# Patient Record
Sex: FEMALE | Race: WHITE | Employment: OTHER | URBAN - METROPOLITAN AREA
[De-identification: names, ages, dates, MRNs, and addresses within clinical notes are randomized per-mention and may not be internally consistent; named-entity substitution may affect disease eponyms.]

---

## 2017-01-18 ENCOUNTER — OFFICE VISIT (OUTPATIENT)
Dept: FAMILY MEDICINE | Facility: CLINIC | Age: 67
End: 2017-01-18

## 2017-01-18 VITALS
RESPIRATION RATE: 18 BRPM | SYSTOLIC BLOOD PRESSURE: 106 MMHG | DIASTOLIC BLOOD PRESSURE: 71 MMHG | TEMPERATURE: 98 F | HEART RATE: 65 BPM

## 2017-01-18 DIAGNOSIS — R42 VERTIGO: ICD-10-CM

## 2017-01-18 DIAGNOSIS — K59.09 OTHER CONSTIPATION: ICD-10-CM

## 2017-01-18 DIAGNOSIS — K59.09 OTHER CONSTIPATION: Primary | ICD-10-CM

## 2017-01-18 LAB
ANION GAP SERPL CALCULATED.3IONS-SCNC: 7 MMOL/L (ref 3–14)
BASOPHILS # BLD AUTO: 0.1 10E9/L (ref 0–0.2)
BASOPHILS NFR BLD AUTO: 0.8 %
BUN SERPL-MCNC: 11 MG/DL (ref 7–30)
CALCIUM SERPL-MCNC: 8.7 MG/DL (ref 8.5–10.1)
CHLORIDE SERPL-SCNC: 107 MMOL/L (ref 94–109)
CO2 SERPL-SCNC: 27 MMOL/L (ref 20–32)
CREAT SERPL-MCNC: 0.73 MG/DL (ref 0.52–1.04)
DIFFERENTIAL METHOD BLD: NORMAL
EOSINOPHIL # BLD AUTO: 0.2 10E9/L (ref 0–0.7)
EOSINOPHIL NFR BLD AUTO: 2.1 %
ERYTHROCYTE [DISTWIDTH] IN BLOOD BY AUTOMATED COUNT: 13.8 % (ref 10–15)
GFR SERPL CREATININE-BSD FRML MDRD: 80 ML/MIN/1.7M2
GLUCOSE SERPL-MCNC: 85 MG/DL (ref 70–99)
HCT VFR BLD AUTO: 38.3 % (ref 35–47)
HGB BLD-MCNC: 12.8 G/DL (ref 11.7–15.7)
IMM GRANULOCYTES # BLD: 0 10E9/L (ref 0–0.4)
IMM GRANULOCYTES NFR BLD: 0.3 %
LYMPHOCYTES # BLD AUTO: 2 10E9/L (ref 0.8–5.3)
LYMPHOCYTES NFR BLD AUTO: 27.8 %
MCH RBC QN AUTO: 31 PG (ref 26.5–33)
MCHC RBC AUTO-ENTMCNC: 33.4 G/DL (ref 31.5–36.5)
MCV RBC AUTO: 93 FL (ref 78–100)
MONOCYTES # BLD AUTO: 0.5 10E9/L (ref 0–1.3)
MONOCYTES NFR BLD AUTO: 6.7 %
NEUTROPHILS # BLD AUTO: 4.5 10E9/L (ref 1.6–8.3)
NEUTROPHILS NFR BLD AUTO: 62.3 %
NRBC # BLD AUTO: 0 10*3/UL
NRBC BLD AUTO-RTO: 0 /100
PLATELET # BLD AUTO: 249 10E9/L (ref 150–450)
POTASSIUM SERPL-SCNC: 4.2 MMOL/L (ref 3.4–5.3)
RBC # BLD AUTO: 4.13 10E12/L (ref 3.8–5.2)
SODIUM SERPL-SCNC: 141 MMOL/L (ref 133–144)
TSH SERPL DL<=0.005 MIU/L-ACNC: 0.76 MU/L (ref 0.4–4)
WBC # BLD AUTO: 7.2 10E9/L (ref 4–11)

## 2017-01-18 RX ORDER — MECLIZINE HCL 12.5 MG 12.5 MG/1
12.5 TABLET ORAL 3 TIMES DAILY PRN
Qty: 30 TABLET | Refills: 1 | Status: SHIPPED | OUTPATIENT
Start: 2017-01-18 | End: 2018-05-09

## 2017-01-18 ASSESSMENT — PAIN SCALES - GENERAL: PAINLEVEL: MILD PAIN (3)

## 2017-01-18 NOTE — NURSING NOTE
Chief Complaint   Patient presents with     Dizziness     Patient is here to discuss ongoing dizzy spells     Rowena Ghotra CMA 3:24 PM on 1/18/2017.

## 2017-01-18 NOTE — PROGRESS NOTES
"SUBJECTIVE:    Pt is a 66 year old female with pmh of     Patient Active Problem List   Diagnosis     Transient cerebral ischemia     Carotid artery dissection (H)     IBS (irritable bowel syndrome)     Screen for colon cancer     Visit for screening mammogram     Encounter for routine gynecological examination     Hypothyroidism     CARDIOVASCULAR SCREENING; LDL GOAL LESS THAN 130     Rash and other nonspecific skin eruption     High cholesterol     Vaginal atrophy     Blepharitis       who is here for evaluation of had concerns including Dizziness.    Here for a new issue.  Dizzy, vertigo type.  Five days.  No head trauma, no recent start/stop meds or supplements.  H/o carotid dissection.  No visual or hearing changes with.   No falls.  No associated URI; some sinus congestion for a few days before started.  Worse if moves head.  No near syncope or syncope    Allergies   Allergen Reactions     Aspirin GI Disturbance     GI upset     Bee Venom Swelling     Birds [Feathers]      Cats      Dust Mites      Fluoride Other (See Comments)     headaches     Mold      No Clinical Screening - See Comments Other (See Comments)     Some antibiotics but not cipro     Novocain [Procaine] Other (See Comments)     headache     Penicillins Unknown     As a child     Seasonal Allergies      Ragweed, pollen     Simvastatin Other (See Comments)     \"hot flash\", muscle weakness, dizziness     Sulfa Drugs Itching     Some associated nausea.        Current Outpatient Prescriptions   Medication Sig Dispense Refill     meclizine (ANTIVERT) 12.5 MG tablet Take 1 tablet (12.5 mg) by mouth 3 times daily as needed for dizziness 30 tablet 1     fluocinonide (LIDEX) 0.05 % external solution Apply topically 2 times daily To scalp as needed. 60 mL 1     albuterol (PROAIR HFA, PROVENTIL HFA, VENTOLIN HFA) 108 (90 BASE) MCG/ACT inhaler Inhale 2 puffs into the lungs every 6 hours as needed for shortness of breath / dyspnea or wheezing 1 Inhaler 1 "     estradiol (ESTRACE) 0.1 MG/GM vaginal cream Place 0.5 g vaginally twice a week       peppermint oil liquid 1 mL daily as needed for other Taking gel tabs not oil, not in EMR.       Hypromellose (ARTIFICIAL TEARS OP) Apply 1 drop to eye daily as needed       EPINEPHrine (EPIPEN) 0.3 MG/0.3ML injection Inject 0.3 mLs (0.3 mg) into the muscle once as needed for anaphylaxis 1 each 3     thyroid (ARMOUR THYROID) 60 MG tablet Take 45 mg by mouth daily       PROGESTERONE 50MG CAPSULES COMPOUND 150 mg At Bedtime 1 capsules in the morning, in addition to bedtime dose.       DiphenhydrAMINE HCl (BENADRYL ALLERGY PO) Take  by mouth as needed. allergies       simethicone (GAS-X EXTRA STRENGTH) 125 MG CHEW Take 2 tablets by mouth 2 times daily.       Fiber POWD Take 1 teaspoonful by mouth daily. Mix with slippery elm.       MAGNESIUM OXIDE PO Take 6-10 tablets by mouth daily.       Lactobacillus (ACIDOPHILUS) CAPS Take 1 capsule by mouth daily.       Charcoal Activated (CHARCOAL PO) Take 6 capsules by mouth daily as needed. For indigestion       Digestive Enzymes TABS Use as directed       Omega-3 Fatty Acids (FISH OIL PO) Take 1 capsule by mouth daily. Plus Vit E       HYDROCHLORIC ACID Take as directed       Cholecalciferol (VITAMIN D) 2000 UNIT tablet Take 1 tablet by mouth daily.         Social History   Substance Use Topics     Smoking status: Never Smoker      Smokeless tobacco: Never Used     Alcohol Use: No         OBJECTIVE:  /71 mmHg  Pulse 65  Temp(Src) 98  F (36.7  C) (Oral)  Resp 18  Breastfeeding? No  GENERAL APPEARANCE: Alert, no acute distress  EYES: PERRL, EOM normal, conjunctiva and lids normal  HENT: Ears and TMs normal, oral mucosa and posterior oropharynx normal  NECK: No adenopathy,masses or thyromegaly  CV: normal rate, regular rhythm, no murmur or gallop  NEURO: Alert, oriented, speech and mentation normal--no nystagmus on lateral gaze, normal romberg and gait  PSYCHE: mentation appears  normal, affect and mood normal    ASSESSMENT/PLAN:    New vertigo h/o carotid dissection  Labs    Prn meclizine  ENT consult  Call if worse    CAL PITTMAN MD

## 2017-01-18 NOTE — MR AVS SNAPSHOT
After Visit Summary   1/18/2017    Jennifer Steiner    MRN: [id number]           Patient Information     Date Of Birth          1950        Visit Information        Provider Department      1/18/2017 3:05 PM Milan Alvarez MD [facility]        Today's Diagnoses     Other constipation    -  1     Vertigo           Care Instructions    Primary Care Center Medication Refill Request Information:  * Please contact your pharmacy regarding ANY request for medication refills.  ** [facility] Prescription Fax = [phone]  * Please allow 3 business days for routine medication refills.  * Please allow 5 business days for controlled substance medication refills.     Primary Care Center Test Result notification information:  *You will be notified with in 7-10 days of your appointment day regarding the results of your test.  If you are on MyChart you will be notified as soon as the provider has reviewed the results and signed off on them.  MRI Screening Tool    Does the patient have any metals in their body? no  Is the patient claustrophobic? no  Does the patient need sedation? no  Is the patient able to transport themself to a table? yes            Follow-ups after your visit        Your next 10 appointments already scheduled     Jan 18, 2017  4:15 PM   LAB with  LAB    [facility] ([facility])    [address] [place] [id number] [phone]           Patient must bring picture ID.  Patient should be prepared to give a urine specimen  Please do not eat 10-12 hours before your appointment if you are coming in fasting for labs on lipids, cholesterol, or glucose (sugar).  Pregnant women should follow their Care Team instructions. Water with medications is okay. Do not drink coffee or other fluids.   If you have concerns about taking  your medications, please ask at office or if scheduling via [employer], send a message by clicking on Secure  Messaging, Message Your Care Team.            Jan 20, 2017  3:00 PM   MR BRAIN FOR STROKE COMPLETE with [id number]   [facility] MRI ([facility])    [address] [facility] [address] [phone]           Take your medicines as usual, unless your doctor tells you not to. Bring a list of your current medicines to your exam (including vitamins, minerals and over-the-counter drugs). Also bring the results of similar scans you may have had.  Please remove any body piercings and hair extensions before you arrive.  Follow your doctor s orders. If you do not, we may have to postpone your exam.  You will not have contrast for this exam. You do not need to do anything special to prepare.  The MRI machine uses a strong magnet. Please wear clothes without metal (snaps, zippers). A sweatsuit works well, or we may give you a hospital gown.   **IMPORTANT** THE INSTRUCTIONS BELOW ARE ONLY FOR THOSE PATIENTS WHO HAVE BEEN TOLD THEY WILL RECEIVE SEDATION OR GENERAL ANESTHESIA DURING THEIR MRI PROCEDURE:  IF YOU WILL RECEIVE SEDATION (take medicine to help you relax during your exam):   You must get the medicine from your doctor before you arrive. Bring the medicine to the exam. Do not take it at home.   Arrive one hour early. Bring someone who can take you home after the test. Your medicine will make you sleepy. After the exam, you may not drive, take a bus or take a taxi by yourself.   No eating 8 hours before your exam. You may have clear liquids up until 4 hours before your exam. (Clear liquids include water, clear tea, black coffee and fruit juice without pulp.)  IF YOU WILL RECEIVE ANESTHESIA (be asleep for your exam):   Arrive 1 1/2 hours early. Bring someone who can take you home after the test. You may not drive, take a bus or take a taxi by yourself.   No eating 8 hours before your exam. You may have clear liquids up until 4 hours before your exam. (Clear  liquids include water, clear tea, black coffee and fruit juice without pulp.)   You will spend four to five hours in the recovery room.  Please call the Imaging Department at your exam site with any questions.            2017  4:20 PM   (Arrive by 4:05 PM)   Return Visit with Thelma Arvizu MD   [facility] ([facility])    [address] [place] [id number] [phone]              Future tests that were ordered for you today     Open Future Orders        Priority Expected Expires Ordered    CBC with platelets differential Routine  2018    TSH with free T4 reflex Routine  2018    Basic metabolic panel Routine  2018    MR Brain for Stroke Cmpl w/o & w Contras Routine  2018            Who to contact     Please call your clinic at [phone] to:    Ask questions about your health    Make or cancel appointments    Discuss your medicines    Learn about your test results    Speak to your doctor   If you have compliments or concerns about an experience at your clinic, or if you wish to file a complaint, please contact [facility] Patient Relations at [phone] or email us at [email or web address]         Additional Information About Your Visit        Notion Systemshart Information     [email or web address] is an electronic gateway that provides easy, online access to your medical records. With [email or web address], you can request a clinic appointment, read your test results, renew a prescription or communicate with your care team.     To sign up for [facility] visit the website at [email or web address]   You will be asked to enter the access code listed below, as well as some personal information. Please follow the directions to create your username and password.     Your access code is: [email or web address]  Expires: 2017  6:30 AM     Your access code will   in 90 days. If you need help or a new code, please contact your [facility] or call [phone] for assistance.        Care EveryWhere ID     This is your Care EveryWhere ID. This could be used by other organizations to access your [facility] medical records  [id number]        Your Vitals Were     Pulse Temperature Respirations Breastfeeding?          65 98  F (36.7  C) (Oral) 18 No         Blood Pressure from Last 3 Encounters:   01/18/17 106/71   06/01/16 112/68   02/17/16 118/72    Weight from Last 3 Encounters:   07/13/16 65.772 kg (145 lb)   06/01/16 68.04 kg (150 lb)   11/18/15 69.491 kg (153 lb 3.2 oz)                 Today's Medication Changes          These changes are accurate as of: 1/18/17  4:06 PM.  If you have any questions, ask your nurse or doctor.               Start taking these medicines.        Dose/Directions    meclizine 12.5 MG tablet   Commonly known as:  ANTIVERT   Used for:  Vertigo   Started by:  Milan Alvarez MD        Dose:  12.5 mg   Take 1 tablet (12.5 mg) by mouth 3 times daily as needed for dizziness   Quantity:  30 tablet   Refills:  1            Where to get your medicines      These medications were sent to [facility] [address] [place] [address] AT [address] [place] [id number]    Hours:  24-hours Phone:  [phone]    - meclizine 12.5 MG tablet             Primary Care Provider Office Phone # Fax #    Milan Alvarez [phone]       [facility] [address] [place] [id number]        Thank you!     Thank you for choosing [facility]  for your care. Our goal is always to provide you with excellent care. Hearing back from our patients is one way we can continue to improve our services. Please take a few minutes to complete the written survey that you may receive in the mail after your visit with us. Thank you!              Your Updated Medication List - Protect others around you: Learn how to safely use, store and throw away your medicines at www.disposemymeds.org.          This list is accurate as of: 1/18/17  4:06 PM.  Always use your most recent med list.                   Brand Name Dispense Instructions for use    acidophilus Caps      Take 1 capsule by mouth daily.       albuterol 108 (90 BASE) MCG/ACT Inhaler    PROAIR HFA/PROVENTIL HFA/VENTOLIN HFA    1 Inhaler    Inhale 2 puffs into the lungs every 6 hours as needed for shortness of breath / dyspnea or wheezing       ARMOUR THYROID 60 MG tablet   Generic drug:  thyroid      Take 45 mg by mouth daily       ARTIFICIAL TEARS OP      Apply 1 drop to eye daily as needed       BENADRYL ALLERGY PO      Take  by mouth as needed. allergies       CHARCOAL PO      Take 6 capsules by mouth daily as needed. For indigestion       Digestive Enzymes Tabs      Use as directed       EPINEPHrine 0.3 MG/0.3ML injection    EPIPEN    1 each    Inject 0.3 mLs (0.3 mg) into the muscle once as needed for anaphylaxis       estradiol 0.1 MG/GM cream    ESTRACE     Place 0.5 g vaginally twice a week       Fiber Powd      Take 1 teaspoonful by mouth daily. Mix with slippery elm.       FISH OIL PO      Take 1 capsule by mouth daily. Plus Vit E       fluocinonide 0.05 % solution    LIDEX    60 mL    Apply topically 2 times daily To scalp as needed.       GAS-X EXTRA STRENGTH 125 MG Chew chewable tablet   Generic drug:  simethicone      Take 2 tablets by mouth 2 times daily.       HYDROCHLORIC ACID      Take as directed       MAGNESIUM OXIDE PO      Take 6-10 tablets by mouth daily.       meclizine 12.5 MG tablet    ANTIVERT    30 tablet    Take 1 tablet (12.5 mg) by mouth 3 times daily as needed for dizziness       peppermint oil oil      1 mL daily as needed for other Taking gel tabs not oil, not in EMR.       PROGESTERONE 50MG CAPSULES COMPOUND      150 mg At Bedtime 1 capsules in the morning, in  addition to bedtime dose.       vitamin D 2000 UNITS tablet      Take 1 tablet by mouth daily.

## 2017-01-20 ENCOUNTER — TELEPHONE (OUTPATIENT)
Dept: NURSING | Facility: CLINIC | Age: 67
End: 2017-01-20

## 2017-01-20 ENCOUNTER — TELEPHONE (OUTPATIENT)
Dept: INTERNAL MEDICINE | Facility: CLINIC | Age: 67
End: 2017-01-20

## 2017-01-20 ENCOUNTER — HOSPITAL ENCOUNTER (OUTPATIENT)
Dept: MRI IMAGING | Facility: CLINIC | Age: 67
Discharge: HOME | End: 2017-01-20
Attending: FAMILY MEDICINE | Admitting: FAMILY MEDICINE
Payer: MEDICARE

## 2017-01-20 DIAGNOSIS — R42 VERTIGO: Primary | ICD-10-CM

## 2017-01-20 DIAGNOSIS — R42 VERTIGO: ICD-10-CM

## 2017-01-20 PROCEDURE — 25500064 ZZH RX 255 OP 636: Performed by: FAMILY MEDICINE

## 2017-01-20 PROCEDURE — 70553 MRI BRAIN STEM W/O & W/DYE: CPT

## 2017-01-20 PROCEDURE — 70549 MR ANGIOGRAPH NECK W/O&W/DYE: CPT

## 2017-01-20 PROCEDURE — A9585 GADOBUTROL INJECTION: HCPCS | Performed by: FAMILY MEDICINE

## 2017-01-20 RX ORDER — GADOBUTROL 604.72 MG/ML
7.5 INJECTION INTRAVENOUS ONCE
Status: COMPLETED | OUTPATIENT
Start: 2017-01-20 | End: 2017-01-20

## 2017-01-20 RX ADMIN — GADOBUTROL 7.5 ML: 604.72 INJECTION INTRAVENOUS at 15:50

## 2017-01-20 NOTE — TELEPHONE ENCOUNTER
"Jennifer called the nurse triage line 01/20/2017 @ 0300. She says that the ENT clinic does not have the referral sent by Dr. Alvarez. She thinks she could get a sooner appointment if she gets a referral. She seems to be somewhat confused. Sometimes saying that she can not make an ENT appointment, other times saying that they (ENT clinic)  just won't call her back. Jennifer says she \"slips through the cracks\" and rarely gets calls back. I advised her to call back to the ENT clinic today during office hours, but she insisted that she needs to speak with someone from Dr. Hollins office first.  She is requesting a call back from someone from Dr. Alvarez's office to discuss her ENT referral. Call her at [phone] to clarify. Thank you.     Reshma Medina RN FNA    Routed to: Dr. Alvarez and RN pool  "

## 2017-01-20 NOTE — TELEPHONE ENCOUNTER
Call Type: Triage Call    Presenting Problem: Jennifer says her [facility] MD was going to call her to  make a ENT referral for her. She says the ENT clinic can not  schedule without it. Message sent to [facility] MD, Dr. Alvarez.  Triage Note:  Guideline Title: Information Only Call; No Symptom Triage (Adult)  Recommended Disposition: Provide Information or Advice Only  Original Inclination: Wanted to speak with a nurse  Override Disposition:  Intended Action: Follow advice given  Physician Contacted: No  Follow-up call to recent contact; no triage required. Information provided from  past call documentation, approved references or experience. ?  YES  Requesting regular office appointment ? NO  Sign(s) or symptom(s) associated with a diagnosed condition or with a new illness  ? NO  Requesting information about provider, services or community resources ? NO  Call back to complete assessment/clarification of information from prior caller to  complete triage ? NO  Requesting information and provider is best resource; no triage required. ? NO  Caller not with patient and is unable to provide clinical information about  patient to facilitate triage. ? NO  Requesting provider information for recently scheduled test, procedure; no triage  required. Needed information not available per approved resources or clinical  experience. ? NO  Requesting information not available per approved reference or clinical  experience; no triage required. ? NO  Requesting information regarding scheduled exam, test or procedure; no triage  required. Information provided from approved resources or clinical experience. ?  NO  General information question; no triage required. Information provided from  approved references or knowledge of organization. ? NO  Health information question; person denies any symptoms, no triage required.  Information provided from approved references or clinical experience. ? NO  Physician Instructions:  Care Advice:

## 2017-02-06 ENCOUNTER — TELEPHONE (OUTPATIENT)
Dept: OTOLARYNGOLOGY | Facility: CLINIC | Age: 67
End: 2017-02-06

## 2017-02-06 NOTE — TELEPHONE ENCOUNTER
Per Call Center:Pt stated she had an audiogram done 1-2 years ago, I explained that she will still need one done here since we have never seen her and we need an updated audiogram. Pt wants to cancel audiogram but wanted to talk to a nurse first before doing so. She also wanted to know if she can be seen sooner than scheduled because she feels like her vertigo is going away. Pt can be reached at [phone], thanks.     Patient has been seen by Dr Washington for eval of potential slient sinus- in 2014- this was negative at that time.  Dr Nissen saw with audio in 2012 for dizziness. She also saw Neurology

## 2017-02-10 ENCOUNTER — TELEPHONE (OUTPATIENT)
Dept: DERMATOLOGY | Facility: CLINIC | Age: 67
End: 2017-02-10

## 2017-02-10 DIAGNOSIS — R42 VERTIGO: Primary | ICD-10-CM

## 2017-02-10 NOTE — TELEPHONE ENCOUNTER
----- Message from Eulaliajackie Burgos sent at 2/10/2017  8:36 AM CST -----  Regarding: PT HOPING TO BE SEEN ON 2/14 FOR CHANGING SPOT ON CHEST   Contact: [phone]  Pt has spot on chest that is changing in size quickly and would like to be seen on 2/14 if possible when she is here for some other appointments. Pt has vertigo and has a hard time going out and would like to be fit in that day if possible while she [place] [facility]. I checked schedules and wasn't able to pull up any availability. Pt asked if a message could be sent to see if she could be fit in.    Please give Pt a call back at [phone]. Okay to leave message.    Thank you,    Eulalia  Call Center    Please DO NOT send message and or reply back to sender. Call center Representatives DO NOT respond to Messages.

## 2017-02-10 NOTE — TELEPHONE ENCOUNTER
Called patient regarding getting an appointment for 2/14. Jennifer didn't answer but I did leave a message for her to call back. Dr. Bonilla has a SBA slot available on 2/14 @ 9:00

## 2017-02-13 NOTE — TELEPHONE ENCOUNTER
"Recommend repeat audio.Last seen in 2012 for dizziness, at that time, there was no otologic     Dr Nissen note/ plan \" I talked with the patient today for some time and went over the audiogram with her and usual symptoms and associated auditory symptoms we will see with dizziness from an otologic standpoint. She does not sound characteristic at all for dizziness emanating from the vestibular system. I think with her history of the carotid artery and some stroke symptoms in the past, I would encourage her to keep her neurology appointment later today. Again, I do not think this is related to otologic entity. \"    Recent MRI 1/20/2017:Impression:  1. No acute infarct.  2. Head MRA demonstrates no aneurysm or stenosis of the major  intracranial arteries.  3. Neck MRA demonstrates patent major cervical arteries. No evidence  of dissection.  4. Mild chronic small vessel ischemic disease.  "

## 2017-02-14 ENCOUNTER — OFFICE VISIT (OUTPATIENT)
Dept: AUDIOLOGY | Facility: CLINIC | Age: 67
End: 2017-02-14

## 2017-02-14 ENCOUNTER — OFFICE VISIT (OUTPATIENT)
Dept: OTOLARYNGOLOGY | Facility: CLINIC | Age: 67
End: 2017-02-14

## 2017-02-14 DIAGNOSIS — R42 DIZZINESS: Primary | ICD-10-CM

## 2017-02-14 DIAGNOSIS — H61.23 EXCESSIVE CERUMEN IN BOTH EAR CANALS: ICD-10-CM

## 2017-02-14 DIAGNOSIS — R42 DIZZINESS AND GIDDINESS: Primary | ICD-10-CM

## 2017-02-14 ASSESSMENT — PAIN SCALES - GENERAL: PAINLEVEL: NO PAIN (1)

## 2017-02-14 NOTE — LETTER
"2/14/2017       RE: Jennifer Steiner  [address]  [facility] [address]     Dear Colleague,    Thank you for referring your patient, Jennifer Steiner, to the [facility] EAR NOSE AND THROAT at [facility]. Please see a copy of my visit note below.    Dear Milan Paredes:    I had the pleasure of meeting Jennifer Steiner in consultation today at the [facility] at your request.    HISTORY OF PRESENT ILLNESS: The patient is a 66-year-old in today for assessment of dizziness.  On January 13th she had a severe episode of spinning for a couple hours.  There is no nausea or vomiting.  She was able to get to an acupuncture treatment  later that day.  She has had mild off balance that has slowly been getting better since then.  Seems to be in all positions but worse when she is up.  She also went to the chiropractor.  She is not really aware of any hearing change or fluctuation.  She does have bilateral tinnitus.  There is no pain or drainage in the ears.  She does have occasional pressure.  Off balance is still present but slowly getting better.           ALLERGIES:    Allergies   Allergen Reactions     Aspirin GI Disturbance     GI upset     Bee Venom Swelling     Birds [Feathers]      Cats      Dust Mites      Fluoride Other (See Comments)     headaches     Mold      No Clinical Screening - See Comments Other (See Comments)     Some antibiotics but not cipro     Novocain [Procaine] Other (See Comments)     headache     Penicillins Unknown     As a child     Seasonal Allergies      Ragweed, pollen     Simvastatin Other (See Comments)     \"hot flash\", muscle weakness, dizziness     Sulfa Drugs Itching       HABITS:   Alcohol use No    History   Smoking Status     Never Smoker   Smokeless Tobacco     Never Used         PAST MEDICAL HISTORY: Please see today's intake form (for the remainder of the PMH) which I reviewed and signed.  Past " Medical History   Diagnosis Date     Anemia      Asthma      since childhood     Back injury      Blepharitis      Constipation, chronic      Chronic enema use     CVA (cerebral infarction) 2009     carotid intimal repair, left     Hyperlipidemia age 58     identified age 58     Hypothyroidism      Immune disorder (H)      Irritable bowel disease      Lung disease      Neck injuries      Pelvic floor dysfunction      Postmenopausal hormone replacement therapy      From Dr. Link     Reduced vision      Scoliosis      Shortness of breath        FAMILY HISTORY/SOCIAL HISTORY:   Family History   Problem Relation Age of Onset     DIABETES Father      GASTROINTESTINAL DISEASE Father 82      after surgery for Bowel obstruction     Alcohol/Drug Brother      GASTROINTESTINAL DISEASE Brother      Hepatitis     HEART DISEASE Brother 62     hx of drug abuse,  age 62     DIABETES Brother      Skin Cancer Brother      Allergies Brother      Anesthesia Reaction No family hx of      Colon Cancer No family hx of      Crohn Disease No family hx of      Ulcerative Colitis No family hx of      Colon Polyps No family hx of      Glaucoma No family hx of      Macular Degeneration No family hx of      CANCER Sister 56     bone cancer     Melanoma Mother      Skin Cancer Mother      Anxiety Disorder Sister      Substance Abuse Sister      Substance Abuse Brother      Anesthesia Reaction Brother      Asthma Brother      CANCER Sister      DIABETES Brother      OSTEOPOROSIS Mother      Obesity Father      Obesity Brother      Alcoholism Sister      Alcoholism Brother      Bone Cancer Sister     Social History     Social History     Marital status: Single     Spouse name: N/A     Number of children: N/A     Years of education: N/A     Occupational History     disability           Social History Main Topics     Smoking status: Never Smoker     Smokeless tobacco: Never Used     Alcohol use No     Drug use: No      Sexual activity: No     Other Topics Concern     Not on file     Social History Narrative       REVIEW OF SYSTEMS: Please see today's intake form (for the remainder of the ROS) which I have reviewed and signed.    PHYSICIAL EXAMINATION:  Constitutional: The patient was well-groomed and in no acute distress.   Skin: Warm and pink.  Psychiatric: The patient's affect was calm, cooperative, and appropriate.   Respiratory: Breathing comfortably without stridor or exertion of accessory muscles.  Eyes: Pupils were equal and reactive. Extraocular movement intact.   Head: Normocephalic and atraumatic. No lesions or scars.  Ears: Both ears were examined under the microscope for microscopic assessment and cleaning.  The right side was cleaned of cerumen with curette.  Tympanic membrane and middle ear look normal after cleaning.  The opposite ear was cleaned and examined using the microscope, curettes and similar techniques.  Tympanic membrane and middle ear look normal after cleaning.     Nose: Sinuses were nontender. Anterior rhinoscopy revealed midline septum and absence of purulence or polyps.  Oral Cavity: Normal tongue, floor of moth, buccal mucosa, and palate. No lesions or masses on inspection or palpation. No abnormal lymph tissue in the oropharynx.   Neck: The parotid is soft without masses. Supple with normal laryngeal and tracheal landmarks.   Lymphatic: There is no palpable lymphadenopathy or other masses in the neck.   Neurologic: Alert and oriented x 3. Cranial nerves III-XI within normal limits. Voice quality normal.  Cerebellar Function Tests:  Grossly normal    Audiogram:  AUDIOGRAM:  Audiogram performed shows a bilateral high-frequency sensorineural hearing loss, slightly worse on the left above 3000 Hz.  Good discrimination at 100% in each ear.           IMPRESSION AND PLAN: I talked with the patient for some time and I suspect she may have had a labyrinthitis.  In any event, it is slowly improving.  Just  1-time episode of spinning.  I would just monitor for now and if things do not clear completely she will return for further testing.         Again, thank you for allowing me to participate in the care of your patient.      Sincerely,    Rick L. Nissen, MD

## 2017-02-14 NOTE — NURSING NOTE
Chief Complaint   Patient presents with     RECHECK     dizziness, vertigo. Seen in audiology. Declined height and weight.     Haresh Wilson LPN

## 2017-02-14 NOTE — PATIENT INSTRUCTIONS
Plan of Care:  1. Follow up with Dr. Nissen as needed.    Clinic Information:  1. To schedule an appointment please call [phone], option 1  2. To talk to a Triage RN please call [phone] select option 3    Marybeth Alex RN  [facility] ENT   Head & Neck Surgery

## 2017-02-14 NOTE — MR AVS SNAPSHOT
After Visit Summary   2/14/2017    Jennifer Steiner    MRN: [id number]           Patient Information     Date Of Birth          1950        Visit Information        Provider Department      2/14/2017 2:00 PM Nissen, Rick L, MD [facility] Ear Nose and Throat        Today's Diagnoses     Dizziness    -  1    Excessive cerumen in both ear canals          Care Instructions    Plan of Care:  1. Follow up with Dr. Nissen as needed.    Clinic Information:  1. To schedule an appointment please call [phone], option 1  2. To talk to a Triage RN please call [phone] select option 3    Marybeth Alex RN  [facility] ENT   Head & Neck Surgery             Follow-ups after your visit        Your next 10 appointments already scheduled     Feb 21, 2017  3:00 PM CST   RETURN GENERAL with Damir Cunningham MD   Eye Clinic ([facility])    Oral Michelle [facility]  [address] [address]  [address] [address]  [facility] [address] [phone]            Mar 28, 2017  3:30 PM CDT   (Arrive by 3:15 PM)   RETURN NEUROTOLOGY with Rick L Nissen, MD   [facility] Ear Nose and Throat ([facility])    [address] [place] [address] [phone]            Apr 05, 2017  4:20 PM CDT   (Arrive by 4:05 PM)   Return Visit with Thelma Arvizu MD   [facility] Gastroenterology and IBD ([facility])    [address] [place] [address] [phone]              Who to contact     Please call your clinic at [phone] to:    Ask questions about your health    Make or cancel appointments    Discuss your medicines    Learn about your test results    Speak to your doctor   If you have compliments or concerns about an experience at your clinic, or if you wish to file a complaint, please contact [facility] Patient Relations at [phone] or email us at  [email or web address]         Additional Information About Your Visit        LokalitehariSkoot Information     GapJumperst is an electronic gateway that provides easy, online access to your medical records. With [email or web address], you can request a clinic appointment, read your test results, renew a prescription or communicate with your care team.     To sign up for [email or web address] visit the website at [email or web address]   You will be asked to enter the access code listed below, as well as some personal information. Please follow the directions to create your username and password.     Your access code is: [email or web address]  Expires: 2017  6:30 AM     Your access code will  in 90 days. If you need help or a new code, please contact your [facility] or call [phone] for assistance.        Care EveryWhere ID     This is your Care EveryWhere ID. This could be used by other organizations to access your [place] medical records  [id number]         Blood Pressure from Last 3 Encounters:   17 106/71   16 112/68   16 118/72    Weight from Last 3 Encounters:   16 65.8 kg (145 lb)   16 68 kg (150 lb)   11/18/15 69.5 kg (153 lb 3.2 oz)              We Performed the Following     BINOCULAR MICROSCOPY        Primary Care Provider Office Phone # Fax #    Milan Alvarez [phone]       [facility] [address] [place] [address]        Thank you!     Thank you for choosing [facility] EAR NOSE AND THROAT  for your care. Our goal is always to provide you with excellent care. Hearing back from our patients is one way we can continue to improve our services. Please take a few minutes to complete the written survey that you may receive in the mail after your visit with us. Thank you!             Your Updated Medication List - Protect others around you: Learn how to safely use, store and throw away your medicines at  www.disposemymeds.org.          This list is accurate as of: 2/14/17 11:59 PM.  Always use your most recent med list.                   Brand Name Dispense Instructions for use    acidophilus Caps      Take 1 capsule by mouth daily.       albuterol 108 (90 BASE) MCG/ACT Inhaler    PROAIR HFA/PROVENTIL HFA/VENTOLIN HFA    1 Inhaler    Inhale 2 puffs into the lungs every 6 hours as needed for shortness of breath / dyspnea or wheezing       ARMOUR THYROID 60 MG tablet   Generic drug:  thyroid      Take 45 mg by mouth daily       ARTIFICIAL TEARS OP      Apply 1 drop to eye daily as needed       BENADRYL ALLERGY PO      Take  by mouth as needed. allergies       CHARCOAL PO      Take 6 capsules by mouth daily as needed. For indigestion       Digestive Enzymes Tabs      Use as directed       EPINEPHrine 0.3 MG/0.3ML injection    EPIPEN    1 each    Inject 0.3 mLs (0.3 mg) into the muscle once as needed for anaphylaxis       estradiol 0.1 MG/GM cream    ESTRACE     Place 0.5 g vaginally twice a week       Fiber Powd      Take 1 teaspoonful by mouth daily. Mix with slippery elm.       FISH OIL PO      Take 1 capsule by mouth daily. Plus Vit E       fluocinonide 0.05 % solution    LIDEX    60 mL    Apply topically 2 times daily To scalp as needed.       GAS-X EXTRA STRENGTH 125 MG Chew chewable tablet   Generic drug:  simethicone      Take 2 tablets by mouth 2 times daily.       HYDROCHLORIC ACID      Take as directed       MAGNESIUM OXIDE PO      Take 6-10 tablets by mouth daily.       meclizine 12.5 MG tablet    ANTIVERT    30 tablet    Take 1 tablet (12.5 mg) by mouth 3 times daily as needed for dizziness       peppermint oil oil      1 mL daily as needed for other Taking gel tabs not oil, not in EMR.       PROGESTERONE 50MG CAPSULES COMPOUND      150 mg At Bedtime 1 capsules in the morning, in addition to bedtime dose.       vitamin D 2000 UNITS tablet      Take 1 tablet by mouth daily.

## 2017-02-14 NOTE — MR AVS SNAPSHOT
After Visit Summary   2/14/2017    Jennifer Steiner    MRN: [id number]           Patient Information     Date Of Birth          1950        Visit Information        Provider Department      2/14/2017 12:00 PM Betty Ennis AuD [facility] Audiology        Today's Diagnoses     Dizziness and giddiness    -  1       Follow-ups after your visit        Your next 10 appointments already scheduled     Feb 21, 2017  3:00 PM CST   RETURN GENERAL with Damir Cunningham MD   Eye Clinic ([facility])    Oral Michelle [facility]  [address] [facility]  [address] Clin [address]  [facility] [id number] [phone]            Apr 05, 2017  4:20 PM CDT   (Arrive by 4:05 PM)   Return Visit with Thelma Arvizu MD   [facility] Gastroenterology and IBD ([facility])    [address]  [address] [facility] [id number] [phone]              Who to contact     Please call your clinic at [phone] to:    Ask questions about your health    Make or cancel appointments    Discuss your medicines    Learn about your test results    Speak to your doctor   If you have compliments or concerns about an experience at your clinic, or if you wish to file a complaint, please contact [facility] Patient Relations at [phone] or email us at [email or web address]         Additional Information About Your Visit        MyChart Information     Buzzstarter Inct is an electronic gateway that provides easy, online access to your medical records. With Goodpatch, you can request a clinic appointment, read your test results, renew a prescription or communicate with your care team.     To sign up for Buzzstarter Inct visit the website at [email or web address]   You will be asked to enter the access code listed below, as well as some personal information. Please follow the directions to create your username and password.     Your access code is: [email or web address]  Expires:  2017  6:30 AM     Your access code will  in 90 days. If you need help or a new code, please contact your [facility] or call [phone] for assistance.        Care EveryWhere ID     This is your Care EveryWhere ID. This could be used by other organizations to access your [place] medical records  [id number]         Blood Pressure from Last 3 Encounters:   17 106/71   16 112/68   16 118/72    Weight from Last 3 Encounters:   16 65.8 kg (145 lb)   16 68 kg (150 lb)   11/18/15 69.5 kg (153 lb 3.2 oz)              We Performed the Following     AUDIOGRAM/TYMPANOGRAM - INTERFACE     [facility] Eval [facility] Speech Recog ([address])     Horace   ([id number])     Tymps / Reflex   ([id number])        Primary Care Provider Office Phone # Fax #    Milan Alvarez [phone]       PRIMARY [facility]ARE CENTER [address] [place] [address]        Thank you!     Thank you for choosing [facility] AUDIOLOGY  for your care. Our goal is always to provide you with excellent care. Hearing back from our patients is one way we can continue to improve our services. Please take a few minutes to complete the written survey that you may receive in the mail after your visit with us. Thank you!             Your Updated Medication List - Protect others around you: Learn how to safely use, store and throw away your medicines at www.disposemymeds.org.          This list is accurate as of: 17  2:17 PM.  Always use your most recent med list.                   Brand Name Dispense Instructions for use    acidophilus Caps      Take 1 capsule by mouth daily.       albuterol 108 (90 BASE) MCG/ACT Inhaler    PROAIR HFA/PROVENTIL HFA/VENTOLIN HFA    1 Inhaler    Inhale 2 puffs into the lungs every 6 hours as needed for shortness of breath / dyspnea or wheezing       ARMOUR THYROID 60 MG tablet   Generic drug:  thyroid      Take 45 mg by mouth daily        ARTIFICIAL TEARS OP      Apply 1 drop to eye daily as needed       BENADRYL ALLERGY PO      Take  by mouth as needed. allergies       CHARCOAL PO      Take 6 capsules by mouth daily as needed. For indigestion       Digestive Enzymes Tabs      Use as directed       EPINEPHrine 0.3 MG/0.3ML injection    EPIPEN    1 each    Inject 0.3 mLs (0.3 mg) into the muscle once as needed for anaphylaxis       estradiol 0.1 MG/GM cream    ESTRACE     Place 0.5 g vaginally twice a week       Fiber Powd      Take 1 teaspoonful by mouth daily. Mix with slippery elm.       FISH OIL PO      Take 1 capsule by mouth daily. Plus Vit E       fluocinonide 0.05 % solution    LIDEX    60 mL    Apply topically 2 times daily To scalp as needed.       GAS-X EXTRA STRENGTH 125 MG Chew chewable tablet   Generic drug:  simethicone      Take 2 tablets by mouth 2 times daily.       HYDROCHLORIC ACID      Take as directed       MAGNESIUM OXIDE PO      Take 6-10 tablets by mouth daily.       meclizine 12.5 MG tablet    ANTIVERT    30 tablet    Take 1 tablet (12.5 mg) by mouth 3 times daily as needed for dizziness       peppermint oil oil      1 mL daily as needed for other Taking gel tabs not oil, not in EMR.       PROGESTERONE 50MG CAPSULES COMPOUND      150 mg At Bedtime 1 capsules in the morning, in addition to bedtime dose.       vitamin D 2000 UNITS tablet      Take 1 tablet by mouth daily.

## 2017-02-14 NOTE — PROGRESS NOTES
"Dear Milan Paredes:    I had the pleasure of meeting Jennifer Steiner in consultation today at the [facility] Otolaryngology [facility] at your request.    HISTORY OF PRESENT ILLNESS: The patient is a 66-year-old in today for assessment of dizziness.  On January 13th she had a severe episode of spinning for a couple hours.  There is no nausea or vomiting.  She was able to get to an acupuncture treatment  later that day.  She has had mild off balance that has slowly been getting better since then.  Seems to be in all positions but worse when she is up.  She also went to the chiropractor.  She is not really aware of any hearing change or fluctuation.  She does have bilateral tinnitus.  There is no pain or drainage in the ears.  She does have occasional pressure.  Off balance is still present but slowly getting better.           ALLERGIES:    Allergies   Allergen Reactions     Aspirin GI Disturbance     GI upset     Bee Venom Swelling     Birds [Feathers]      Cats      Dust Mites      Fluoride Other (See Comments)     headaches     Mold      No Clinical Screening - See Comments Other (See Comments)     Some antibiotics but not cipro     Novocain [Procaine] Other (See Comments)     headache     Penicillins Unknown     As a child     Seasonal Allergies      Ragweed, pollen     Simvastatin Other (See Comments)     \"hot flash\", muscle weakness, dizziness     Sulfa Drugs Itching       HABITS:   Alcohol use No    History   Smoking Status     Never Smoker   Smokeless Tobacco     Never Used         PAST MEDICAL HISTORY: Please see today's intake form (for the remainder of the PMH) which I reviewed and signed.  Past Medical History   Diagnosis Date     Anemia      Asthma      since childhood     Back injury      Blepharitis      Constipation, chronic      Chronic enema use     CVA (cerebral infarction) 2009     carotid intimal repair, left     Hyperlipidemia age 58     identified age 58     Hypothyroidism      " Immune disorder (H)      Irritable bowel disease      Lung disease      Neck injuries      Pelvic floor dysfunction      Postmenopausal hormone replacement therapy      From Dr. Link     Reduced vision      Scoliosis      Shortness of breath        FAMILY HISTORY/SOCIAL HISTORY:   Family History   Problem Relation Age of Onset     DIABETES Father      GASTROINTESTINAL DISEASE Father 82      after surgery for Bowel obstruction     Alcohol/Drug Brother      GASTROINTESTINAL DISEASE Brother      Hepatitis     HEART DISEASE Brother 62     hx of drug abuse,  age 62     DIABETES Brother      Skin Cancer Brother      Allergies Brother      Anesthesia Reaction No family hx of      Colon Cancer No family hx of      Crohn Disease No family hx of      Ulcerative Colitis No family hx of      Colon Polyps No family hx of      Glaucoma No family hx of      Macular Degeneration No family hx of      CANCER Sister 56     bone cancer     Melanoma Mother      Skin Cancer Mother      Anxiety Disorder Sister      Substance Abuse Sister      Substance Abuse Brother      Anesthesia Reaction Brother      Asthma Brother      CANCER Sister      DIABETES Brother      OSTEOPOROSIS Mother      Obesity Father      Obesity Brother      Alcoholism Sister      Alcoholism Brother      Bone Cancer Sister     Social History     Social History     Marital status: Single     Spouse name: N/A     Number of children: N/A     Years of education: N/A     Occupational History     disability           Social History Main Topics     Smoking status: Never Smoker     Smokeless tobacco: Never Used     Alcohol use No     Drug use: No     Sexual activity: No     Other Topics Concern     Not on file     Social History Narrative       REVIEW OF SYSTEMS: Please see today's intake form (for the remainder of the ROS) which I have reviewed and signed.    PHYSICIAL EXAMINATION:  Constitutional: The patient was well-groomed and in no acute  distress.   Skin: Warm and pink.  Psychiatric: The patient's affect was calm, cooperative, and appropriate.   Respiratory: Breathing comfortably without stridor or exertion of accessory muscles.  Eyes: Pupils were equal and reactive. Extraocular movement intact.   Head: Normocephalic and atraumatic. No lesions or scars.  Ears: Both ears were examined under the microscope for microscopic assessment and cleaning.  The right side was cleaned of cerumen with curette.  Tympanic membrane and middle ear look normal after cleaning.  The opposite ear was cleaned and examined using the microscope, curettes and similar techniques.  Tympanic membrane and middle ear look normal after cleaning.     Nose: Sinuses were nontender. Anterior rhinoscopy revealed midline septum and absence of purulence or polyps.  Oral Cavity: Normal tongue, floor of moth, buccal mucosa, and palate. No lesions or masses on inspection or palpation. No abnormal lymph tissue in the oropharynx.   Neck: The parotid is soft without masses. Supple with normal laryngeal and tracheal landmarks.   Lymphatic: There is no palpable lymphadenopathy or other masses in the neck.   Neurologic: Alert and oriented x 3. Cranial nerves III-XI within normal limits. Voice quality normal.  Cerebellar Function Tests:  Grossly normal    Audiogram:  AUDIOGRAM:  Audiogram performed shows a bilateral high-frequency sensorineural hearing loss, slightly worse on the left above 3000 Hz.  Good discrimination at 100% in each ear.           IMPRESSION AND PLAN: I talked with the patient for some time and I suspect she may have had a labyrinthitis.  In any event, it is slowly improving.  Just 1-time episode of spinning.  I would just monitor for now and if things do not clear completely she will return for further testing.           Thank you very much for the opportunity to participate in the care of your patient.    Rick L Nissen MD

## 2017-02-14 NOTE — PROGRESS NOTES
AUDIOLOGY REPORT    SUMMARY: Audiology visit completed. See audiogram for results.      RECOMMENDATIONS: Follow-up with ENT.    Valencia Capellan.  Licensed Audiologist  [place] #[id number]

## 2017-02-21 ENCOUNTER — OFFICE VISIT (OUTPATIENT)
Dept: OPHTHALMOLOGY | Facility: CLINIC | Age: 67
End: 2017-02-21
Attending: OPHTHALMOLOGY
Payer: MEDICARE

## 2017-02-21 DIAGNOSIS — R42 VERTIGO: ICD-10-CM

## 2017-02-21 DIAGNOSIS — H04.123 BILATERAL DRY EYES: ICD-10-CM

## 2017-02-21 DIAGNOSIS — H26.9 CATARACT: ICD-10-CM

## 2017-02-21 DIAGNOSIS — H05.411: ICD-10-CM

## 2017-02-21 DIAGNOSIS — H57.12 PAIN IN OR AROUND EYE, LEFT: Primary | ICD-10-CM

## 2017-02-21 PROCEDURE — 99212 OFFICE O/P EST SF 10 MIN: CPT | Mod: ZF

## 2017-02-21 ASSESSMENT — TONOMETRY
OD_IOP_MMHG: 19
OS_IOP_MMHG: 14
IOP_METHOD: ICARE

## 2017-02-21 ASSESSMENT — EXTERNAL EXAM - LEFT EYE: OS_EXAM: NORMAL

## 2017-02-21 ASSESSMENT — REFRACTION_WEARINGRX
SPECS_TYPE: SVL
OS_SPHERE: -5.25
OD_SPHERE: -5.75
OS_CYLINDER: +0.50
OD_AXIS: 078
OD_CYLINDER: +0.75
OS_AXIS: 100

## 2017-02-21 ASSESSMENT — CONF VISUAL FIELD
OS_NORMAL: 1
OD_NORMAL: 1

## 2017-02-21 ASSESSMENT — VISUAL ACUITY
OD_CC: 20/20
METHOD: SNELLEN - LINEAR
CORRECTION_TYPE: GLASSES
OS_CC: 20/20

## 2017-02-21 ASSESSMENT — CUP TO DISC RATIO
OD_RATIO: 0.3
OS_RATIO: 0.3

## 2017-02-21 ASSESSMENT — EXTERNAL EXAM - RIGHT EYE: OD_EXAM: ENOPHTHALMOS

## 2017-02-21 ASSESSMENT — SLIT LAMP EXAM - LIDS
COMMENTS: NORMAL
COMMENTS: NORMAL

## 2017-02-21 NOTE — MR AVS SNAPSHOT
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      Your next 10 appointments already scheduled     Mar 28, 2017  3:30 PM CDT   (Arrive by 3:15 PM)   RETURN NEUROTOLOGY with Rick L Nissen, MD   [facility] ([facility])    [address] [place] [id number] [phone]            Apr 05, 2017  4:20 PM CDT   (Arrive by 4:05 PM)   Return Visit with Thelma Arvizu MD   [facility] ([facility])    [address] [place] [id number] [phone]              Who to contact     Please call your clinic at [phone] to:    Ask questions about your health    Make or cancel appointments    Discuss your medicines    Learn about your test results    Speak to your doctor   If you have compliments or concerns about an experience at your clinic, or if you wish to file a complaint, please contact [facility] Patient Relations at [phone] or email us at [email or web address]         Additional Information About Your Visit        MyChart Information     ProspX is an electronic gateway that provides easy, online access to your medical records. With ProspX, you can request a clinic appointment, read your test results, renew a prescription or communicate with your care  team.     To sign up for [employer] visit the website at [email or web address]   You will be asked to enter the access code listed below, as well as some personal information. Please follow the directions to create your username and password.     Your access code is: [email or web address]  Expires: 2017  6:30 AM     Your access code will  in 90 days. If you need help or a new code, please contact your [facility] or call [phone] for assistance.        Care EveryWhere ID     This is your Care EveryWhere ID. This could be used by other organizations to access your [place] medical records  [id number]         Blood Pressure from Last 3 Encounters:   17 106/71   16 112/68   16 118/72    Weight from Last 3 Encounters:   16 65.8 kg (145 lb)   16 68 kg (150 lb)   11/18/15 69.5 kg (153 lb 3.2 oz)              Today, you had the following     No orders found for display       Primary Care Provider Office Phone # Fax #    Milan Alvarez [phone]       [facility] [address] [place] [id number]        Thank you!     Thank you for choosing EYE CLINIC  for your care. Our goal is always to provide you with excellent care. Hearing back from our patients is one way we can continue to improve our services. Please take a few minutes to complete the written survey that you may receive in the mail after your visit with us. Thank you!             Your Updated Medication List - Protect others around you: Learn how to safely use, store and throw away your medicines at www.disposemymeds.org.          This list is accurate as of: 17 11:59 PM.  Always use your most recent med list.                   Brand Name Dispense Instructions for use    acidophilus Caps      Take 1 capsule by mouth daily.       albuterol 108 (90 BASE) MCG/ACT Inhaler    PROAIR HFA/PROVENTIL HFA/VENTOLIN HFA    1 Inhaler    Inhale 2 puffs into the  lungs every 6 hours as needed for shortness of breath / dyspnea or wheezing       ARMOUR THYROID 60 MG tablet   Generic drug:  thyroid      Take 45 mg by mouth daily       ARTIFICIAL TEARS OP      Apply 1 drop to eye daily as needed       BENADRYL ALLERGY PO      Take  by mouth as needed. allergies       CHARCOAL PO      Take 6 capsules by mouth daily as needed. For indigestion       Digestive Enzymes Tabs      Use as directed       EPINEPHrine 0.3 MG/0.3ML injection    EPIPEN    1 each    Inject 0.3 mLs (0.3 mg) into the muscle once as needed for anaphylaxis       estradiol 0.1 MG/GM cream    ESTRACE     Place 0.5 g vaginally twice a week       Fiber Powd      Take 1 teaspoonful by mouth daily. Mix with slippery elm.       FISH OIL PO      Take 1 capsule by mouth daily. Plus Vit E       fluocinonide 0.05 % solution    LIDEX    60 mL    Apply topically 2 times daily To scalp as needed.       GAS-X EXTRA STRENGTH 125 MG Chew chewable tablet   Generic drug:  simethicone      Take 2 tablets by mouth 2 times daily.       HYDROCHLORIC ACID      Take as directed       MAGNESIUM OXIDE PO      Take 6-10 tablets by mouth daily.       meclizine 12.5 MG tablet    ANTIVERT    30 tablet    Take 1 tablet (12.5 mg) by mouth 3 times daily as needed for dizziness       peppermint oil oil      1 mL daily as needed for other Taking gel tabs not oil, not in EMR.       PROGESTERONE 50MG CAPSULES COMPOUND      150 mg At Bedtime 1 capsules in the morning, in addition to bedtime dose.       vitamin D 2000 UNITS tablet      Take 1 tablet by mouth daily.

## 2017-02-21 NOTE — NURSING NOTE
Chief Complaints and History of Present Illnesses   Patient presents with     Follow Up For     Pain/pressure behind LE     HPI    Affected eye(s):  Left   Symptoms:        Duration:  9 months   Frequency:  Constant       Do you have eye pain now?:  No      Comments:  Pt. States that she had an episode of vertigo in late January.   Vertigo has improved but still has issues.   Also noticed pressure behind LE when vertigo kicked in.  Pressure seems to worsen when bending over.  Roslyn Cormier COT 3:09 PM February 21, 2017

## 2017-04-05 ENCOUNTER — OFFICE VISIT (OUTPATIENT)
Dept: GASTROENTEROLOGY | Facility: CLINIC | Age: 67
End: 2017-04-05

## 2017-04-05 VITALS
SYSTOLIC BLOOD PRESSURE: 115 MMHG | OXYGEN SATURATION: 97 % | DIASTOLIC BLOOD PRESSURE: 72 MMHG | TEMPERATURE: 97.8 F | HEIGHT: 63 IN | HEART RATE: 80 BPM

## 2017-04-05 DIAGNOSIS — R19.8 ABNORMAL DEFECATION: ICD-10-CM

## 2017-04-05 DIAGNOSIS — K59.9 COLONIC DYSMOTILITY: Primary | ICD-10-CM

## 2017-04-05 ASSESSMENT — PAIN SCALES - GENERAL: PAINLEVEL: MILD PAIN (2)

## 2017-04-05 NOTE — PATIENT INSTRUCTIONS
1. Give us a call with the dose of your slippery elm, magnesium oxide, and fiber.  If you have any questions, please don't hesitate to contact our GI RN Clinic Coordinators at [phone] (select option #3 for nurse line).   2. Will consider dose reduction in Mg oxide given loose stools.  3. Ok to continue twice daily enemas.  4. Will refer to [facility] for repeat evaluation given worsening symptoms over the past 6-7 years (since last evaluated). Recommend biofeedback with [facility].   5. Referral placed for GI health psychologist, Doretha Vallejo.  6. Please review information on TCAs and SSRIs.   7. Follow-up in GI clinic after Pelvic Floor assessment (approx 3-4 months).

## 2017-04-05 NOTE — PROGRESS NOTES
"GI CLINIC VISIT    CC: follow-up      ASSESSMENT/PLAN:  (K59.9) Colonic dysmotility (primary encounter diagnosis)  (R19.8) Abnormal defecation  Comment:   Stooling regularly, though, not unexpectedly, difficulty evacuating her rectum without use of enema. We spent a good deal of time reviewing what efforts we could make to alter consistency of stools and, to some degree, her bowel transit. She continues to express interest in using a limited range of medications that she feels are \"natural.\" We also discussed her prior diagnoses including pelvic floor dysfunction and the progression this has seemingly had over the last several years. She is more open today than at her last visit in working on biofeedback (per her report she has yet to complete the training at any center where she's been treated, most recently due to her prior therapist leaving her position).  has some questions as to whether she is a surgical candidate, though she's clear she's not interested in an ostomy. We discussed a referral to the [facility] for another opinion on this as well as considering a re-evaluation of her degree of dysfunction.   Noted stools are frequently quite loose with pt ever increasing her dose of Mg (she is unsure if 100 mg caps or 250) and I think we can titrate this and her fiber dose to better manage her stooling pattern.    There has been a significant impact of these symptoms on her QOL as well as changes to her mood. Vague abdominal discomfort is reported (not always associated with constipation) and has been ongoing since childhood which may benefit from more directed therapy.  Plan:   1. Give us a call with the dose of your slippery elm, magnesium oxide, and fiber  2. Will consider dose reduction in Mg oxide given loose stools once we know your current medications  3. Ok to continue twice daily enemas.  4. Will refer to [facility] for repeat evaluation given worsening symptoms over the past " 6-7 years (since last evaluated). Recommend biofeedback with [facility].   5. Referral placed for GI health psychologist, Doretha Vallejo.  6. Please review provided information on TCAs and SSRIs.        RTC after Pelvic Floor assessment, ~ 3-4 months    It was a pleasure to participate in the care of this patient; please contact us with any further questions.  A total of 30 minutes was spent with this patient, >50% of which was counseling regarding the above delineated issues.    Thelma Arvizu MD   of Medicine  Division of Gastroenterology, Hepatology and Nutrition  [facility]    ---------------------------------------------------------------------------------------------------  HPI:  Ms. Steiner is a 67 year old female with hypothyroidism, asthma, HL, reported systemic candidiasis, and prior TIA who carries the prior diagnoses of dysynnergic defecation/pelvic floor dysfunction and constipation (?colonic dysmotility) who was initially seen in GI clinic in June 2016 to establish care. She had previously been followed by gastroenterology at the [facility] (Dr. Munoz) and [facility] and has been treated for GI symptoms at the [facility] in the past. She had been seen at our institution by GI once before with Dr. Esteban Saravia in Feb 2016. She has been seen by several colorectal surgeons in the past including Dr. Andrade, Dr. Sheriff and most recently Dr. Villafuerte here at [facility] (2013). She returns today for follow-up.      Taken from my prior documentation:  - years of laxative and enema use in order to move her bowels  - 2010 - pelvic floor testing reveals dysfunction, SBFT normal, GES normal, Sitz marker study with 75% of markers in the rectum --> starts biofeedback (at [facility]), surgery first discussed (at [facility])- concern that subtotal colectomy w/ Ileoanal anastomosis may not address her issues  - 2011 - biofeedback at [facility],  "work-up there with motility capsule reveals normal gastric and small bowel transit times, prolonged colon transit - but comments are made that colon pressures are good and patient had many small BMs during study, more suggestive of pelvic floor issues. She was felt to have some IBS features and was treated for SIBO as well as SI fungal overgrowth during this time. She did not tolerate the fluconazole given for fungal overgrowth.  - 2012 - sent to [facility] for colonic manometry, test not performed due to concern for anal stenosis (mentioned in a prior note from physical exam), biofeedback started at [facility]  - 2013 - seen by Dr. Villafuerte here and surgery discussed, again concern that ileoanal anastomosis would be insufficient, pt not interested in pursuing  - late 2013/2014 - appears colonic manometry completed (see scanned report under Procedures tab) - poor phasic and tonic response to meal ingestion, normal response to neostygmine --> response to neostigmine excludes issues with colonic inertia, though poor meal response may indicate a requirement of pharmacologic stimulus to induce stools      At her initial GI visit she expressed interest in another opinion on her options. She reported struggling with biofeedback and not desiring any surgery. She was managing her constipation with magnesium oxide (had been at 6, now at 10 daily), slippery elm  and tap water enemas stating she preferred \"natural\" products. She had not previously been on Amitiza or Linzess. With this regimen she produced a small BM in the morning (before an enema use) and then with an enema she will produce a more significant stool. A second enema would be used in the evening if she noted bloating, abdominal distention, and/or pain.   With some discussion, Ms. Steiner was open to continuing to work with PT in [facility] on pelivc floor exercises. Unfortunately her therapist left [facility] after only a couple sessions. Ms. Steiner states there is no other therapist " "available to meet her needs. She has minimally continued the exercises on her own at home.   Since that visit, we also performed a surveillance colonoscopy. This was normal, though with her history of polyps she is planned for a repeat exam in 5 years.     Currently, she is using 12 caps Mg oxide (dose unkown). She had been using slippery elm, but has stopped due to cost issues. With this each morning she will pass on [place] 7 movement. She may or may not have some smaller BMs thoughout the day. Ms. Steiner continues to use about one water enema daily. This is used when she needs to \"relieve pressure\" or experiences LLQ abd pain. After the enema she passes [place] 5-7 stools and gas. She notes a sense that her stool reaches is in her rectum but feels that it can only be emptied with an enema. She does not feel digital stimulation is as effective and the enema.    Ms. Steiner is currently using peppermint gels as needed for \"gas\"/pressure in abdomen. She is not currently on fiber and reports an \"allergy\" to flax and psyllium.    She shares the difficulty she's had with her GI issues since her childhood describing a \"hard\" life as one of seven children. Ms. Steiner states she's had chronic abdominal pain since she was a kid. She states she had a lot of \"food allergies\" and that laxatives were given to address this issue. She found this somewhat helpful but never led to full resolution of her pain which has continued to plague her.     ROS:    Remainder of comprehensive ROS is negative.     PROBLEM LIST  Patient Active Problem List    Diagnosis Date Noted     Blepharitis 10/07/2014     Problem list name updated by automated process. Provider to review       Vaginal atrophy 11/03/2012     High cholesterol 02/14/2012     Rash and other nonspecific skin eruption 07/22/2011     CARDIOVASCULAR SCREENING; LDL GOAL LESS THAN 130 06/11/2010     Transient cerebral ischemia 07/10/2009     Diagnosis updated by automated process. " "Provider to review and confirm.       Carotid artery dissection (H) 07/10/2009     IBS (irritable bowel syndrome) 07/10/2009     Screen for colon cancer 07/10/2009     Due.  Referral given.       Visit for screening mammogram 07/10/2009     Due.  Referral given.  (Problem list name updated by automated process. Provider to review and confirm.)       Encounter for routine gynecological examination 07/10/2009     Due.  Recommend return for physical and pap.  Problem list name updated by automated process. Provider to review       Hypothyroidism 07/10/2009       PERTINENT MEDICATIONS:  Current Outpatient Prescriptions   Medication     meclizine (ANTIVERT) 12.5 MG tablet     fluocinonide (LIDEX) 0.05 % external solution     albuterol (PROAIR HFA, PROVENTIL HFA, VENTOLIN HFA) 108 (90 BASE) MCG/ACT inhaler     estradiol (ESTRACE) 0.1 MG/GM vaginal cream     peppermint oil liquid     Hypromellose (ARTIFICIAL TEARS OP)     EPINEPHrine (EPIPEN) 0.3 MG/0.3ML injection     thyroid (ARMOUR THYROID) 60 MG tablet     PROGESTERONE 50MG CAPSULES COMPOUND     DiphenhydrAMINE HCl (BENADRYL ALLERGY PO)     simethicone (GAS-X EXTRA STRENGTH) 125 MG CHEW     Fiber POWD     MAGNESIUM OXIDE PO     Lactobacillus (ACIDOPHILUS) CAPS     Charcoal Activated (CHARCOAL PO)     Digestive Enzymes TABS     Omega-3 Fatty Acids (FISH OIL PO)     HYDROCHLORIC ACID     Cholecalciferol (VITAMIN D) 2000 UNIT tablet     No current facility-administered medications for this visit.          PHYSICAL EXAMINATION:  Vitals /72 (BP Location: Left arm, Patient Position: Chair, Cuff Size: Adult Small)  Pulse 80  Temp 97.8  F (36.6  C)  Ht 1.6 m (5' 3\")  SpO2 97%   Wt   Wt Readings from Last 2 Encounters:   07/13/16 65.8 kg (145 lb)   06/01/16 68 kg (150 lb)   Gen: Pt sitting up on exam table in NAD, interactive and cooperative on exam  Eyes: sclerae anicteric, no injection  ENT:  MMM  Cardiac: RRR, nl S1, S2  Resp: Clear on anterior exam  GI: " Normoactive BS, abd soft,nontender  Skin: Warm, dry, no jaundice  Neuro: alert, oriented, answers questions appropriately      PERTINENT STUDIES:    Orders Only on 01/18/2017   Component Date Value Ref Range Status     WBC 01/18/2017 7.2  4.0 - 11.0 10e9/L Final     RBC Count 01/18/2017 4.13  3.8 - 5.2 10e12/L Final     Hemoglobin 01/18/2017 12.8  11.7 - 15.7 g/dL Final     Hematocrit 01/18/2017 38.3  35.0 - 47.0 % Final     MCV 01/18/2017 93  78 - 100 fl Final     MCH 01/18/2017 31.0  26.5 - 33.0 pg Final     MCHC 01/18/2017 33.4  31.5 - 36.5 g/dL Final     RDW 01/18/2017 13.8  10.0 - 15.0 % Final     Platelet Count 01/18/2017 249  150 - 450 10e9/L Final     Diff Method 01/18/2017 Automated Method   Final     % Neutrophils 01/18/2017 62.3  % Final     % Lymphocytes 01/18/2017 27.8  % Final     % Monocytes 01/18/2017 6.7  % Final     % Eosinophils 01/18/2017 2.1  % Final     % Basophils 01/18/2017 0.8  % Final     % Immature Granulocytes 01/18/2017 0.3  % Final     Nucleated RBCs 01/18/2017 0  0 /100 Final     Absolute Neutrophil 01/18/2017 4.5  1.6 - 8.3 10e9/L Final     Absolute Lymphocytes 01/18/2017 2.0  0.8 - 5.3 10e9/L Final     Absolute Monocytes 01/18/2017 0.5  0.0 - 1.3 10e9/L Final     Absolute Eosinophils 01/18/2017 0.2  0.0 - 0.7 10e9/L Final     Absolute Basophils 01/18/2017 0.1  0.0 - 0.2 10e9/L Final     Abs Immature Granulocytes 01/18/2017 0.0  0 - 0.4 10e9/L Final     Absolute Nucleated RBC 01/18/2017 0.0   Final     TSH 01/18/2017 0.76  0.40 - 4.00 mU/L Final     Sodium 01/18/2017 141  133 - 144 mmol/L Final     Potassium 01/18/2017 4.2  3.4 - 5.3 mmol/L Final     Chloride 01/18/2017 107  94 - 109 mmol/L Final     Carbon Dioxide 01/18/2017 27  20 - 32 mmol/L Final     Anion Gap 01/18/2017 7  3 - 14 mmol/L Final     Glucose 01/18/2017 85  70 - 99 mg/dL Final     Urea Nitrogen 01/18/2017 11  7 - 30 mg/dL Final     Creatinine 01/18/2017 0.73  0.52 - 1.04 mg/dL Final     GFR Estimate 01/18/2017 80  >60  mL/min/1.7m2 Final     GFR Estimate If Black 01/18/2017   >60 mL/min/1.7m2 Final                    Value:>90   GFR Calc       Calcium 01/18/2017 8.7  8.5 - 10.1 mg/dL Final

## 2017-04-05 NOTE — MR AVS SNAPSHOT
After Visit Summary   4/5/2017    Jennifer Steiner    MRN: [id number]           Patient Information     Date Of Birth          1950        Visit Information        Provider Department      4/5/2017 4:20 PM Thelma Arvizu MD [facility]        Care Instructions    1. Give us a call with the dose of your slippery elm, magnesium oxide, and fiber.  If you have any questions, please don't hesitate to contact our GI RN Clinic Coordinators at [phone] (select option #3 for nurse line).   2. Will consider dose reduction in Mg oxide given loose stools.  3. Ok to continue twice daily enemas.  4. Will refer to [facility] for repeat evaluation given worsening symptoms over the past 6-7 years (since last evaluated). Recommend biofeedback with [facility].   5. Referral placed for GI health psychologist, Doretha Vallejo.  6. Please review information on TCAs and SSRIs.   7. Follow-up in GI clinic after Pelvic Floor assessment (approx 3-4 months).               Follow-ups after your visit        Your next 10 appointments already scheduled     May 15, 2017  3:00 PM CDT   (Arrive by 2:45 PM)   Return Visit with Clotilde Washington MD   [facility] ([facility])    [address] [place] [id number] [phone]              Who to contact     Please call your clinic at [phone] to:    Ask questions about your health    Make or cancel appointments    Discuss your medicines    Learn about your test results    Speak to your doctor   If you have compliments or concerns about an experience at your clinic, or if you wish to file a complaint, please contact [facility] Patient Relations at [phone] or email us at [email or web address]         Additional Information About Your Visit        MyChart Information     [employer] is an electronic gateway that provides  "easy, online access to your medical records. With [employer], you can request a clinic appointment, read your test results, renew a prescription or communicate with your care team.     To sign up for [employer] visit the website at [email or web address]   You will be asked to enter the access code listed below, as well as some personal information. Please follow the directions to create your username and password.     Your access code is: [email or web address]  Expires: 2017  7:30 AM     Your access code will  in 90 days. If you need help or a new code, please contact your [facility] or call [phone] for assistance.        Care EveryWhere ID     This is your Care EveryWhere ID. This could be used by other organizations to access your [place] medical records  [id number]        Your Vitals Were     Pulse Temperature Height Pulse Oximetry          80 97.8  F (36.6  C) 1.6 m (5' 3\") 97%         Blood Pressure from Last 3 Encounters:   17 115/72   17 106/71   16 112/68    Weight from Last 3 Encounters:   16 65.8 kg (145 lb)   16 68 kg (150 lb)   11/18/15 69.5 kg (153 lb 3.2 oz)              Today, you had the following     No orders found for display       Primary Care Provider Office Phone # Fax #    Milan Alvarez [phone]       [facility] [address] [place] [id number]        Thank you!     Thank you for choosing [facility]  for your care. Our goal is always to provide you with excellent care. Hearing back from our patients is one way we can continue to improve our services. Please take a few minutes to complete the written survey that you may receive in the mail after your visit with us. Thank you!             Your Updated Medication List - Protect others around you: Learn how to safely use, store and throw away your medicines at www.disposemymeds.org.          This list is " accurate as of: 4/5/17  5:29 PM.  Always use your most recent med list.                   Brand Name Dispense Instructions for use    acidophilus Caps      Take 1 capsule by mouth daily.       albuterol 108 (90 BASE) MCG/ACT Inhaler    PROAIR HFA/PROVENTIL HFA/VENTOLIN HFA    1 Inhaler    Inhale 2 puffs into the lungs every 6 hours as needed for shortness of breath / dyspnea or wheezing       ARMOUR THYROID 60 MG tablet   Generic drug:  thyroid      Take 45 mg by mouth daily       ARTIFICIAL TEARS OP      Apply 1 drop to eye daily as needed       BENADRYL ALLERGY PO      Take  by mouth as needed. allergies       CHARCOAL PO      Take 6 capsules by mouth daily as needed. For indigestion       Digestive Enzymes Tabs      Use as directed       EPINEPHrine 0.3 MG/0.3ML injection    EPIPEN    1 each    Inject 0.3 mLs (0.3 mg) into the muscle once as needed for anaphylaxis       estradiol 0.1 MG/GM cream    ESTRACE     Place 0.5 g vaginally twice a week       Fiber Powd      Take 1 teaspoonful by mouth daily. Mix with slippery elm.       FISH OIL PO      Take 1 capsule by mouth daily. Plus Vit E       fluocinonide 0.05 % solution    LIDEX    60 mL    Apply topically 2 times daily To scalp as needed.       GAS-X EXTRA STRENGTH 125 MG Chew chewable tablet   Generic drug:  simethicone      Take 2 tablets by mouth 2 times daily.       HYDROCHLORIC ACID      Take as directed       MAGNESIUM OXIDE PO      Take 6-10 tablets by mouth daily.       meclizine 12.5 MG tablet    ANTIVERT    30 tablet    Take 1 tablet (12.5 mg) by mouth 3 times daily as needed for dizziness       peppermint oil oil      1 mL daily as needed for other Taking gel tabs not oil, not in EMR.       PROGESTERONE 50MG CAPSULES COMPOUND      150 mg At Bedtime 1 capsules in the morning, in addition to bedtime dose.       vitamin D 2000 UNITS tablet      Take 1 tablet by mouth daily.

## 2017-04-05 NOTE — LETTER
"4/5/2017     RE: Jennifer Steiner  [address]  [place] [facility] [address]     Dear Colleague,    Thank you for referring your patient, Jennifer Steiner, to the [facility]STRO[facility]OLOGY AND IBD at [facility]. Please see a copy of my visit note below.    GI CLINIC VISIT    CC: follow-up      ASSESSMENT/PLAN:  (K59.9) Colonic dysmotility (primary encounter diagnosis)  (R19.8) Abnormal defecation  Comment:   Stooling regularly, though, not unexpectedly, difficulty evacuating her rectum without use of enema. We spent a good deal of time reviewing what efforts we could make to alter consistency of stools and, to some degree, her bowel transit. She continues to express interest in using a limited range of medications that she feels are \"natural.\" We also discussed her prior diagnoses including pelvic floor dysfunction and the progression this has seemingly had over the last several years. She is more open today than at her last visit in working on biofeedback (per her report she has yet to complete the training at any center where she's been treated, most recently due to her prior therapist leaving her position).  has some questions as to whether she is a surgical candidate, though she's clear she's not interested in an ostomy. We discussed a referral to the [facility] for another opinion on this as well as considering a re-evaluation of her degree of dysfunction.   Noted stools are frequently quite loose with pt ever increasing her dose of Mg (she is unsure if 100 mg caps or 250) and I think we can titrate this and her fiber dose to better manage her stooling pattern.    There has been a significant impact of these symptoms on her QOL as well as changes to her mood. Vague abdominal discomfort is reported (not always associated with constipation) and has been ongoing since childhood which may benefit from more directed therapy.  Plan:   1. Give us a call with " the dose of your slippery elm, magnesium oxide, and fiber  2. Will consider dose reduction in Mg oxide given loose stools once we know your current medications  3. Ok to continue twice daily enemas.  4. Will refer to [facility] for repeat evaluation given worsening symptoms over the past 6-7 years (since last evaluated). Recommend biofeedback with [facility].   5. Referral placed for GI health psychologist, Doretha Vallejo.  6. Please review provided information on TCAs and SSRIs.        RTC after Pelvic Floor assessment, ~ 3-4 months    It was a pleasure to participate in the care of this patient; please contact us with any further questions.  A total of 30 minutes was spent with this patient, >50% of which was counseling regarding the above delineated issues.    Thelma Arvizu MD   of Medicine  Division of Gastroenterology, Hepatology and Nutrition  [facility]    ---------------------------------------------------------------------------------------------------  HPI:  Ms. Steiner is a 67 year old female with hypothyroidism, asthma, HL, reported systemic candidiasis, and prior TIA who carries the prior diagnoses of dysynnergic defecation/pelvic floor dysfunction and constipation (?colonic dysmotility) who was initially seen in GI clinic in June 2016 to establish care. She had previously been followed by gastroenterology at the [facility] (Dr. Munoz) and [facility] and has been treated for GI symptoms at the [facility] in the past. She had been seen at our institution by GI once before with Dr. Esteban Saravia in Feb 2016. She has been seen by several colorectal surgeons in the past including Dr. Andrade, Dr. Sheriff and most recently Dr. Christo rivera at [facility] (2013). She returns today for follow-up.      Taken from my prior documentation:  - years of laxative and enema use in order to move her bowels  - 2010 - pelvic floor testing reveals  "dysfunction, SBFT normal, GES normal, Sitz marker study with 75% of markers in the rectum --> starts biofeedback (at [facility]), surgery first discussed (at [facility])- concern that subtotal colectomy w/ Ileoanal anastomosis may not address her issues  - 2011 - biofeedback at continues at [place], work-up there with motility capsule reveals normal gastric and small bowel transit times, prolonged colon transit - but comments are made that colon pressures are good and patient had many small BMs during study, more suggestive of pelvic floor issues. She was felt to have some IBS features and was treated for SIBO as well as SI fungal overgrowth during this time. She did not tolerate the fluconazole given for fungal overgrowth.  - 2012 - sent to [facility] for colonic manometry, test not performed due to concern for anal stenosis (mentioned in a prior note from physical exam), biofeedback started at [facility]  - 2013 - seen by Dr. Villafuerte here and surgery discussed, again concern that ileoanal anastomosis would be insufficient, pt not interested in pursuing  - late 2013/2014 - appears colonic manometry completed (see scanned report under Procedures tab) - poor phasic and tonic response to meal ingestion, normal response to neostygmine --> response to neostigmine excludes issues with colonic inertia, though poor meal response may indicate a requirement of pharmacologic stimulus to induce stools      At her initial GI visit she expressed interest in another opinion on her options. She reported struggling with biofeedback and not desiring any surgery. She was managing her constipation with magnesium oxide (had been at 6, now at 10 daily), slippery elm  and tap water enemas stating she preferred \"natural\" products. She had not previously been on Amitiza or Linzess. With this regimen she produced a small BM in the morning (before an enema use) and then with an enema she will produce a more significant stool. A second enema would be used in the " "evening if she noted bloating, abdominal distention, and/or pain.   With some discussion, Ms. Steiner was open to continuing to work with PT in [facility] on pelivc floor exercises. Unfortunately her therapist left [facility] after only a couple sessions. Ms. Steiner states there is no other therapist available to meet her needs. She has minimally continued the exercises on her own at home.   Since that visit, we also performed a surveillance colonoscopy. This was normal, though with her history of polyps she is planned for a repeat exam in 5 years.     Currently, she is using 12 caps Mg oxide (dose unkown). She had been using slippery elm, but has stopped due to cost issues. With this each morning she will pass on [place] 7 movement. She may or may not have some smaller BMs thoughout the day. Ms. Steiner continues to use about one water enema daily. This is used when she needs to \"relieve pressure\" or experiences LLQ abd pain. After the enema she passes [place] 5-7 stools and gas. She notes a sense that her stool reaches is in her rectum but feels that it can only be emptied with an enema. She does not feel digital stimulation is as effective and the enema.    Ms. Steiner is currently using peppermint gels as needed for \"gas\"/pressure in abdomen. She is not currently on fiber and reports an \"allergy\" to flax and psyllium.    She shares the difficulty she's had with her GI issues since her childhood describing a \"hard\" life as one of seven children. Ms. Steiner states she's had chronic abdominal pain since she was a kid. She states she had a lot of \"food allergies\" and that laxatives were given to address this issue. She found this somewhat helpful but never led to full resolution of her pain which has continued to plague her.     ROS:    Remainder of comprehensive ROS is negative.     PROBLEM LIST  Patient Active Problem List    Diagnosis Date Noted     Blepharitis 10/07/2014     Problem list name updated by automated " "process. Provider to review       Vaginal atrophy 11/03/2012     High cholesterol 02/14/2012     Rash and other nonspecific skin eruption 07/22/2011     CARDIOVASCULAR SCREENING; LDL GOAL LESS THAN 130 06/11/2010     Transient cerebral ischemia 07/10/2009     Diagnosis updated by automated process. Provider to review and confirm.       Carotid artery dissection (H) 07/10/2009     IBS (irritable bowel syndrome) 07/10/2009     Screen for colon cancer 07/10/2009     Due.  Referral given.       Visit for screening mammogram 07/10/2009     Due.  Referral given.  (Problem list name updated by automated process. Provider to review and confirm.)       Encounter for routine gynecological examination 07/10/2009     Due.  Recommend return for physical and pap.  Problem list name updated by automated process. Provider to review       Hypothyroidism 07/10/2009       PERTINENT MEDICATIONS:  Current Outpatient Prescriptions   Medication     meclizine (ANTIVERT) 12.5 MG tablet     fluocinonide (LIDEX) 0.05 % external solution     albuterol (PROAIR HFA, PROVENTIL HFA, VENTOLIN HFA) 108 (90 BASE) MCG/ACT inhaler     estradiol (ESTRACE) 0.1 MG/GM vaginal cream     peppermint oil liquid     Hypromellose (ARTIFICIAL TEARS OP)     EPINEPHrine (EPIPEN) 0.3 MG/0.3ML injection     thyroid (ARMOUR THYROID) 60 MG tablet     PROGESTERONE 50MG CAPSULES COMPOUND     DiphenhydrAMINE HCl (BENADRYL ALLERGY PO)     simethicone (GAS-X EXTRA STRENGTH) 125 MG CHEW     Fiber POWD     MAGNESIUM OXIDE PO     Lactobacillus (ACIDOPHILUS) CAPS     Charcoal Activated (CHARCOAL PO)     Digestive Enzymes TABS     Omega-3 Fatty Acids (FISH OIL PO)     HYDROCHLORIC ACID     Cholecalciferol (VITAMIN D) 2000 UNIT tablet     No current facility-administered medications for this visit.          PHYSICAL EXAMINATION:  Vitals /72 (BP Location: Left arm, Patient Position: Chair, Cuff Size: Adult Small)  Pulse 80  Temp 97.8  F (36.6  C)  Ht 1.6 m (5' 3\")  SpO2 " 97%   Wt   Wt Readings from Last 2 Encounters:   07/13/16 65.8 kg (145 lb)   06/01/16 68 kg (150 lb)   Gen: Pt sitting up on exam table in NAD, interactive and cooperative on exam  Eyes: sclerae anicteric, no injection  ENT:  MMM  Cardiac: RRR, nl S1, S2  Resp: Clear on anterior exam  GI: Normoactive BS, abd soft,nontender  Skin: Warm, dry, no jaundice  Neuro: alert, oriented, answers questions appropriately      PERTINENT STUDIES:    Orders Only on 01/18/2017   Component Date Value Ref Range Status     WBC 01/18/2017 7.2  4.0 - 11.0 10e9/L Final     RBC Count 01/18/2017 4.13  3.8 - 5.2 10e12/L Final     Hemoglobin 01/18/2017 12.8  11.7 - 15.7 g/dL Final     Hematocrit 01/18/2017 38.3  35.0 - 47.0 % Final     MCV 01/18/2017 93  78 - 100 fl Final     MCH 01/18/2017 31.0  26.5 - 33.0 pg Final     MCHC 01/18/2017 33.4  31.5 - 36.5 g/dL Final     RDW 01/18/2017 13.8  10.0 - 15.0 % Final     Platelet Count 01/18/2017 249  150 - 450 10e9/L Final     Diff Method 01/18/2017 Automated Method   Final     % Neutrophils 01/18/2017 62.3  % Final     % Lymphocytes 01/18/2017 27.8  % Final     % Monocytes 01/18/2017 6.7  % Final     % Eosinophils 01/18/2017 2.1  % Final     % Basophils 01/18/2017 0.8  % Final     % Immature Granulocytes 01/18/2017 0.3  % Final     Nucleated RBCs 01/18/2017 0  0 /100 Final     Absolute Neutrophil 01/18/2017 4.5  1.6 - 8.3 10e9/L Final     Absolute Lymphocytes 01/18/2017 2.0  0.8 - 5.3 10e9/L Final     Absolute Monocytes 01/18/2017 0.5  0.0 - 1.3 10e9/L Final     Absolute Eosinophils 01/18/2017 0.2  0.0 - 0.7 10e9/L Final     Absolute Basophils 01/18/2017 0.1  0.0 - 0.2 10e9/L Final     Abs Immature Granulocytes 01/18/2017 0.0  0 - 0.4 10e9/L Final     Absolute Nucleated RBC 01/18/2017 0.0   Final     TSH 01/18/2017 0.76  0.40 - 4.00 mU/L Final     Sodium 01/18/2017 141  133 - 144 mmol/L Final     Potassium 01/18/2017 4.2  3.4 - 5.3 mmol/L Final     Chloride 01/18/2017 107  94 - 109 mmol/L Final      Carbon Dioxide 01/18/2017 27  20 - 32 mmol/L Final     Anion Gap 01/18/2017 7  3 - 14 mmol/L Final     Glucose 01/18/2017 85  70 - 99 mg/dL Final     Urea Nitrogen 01/18/2017 11  7 - 30 mg/dL Final     Creatinine 01/18/2017 0.73  0.52 - 1.04 mg/dL Final     GFR Estimate 01/18/2017 80  >60 mL/min/1.7m2 Final     GFR Estimate If Black 01/18/2017   >60 mL/min/1.7m2 Final                    Value:>90   GFR Calc       Calcium 01/18/2017 8.7  8.5 - 10.1 mg/dL Final     Again, thank you for allowing me to participate in the care of your patient.      Sincerely,    Thelma Arvizu MD

## 2017-04-05 NOTE — NURSING NOTE
"Chief Complaint   Patient presents with     RECHECK     f/u       Vitals:    04/05/17 1619   BP: 115/72   BP Location: Left arm   Patient Position: Chair   Cuff Size: Adult Small   Pulse: 80   Temp: 97.8  F (36.6  C)   SpO2: 97%   Height: 5' 3\"       There is no height or weight on file to calculate BMI.      Sveta Lopez MA                          "

## 2017-04-06 ENCOUNTER — CARE COORDINATION (OUTPATIENT)
Dept: GASTROENTEROLOGY | Facility: CLINIC | Age: 67
End: 2017-04-06

## 2017-05-09 ENCOUNTER — CARE COORDINATION (OUTPATIENT)
Dept: GASTROENTEROLOGY | Facility: CLINIC | Age: 67
End: 2017-05-09

## 2017-05-09 NOTE — PROGRESS NOTES
Called patient to follow up on [facility] referral.     Notified patient that [facility] has refused patient referral for biofeedback and further testing. Per Dr. Arvizu if patient would like to discuss surgical options we can refer to Colorectal surgery.     Patient declined CR referral and hung up on writer.

## 2017-05-15 ENCOUNTER — OFFICE VISIT (OUTPATIENT)
Dept: OTOLARYNGOLOGY | Facility: CLINIC | Age: 67
End: 2017-05-15

## 2017-05-15 VITALS — HEIGHT: 63 IN | BODY MASS INDEX: 25.69 KG/M2 | WEIGHT: 145 LBS

## 2017-05-15 DIAGNOSIS — R42 VERTIGO: Primary | ICD-10-CM

## 2017-05-15 ASSESSMENT — PAIN SCALES - GENERAL: PAINLEVEL: NO PAIN (0)

## 2017-05-15 NOTE — LETTER
5/15/2017       RE: Jennifer Steiner  [address] [place] [address]     Dear Colleague,    Thank you for referring your patient, Jennifer Steiner, to the [facility] EAR [facility]SE [facility] THROAT at [facility]. Please see a copy of my visit note below.    HISTORY OF PRESENT ILLNESS:  Jennifer Steiner presents.  She was seen by me previously for incidental finding of a maxillary sinus mucous retention cyst.  She comes in now because in January she had a bout of acute vertigo.  She was evaluated here in the ENT Clinic several weeks after onset of symptoms.  An MRI was obtained which was read as normal.  She does have a history of carotid artery dissection.  This was not identified as the source of her symptoms back in January.  She was improving by the time she was seen in the ENT Clinic.  She states that she currently is not quite yet back to baseline and is wondering if anything else can be done.  She has no other otologic symptoms such as hearing loss or tinnitus currently and no other concerning ENT symptoms.      PHYSICAL EXAMINATION:  She is examined.  Ears demonstrate normal canals, auricles and tympanic membranes.  The nasal passages demonstrate clear rhinorrhea.  Oral cavity, oropharynx is normal.  Facial movement is symmetric.      ASSESSMENT AND PLAN:  This is a patient who experienced acute vertigo.  She is slowly recovering.  I do think that she may benefit from vestibular therapy.  We will put in a consult for this.  She seems comfortable with this plan.  I am certainly happy to see her back if she has any additional issues.       Again, thank you for allowing me to participate in the care of your patient.      Sincerely,    Clotilde Washington MD

## 2017-05-15 NOTE — NURSING NOTE
Chief Complaint   Patient presents with     RECHECK     f/u vertigo     Nazia Das Medical Assistant

## 2017-05-15 NOTE — MR AVS SNAPSHOT
After Visit Summary   5/15/2017    Jennifer Steiner    MRN: [id number]           Patient Information     Date Of Birth          1950        Visit Information        Provider Department      5/15/2017 3:00 PM Clotilde Washington MD [facility] Ear Nose and Throat        Care Instructions    Plan of care:  We will call you with contact information regarding vestibular rehab  Clinic contact information:  1. To schedule an appointment call [phone], option 1  2. To talk to the Triage RN call [phone], option 3  3. If you need to speak to Liliana or get a message to your doctor on a Friday, call the triage RN  4. LilianaRN: [phone]  5. Surgery scheduling:      Maude Miles: [phone]      Kenyetta Phillips: [phone]  6. Fax: [phone]  7. [phone]          Follow-ups after your visit        Your next 10 appointments already scheduled     2017  4:00 PM CDT   (Arrive by 3:45 PM)   Return Visit with Doretha Vallejo, PhD   [facility] Gastroenterology and IBD ([facility])    [address] [place] [id number] [phone]            Aug 16, 2017  3:40 PM CDT   (Arrive by 3:25 PM)   Return Visit with Thelma Arvizu MD   [facility] Gastroenterology and IBD ([facility])    [address] [place] [id number] [phone]              Who to contact     Please call your clinic at [phone] to:    Ask questions about your health    Make or cancel appointments    Discuss your medicines    Learn about your test results    Speak to your doctor   If you have compliments or concerns about an experience at your clinic, or if you wish to file a complaint, please contact [facility] Patient Relations at [phone] or email us at [email or web address]         Additional Information About Your Visit        MyChart Information     [employer] is an  "electronic gateway that provides easy, online access to your medical records. With [email or web address], you can request a clinic appointment, read your test results, renew a prescription or communicate with your care team.     To sign up for [email or web address] visit the website at [email or web address]   You will be asked to enter the access code listed below, as well as some personal information. Please follow the directions to create your username and password.     Your access code is: [email or web address]  Expires: 2017  6:31 AM     Your access code will  in 90 days. If you need help or a new code, please contact your [facility] or call [phone] for assistance.        Care EveryWhere ID     This is your Care EveryWhere ID. This could be used by other organizations to access your [facility] medical records  [id number]        Your Vitals Were     Height BMI (Body Mass Index)                1.6 m (5' 3\") 25.69 kg/m2           Blood Pressure from Last 3 Encounters:   17 115/72   17 106/71   16 112/68    Weight from Last 3 Encounters:   05/15/17 65.8 kg (145 lb)   16 65.8 kg (145 lb)   16 68 kg (150 lb)              Today, you had the following     No orders found for display       Primary Care Provider Office Phone # Fax #    Milan Alvarez [phone]       [facility] [address] [place] [address]        Thank you!     Thank you for choosing [facility] EAR NOSE AND THROAT  for your care. Our goal is always to provide you with excellent care. Hearing back from our patients is one way we can continue to improve our services. Please take a few minutes to complete the written survey that you may receive in the mail after your visit with us. Thank you!             Your Updated Medication List - Protect others around you: Learn how to safely use, store and throw away your medicines at www.disposemymeds.org.          This list is " accurate as of: 5/15/17  4:23 PM.  Always use your most recent med list.                   Brand Name Dispense Instructions for use    acidophilus Caps      Take 1 capsule by mouth daily.       albuterol 108 (90 BASE) MCG/ACT Inhaler    PROAIR HFA/PROVENTIL HFA/VENTOLIN HFA    1 Inhaler    Inhale 2 puffs into the lungs every 6 hours as needed for shortness of breath / dyspnea or wheezing       ARMOUR THYROID 60 MG tablet   Generic drug:  thyroid      Take 45 mg by mouth daily       ARTIFICIAL TEARS OP      Apply 1 drop to eye daily as needed       BENADRYL ALLERGY PO      Take  by mouth as needed. allergies       CHARCOAL PO      Take 6 capsules by mouth daily as needed. For indigestion       Digestive Enzymes Tabs      Use as directed       EPINEPHrine 0.3 MG/0.3ML injection    EPIPEN    1 each    Inject 0.3 mLs (0.3 mg) into the muscle once as needed for anaphylaxis       estradiol 0.1 MG/GM cream    ESTRACE     Place 0.5 g vaginally twice a week       Fiber Powd      Take 1 teaspoonful by mouth daily. Mix with slippery elm.       FISH OIL PO      Take 1 capsule by mouth daily. Plus Vit E       fluocinonide 0.05 % solution    LIDEX    60 mL    Apply topically 2 times daily To scalp as needed.       GAS-X EXTRA STRENGTH 125 MG Chew chewable tablet   Generic drug:  simethicone      Take 2 tablets by mouth 2 times daily.       HYDROCHLORIC ACID      Take as directed       MAGNESIUM OXIDE PO      Take 6-10 tablets by mouth daily.       meclizine 12.5 MG tablet    ANTIVERT    30 tablet    Take 1 tablet (12.5 mg) by mouth 3 times daily as needed for dizziness       peppermint oil oil      1 mL daily as needed for other Taking gel tabs not oil, not in EMR.       PROGESTERONE 50MG CAPSULES COMPOUND      150 mg At Bedtime 1 capsules in the morning, in addition to bedtime dose.       vitamin D 2000 UNITS tablet      Take 1 tablet by mouth daily.

## 2017-05-15 NOTE — PATIENT INSTRUCTIONS
Plan of care:  We will call you with contact information regarding vestibular rehab  Clinic contact information:  1. To schedule an appointment call [phone], option 1  2. To talk to the Triage RN call [phone], option 3  3. If you need to speak to Liliana or get a message to your doctor on a Friday, call the triage RN  4. LilianaRN: [phone]  5. Surgery scheduling:      Maude Miles: [phone]      Kenyetta Phillips: [phone]  6. Fax: [phone]  7. [phone]

## 2017-06-10 ENCOUNTER — HEALTH MAINTENANCE LETTER (OUTPATIENT)
Age: 67
End: 2017-06-10

## 2017-06-19 ENCOUNTER — OFFICE VISIT (OUTPATIENT)
Dept: GASTROENTEROLOGY | Facility: CLINIC | Age: 67
End: 2017-06-19

## 2017-06-19 DIAGNOSIS — F33.1 MAJOR DEPRESSIVE DISORDER, RECURRENT EPISODE, MODERATE (H): ICD-10-CM

## 2017-06-19 DIAGNOSIS — F45.42 PAIN DISORDER ASSOCIATED WITH PSYCHOLOGICAL FACTORS AND MEDICAL CONDITION: Primary | ICD-10-CM
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
## 2017-06-19 NOTE — MR AVS SNAPSHOT
After Visit Summary   6/19/2017    Jennifer Steiner    MRN: [id number]           Patient Information     Date Of Birth          1950        Visit Information        Provider Department      6/19/2017 4:00 PM Doretha Vallejo, PhD M [facility] Gastroenterology and IBD         Follow-ups after your visit        Your next 10 appointments already scheduled     Jun 26, 2017  2:30 PM CDT   (Arrive by 2:15 PM)   Balance Testing with Gabriel Luong [facility] ([facility] [place])    [address] [place] [address] [phone]           You are scheduled for the following tests: VNG (Videonystagmography). You will wear goggles with small cameras. These record small eye movements as you change position. This test takes 1 to 1 1/2 hours. Rotational Chair. You will sit a chair that has a motor. The room will be dark. You will wear goggles with a camera while the chair rocks slowly from side to side. This test takes 20 to 30 minutes. CDP (Computerized Dynamic Posturography). You will stand on a platform with your eyes open or closed. It will be unsteady at times. This will tell us how your balance systems work together and how well you sense the ground under your feet. This test takes 30 minutes.  Why am I having these tests? These are tests for your inner ear. They may help us find out why you feel dizzy or off balance.  How do I prepare? Below is a list of things that can affect test results. Do NOT take these for 48 hours before your tests or we will need to reschedule. We want to make sure your test results are correct. Ask your doctor if you have questions about stopping any medicine.  48 hours before the tests: Stop drinking alcohol (beer, wine, liquor). Do NOT take Amitriptyline. Do NOT take medicines for: Allergy or colds. Examples: Benadryl (diphenhydramine), Allegra (fexofenadine), Zyrtec (cetirizine) and any over-the-counter medicine that  may cause drowsiness. Anti-anxiety, unless you have been on them every day for the past 8 weeks or more. Examples: Xanax (alprazolam), Klonopin (clonazepam), Valium (diazepam), Ativan (lorazepam). Dizziness and nausea. Examples: Antivert/Bonine/Dramamine Less-Drowsy Formula (meclizine), Dramamine (dimenhydrinate), Trans-derm Scop (Scopolamine), Compazine (prochlorperazine), Phenergan (promethazine). Sleeping. This includes sleeping pills, sedatives, tranquilizers, muscle relaxants and narcotics. It is okay to take medicines for diabetes, heart, blood pressure, seizures, thyroid or an ongoing condition.  The day of the tests:   Please have a  with you.   Do not have caffeine (coffee, soda, chocolate, energy drinks).   Do not smoke or have nicotine for at least 4 hours before the tests. This includes tobacco, e-cigarettes and nicotine gum.   Do not eat 2 to 3 hours before the tests, unless you have diabetes. Then, you should follow your usual diet.   Do not wear any make-up or lotions around your eyes or eyelids.   If you wear contact lenses, be prepared to take them out for testing; or wear your glasses.   If you take the anti-nausea medicine Zofran (ondansetron), you may bring it with you to take after your tests.  Who should I call if I have questions? If you have any questions, please call the [facility] Center at [facility]: [phone]            Jul 17, 2017  4:00 PM CDT   (Arrive by 3:45 PM)   Return Visit with Doretha Vallejo, PhD    [facility] ([facility])    [address] [place] [id number]   [phone]            Jul 31, 2017  4:00 PM CDT   (Arrive by 3:45 PM)   Return Visit with Doretha Vallejo, PhD   [facility] ([facility])    [address] [place] [id number]   [phone]            Aug 16, 2017  3:40 PM CDT   (Arrive by 3:25 PM)    Return Visit with Thelma Arvizu MD   [facility] Gastroenterology and IBD ([facility] [place])    [address] [facility]  [address] [facility] [address] [phone]              Who to contact     Please call your clinic at [phone] to:    Ask questions about your health    Make or cancel appointments    Discuss your medicines    Learn about your test results    Speak to your doctor   If you have compliments or concerns about an experience at your clinic, or if you wish to file a complaint, please contact [facility] Patient Relations at [phone] or email us at [email or web address]         Additional Information About Your Visit        MyChart Information     [employer] is an electronic gateway that provides easy, online access to your medical records. With [email or web address], you can request a clinic appointment, read your test results, renew a prescription or communicate with your care team.     To sign up for [email or web address] visit the website at [email or web address]   You will be asked to enter the access code listed below, as well as some personal information. Please follow the directions to create your username and password.     Your access code is: [email or web address]  Expires: 2017  6:31 AM     Your access code will  in 90 days. If you need help or a new code, please contact your [facility] or call [phone] for assistance.        Care EveryWhere ID     This is your Care EveryWhere ID. This could be used by other organizations to access your [facility] medical records  [id number]         Blood Pressure from Last 3 Encounters:   17 115/72   17 106/71   16 112/68    Weight from Last 3 Encounters:   05/15/17 65.8 kg (145 lb)   16 65.8 kg (145 lb)   16 68 kg (150 lb)              Today, you had the following     No orders found for display       Primary Care Provider Office Phone  # Fax #    Milan Alvarez [phone]       [facility] [address] [place] [id number]        Thank you!     Thank you for choosing [facility] GASTROENTEROLOGY AND IBD  for your care. Our goal is always to provide you with excellent care. Hearing back from our patients is one way we can continue to improve our services. Please take a few minutes to complete the written survey that you may receive in the mail after your visit with us. Thank you!             Your Updated Medication List - Protect others around you: Learn how to safely use, store and throw away your medicines at www.disposemymeds.org.          This list is accurate as of: 6/19/17  5:16 PM.  Always use your most recent med list.                   Brand Name Dispense Instructions for use    acidophilus Caps      Take 1 capsule by mouth daily.       albuterol 108 (90 BASE) MCG/ACT Inhaler    PROAIR HFA/PROVENTIL HFA/VENTOLIN HFA    1 Inhaler    Inhale 2 puffs into the lungs every 6 hours as needed for shortness of breath / dyspnea or wheezing       ARMOUR THYROID 60 MG tablet   Generic drug:  thyroid      Take 45 mg by mouth daily       ARTIFICIAL TEARS OP      Apply 1 drop to eye daily as needed       BENADRYL ALLERGY PO      Take  by mouth as needed. allergies       CHARCOAL PO      Take 6 capsules by mouth daily as needed. For indigestion       Digestive Enzymes Tabs      Use as directed       EPINEPHrine 0.3 MG/0.3ML injection    EPIPEN    1 each    Inject 0.3 mLs (0.3 mg) into the muscle once as needed for anaphylaxis       estradiol 0.1 MG/GM cream    ESTRACE     Place 0.5 g vaginally twice a week       Fiber Powd      Take 1 teaspoonful by mouth daily. Mix with slippery elm.       FISH OIL PO      Take 1 capsule by mouth daily. Plus Vit E       fluocinonide 0.05 % solution    LIDEX    60 mL    Apply topically 2 times daily To scalp as needed.       GAS-X EXTRA STRENGTH 125 MG Chew chewable tablet   Generic  drug:  simethicone      Take 2 tablets by mouth 2 times daily.       HYDROCHLORIC ACID      Take as directed       MAGNESIUM OXIDE PO      Take 6-10 tablets by mouth daily.       meclizine 12.5 MG tablet    ANTIVERT    30 tablet    Take 1 tablet (12.5 mg) by mouth 3 times daily as needed for dizziness       peppermint oil oil      1 mL daily as needed for other Taking gel tabs not oil, not in EMR.       PROGESTERONE 50MG CAPSULES COMPOUND      150 mg At Bedtime 1 capsules in the morning, in addition to bedtime dose.       vitamin D 2000 UNITS tablet      Take 1 tablet by mouth daily.

## 2017-06-26 ENCOUNTER — DOCUMENTATION ONLY (OUTPATIENT)
Dept: FAMILY MEDICINE | Facility: CLINIC | Age: 67
End: 2017-06-26

## 2017-06-26 ENCOUNTER — OFFICE VISIT (OUTPATIENT)
Dept: AUDIOLOGY | Facility: CLINIC | Age: 67
End: 2017-06-26

## 2017-06-26 DIAGNOSIS — H90.3 SENSORINEURAL HEARING LOSS (SNHL) OF BOTH EARS: ICD-10-CM

## 2017-06-26 DIAGNOSIS — R42 DIZZINESS AND GIDDINESS: ICD-10-CM

## 2017-06-26 NOTE — MR AVS SNAPSHOT
After Visit Summary   6/26/2017    Jennifer Steiner    MRN: [id number]           Patient Information     Date Of Birth          1950        Visit Information        Provider Department      6/26/2017 2:30 PM Cecille Aaron AuD [facility] Audiology        Today's Diagnoses     Dizziness and giddiness        Sensorineural hearing loss (SNHL) of both ears           Follow-ups after your visit        Your next 10 appointments already scheduled     Jul 17, 2017  4:00 PM CDT   (Arrive by 3:45 PM)   Return Visit with Doretha Vallejo, PhD    [facility] IBD ([facility])    [address] [place] [address]   [phone]            Jul 31, 2017  4:00 PM CDT   (Arrive by 3:45 PM)   Return Visit with Doretha Vallejo, PhD    [facility] ([facility])    [address] [place] [address]   [phone]            Sep 11, 2017  3:00 PM CDT   (Arrive by 2:45 PM)   Return Visit with Doretha Vallejo, PhD    [facility] ([facility])    [address] [place] [address]   [phone]            Sep 25, 2017  3:00 PM CDT   (Arrive by 2:45 PM)   Return Visit with Doretha Vallejo, PhD    [facility] ([facility])    [address] [place] [address]   [phone]            Oct 09, 2017  3:00 PM CDT   (Arrive by 2:45 PM)   Return Visit with Doretha Vallejo, PhD    [facility] ([facility])    [address] [place] [address]   [phone]            Oct 23, 2017  3:00 PM CDT   (Arrive by 2:45 PM)   Return Visit with Doretha Vallejo, PhD    [facility] ([facility])    [address] [place] [address]    [phone]            2017  3:00 PM CST   (Arrive by 2:45 PM)   Return Visit with Doretha Vallejo PhD   [facility] ([facility])    [address] [place] [address]   [phone]            2017  3:00 PM CST   (Arrive by 2:45 PM)   Return Visit with PhD ANA MARIA [facility] Gastroenterology and IBD ([facility])    [address] [place] [address]   [phone]            Dec 06, 2017  3:00 PM CST   (Arrive by 2:45 PM)   Return Visit with Thelma Arvizu MD   [facility] Gastroenterology and IBD ([facility])    [address] [place] [id number] [phone]              Who to contact     Please call your clinic at [phone] to:    Ask questions about your health    Make or cancel appointments    Discuss your medicines    Learn about your test results    Speak to your doctor   If you have compliments or concerns about an experience at your clinic, or if you wish to file a complaint, please contact [facility] Patient Relations at [phone] or email us at [email or web address]         Additional Information About Your Visit        Triporatihart Information     Magentot is an electronic gateway that provides easy, online access to your medical records. With [employer], you can request a clinic appointment, read your test results, renew a prescription or communicate with your care team.     To sign up for Magentot visit the website at [email or web address]   You will be asked to enter the access code listed below, as well as some personal information. Please follow the directions to create your username and password.     Your access code is: [email or web address]  Expires: 2017  6:31 AM     Your access code will  in 90 days. If you need help or a new code, please contact  your [facility] or call [phone] for assistance.        Care EveryWhere ID     This is your Care EveryWhere ID. This could be used by other organizations to access your [facility] medical records  [id number]         Blood Pressure from Last 3 Encounters:   04/05/17 115/72   01/18/17 106/71   06/01/16 112/68    Weight from Last 3 Encounters:   05/15/17 65.8 kg (145 lb)   07/13/16 65.8 kg (145 lb)   06/01/16 68 kg (150 lb)              We Performed the Following     Basic Vestibular Evaluation (67761)     Caloric Vestibular Test, W./ Rec, Bilateral, Monothermal, 2 Irrigations (81337)     Sinusoidal Vertical Axis Rotation Test (08342)     Tympanometry (impedance - testing) (54104)        Primary Care Provider Office Phone # Fax #    Milan Alvarez [phone]       [facility] [address] [place] [address]        Equal Access to Services     FRANCY BROUSSARD AH: Hadii aad ku hadasho Soomaali, waaxda luqadaha, qaybta kaalmada adeegyada, waxay idiin hayaan priyanka smith . So [facility] [phone].    ATENCIÓN: Si habla español, tiene a mcallister disposición servicios gratuitos de asistencia lingüística. Llame al [phone].    We comply with applicable federal civil rights laws and [place] laws. We do not discriminate on the basis of race, color, national origin, age, disability sex, sexual orientation or gender identity.            Thank you!     Thank you for choosing [facility] AUDIOLOGY  for your care. Our goal is always to provide you with excellent care. Hearing back from our patients is one way we can continue to improve our services. Please take a few minutes to complete the written survey that you may receive in the mail after your visit with us. Thank you!             Your Updated Medication List - Protect others around you: Learn how to safely use, store and throw away your medicines at www.disposemymeds.org.          This list is accurate  as of: 6/26/17 11:59 PM.  Always use your most recent med list.                   Brand Name Dispense Instructions for use Diagnosis    acidophilus Caps      Take 1 capsule by mouth daily.        albuterol 108 (90 BASE) MCG/ACT Inhaler    PROAIR HFA/PROVENTIL HFA/VENTOLIN HFA    1 Inhaler    Inhale 2 puffs into the lungs every 6 hours as needed for shortness of breath / dyspnea or wheezing    Uncomplicated asthma, unspecified asthma severity, Osteoporosis       ARMOUR THYROID 60 MG tablet   Generic drug:  thyroid      Take 45 mg by mouth daily    Osteoporosis, unspecified       ARTIFICIAL TEARS OP      Apply 1 drop to eye daily as needed        BENADRYL ALLERGY PO      Take  by mouth as needed. allergies        CHARCOAL PO      Take 6 capsules by mouth daily as needed. For indigestion        Digestive Enzymes Tabs      Use as directed        EPINEPHrine 0.3 MG/0.3ML injection    EPIPEN    1 each    Inject 0.3 mLs (0.3 mg) into the muscle once as needed for anaphylaxis    Urticaria       estradiol 0.1 MG/GM cream    ESTRACE     Place 0.5 g vaginally twice a week    High cholesterol, TIA (transient ischaemic attack)       Fiber Powd      Take 1 teaspoonful by mouth daily. Mix with slippery elm.        FISH OIL PO      Take 1 capsule by mouth daily. Plus Vit E        fluocinonide 0.05 % solution    LIDEX    60 mL    Apply topically 2 times daily To scalp as needed.    Dermatitis, seborrheic       GAS-X EXTRA STRENGTH 125 MG Chew chewable tablet   Generic drug:  simethicone      Take 2 tablets by mouth 2 times daily.    High cholesterol, Memory loss, Carotid artery dissection (H), Vertigo, Rash and other nonspecific skin eruption       HYDROCHLORIC ACID      Take as directed        MAGNESIUM OXIDE PO      Take 6-10 tablets by mouth daily.        meclizine 12.5 MG tablet    ANTIVERT    30 tablet    Take 1 tablet (12.5 mg) by mouth 3 times daily as needed for dizziness    Vertigo       peppermint oil oil      1 mL daily  as needed for other Taking gel tabs not oil, not in EMR.    High cholesterol, TIA (transient ischaemic attack)       PROGESTERONE 50MG CAPSULES COMPOUND      150 mg At Bedtime 1 capsules in the morning, in addition to bedtime dose.    Osteoporosis, unspecified       vitamin D 2000 UNITS tablet      Take 1 tablet by mouth daily.

## 2017-06-28 NOTE — PROGRESS NOTES
AUDIOLOGY REPORT    BACKGROUND INFORMATION: Jennifer Steiner, 67 year old, was seen in Audiology at the [facility] on 6/26/2017, for videonystagmography (VNG) and rotational chair testing, referred by Dr. Clotilde Washington and Dr. Rick Nissen. The patient reports an episode of vertigo in January 2017 while getting out of bed. Her vertigo resolved after about 15 minutes and she was left with a feeling of imbalance that increased with head movement and when bending over. The patient reports that her imbalance has greatly improved, particularly since she started swimming.  She does have  occasional imbalance when looking at moving objects (e.g. walking into a store and seeing the automatic doors open,  and when stopping at traffic lights), when looking up in the morning, and when she gets tired.  The patient does report a recent increase in bilateral tinnitus and left aural fullness. She has a history of left otalgia, which she reports is related to a significant history of ear infections as a child and tooth issues on the left side. The patient does have a history of a left carotid artery tear about 7 years ago, for which she did not require surgical intervention. The patient has a history of hashimoto's hypothyroidism, severe food allergies, and heart palpitations. A hearing evaluation performed on 2/14/2017 revealed right normal sloping to moderate sensorineural hearing loss, and left normal to mild sensorineural hearing loss (with an air bone gap at 250 Hz).     TEST RESULTS AND PROCEDURES:    Dizziness Handicap Inventory (DHI): 26      Rotational chair testing:   Sinusoidal harmonic acceleration test:    Spontaneous nystagmus: Absent  Phase: Normal at 0.01, 0.02, 0.04, 0.08, 0.16,  and 0.32 Hz  Gain: Normal at 0.01, 0.02,0.04,  0.08, 0.16, and 0.32 Hz  Symmetry: Normal at 0.01, 0.02, 0.04, 0.08, 0.16, and 0.32 Hz  Spectral Purity: Normal at 0.01, 0.02, 0.04, 0.08, 0.16, and  0.32 Hz  Overall rotational chair test: Normal at 0.01, 0.02, 0.04, 0.08, 0.16, and 0.32 Hz    Videonystagmography (VNG) testing:  Tympanograms: normal eardrum mobility bilaterally  Ocular range of motion and ocular counter roll: Normal  Head Thrust: Negative  Gaze nystagmus with fixation: Negative for nystagmus. Saccadic intrusions noted in gaze left.   Saccades: Normal  Anti-saccades: Normal  Pursuit: Normal   Optokinetics: Abnormal. Low gain in both directions on the first attempt. Low gain to the left only on the second attempt.   Gaze with fixation removed: Positive for saccadic intrusions in gaze center and gaze up, and 3 degrees per second left beating nystagmus in gaze left.   Head Shake test: Negative for nystagmus post horizontal headshake; positive for 3 degrees per second left beating nystagmus post vertical headshake    Backus-Hallpike: Negative for nystagmus and symptoms bilaterally  Roll Test: Negative for nystagmus and symptoms bilaterally. Positive for saccadic intrusions.     Positionals: Body Right: Positive for 2 degrees per second left beating nystagmus  Positionals: Body Left: Negative for nystagmus  Positionals: Supine: Positive for 2 degrees per second left beating nystagmus  Positionals: Pre-Caloric: Positive for 3 degrees per second left beating nystagmus    Calorics (Tested at 44 degrees and 30 degrees Celsius for 30 seconds for warm and cool water, respectively):  Cool water calorics were not tested due to patient's nausea during warm irrigations.   Right Warm Eye Speed: 33 degrees per second  Left Warm Eye Speed: 29 degrees per second  Right Cool Eye Speed: Did not test  Left Cool Eye Speed: Did not test  Difference between ear: 6% left hypofunction. (Greater than 25% considered clinically significant.)  Fixation Index: 0.07  Overall caloric test: Normal    SUMMARY AND RECOMMENDATIONS:   1) There were no strong indications of peripheral vestibular system involvement noted on today's exam.  Mild indications include left beating nystagmus following  vertical headshake testing. Results should be interpreted with care, as the nystagmus was not significantly different from gaze evoked nystagmus. Rotational chair testing and caloric results do not suggest a peripheral weakness.     2) There were mild indications of central vestibular system involvement noted on today's exam. Indications include low gain on OPK testing. Clinical correlation is needed.     Follow-up with Dr. Rick Nissen and Dr. Clotilde Washington for medical management.  Please call this clinic at [phone] with questions regarding these results or recommendations.         Valencia Brink.  Licensed Audiologist  [place] #[id number]

## 2017-06-29 NOTE — PROGRESS NOTES
"Jennifer assigned to [facility] for [facility] care mgmt June 2016 but I have been unable to reach her until today.  Declined home visit at this time, has MANY issues with medical bills that she has been unable to resolve and says that she has \"given up\" at this time.  Has been working with [facility] trying to resolve some of these problems.  Per Jennifer, she is being given conflicting information about in network providers from [facility] and that the clinics also give her different information.  Is seen at [facility] and \"Julianna Otto\", which is an [facility] health clinic.    Will continue to reach out to her every six months and will send her another copy of my contact information so that she is able to reach me should she need anything.    Wanda Breyer RN PHN  [facility] Care Coord[facility]  [phone]    "

## 2017-07-20 ENCOUNTER — TELEPHONE (OUTPATIENT)
Dept: OTOLARYNGOLOGY | Facility: CLINIC | Age: 67
End: 2017-07-20

## 2017-07-20 NOTE — TELEPHONE ENCOUNTER
Per Dr. Nissen,   Patient's Balance Test from 6/26/17 is normal. Her options are to have PT with [facility] for vestibular rehab or she could see Neurology for further evaluation of her vertigo.   Left detailed message on patient's voice mail with this information. Informed her to call me if she wants to pursue one of these options. My direct number given.    Hermila Lawrence, RN Care Coordinator  [facility] Otolaryngology/Head & Neck Surgery  Direct contact: [phone]

## 2017-07-21 ENCOUNTER — TELEPHONE (OUTPATIENT)
Dept: OTOLARYNGOLOGY | Facility: CLINIC | Age: 67
End: 2017-07-21

## 2017-07-21 DIAGNOSIS — H81.10 BPPV (BENIGN PAROXYSMAL POSITIONAL VERTIGO), UNSPECIFIED LATERALITY: Primary | ICD-10-CM

## 2017-07-31 ENCOUNTER — OFFICE VISIT (OUTPATIENT)
Dept: GASTROENTEROLOGY | Facility: CLINIC | Age: 67
End: 2017-07-31

## 2017-07-31 DIAGNOSIS — F33.1 MAJOR DEPRESSIVE DISORDER, RECURRENT EPISODE, MODERATE (H): ICD-10-CM

## 2017-07-31 DIAGNOSIS — F45.42 PAIN DISORDER ASSOCIATED WITH PSYCHOLOGICAL FACTORS AND MEDICAL CONDITION: Primary | ICD-10-CM

## 2017-07-31 NOTE — MR AVS SNAPSHOT
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    -  1    Major depressive disorder, recurrent episode, moderate (H)           Follow-ups after your visit        Your next 10 appointments already scheduled     Sep 25, 2017  3:00 PM CDT   (Arrive by 2:45 PM)   Return Visit with Doretha Vallejo, PhD    [facility] ([facility])    [address] [facility] [id number] [phone]            Oct 09, 2017  3:00 PM CDT   (Arrive by 2:45 PM)   Return Visit with Doretha Vallejo, PhD    [facility] ([facility])    [address] [id number] [phone]            Nov 06, 2017  3:00 PM CST   (Arrive by 2:45 PM)   Return Visit with Doretha Vallejo, PhD    [facility] ([facility])    [address]  [address] [facility] [address] [phone]            Nov 24, 2017  3:00 PM CST   (Arrive by 2:45 PM)   Return Visit with Doretha Vallejo, PhD    [facility] ([facility])    [address]  [address] [facility] [address] [phone]            Dec 06, 2017  3:00 PM CST   (Arrive by 2:45 PM)   Return Visit with Thelma Arvizu MD    [facility] ([facility])    [address]  [address] [facility] [address] [phone]              Who to contact     Please call your clinic at [phone] to:    Ask questions about your  health    Make or cancel appointments    Discuss your medicines    Learn about your test results    Speak to your doctor   If you have compliments or concerns about an experience at your clinic, or if you wish to file a complaint, please contact [facility] Patient Relations at [phone] or email us at [email or web address]         Additional Information About Your Visit        [employer] Information     [email or web address] is an electronic gateway that provides easy, online access to your medical records. With OfficialVirtualDJ, you can request a clinic appointment, read your test results, renew a prescription or communicate with your care team.     To sign up for [email or web address] visit the website at [email or web address]   You will be asked to enter the access code listed below, as well as some personal information. Please follow the directions to create your username and password.     Your access code is: [email or web address]  Expires: 10/29/2017  5:10 PM     Your access code will  in 90 days. If you need help or a new code, please contact your [facility] or call [phone] for assistance.        Care EveryWhere ID     This is your Care EveryWhere ID. This could be used by other organizations to access your [facility] medical records  [id number]         Blood Pressure from Last 3 Encounters:   17 115/72   17 106/71   16 112/68    Weight from Last 3 Encounters:   05/15/17 65.8 kg (145 lb)   16 65.8 kg (145 lb)   16 68 kg (150 lb)              Today, you had the following     No orders found for display       Primary Care Provider Office Phone # Fax #    Milan Alvarez [phone]       [address] [address]        Equal Access to Services     FRANCY BROUSSARD : sarahi Peres, marisol mcnamara. So [facility]  [phone].    ATENCIÓN: Si lynette parra, tiene a mcallister disposición servicios gratuitos de asistencia lingüística. Buddy romero [phone].    We comply with applicable federal civil rights laws and [place] laws. We do not discriminate on the basis of race, color, national origin, age, disability sex, sexual orientation or gender identity.            Thank you!     Thank you for choosing [facility] GASTROENTEROLOGY AND IBD CLINIC  for your care. Our goal is always to provide you with excellent care. Hearing back from our patients is one way we can continue to improve our services. Please take a few minutes to complete the written survey that you may receive in the mail after your visit with us. Thank you!             Your Updated Medication List - Protect others around you: Learn how to safely use, store and throw away your medicines at www.disposemymeds.org.          This list is accurate as of: 7/31/17 11:59 PM.  Always use your most recent med list.                   Brand Name Dispense Instructions for use Diagnosis    acidophilus Caps      Take 1 capsule by mouth daily.        albuterol 108 (90 BASE) MCG/ACT Inhaler    PROAIR HFA/PROVENTIL HFA/VENTOLIN HFA    1 Inhaler    Inhale 2 puffs into the lungs every 6 hours as needed for shortness of breath / dyspnea or wheezing    Uncomplicated asthma, unspecified asthma severity, Osteoporosis       ARMOUR THYROID 60 MG tablet   Generic drug:  thyroid      Take 45 mg by mouth daily    Osteoporosis, unspecified       ARTIFICIAL TEARS OP      Apply 1 drop to eye daily as needed        BENADRYL ALLERGY PO      Take  by mouth as needed. allergies        CHARCOAL PO      Take 6 capsules by mouth daily as needed. For indigestion        Digestive Enzymes Tabs      Use as directed        EPINEPHrine 0.3 MG/0.3ML injection 2-pack    EPIPEN    1 each    Inject 0.3 mLs (0.3 mg) into the muscle once as needed for anaphylaxis    Urticaria       estradiol 0.1 MG/GM cream    ESTRACE      Place 0.5 g vaginally twice a week    High cholesterol, TIA (transient ischaemic attack)       Fiber Powd      Take 1 teaspoonful by mouth daily. Mix with slippery elm.        FISH OIL PO      Take 1 capsule by mouth daily. Plus Vit E        fluocinonide 0.05 % solution    LIDEX    60 mL    Apply topically 2 times daily To scalp as needed.    Dermatitis, seborrheic       GAS-X EXTRA STRENGTH 125 MG Chew chewable tablet   Generic drug:  simethicone      Take 2 tablets by mouth 2 times daily.    High cholesterol, Memory loss, Carotid artery dissection (H), Vertigo, Rash and other nonspecific skin eruption       HYDROCHLORIC ACID      Take as directed        MAGNESIUM OXIDE PO      Take 6-10 tablets by mouth daily.        meclizine 12.5 MG tablet    ANTIVERT    30 tablet    Take 1 tablet (12.5 mg) by mouth 3 times daily as needed for dizziness    Vertigo       peppermint oil oil      1 mL daily as needed for other Taking gel tabs not oil, not in EMR.    High cholesterol, TIA (transient ischaemic attack)       PROGESTERONE 50MG CAPSULES COMPOUND      150 mg At Bedtime 1 capsules in the morning, in addition to bedtime dose.    Osteoporosis, unspecified       vitamin D 2000 UNITS tablet      Take 1 tablet by mouth daily.
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
## 2017-08-22 ENCOUNTER — TELEPHONE (OUTPATIENT)
Dept: DERMATOLOGY | Facility: CLINIC | Age: 67
End: 2017-08-22

## 2017-08-22 NOTE — TELEPHONE ENCOUNTER
Pt called with concerns of been stung by about 10 bees yesterday.  Areas are red and swollen. Pt reports she has been icing the areas. Using oral and topical benardyl. Taking salt water, magnesium and baking soda baths.  Denies any difficulty breathing or swelling of her throat.  Does report areas are itching and gets a chill from time to time.  Denies need to be seen in the ER at this time  but would like a call back with other recommendations.  Explained to pt that this message would be forwarded to the JOANNE but if her condition is to worsen at all to call 911.

## 2017-08-22 NOTE — TELEPHONE ENCOUNTER
Called the patient to discuss her symptoms. She states overall she thinks she has improved since yesterday morning when she had the bee stings, but is getting redness and swelling at the sites of her bee stings (wrists, arms, and hip). She had about 10 bee stings. She denies facial swelling or shortness of breath.  She is using cold-compresses, topical over the counter therapies, and benadryl prn. Discussed that in the acue setting she could schedule the benadryl q4-8 hrs 25-50 mg and discussed the side effect of sedation.  At this time, would not prescribe prednisone given that she states she is improving.  Discussed warning signs for which she should report straight to the emergency department, which include shortness of breath, facial swelling, or if she is just not improving.    Moi Woo MD  Internal Medicine-Dermatology, PGY-2

## 2017-08-29 ENCOUNTER — THERAPY VISIT (OUTPATIENT)
Dept: PHYSICAL THERAPY | Facility: CLINIC | Age: 67
End: 2017-08-29

## 2017-08-29 ENCOUNTER — OFFICE VISIT (OUTPATIENT)
Dept: OPHTHALMOLOGY | Facility: CLINIC | Age: 67
End: 2017-08-29

## 2017-08-29 DIAGNOSIS — H02.889 MEIBOMIAN GLAND DYSFUNCTION: ICD-10-CM

## 2017-08-29 DIAGNOSIS — H81.10 BPPV (BENIGN PAROXYSMAL POSITIONAL VERTIGO), UNSPECIFIED LATERALITY: Primary | ICD-10-CM

## 2017-08-29 DIAGNOSIS — H10.13 ALLERGIC CONJUNCTIVITIS, BILATERAL: Primary | ICD-10-CM

## 2017-08-29 RX ORDER — OLOPATADINE HYDROCHLORIDE 1 MG/ML
1 SOLUTION/ DROPS OPHTHALMIC 2 TIMES DAILY
Qty: 1 BOTTLE | Refills: 3 | Status: SHIPPED | OUTPATIENT
Start: 2017-08-29 | End: 2017-09-26

## 2017-08-29 ASSESSMENT — TONOMETRY
OD_IOP_MMHG: 18
IOP_METHOD: ICARE
OS_IOP_MMHG: 18

## 2017-08-29 ASSESSMENT — VISUAL ACUITY
METHOD: SNELLEN - LINEAR
CORRECTION_TYPE: GLASSES
OD_CC: 20/20
OS_CC+: +2
OS_CC: 20/30

## 2017-08-29 ASSESSMENT — EXTERNAL EXAM - LEFT EYE: OS_EXAM: NORMAL

## 2017-08-29 ASSESSMENT — CONF VISUAL FIELD
OD_NORMAL: 1
OS_NORMAL: 1

## 2017-08-29 ASSESSMENT — EXTERNAL EXAM - RIGHT EYE: OD_EXAM: ENOPHTHALMOS

## 2017-08-29 NOTE — MR AVS SNAPSHOT
After Visit Summary   8/29/2017    Jennifer Steiner    MRN: [id number]           Patient Information     Date Of Birth          1950        Visit Information        Provider Department      8/29/2017 12:20 PM Pancho Santos OD [facility]        Today's Diagnoses     Allergic conjunctivitis, bilateral    -  1    Meibomian gland dysfunction           Follow-ups after your visit        Follow-up notes from your care team     Return in about 2 months (around 10/29/2017) for Follow Up.      Your next 10 appointments already scheduled     Nov 13, 2017  1:00 PM CST   (Arrive by 12:45 PM)   Return Visit with Doretha Vallejo, PhD   [facility] ([facility])    [address] [place] [address] [phone]            Dec 06, 2017  3:00 PM CST   (Arrive by 2:45 PM)   Return Visit with Thelma Arvizu MD   [facility] ([facility])    [address] [place] [address] [phone]            Dec 19, 2017  2:00 PM CST   (Arrive by 1:45 PM)   Return Visit with Doretha Vallejo PhD   [facility] ([facility])    [address] [place] [address] [phone]              Who to contact     Please call your clinic at [phone] to:    Ask questions about your health    Make or cancel appointments    Discuss your medicines    Learn about your test results    Speak to your doctor   If you have compliments or concerns about an experience at your clinic, or if you wish to file a complaint, please contact [facility] Patient Relations at [phone] or email us at [email or web address]         Additional Information About Your Visit        MyChart Information     [employer] is an electronic gateway that provides easy,  online access to your medical records. With [employer], you can request a clinic appointment, read your test results, renew a prescription or communicate with your care team.     To sign up for [employer] visit the website at [email or web address]   You will be asked to enter the access code listed below, as well as some personal information. Please follow the directions to create your username and password.     Your access code is: [email or web address]  Expires: 10/29/2017  5:10 PM     Your access code will  in 90 days. If you need help or a new code, please contact your [facility] or call [phone] for assistance.        Care EveryWhere ID     This is your Care EveryWhere ID. This could be used by other organizations to access your [place] medical records  [id number]         Blood Pressure from Last 3 Encounters:   17 115/72   17 106/71   16 112/68    Weight from Last 3 Encounters:   05/15/17 65.8 kg (145 lb)   16 65.8 kg (145 lb)   16 68 kg (150 lb)              Today, you had the following     No orders found for display         Today's Medication Changes          These changes are accurate as of: 17 11:59 PM.  If you have any questions, ask your nurse or doctor.               Start taking these medicines.        Dose/Directions    olopatadine 0.1 % ophthalmic solution   Commonly known as:  PATANOL   Used for:  Allergic conjunctivitis, bilateral   Started by:  Pancho Santos, OD        Dose:  1 drop   Place 1 drop into both eyes 2 times daily   Quantity:  1 Bottle   Refills:  3            Where to get your medicines      These medications were sent to [facility] [address] [place] [address] [address] [place] [address] AT [address] [address]    Hours:  24-hours Phone:  [phone]     olopatadine 0.1 % ophthalmic solution                Primary Care Provider Office Phone # Fax #    Milan Alvraez MD  [phone]       [address] [facility] [address]  [facility] [address]        Equal Access to Services     FRANCY BROUSSARD : Hadii dilia vazquez kaushal Elias, wakendrickda lurusty, qamaynorta kaerasmoda aspen, marisol pichardomarti bambi. So [facility] [phone].    ATENCIÓN: Si habla español, tiene a mcallister disposición servicios gratuitos de asistencia lingüística. Buddy al [phone].    We comply with applicable federal civil rights laws and [place] laws. We do not discriminate on the basis of race, color, national origin, age, disability, sex, sexual orientation, or gender identity.            Thank you!     Thank you for choosing [facility]  for your care. Our goal is always to provide you with excellent care. Hearing back from our patients is one way we can continue to improve our services. Please take a few minutes to complete the written survey that you may receive in the mail after your visit with us. Thank you!             Your Updated Medication List - Protect others around you: Learn how to safely use, store and throw away your medicines at www.disposemymeds.org.          This list is accurate as of: 8/29/17 11:59 PM.  Always use your most recent med list.                   Brand Name Dispense Instructions for use Diagnosis    acidophilus Caps      Take 1 capsule by mouth daily.        albuterol 108 (90 BASE) MCG/ACT Inhaler    PROAIR HFA/PROVENTIL HFA/VENTOLIN HFA    1 Inhaler    Inhale 2 puffs into the lungs every 6 hours as needed for shortness of breath / dyspnea or wheezing    Uncomplicated asthma, unspecified asthma severity, Osteoporosis       ARMOUR THYROID 60 MG tablet   Generic drug:  thyroid      Take 45 mg by mouth daily    Osteoporosis, unspecified       ARTIFICIAL TEARS OP      Apply 1 drop to eye daily as needed        BENADRYL ALLERGY PO      Take  by mouth as needed. allergies        CHARCOAL PO      Take 6 capsules by mouth daily as needed. For indigestion         Digestive Enzymes Tabs      Use as directed        EPINEPHrine 0.3 MG/0.3ML injection 2-pack    EPIPEN    1 each    Inject 0.3 mLs (0.3 mg) into the muscle once as needed for anaphylaxis    Urticaria       estradiol 0.1 MG/GM cream    ESTRACE     Place 0.5 g vaginally twice a week    High cholesterol, TIA (transient ischaemic attack)       Fiber Powd      Take 1 teaspoonful by mouth daily. Mix with slippery elm.        FISH OIL PO      Take 1 capsule by mouth daily. Plus Vit E        fluocinonide 0.05 % solution    LIDEX    60 mL    Apply topically 2 times daily To scalp as needed.    Dermatitis, seborrheic       GAS-X EXTRA STRENGTH 125 MG Chew chewable tablet   Generic drug:  simethicone      Take 2 tablets by mouth 2 times daily.    High cholesterol, Memory loss, Carotid artery dissection (H), Vertigo, Rash and other nonspecific skin eruption       HYDROCHLORIC ACID      Take as directed        MAGNESIUM OXIDE PO      Take 6-10 tablets by mouth daily.        meclizine 12.5 MG tablet    ANTIVERT    30 tablet    Take 1 tablet (12.5 mg) by mouth 3 times daily as needed for dizziness    Vertigo       olopatadine 0.1 % ophthalmic solution    PATANOL    1 Bottle    Place 1 drop into both eyes 2 times daily    Allergic conjunctivitis, bilateral       peppermint oil oil      1 mL daily as needed for other Taking gel tabs not oil, not in EMR.    High cholesterol, TIA (transient ischaemic attack)       PROGESTERONE 50MG CAPSULES COMPOUND      150 mg At Bedtime 1 capsules in the morning, in addition to bedtime dose.    Osteoporosis, unspecified       vitamin D 2000 UNITS tablet      Take 1 tablet by mouth daily.

## 2017-08-29 NOTE — MR AVS SNAPSHOT
After Visit Summary   8/29/2017    Jennifer Steiner    MRN: [id number]           Patient Information     Date Of Birth          1950        Visit Information        Provider Department      8/29/2017 1:00 PM Lakia Woodson PT [facility]        Today's Diagnoses     BPPV (benign paroxysmal positional vertigo), unspecified laterality    -  1       Follow-ups after your visit        Your next 10 appointments already scheduled     Sep 11, 2017  3:00 PM CDT   (Arrive by 2:45 PM)   Return Visit with Doretha Vallejo, PhD   [facility] ([facility])    [address] [place] [address]   [phone]            Sep 25, 2017  3:00 PM CDT   (Arrive by 2:45 PM)   Return Visit with Doretha Vallejo, PhD   [facility] ([facility])    [address] [place] [address]   [phone]            Oct 09, 2017  3:00 PM CDT   (Arrive by 2:45 PM)   Return Visit with Doretha Vallejo, PhD   [facility] ([facility])    [address] [place] [address]   [phone]            Nov 06, 2017  3:00 PM CST   (Arrive by 2:45 PM)   Return Visit with Doretha Vallejo, PhD   [facility] ([facility])    [address] [place] [address]   [phone]            Nov 24, 2017  3:00 PM CST   (Arrive by 2:45 PM)   Return Visit with Doretha Vallejo, PhD   [facility] ([facility])    [address] [place] [address]   [phone]            Dec 06, 2017  3:00 PM CST   (Arrive by 2:45 PM)   Return Visit with Thelma Arvizu MD   [facility] ([facility])    [address] [place] [address]  [facility] [address] [phone]              Who to contact     Please call your clinic at [phone] to:    Ask questions about your health    Make or cancel appointments    Discuss your medicines    Learn about your test results    Speak to your doctor   If you have compliments or concerns about an experience at your clinic, or if you wish to file a complaint, please contact [facility] Patient Relations at [phone] or email us at [email or web address]         Additional Information About Your Visit        [employer] Information     [employer] is an electronic gateway that provides easy, online access to your medical records. With [employer], you can request a clinic appointment, read your test results, renew a prescription or communicate with your care team.     To sign up for [employer] visit the website at [email or web address]   You will be asked to enter the access code listed below, as well as some personal information. Please follow the directions to create your username and password.     Your access code is: [email or web address]  Expires: 10/29/2017  5:10 PM     Your access code will  in 90 days. If you need help or a new code, please contact your [facility] or call [phone] for assistance.        Care EveryWhere ID     This is your Care EveryWhere ID. This could be used by other organizations to access your [place] medical records  [id number]         Blood Pressure from Last 3 Encounters:   17 115/72   17 106/71   16 112/68    Weight from Last 3 Encounters:   05/15/17 65.8 kg (145 lb)   16 65.8 kg (145 lb)   16 68 kg (150 lb)              Today, you had the following     No orders found for display         Today's Medication Changes          These changes are accurate as of: 17  9:22 PM.  If you have any questions, ask your nurse or doctor.               Start taking these medicines.         Dose/Directions    olopatadine 0.1 % ophthalmic solution   Commonly known as:  PATANOL   Used for:  Allergic conjunctivitis, bilateral   Started by:  Pancho Santos, OD        Dose:  1 drop   Place 1 drop into both eyes 2 times daily   Quantity:  1 Bottle   Refills:  3            Where to get your medicines      These medications were sent to [facility] [address] [place] [address] AT [address] [address]    Hours:  24-hours Phone:  [phone]     olopatadine 0.1 % ophthalmic solution                Primary Care Provider Office Phone # Fax #    Milan Alvarez [phone]       [address] [address]        Equal Access to Services     FRANCY BROUSSARD AH: Hadii dilia vazquez hadasho Soomaali, waaxda luqadaha, qaybta kaalmada adeegyada, waxlan longoriain adrian lacy. So [facility] [phone].    ATENCIÓN: Si habla español, tiene a mcallister disposición servicios gratuitos de asistencia lingüística. Ll[facility] [phone].    We comply with applicable federal civil rights laws and [place] laws. We do not discriminate on the basis of race, color, national origin, age, disability sex, sexual orientation or gender identity.            Thank you!     Thank you for choosing [facility]  for your care. Our goal is always to provide you with excellent care. Hearing back from our patients is one way we can continue to improve our services. Please take a few minutes to complete the written survey that you may receive in the mail after your visit with us. Thank you!             Your Updated Medication List - Protect others around you: Learn how to safely use, store and throw away your medicines at www.disposemymeds.org.          This list is accurate as of: 8/29/17  9:22 PM.  Always use your most recent med list.                   Brand Name Dispense Instructions for use Diagnosis    acidophilus Caps      Take 1 capsule by mouth  daily.        albuterol 108 (90 BASE) MCG/ACT Inhaler    PROAIR HFA/PROVENTIL HFA/VENTOLIN HFA    1 Inhaler    Inhale 2 puffs into the lungs every 6 hours as needed for shortness of breath / dyspnea or wheezing    Uncomplicated asthma, unspecified asthma severity, Osteoporosis       ARMOUR THYROID 60 MG tablet   Generic drug:  thyroid      Take 45 mg by mouth daily    Osteoporosis, unspecified       ARTIFICIAL TEARS OP      Apply 1 drop to eye daily as needed        BENADRYL ALLERGY PO      Take  by mouth as needed. allergies        CHARCOAL PO      Take 6 capsules by mouth daily as needed. For indigestion        Digestive Enzymes Tabs      Use as directed        EPINEPHrine 0.3 MG/0.3ML injection 2-pack    EPIPEN    1 each    Inject 0.3 mLs (0.3 mg) into the muscle once as needed for anaphylaxis    Urticaria       estradiol 0.1 MG/GM cream    ESTRACE     Place 0.5 g vaginally twice a week    High cholesterol, TIA (transient ischaemic attack)       Fiber Powd      Take 1 teaspoonful by mouth daily. Mix with slippery elm.        FISH OIL PO      Take 1 capsule by mouth daily. Plus Vit E        fluocinonide 0.05 % solution    LIDEX    60 mL    Apply topically 2 times daily To scalp as needed.    Dermatitis, seborrheic       GAS-X EXTRA STRENGTH 125 MG Chew chewable tablet   Generic drug:  simethicone      Take 2 tablets by mouth 2 times daily.    High cholesterol, Memory loss, Carotid artery dissection (H), Vertigo, Rash and other nonspecific skin eruption       HYDROCHLORIC ACID      Take as directed        MAGNESIUM OXIDE PO      Take 6-10 tablets by mouth daily.        meclizine 12.5 MG tablet    ANTIVERT    30 tablet    Take 1 tablet (12.5 mg) by mouth 3 times daily as needed for dizziness    Vertigo       olopatadine 0.1 % ophthalmic solution    PATANOL    1 Bottle    Place 1 drop into both eyes 2 times daily    Allergic conjunctivitis, bilateral       peppermint oil oil      1 mL daily as needed for other Taking  gel tabs not oil, not in EMR.    High cholesterol, TIA (transient ischaemic attack)       PROGESTERONE 50MG CAPSULES COMPOUND      150 mg At Bedtime 1 capsules in the morning, in addition to bedtime dose.    Osteoporosis, unspecified       vitamin D 2000 UNITS tablet      Take 1 tablet by mouth daily.

## 2017-08-29 NOTE — PROGRESS NOTES
08/29/17 1300   Signing Clinician's Name / Credentials   Signing clinician's name / credentials Sneha MALDONADOT NCS   Functional Gait Assessment (RENEE Yun., WILMER Villegas, et al. (2004))   1. GAIT LEVEL SURFACE 3   2. CHANGE IN GAIT SPEED 3   3. GAIT WITH HORIZONTAL HEAD TURNS 3   4. GAIT WITH VERTICAL HEAD TURNS 3   5. GAIT AND PIVOT TURN 3   6. STEP OVER OBSTACLE 3   7. GAIT WITH NARROW BASE OF SUPPORT 3   8. GAIT WITH EYES CLOSED 1  (>10 seconds.)   9. AMBULATING BACKWARDS 3   10. STEPS 3   Total Functional Gait Assessment Score   TOTAL SCORE: (MAXIMUM SCORE 30) 28   Functional Gait Assessment (FGA): The FGA assesses postural stability during various walking tasks.     Patient Score: 28/30  Scores of <22/30 have been correlated with predicting falls in community-dwelling older adults according to Court & Joe 2010.   Scores of <18/30 have been correlated with increased risk for falls in patients with Parkinsons Disease according to Wil Ayoub, Abarca et al 2014.  Minimal Detectable Change for patients with acute/chronic stroke = 4.2 according to Thihattie & Ritschel 2009  Minimal Detectable Change for patients with vestibular disorder = 8 according to Court & Joe 2010    Assessment (rationale for performing, application to patient s function & care plan): falls risk  Minutes billed as physical performance test: part of evaluation    Sneha Woodson DPT, MPT, NCS

## 2017-08-29 NOTE — PROGRESS NOTES
Assessment/Plan  (H10.13) Allergic conjunctivitis, bilateral  (primary encounter diagnosis)  Comment: Explained that symptoms are likely due to a combination of allergies and dry eye  Plan: olopatadine (PATANOL) 0.1 % ophthalmic solution   Prescribed Patanol bid OU.    (H02.89) Meibomian gland dysfunction  Comment: See above.  Plan:  Educated patient on condition and clinical findings. Recommended warm compresses twice each day for ten minutes.    Return to clinic in 2 months for follow-up on ocular surface irritation.    Complete documentation of historical and exam elements from today's encounter can  be found in the full encounter summary report (not reduplicated in this progress  note). I personally obtained the chief complaint(s) and history of present illness. I  confirmed and edited as necessary the review of systems, past medical/surgical  history, family history, social history, and examination findings as documented by  others; and I examined the patient myself. I personally reviewed the relevant tests,  images, and reports as documented above. I formulated and edited as necessary the  assessment and plan and discussed the findings and management plan with the  patient and family.    Pancho Santos, OD, FAAO

## 2017-08-29 NOTE — NURSING NOTE
Chief Complaints and History of Present Illnesses   Patient presents with     Red Eye Left Eye     HPI    Affected eye(s):  Left   Symptoms:     No blurred vision      Frequency:  Constant       Do you have eye pain now?:  Yes   Location:  OS   Pain Level:  No Pain (1)   Pain Frequency:  Intermittent      Comments:  Left eye has chronic infection. Pressure when bending down. Irritation at the corner of the lid, comes and goes. Discharge that was yellow/green last week. Told to use warm compresses and ATs. She covers the eye while watching TV. Leaving compresses on over night. Doing ATs 1-3 times daily. Symptoms worse in AM.    Lluvia Sampson COT 12:36 PM August 29, 2017

## 2017-08-30 NOTE — PROGRESS NOTES
08/29/17 1300   General Information   Start of Care Date 08/29/17   Referring Physician Dr Nissen/Dr Vega   Orders Evaluate and Treat as Indicated   Order Date 07/21/17  (5/15 Dr vega)   Medical Diagnosis BPPV  (vertigo)   Surgical/Medical history reviewed Yes  (chronic abd pain/IBS, depression, carotid artery dissection)   Pertinent history of current problem (include personal factors and/or comorbidities that impact the POC) Dizziness comes and goes. It has diminshed. Swimming has helped. Did have some massage to her face and it helped. Top of the steps sets it off, going down steps only, Getting up from chair or getting up fromlaying down can create it, looking down sometimes. It doesn't last long, a second to a minute.  I hold onto something. Twice a week. Other health issues so sometimes I feel weak.GI issues and I am in pain in alot. Balance is not as good, its OK. I trip. No falls. ITs a balance and a spin when it happens. No nausea anymore.    Pertinent Visual History  glasses   Prior level of function comment I love to walk. I dont walk when its icy outside. Walks to the bus, doesn't drive.  Goes swimming for ex at the [employer] (10 minute walk from home) twicea  week for 15-20 minutes.    Diagnostic Tests VNG;Rotational Chair   VNG Results   VNG results left beating after vertical headshake. calorics were normal.    Rotational chair Results   Rotational chair results normal   Previous/Current Treatment Medication(s);Other   Patient role/Employment history Retired;Disabled  ()   Living environment House/townhome  (third floor of a house, has to climb steps)   Patient/Family Goals Statement Im trying to rebuild, get my strength back.   Fall Risk Screen   Fall screen completed by PT   Per patient - Fall 2 or more times in past year? No   Per patient - Fall with injury in past year? No   Is patient a fall risk? No   Functional Scales   Functional Scales and Outcomes could not complete the  DHI, I cant see the computers, it strains my eyes and I am tring to doless strain on them so could not fill out the form (DHI)   Pain   Patient currently in pain Yes   Pain location gut pain, almost never goes away.When my gut hurts my back hurts too.    Pain description comment I dont go out of r long periods of time anymore, moer than a few hours and my gut really hurts.   Pain comments left eye hurts a bit. She just finished eye MD appt. My left ear hurts sometimes. History of left knee injury when 20 yo.   Vital Signs   Pulse 75   BP 99/83  (left arm seated after we did exercise )   Other Vital Signs (did not eat today or drink much). right arm seated to compare 105/65 and HR 71 BPMS.   Integumentary   Integumentary Comments right toe significant bunion   Gait   Gait Comments 25fTW at fast speed 5.72 seconds and 13 steps.   Gait Special Tests 25 Foot Timed Walk   Seconds 7.69   Steps 14 Steps   Comments normal ROSE, symmetrical steps, and arms swing   Gait Special Tests Dynamic Gait Index   Score out of 24 24   Gait Special Tests Functional Gait Assessment Score out of 30   Score out of 30 28   Balance Special Tests Sit to Stand Reps in 30 Seconds   Reps in 30 seconds 13   Height 18  (good control, no dizziness.)   Comments LBP with this, fell on a wet sidewalk when I was 31 and some damge to my low back   Sensory Examination   Sensory Perception other (describe)   Sensory Perception Comments gets some in her left arm, in past left leg also but not anymore. Maybe 3 times a week if turns head a certain way.   Planned Therapy Interventions   Planned Therapy Interventions Comment I explained to her that her balance and gait testing was all normal and her VNG results are good. Symptoms are minimal now and she knows to move slowly out of bed. She knows to stay active.   Clinical Impression   Criteria for Skilled Therapeutic Interventions Met yes, treatment indicated   PT Diagnosis Dizziness   Influenced by the  following impairments symptoms of dizziness and pain   Functional limitations due to impairments affects IADLS   Clinical Presentation Stable/Uncomplicated   Clinical Presentation Rationale based on chronic medical issues/abd pain, subjective comments, Gait speed/quality, DGI/FGA, sit to stand test and clinical judgement   Clinical Decision Making (Complexity) Low complexity   Therapy Frequency other (see comments)   Predicted Duration of Therapy Intervention (days/wks) today only   Risk & Benefits of therapy have been explained Yes   Patient, Family & other staff in agreement with plan of care Yes   Clinical Impression Comments Symptoms have improved and now only has very brief episodes. VNG results are not consistent with BPPV. Her balance is good. She is active. NO furhter PT planned.   Mobility: Walking & Moving Around   Mobility: Current Status , Goal , Discharge -Wenq Only- Modifier the same for all G-codes CI: 1-19% impairment   Mobility: Current & Discharge Modifier Rationale-Eval Only balance is good. Outside of fall risk range and she is active.   Total Evaluation Time   Total Evaluation Time (Minutes) 40   Sneha Woodson DPT, MPT, NCS

## 2017-09-11 ENCOUNTER — OFFICE VISIT (OUTPATIENT)
Dept: GASTROENTEROLOGY | Facility: CLINIC | Age: 67
End: 2017-09-11

## 2017-09-11 DIAGNOSIS — F45.42 PAIN DISORDER ASSOCIATED WITH PSYCHOLOGICAL FACTORS AND MEDICAL CONDITION: Primary | ICD-10-CM

## 2017-09-11 DIAGNOSIS — F33.1 MAJOR DEPRESSIVE DISORDER, RECURRENT EPISODE, MODERATE (H): ICD-10-CM

## 2017-09-11 NOTE — MR AVS SNAPSHOT
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    -  1    Major depressive disorder, recurrent episode, moderate (H)           Follow-ups after your visit        Your next 10 appointments already scheduled     Sep 25, 2017  3:00 PM CDT   (Arrive by 2:45 PM)   Return Visit with Doretha Vallejo, PhD    [facility] ([facility])    [address]  [address] [facility] [id number] [phone]            Oct 09, 2017  3:00 PM CDT   (Arrive by 2:45 PM)   Return Visit with Doretha Vallejo, PhD    [facility] ([facility])    [address] [id number] [phone]            Nov 06, 2017  3:00 PM CST   (Arrive by 2:45 PM)   Return Visit with Doretha Vallejo, PhD    [facility] ([facility])    [address]  [address] [facility] [id number] [phone]            Nov 24, 2017  3:00 PM CST   (Arrive by 2:45 PM)   Return Visit with Doretha Vallejo, PhD    [facility] ([facility])    [address]  [address] [facility] [address] [phone]            Dec 06, 2017  3:00 PM CST   (Arrive by 2:45 PM)   Return Visit with Thelma Arvizu MD    [facility] ([facility])    [address]  [address] [facility] [id number] [phone]              Who to contact     Please call your clinic at [phone] to:    Ask questions about your  health    Make or cancel appointments    Discuss your medicines    Learn about your test results    Speak to your doctor   If you have compliments or concerns about an experience at your clinic, or if you wish to file a complaint, please contact [facility] Patient Relations at [phone] or email us at [email or web address]         Additional Information About Your Visit        [employer] Information     [employer] is an electronic gateway that provides easy, online access to your medical records. With [employer], you can request a clinic appointment, read your test results, renew a prescription or communicate with your care team.     To sign up for [employer] visit the website at [email or web address]   You will be asked to enter the access code listed below, as well as some personal information. Please follow the directions to create your username and password.     Your access code is: [email or web address]  Expires: 10/29/2017  5:10 PM     Your access code will  in 90 days. If you need help or a new code, please contact your [facility] or call [phone] for assistance.        Care EveryWhere ID     This is your Care EveryWhere ID. This could be used by other organizations to access your [facility] medical records  [id number]         Blood Pressure from Last 3 Encounters:   17 115/72   17 106/71   16 112/68    Weight from Last 3 Encounters:   05/15/17 65.8 kg (145 lb)   16 65.8 kg (145 lb)   16 68 kg (150 lb)              Today, you had the following     No orders found for display       Primary Care Provider Office Phone # Fax #    Milan Alvarez [phone]       [address] [address]        Equal Access to Services     FRANCY BROUSSARD : sarahi Peres, marisol mcnamara. So [facility] [place]  [phone].    ATENCIÓN: Si lynette parra, tiene a mcallister disposición servicios gratuitos de asistencia lingüística. Buddy romero [phone].    We comply with applicable federal civil rights laws and [place] laws. We do not discriminate on the basis of race, color, national origin, age, disability sex, sexual orientation or gender identity.            Thank you!     Thank you for choosing [facility]  for your care. Our goal is always to provide you with excellent care. Hearing back from our patients is one way we can continue to improve our services. Please take a few minutes to complete the written survey that you may receive in the mail after your visit with us. Thank you!             Your Updated Medication List - Protect others around you: Learn how to safely use, store and throw away your medicines at www.disposemymeds.org.          This list is accurate as of: 9/11/17  4:04 PM.  Always use your most recent med list.                   Brand Name Dispense Instructions for use Diagnosis    acidophilus Caps      Take 1 capsule by mouth daily.        albuterol 108 (90 BASE) MCG/ACT Inhaler    PROAIR HFA/PROVENTIL HFA/VENTOLIN HFA    1 Inhaler    Inhale 2 puffs into the lungs every 6 hours as needed for shortness of breath / dyspnea or wheezing    Uncomplicated asthma, unspecified asthma severity, Osteoporosis       ARMOUR THYROID 60 MG tablet   Generic drug:  thyroid      Take 45 mg by mouth daily    Osteoporosis, unspecified       ARTIFICIAL TEARS OP      Apply 1 drop to eye daily as needed        BENADRYL ALLERGY PO      Take  by mouth as needed. allergies        CHARCOAL PO      Take 6 capsules by mouth daily as needed. For indigestion        Digestive Enzymes Tabs      Use as directed        EPINEPHrine 0.3 MG/0.3ML injection 2-pack    EPIPEN    1 each    Inject 0.3 mLs (0.3 mg) into the muscle once as needed for anaphylaxis    Urticaria       estradiol 0.1 MG/GM cream    ESTRACE      Place 0.5 g vaginally twice a week    High cholesterol, TIA (transient ischaemic attack)       Fiber Powd      Take 1 teaspoonful by mouth daily. Mix with slippery elm.        FISH OIL PO      Take 1 capsule by mouth daily. Plus Vit E        fluocinonide 0.05 % solution    LIDEX    60 mL    Apply topically 2 times daily To scalp as needed.    Dermatitis, seborrheic       GAS-X EXTRA STRENGTH 125 MG Chew chewable tablet   Generic drug:  simethicone      Take 2 tablets by mouth 2 times daily.    High cholesterol, Memory loss, Carotid artery dissection (H), Vertigo, Rash and other nonspecific skin eruption       HYDROCHLORIC ACID      Take as directed        MAGNESIUM OXIDE PO      Take 6-10 tablets by mouth daily.        meclizine 12.5 MG tablet    ANTIVERT    30 tablet    Take 1 tablet (12.5 mg) by mouth 3 times daily as needed for dizziness    Vertigo       olopatadine 0.1 % ophthalmic solution    PATANOL    1 Bottle    Place 1 drop into both eyes 2 times daily    Allergic conjunctivitis, bilateral       peppermint oil oil      1 mL daily as needed for other Taking gel tabs not oil, not in EMR.    High cholesterol, TIA (transient ischaemic attack)       PROGESTERONE 50MG CAPSULES COMPOUND      150 mg At Bedtime 1 capsules in the morning, in addition to bedtime dose.    Osteoporosis, unspecified       vitamin D 2000 UNITS tablet      Take 1 tablet by mouth daily.
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
## 2017-09-21 ENCOUNTER — TELEPHONE (OUTPATIENT)
Dept: OPHTHALMOLOGY | Facility: CLINIC | Age: 67
End: 2017-09-21

## 2017-09-21 NOTE — TELEPHONE ENCOUNTER
Pt last seen about 2 weeks ago-- allergic conjunctivitis  Started using allergy eye drops and using in one week    Pt had reaction to eye drop-- Patanol    White part turned red   No itching  Pt stopped eye drop  Reviewed ok to stop if drop if intolerate-- redness  No itching, redness around eye    Pt to continue drops without preservatives-- pt may try cool compresses  Pt has appt next week with dr. Santos    Note to dr. Santos for review  Thomas Benson RN 12:52 PM 09/21/17

## 2017-09-21 NOTE — TELEPHONE ENCOUNTER
----- Message from Elsa Costa sent at 9/21/2017 10:54 AM CDT -----  Regarding: Pt is dealing with major health issues, saw Dr. Santos for some superficial and ...  Contact: [phone]  In depth issues recently, Dr. Santos gave her eye drops, pt thinks she may be having an allergic reaction to the drops, pt stopped taking them, now pt does not know what to do?  I offered pt an appt on Tues, 9/26 with Dr. Santos, but she does not know if she can go outside, ride the bus, with what is happening to her.      Please call pt at [phone].    Thank you,  Brandon JIMENEZ    Please DO NOT send this message and/or reply back to sender.  Call Center Representatives DO NOT respond to messages.

## 2017-09-26 ENCOUNTER — OFFICE VISIT (OUTPATIENT)
Dept: OPHTHALMOLOGY | Facility: CLINIC | Age: 67
End: 2017-09-26

## 2017-09-26 DIAGNOSIS — H02.889 MEIBOMIAN GLAND DYSFUNCTION: ICD-10-CM

## 2017-09-26 DIAGNOSIS — H10.13 ALLERGIC CONJUNCTIVITIS, BILATERAL: Primary | ICD-10-CM

## 2017-09-26 ASSESSMENT — CONF VISUAL FIELD
METHOD: COUNTING FINGERS
OS_NORMAL: 1
OD_NORMAL: 1

## 2017-09-26 ASSESSMENT — VISUAL ACUITY
OS_CC+: -1
METHOD: SNELLEN - LINEAR
OD_CC+: -2
CORRECTION_TYPE: GLASSES
OD_CC: 20/20
OS_CC: 20/25

## 2017-09-26 ASSESSMENT — EXTERNAL EXAM - RIGHT EYE: OD_EXAM: ENOPHTHALMOS

## 2017-09-26 ASSESSMENT — TONOMETRY
IOP_METHOD: ICARE
OS_IOP_MMHG: 17
OD_IOP_MMHG: 15

## 2017-09-26 ASSESSMENT — EXTERNAL EXAM - LEFT EYE: OS_EXAM: NORMAL

## 2017-09-26 NOTE — NURSING NOTE
Chief Complaints and History of Present Illnesses   Patient presents with     Follow Up For     drop irritation     HPI    Affected eye(s):  Left   Symptoms:     No blurred vision      Frequency:  Constant       Do you have eye pain now?:  No      Comments:  Eye turned red and lids swollen after using Patanol drop for 5 days, using only in left eye. Discontinued drop, eye is still a little red and irritated. She is rinsing with saline drops and ban oil cotton balls taped over eye all day and night. Has not started Ocu-rinse yet.    Lluvia Sampson COT 3:22 PM September 26, 2017

## 2017-09-26 NOTE — MR AVS SNAPSHOT
After Visit Summary   9/26/2017    Jennifer Steiner    MRN: [id number]           Patient Information     Date Of Birth          1950        Visit Information        Provider Department      9/26/2017 3:20 PM Pancho Santos OD [facility] Ophthalmology        Today's Diagnoses     Allergic conjunctivitis, bilateral    -  1    Meibomian gland dysfunction           Follow-ups after your visit        Follow-up notes from your care team     Return in about 1 month (around 10/26/2017) for Comprehensive Eye Exam, Dilation, Refraction.      Your next 10 appointments already scheduled     Nov 13, 2017  1:00 PM CST   (Arrive by 12:45 PM)   Return Visit with Doretha Vallejo, PhD   [facility] Gastroenterology and IBD Clinic ([facility])    [address] [place] [address] [phone]            Dec 06, 2017  3:00 PM CST   (Arrive by 2:45 PM)   Return Visit with Thelma Arvizu MD   [facility] Gastroenterology and IBD Clinic ([facility])    [address] [place] [address] [phone]            Dec 19, 2017  2:00 PM CST   (Arrive by 1:45 PM)   Return Visit with Doretha Vallejo, PhD   [facility] Gastroenterology and IBD Clinic ([facility])    [address] [place] [address] [phone]              Who to contact     Please call your clinic at [phone] to:    Ask questions about your health    Make or cancel appointments    Discuss your medicines    Learn about your test results    Speak to your doctor   If you have compliments or concerns about an experience at your clinic, or if you wish to file a complaint, please contact [facility] Patient Relations at [phone] or email us at [email or web address]         Additional Information About Your Visit        MyChart Information     [employer] is an  electronic gateway that provides easy, online access to your medical records. With FreePriceAlerts, you can request a clinic appointment, read your test results, renew a prescription or communicate with your care team.     To sign up for FreePriceAlerts visit the website at [email or web address]   You will be asked to enter the access code listed below, as well as some personal information. Please follow the directions to create your username and password.     Your access code is: [email or web address]  Expires: 10/29/2017  5:10 PM     Your access code will  in 90 days. If you need help or a new code, please contact your [facility] or call [phone] for assistance.        Care EveryWhere ID     This is your Care EveryWhere ID. This could be used by other organizations to access your [place] medical records  [id number]         Blood Pressure from Last 3 Encounters:   17 115/72   17 106/71   16 112/68    Weight from Last 3 Encounters:   05/15/17 65.8 kg (145 lb)   16 65.8 kg (145 lb)   16 68 kg (150 lb)              Today, you had the following     No orders found for display       Primary Care Provider Office Phone # Fax #    Milan Alvarez [phone]       [address] [address]        Equal Access to Services     [facility]: Hadii aad ku hadasho Soomaali, waaxda luqadaha, qaybta kaalmada adeegyada, marisol lezama hayjosé miguel smith . So [facility] [phone].    ATENCIÓN: Si habla español, tiene a mcallister disposición servicios gratuitos de asistencia lingüística. Llame al [phone].    We comply with applicable federal civil rights laws and [place] laws. We do not discriminate on the basis of race, color, national origin, age, disability, sex, sexual orientation, or gender identity.            Thank you!     Thank you for choosing [facility]  for your care. Our goal is always to provide you with  excellent care. Hearing back from our patients is one way we can continue to improve our services. Please take a few minutes to complete the written survey that you may receive in the mail after your visit with us. Thank you!             Your Updated Medication List - Protect others around you: Learn how to safely use, store and throw away your medicines at www.disposemymeds.org.          This list is accurate as of: 9/26/17 11:59 PM.  Always use your most recent med list.                   Brand Name Dispense Instructions for use Diagnosis    acidophilus Caps      Take 1 capsule by mouth daily.        albuterol 108 (90 BASE) MCG/ACT Inhaler    PROAIR HFA/PROVENTIL HFA/VENTOLIN HFA    1 Inhaler    Inhale 2 puffs into the lungs every 6 hours as needed for shortness of breath / dyspnea or wheezing    Uncomplicated asthma, unspecified asthma severity, Osteoporosis       ARMOUR THYROID 60 MG tablet   Generic drug:  thyroid      Take 45 mg by mouth daily    Osteoporosis, unspecified       ARTIFICIAL TEARS OP      Apply 1 drop to eye daily as needed        BENADRYL ALLERGY PO      Take  by mouth as needed. allergies        CHARCOAL PO      Take 6 capsules by mouth daily as needed. For indigestion        Digestive Enzymes Tabs      Use as directed        EPINEPHrine 0.3 MG/0.3ML injection 2-pack    EPIPEN    1 each    Inject 0.3 mLs (0.3 mg) into the muscle once as needed for anaphylaxis    Urticaria       estradiol 0.1 MG/GM cream    ESTRACE     Place 0.5 g vaginally twice a week    High cholesterol, TIA (transient ischaemic attack)       Fiber Powd      Take 1 teaspoonful by mouth daily. Mix with slippery elm.        FISH OIL PO      Take 1 capsule by mouth daily. Plus Vit E        fluocinonide 0.05 % solution    LIDEX    60 mL    Apply topically 2 times daily To scalp as needed.    Dermatitis, seborrheic       GAS-X EXTRA STRENGTH 125 MG Chew chewable tablet   Generic drug:  simethicone      Take 2 tablets by mouth 2  times daily.    High cholesterol, Memory loss, Carotid artery dissection (H), Vertigo, Rash and other nonspecific skin eruption       HYDROCHLORIC ACID      Take as directed        MAGNESIUM OXIDE PO      Take 6-10 tablets by mouth daily.        meclizine 12.5 MG tablet    ANTIVERT    30 tablet    Take 1 tablet (12.5 mg) by mouth 3 times daily as needed for dizziness    Vertigo       peppermint oil oil      1 mL daily as needed for other Taking gel tabs not oil, not in EMR.    High cholesterol, TIA (transient ischaemic attack)       PROGESTERONE 50MG CAPSULES COMPOUND      150 mg At Bedtime 1 capsules in the morning, in addition to bedtime dose.    Osteoporosis, unspecified       vitamin D 2000 UNITS tablet      Take 1 tablet by mouth daily.

## 2017-09-26 NOTE — PROGRESS NOTES
Assessment/Plan  (H10.13) Allergic conjunctivitis, bilateral  (primary encounter diagnosis)  Comment: Patient reports significant redness and irritation with use of Patanol  Plan:  Discontinue use of Patanol OU. Recommended cool compresses and NPATs prn.    (H02.89) Meibomian gland dysfunction  Comment: Significant crusting on skin  Plan:  Educated patient on condition and clinical findings. Recommended warm compresses twice each day for ten minutes with gentle lid scrubs.   Monitor at comprehensive eye exam in 1 month.    Complete documentation of historical and exam elements from today's encounter can  be found in the full encounter summary report (not reduplicated in this progress  note). I personally obtained the chief complaint(s) and history of present illness. I  confirmed and edited as necessary the review of systems, past medical/surgical  history, family history, social history, and examination findings as documented by  others; and I examined the patient myself. I personally reviewed the relevant tests,  images, and reports as documented above. I formulated and edited as necessary the  assessment and plan and discussed the findings and management plan with the  patient and family.    Pancho Santos, OD, FAAO

## 2017-11-13 ENCOUNTER — OFFICE VISIT (OUTPATIENT)
Dept: GASTROENTEROLOGY | Facility: CLINIC | Age: 67
End: 2017-11-13

## 2017-11-13 DIAGNOSIS — F45.42 PAIN DISORDER ASSOCIATED WITH PSYCHOLOGICAL AND PHYSICAL FACTORS: Primary | ICD-10-CM

## 2017-11-13 DIAGNOSIS — F33.1 MAJOR DEPRESSIVE DISORDER, RECURRENT EPISODE, MODERATE (H): ICD-10-CM
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
## 2017-11-13 NOTE — MR AVS SNAPSHOT
After Visit Summary   11/13/2017    Jennifer Steiner    MRN: [id number]           Patient Information     Date Of Birth          1950        Visit Information        Provider Department      11/13/2017 1:00 PM Doretha Vallejo, PhD  [facility]        Today's Diagnoses     Pain disorder associated with psychological and physical factors    -  1    Major depressive disorder, recurrent episode, moderate (H)           Follow-ups after your visit        Your next 10 appointments already scheduled     Dec 06, 2017  3:00 PM CST   (Arrive by 2:45 PM)   Return Visit with Thelma Arvizu MD   [facility] Gastroenterology and IBD Clinic ([facility])    [address] [address] [phone]            Dec 19, 2017 12:40 PM CST   (Arrive by 12:25 PM)   RETURN GENERAL with Pancho Santos OD   [facility] Ophthalmology ([facility])    [address] [address] [phone]            Dec 19, 2017  2:00 PM CST   (Arrive by 1:45 PM)   Return Visit with Doretha Vallejo, PhD   [facility] Gastroenterology and IBD Clinic ([facility])    [address] [place] [address] [phone]              Who to contact     Please call your clinic at [phone] to:    Ask questions about your health    Make or cancel appointments    Discuss your medicines    Learn about your test results    Speak to your doctor   If you have compliments or concerns about an experience at your clinic, or if you wish to file a complaint, please contact [facility] Patient Relations at [phone] or email us at [email or web address]         Additional Information About Your Visit        MyChart Information     [employer] is an electronic gateway that provides easy, online access to your medical records. With  CyberDefender[employer], you can request a clinic appointment, read your test results, renew a prescription or communicate with your care team.     To sign up for [employer] Â® visit the website at [email or web address]   You will be asked to enter the access code listed below, as well as some personal information. Please follow the directions to create your username and password.     Your access code is: [email or web address]  Expires: 2018  9:12 PM     Your access code will  in 90 days. If you need help or a new code, please contact your [facility] or call [phone] for assistance.        Care EveryWhere ID     This is your Care EveryWhere ID. This could be used by other organizations to access your [place] medical records  [id number]         Blood Pressure from Last 3 Encounters:   17 115/72   17 106/71   16 112/68    Weight from Last 3 Encounters:   05/15/17 65.8 kg (145 lb)   16 65.8 kg (145 lb)   16 68 kg (150 lb)              Today, you had the following     No orders found for display       Primary Care Provider Office Phone # Fax #    Milan Alvarez [phone]       [address] [address]        Equal Access to Services     FRANCY BROUSSARD : Hadii aad ku hadasho Soomaali, waaxda luqadaha, qaybta kaalmada adeegyada, marisol lacy. So [facility] [phone].    ATENCIÓN: Si habla español, tiene a mcallister disposición servicios gratuitos de asistencia lingüística. Llangela al [phone].    We comply with applicable federal civil rights laws and [place] laws. We do not discriminate on the basis of race, color, national origin, age, disability, sex, sexual orientation, or gender identity.            Thank you!     Thank you for choosing [facility] GASTROENTEROLOGY AND IBD CLINIC  for your care. Our goal is always to provide you with excellent care. Hearing back from our patients is one way we can  continue to improve our services. Please take a few minutes to complete the written survey that you may receive in the mail after your visit with us. Thank you!             Your Updated Medication List - Protect others around you: Learn how to safely use, store and throw away your medicines at www.disposemymeds.org.          This list is accurate as of: 11/13/17  2:10 PM.  Always use your most recent med list.                   Brand Name Dispense Instructions for use Diagnosis    acidophilus Caps      Take 1 capsule by mouth daily.        albuterol 108 (90 BASE) MCG/ACT Inhaler    PROAIR HFA/PROVENTIL HFA/VENTOLIN HFA    1 Inhaler    Inhale 2 puffs into the lungs every 6 hours as needed for shortness of breath / dyspnea or wheezing    Uncomplicated asthma, unspecified asthma severity, Osteoporosis       ARMOUR THYROID 60 MG tablet   Generic drug:  thyroid      Take 45 mg by mouth daily    Osteoporosis, unspecified       ARTIFICIAL TEARS OP      Apply 1 drop to eye daily as needed        BENADRYL ALLERGY PO      Take  by mouth as needed. allergies        CHARCOAL PO      Take 6 capsules by mouth daily as needed. For indigestion        Digestive Enzymes Tabs      Use as directed        EPINEPHrine 0.3 MG/0.3ML injection 2-pack    EPIPEN    1 each    Inject 0.3 mLs (0.3 mg) into the muscle once as needed for anaphylaxis    Urticaria       estradiol 0.1 MG/GM cream    ESTRACE     Place 0.5 g vaginally twice a week    High cholesterol, TIA (transient ischaemic attack)       Fiber Powd      Take 1 teaspoonful by mouth daily. Mix with slippery elm.        FISH OIL PO      Take 1 capsule by mouth daily. Plus Vit E        fluocinonide 0.05 % solution    LIDEX    60 mL    Apply topically 2 times daily To scalp as needed.    Dermatitis, seborrheic       GAS-X EXTRA STRENGTH 125 MG Chew chewable tablet   Generic drug:  simethicone      Take 2 tablets by mouth 2 times daily.    High cholesterol, Memory loss, Carotid artery  dissection (H), Vertigo, Rash and other nonspecific skin eruption       HYDROCHLORIC ACID      Take as directed        MAGNESIUM OXIDE PO      Take 6-10 tablets by mouth daily.        meclizine 12.5 MG tablet    ANTIVERT    30 tablet    Take 1 tablet (12.5 mg) by mouth 3 times daily as needed for dizziness    Vertigo       peppermint oil oil      1 mL daily as needed for other Taking gel tabs not oil, not in EMR.    High cholesterol, TIA (transient ischaemic attack)       PROGESTERONE 50MG CAPSULES COMPOUND      150 mg At Bedtime 1 capsules in the morning, in addition to bedtime dose.    Osteoporosis, unspecified       vitamin D 2000 UNITS tablet      Take 1 tablet by mouth daily.
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
## 2017-12-06 ENCOUNTER — OFFICE VISIT (OUTPATIENT)
Dept: GASTROENTEROLOGY | Facility: CLINIC | Age: 67
End: 2017-12-06

## 2017-12-06 VITALS
TEMPERATURE: 97.4 F | OXYGEN SATURATION: 98 % | HEART RATE: 74 BPM | HEIGHT: 63 IN | SYSTOLIC BLOOD PRESSURE: 113 MMHG | DIASTOLIC BLOOD PRESSURE: 69 MMHG

## 2017-12-06 DIAGNOSIS — K59.09 OTHER CONSTIPATION: Primary | ICD-10-CM

## 2017-12-06 ASSESSMENT — PAIN SCALES - GENERAL: PAINLEVEL: MILD PAIN (2)

## 2017-12-06 NOTE — MR AVS SNAPSHOT
After Visit Summary   12/6/2017    Jennifer Steiner    MRN: [id number]           Patient Information     Date Of Birth          1950        Visit Information        Provider Department      12/6/2017 3:00 PM Thelma Arvizu MD [facility]        Care Instructions    1. Referral to colorectal surgery to address your questions about surgery.  2. Stop charcoal as this can lead to constipation.  3. Stop supplements with digestive enzymes, and hydrocholoric acid with pepsin.  4. Use magnesium oxide tablets (not a blend with additives and other electrolytes) at 250 mg/pill. Take 3 pills daily and monitor stools, we can increase or decrease based on symptoms.  5. Start Citrucel 1-4 capsules daily and monitor stool output.   6. Contact GI clinic with an update in 4-8 weeks.   7. Continue your work with Doretha Vallejo, PhD, LP.  8. Let us know if you are interested in starting an SSRI for abdominal pain.   9. Follow-up in GI clinic in 3-4 months (next available).     If you have any questions, please don't hesitate to contact our GI RN Clinic Coordinator Aurora Bermudez at [phone].              Follow-ups after your visit        Your next 10 appointments already scheduled     Dec 19, 2017 12:40 PM CST   (Arrive by 12:25 PM)   RETURN GENERAL with Pancho Santos OD   [facility] ([facility])    [address] [place] [id number] [phone]            Dec 19, 2017  2:00 PM CST   (Arrive by 1:45 PM)   Return Visit with Doretha Vallejo,    [facility] ([facility])    [address] [place] [address] [phone]              Who to contact     Please call your clinic at [phone] to:    Ask questions about your health    Make or cancel appointments    Discuss your medicines    Learn about your test results    Speak  "to your doctor   If you have compliments or concerns about an experience at your clinic, or if you wish to file a complaint, please contact [facility] Patient Relations at [phone] or email us at [email or web address]         Additional Information About Your Visit        [employer] Information     [employer] is an electronic gateway that provides easy, online access to your medical records. With [employer], you can request a clinic appointment, read your test results, renew a prescription or communicate with your care team.     To sign up for [employer] visit the website at [email or web address]   You will be asked to enter the access code listed below, as well as some personal information. Please follow the directions to create your username and password.     Your access code is: [email or web address]  Expires: 2018  9:12 PM     Your access code will  in 90 days. If you need help or a new code, please contact your [facility] or call [phone] for assistance.        Care EveryWhere ID     This is your Care EveryWhere ID. This could be used by other organizations to access your [facility] medical records  [id number]        Your Vitals Were     Pulse Temperature Height Pulse Oximetry          74 97.4  F (36.3  C) 1.6 m (5' 3\") 98%         Blood Pressure from Last 3 Encounters:   17 113/69   17 115/72   17 106/71    Weight from Last 3 Encounters:   05/15/17 65.8 kg (145 lb)   16 65.8 kg (145 lb)   16 68 kg (150 lb)              Today, you had the following     No orders found for display       Primary Care Provider Office Phone # Fax #    Milan Alvarez [phone]       [address] [address]        Equal Access to Services     FRANCY BROUSSARD AH: Jerica easley Sotasha, waaxda luqadaha, qaybta kaalmarisol irvin . So [facility] " [phone].    ATENCIÓN: Si lynette parra, tiene a mcallister disposición servicios gratuitos de asistencia lingüística. Buddy romero [phone].    We comply with applicable federal civil rights laws and [place] laws. We do not discriminate on the basis of race, color, national origin, age, disability, sex, sexual orientation, or gender identity.            Thank you!     Thank you for choosing [facility]  for your care. Our goal is always to provide you with excellent care. Hearing back from our patients is one way we can continue to improve our services. Please take a few minutes to complete the written survey that you may receive in the mail after your visit with us. Thank you!             Your Updated Medication List - Protect others around you: Learn how to safely use, store and throw away your medicines at www.disposemymeds.org.          This list is accurate as of: 12/6/17  4:09 PM.  Always use your most recent med list.                   Brand Name Dispense Instructions for use Diagnosis    acidophilus Caps      Take 1 capsule by mouth daily.        albuterol 108 (90 BASE) MCG/ACT Inhaler    PROAIR HFA/PROVENTIL HFA/VENTOLIN HFA    1 Inhaler    Inhale 2 puffs into the lungs every 6 hours as needed for shortness of breath / dyspnea or wheezing    Uncomplicated asthma, unspecified asthma severity, Osteoporosis       ARMOUR THYROID 60 MG tablet   Generic drug:  thyroid      Take 45 mg by mouth daily    Osteoporosis, unspecified       ARTIFICIAL TEARS OP      Apply 1 drop to eye daily as needed        BENADRYL ALLERGY PO      Take  by mouth as needed. allergies        CHARCOAL PO      Take 6 capsules by mouth daily as needed. For indigestion        Digestive Enzymes Tabs      Use as directed        EPINEPHrine 0.3 MG/0.3ML injection 2-pack    EPIPEN    1 each    Inject 0.3 mLs (0.3 mg) into the muscle once as needed for anaphylaxis    Urticaria       estradiol 0.1 MG/GM cream    ESTRACE      Place 0.5 g vaginally twice a week    High cholesterol, TIA (transient ischaemic attack)       Fiber Powd      Take 1 teaspoonful by mouth daily. Mix with slippery elm.        FISH OIL PO      Take 1 capsule by mouth daily. Plus Vit E        fluocinonide 0.05 % solution    LIDEX    60 mL    Apply topically 2 times daily To scalp as needed.    Dermatitis, seborrheic       GAS-X EXTRA STRENGTH 125 MG Chew chewable tablet   Generic drug:  simethicone      Take 2 tablets by mouth 2 times daily.    High cholesterol, Memory loss, Carotid artery dissection (H), Vertigo, Rash and other nonspecific skin eruption       HYDROCHLORIC ACID      Take as directed        LIOTHYRONINE SODIUM PO      Take 5 mcg by mouth daily        MAGNESIUM OXIDE PO      Take 6-8 tablets by mouth daily        meclizine 12.5 MG tablet    ANTIVERT    30 tablet    Take 1 tablet (12.5 mg) by mouth 3 times daily as needed for dizziness    Vertigo       peppermint oil oil      1 mL daily as needed for other Taking gel tabs not oil, not in EMR.    High cholesterol, TIA (transient ischaemic attack)       PROGESTERONE 50MG CAPSULES COMPOUND      150 mg At Bedtime 1 capsules in the morning, in addition to bedtime dose.    Osteoporosis, unspecified       vitamin D 2000 UNITS tablet      Take 1 tablet by mouth daily.

## 2017-12-06 NOTE — LETTER
"12/6/2017       RE: Jennifer Steiner  [address]  [facility] [id number]     Dear Colleague,    Thank you for referring your patient, Jennifer Steiner, to the [facility] at [facility]. Please see a copy of my visit note below.    GI CLINIC VISIT    CC:  Follow-up      ASSESSMENT/PLAN:  (K59.9) Colonic dysmotility (primary encounter diagnosis)  (R19.8) Abnormal defecation  Comment:      Pelvic floor dysfunction with no improvement with biofeeback. She has struggled to complete a training program and has worked with multiple therapists.      Colonic dysmotility, though with intact colonic response to neostigmine, seems to be medication-dependent.     Pt has declined start of medications aimed at constipation opting for therapies she considers more \"natural\" and continues to decline this at today's visit.     She has opted not to follow-through on any of the prior medication adjustments we have discussed expressing concern about worsening symptoms if any change is made and concern about medication side effects.  We spent a great deal of time reviewing her current medications, their side effects and ineffective symptoms control and review of proposed new medications and potential side effects. We discussed that if she is not interested in any of my recommendations, that I can refer her to another GI provider for another opinion. Ultimately, Ms. Steiner states that she will try adjustments of her current medications, but not start any new prescription medications at this time.     She is also interested in meeting with a colorectal surgery clinic to address some of her questions and to see if surgery is an option for her (last visit with colorectal surgery ?2013).     Plan:   1. Referral to colorectal surgery to address your questions about surgery.  2. Stop charcoal as this can lead to constipation.  3. Stop supplements with digestive enzymes, " and hydrocholoric acid with pepsin.  4. Use magnesium oxide tablets (not a blend with additives and other electrolytes) at 250 mg/pill. Take 3 pills daily and monitor stools, we can increase or decrease based on symptoms.  5. Start Citrucel 1-4 capsules daily and monitor stool output.   6. Contact GI clinic with an update in 4-8 weeks. Will adjust meds further from that point   7. Ok to continue enemas as needed  8. Continue your work with Doretha Vallejo, PhD, LP.  9. Let us know if you are interested in starting an SSRI for abdominal pain. Meds discussed.      RTC 3 months    It was a pleasure to participate in the care of this patient; please contact us with any further questions.  A total of 50 face-to-face minutes was spent with this patient, >50% of which was counseling regarding the above delineated issues.    Thelma Arvizu MD   of Medicine  Division of Gastroenterology, Hepatology and Nutrition  [facility]    ---------------------------------------------------------------------------------------------------  HPI:    Ms. Steiner is a 67 year old female with hypothyroidism, asthma, HL, reported systemic candidiasis, and prior TIA who carries the prior diagnoses of dysynnergic defecation/pelvic floor dysfunction and constipation (?colonic dysmotility) who was initially seen in GI clinic in June 2016 to establish care. She had previously been followed by gastroenterology at [facility] (Dr. Munoz) and [facility] and has been treated for GI symptoms at the [facility] in the past. She had been seen at our institution by GI once before with Dr. Esteban Saravia in Feb 2016. She has been seen by several colorectal surgeons in the past including Dr. Andrade, Dr. Sheriff and most recently Dr. Christo rivera at [facility] (2013). She returns today for follow-up.       Taken from my prior documentation:  - years of laxative and enema use in order to move her bowels  -  2010 - pelvic floor testing reveals dysfunction, SBFT normal, GES normal, Sitz marker study with 75% of markers in the rectum --> starts biofeedback (at [facility]), surgery first discussed (at [facility])- concern that subtotal colectomy w/ Ileoanal anastomosis may not address her issues  - 2011 - biofeedback at [facility] [place], work-up there with motility capsule reveals normal gastric and small bowel transit times, prolonged colon transit - but comments are made that colon pressures are good and patient had many small BMs during study, more suggestive of pelvic floor issues. She was felt to have some IBS features and was treated for SIBO as well as SI fungal overgrowth during this time. She did not tolerate the fluconazole given for fungal overgrowth.  - 2012 - sent to [facility] for colonic manometry, test not performed due to concern for anal stenosis (mentioned in a prior note from physical exam), biofeedback started at [facility]  - 2013 - seen by Dr. Villafuerte here and surgery discussed, again concern that ileoanal anastomosis would be insufficient, pt not interested in pursuing  - late 2013/2014 - appears colonic manometry completed (see scanned report under Procedures tab) - poor phasic and tonic response to meal ingestion, normal response to neostygmine --> response to neostigmine excludes issues with colonic inertia, though poor meal response may indicate a requirement of pharmacologic stimulus to induce stools        Ms. Steiner has been seen twice in our GI clinic. She has expressed interest in obtaining help managing her symptoms but has been reluctant to trial any medications other than those she feels are most natural (primarily using magnesium, slippery elm, peppermint, and tap water enemas). We have discussed several titrations of these medications, though Ms. Steiner has not made these alterations. Today, she shares more about her complicated relationship with abdominal pain and her stooling pattern. She feels  "that these symptoms began during her \"difficult\" childhood and she has difficulty altering her therapies because she has been on them so long. Though she can acknowledge that her current treatments are not as helpful as she'd like them to be. Ms. Mckeon further details that she uses each of her various medications for a separate symptom and the dose of each varies greatly day-to-day. Her current medications are:    - mg O7, with 1035 mg elemental mg (blended ozonated mg oxides) - takes 6-8 a day.  -slippery elm 1/2 tsp as needed - at least a few times a week  - Peppermint gels (with peppermint oil, ginger oil, and fennel oil). Takes 1-5 capsules throughout the day - \"soothing\" for abdominal pain.  - charcoal tabs prn (ensures not taking close to other meds) - 3-12 capsules a day.  - helps with bloating  - unknown supplement with hydrocholic acid with pepsin  - unknown supplement with \"digestive enzymes\"  - tap water enemas at least a few times each week when feeling \"full\" or \"bloated\" --> she feels these have not been as effective at emptying her rectum as of late       She has intermittently pursued biofeedback, ultimately finding some success through a therapist at [facility] (who has since left). She was thus [facility] visit to complete her biofeedback there, but her referral was declined due to her prior experience there. Today she states she is interested in talking about other options with another colorectal surgeon. She is aware that she may not be a candidate but would like to discuss this further.     Ms. Steiner has also been following with our GI health psychologist Doretha Vallejo, PhD, LP which she has found beneficial, though she shares that she's heard some of these recommendations previously. We discussed that these visits are intended to help her build on skills developed during earlier visits and continued follow-up was encouraged.        On GI ROS, Ms. " Jatin denies weight loss stating that her weight has been stable. She adds that during periods of increased abdominal pain she will change to a more liquid diet. She does not do any fasting.       ROS:    Remainder of ROS is negative.     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 Provider to review       Vaginal atrophy 11/03/2012     Priority: Medium     High cholesterol 02/14/2012     Priority: Medium     Rash and other nonspecific skin eruption 07/22/2011     Priority: Medium     CARDIOVASCULAR SCREENING; LDL GOAL LESS THAN 130 06/11/2010     Priority: Medium     Transient cerebral ischemia 07/10/2009     Priority: Medium     Diagnosis updated by automated process. Provider to review and confirm.       Carotid artery dissection (H) 07/10/2009     Priority: Medium     IBS (irritable bowel syndrome) 07/10/2009     Priority: Medium     Screen for colon cancer 07/10/2009     Priority: Medium     Due.  Referral given.       Visit for screening mammogram 07/10/2009     Priority: Medium     Due.  Referral given.  (Problem list name updated by automated process. Provider to review and confirm.)       Encounter for routine gynecological examination 07/10/2009     Priority: Medium     Due.  Recommend return for physical and pap.  Problem list name updated by automated process. Provider to review       Hypothyroidism 07/10/2009     Priority: Medium       PERTINENT MEDICATIONS:  Current Outpatient Prescriptions   Medication     LIOTHYRONINE SODIUM PO     meclizine (ANTIVERT) 12.5 MG tablet     fluocinonide (LIDEX) 0.05 % external solution     albuterol (PROAIR HFA, PROVENTIL HFA, VENTOLIN HFA) 108 (90 BASE) MCG/ACT inhaler     estradiol (ESTRACE) 0.1 MG/GM vaginal cream     peppermint oil  "liquid     Hypromellose (ARTIFICIAL TEARS OP)     EPINEPHrine (EPIPEN) 0.3 MG/0.3ML injection     thyroid (ARMOUR THYROID) 60 MG tablet     PROGESTERONE 50MG CAPSULES COMPOUND     DiphenhydrAMINE HCl (BENADRYL ALLERGY PO)     simethicone (GAS-X EXTRA STRENGTH) 125 MG CHEW     Fiber POWD     MAGNESIUM OXIDE PO     Lactobacillus (ACIDOPHILUS) CAPS     Charcoal Activated (CHARCOAL PO)     Digestive Enzymes TABS     Omega-3 Fatty Acids (FISH OIL PO)     HYDROCHLORIC ACID     Cholecalciferol (VITAMIN D) 2000 UNIT tablet     No current facility-administered medications for this visit.          PHYSICAL EXAMINATION:  Vitals /69 (BP Location: Left arm, Patient Position: Chair, Cuff Size: Adult Regular)  Pulse 74  Temp 97.4  F (36.3  C)  Ht 1.6 m (5' 3\")  SpO2 98%   Wt   Wt Readings from Last 2 Encounters:   05/15/17 65.8 kg (145 lb)   07/13/16 65.8 kg (145 lb)   Gen: Pt sitting up in NAD, interactive and cooperative on exam  Eyes: sclerae anicteric, no injection  ENT:  MMM  Skin: Warm, dry, no jaundice  Neuro: alert, oriented, answers questions appropriately      PERTINENT STUDIES:    Orders Only on 01/18/2017   Component Date Value Ref Range Status     WBC 01/18/2017 7.2  4.0 - 11.0 10e9/L Final     RBC Count 01/18/2017 4.13  3.8 - 5.2 10e12/L Final     Hemoglobin 01/18/2017 12.8  11.7 - 15.7 g/dL Final     Hematocrit 01/18/2017 38.3  35.0 - 47.0 % Final     MCV 01/18/2017 93  78 - 100 fl Final     MCH 01/18/2017 31.0  26.5 - 33.0 pg Final     MCHC 01/18/2017 33.4  31.5 - 36.5 g/dL Final     RDW 01/18/2017 13.8  10.0 - 15.0 % Final     Platelet Count 01/18/2017 249  150 - 450 10e9/L Final     Diff Method 01/18/2017 Automated Method   Final     % Neutrophils 01/18/2017 62.3  % Final     % Lymphocytes 01/18/2017 27.8  % Final     % Monocytes 01/18/2017 6.7  % Final     % Eosinophils 01/18/2017 2.1  % Final     % Basophils 01/18/2017 0.8  % Final     % Immature Granulocytes 01/18/2017 0.3  % Final     Nucleated RBCs " 01/18/2017 0  0 /100 Final     Absolute Neutrophil 01/18/2017 4.5  1.6 - 8.3 10e9/L Final     Absolute Lymphocytes 01/18/2017 2.0  0.8 - 5.3 10e9/L Final     Absolute Monocytes 01/18/2017 0.5  0.0 - 1.3 10e9/L Final     Absolute Eosinophils 01/18/2017 0.2  0.0 - 0.7 10e9/L Final     Absolute Basophils 01/18/2017 0.1  0.0 - 0.2 10e9/L Final     Abs Immature Granulocytes 01/18/2017 0.0  0 - 0.4 10e9/L Final     Absolute Nucleated RBC 01/18/2017 0.0   Final     TSH 01/18/2017 0.76  0.40 - 4.00 mU/L Final     Sodium 01/18/2017 141  133 - 144 mmol/L Final     Potassium 01/18/2017 4.2  3.4 - 5.3 mmol/L Final     Chloride 01/18/2017 107  94 - 109 mmol/L Final     Carbon Dioxide 01/18/2017 27  20 - 32 mmol/L Final     Anion Gap 01/18/2017 7  3 - 14 mmol/L Final     Glucose 01/18/2017 85  70 - 99 mg/dL Final     Urea Nitrogen 01/18/2017 11  7 - 30 mg/dL Final     Creatinine 01/18/2017 0.73  0.52 - 1.04 mg/dL Final     GFR Estimate 01/18/2017 80  >60 mL/min/1.7m2 Final     GFR Estimate If Black 01/18/2017   >60 mL/min/1.7m2 Final                    Value:>90   GFR Calc       Calcium 01/18/2017 8.7  8.5 - 10.1 mg/dL Final       Again, thank you for allowing me to participate in the care of your patient.      Sincerely,    Thelma Arviuz MD

## 2017-12-06 NOTE — PROGRESS NOTES
"GI CLINIC VISIT    CC:  Follow-up      ASSESSMENT/PLAN:  (K59.9) Colonic dysmotility (primary encounter diagnosis)  (R19.8) Abnormal defecation  Comment:      Pelvic floor dysfunction with no improvement with biofeeback. She has struggled to complete a training program and has worked with multiple therapists.      Colonic dysmotility, though with intact colonic response to neostigmine, seems to be medication-dependent.     Pt has declined start of medications aimed at constipation opting for therapies she considers more \"natural\" and continues to decline this at today's visit.     She has opted not to follow-through on any of the prior medication adjustments we have discussed expressing concern about worsening symptoms if any change is made and concern about medication side effects.  We spent a great deal of time reviewing her current medications, their side effects and ineffective symptoms control and review of proposed new medications and potential side effects. We discussed that if she is not interested in any of my recommendations, that I can refer her to another GI provider for another opinion. Ultimately, Ms. Steiner states that she will try adjustments of her current medications, but not start any new prescription medications at this time.     She is also interested in meeting with a colorectal surgery clinic to address some of her questions and to see if surgery is an option for her (last visit with colorectal surgery ?2013).     Plan:   1. Referral to colorectal surgery to address your questions about surgery.  2. Stop charcoal as this can lead to constipation.  3. Stop supplements with digestive enzymes, and hydrocholoric acid with pepsin.  4. Use magnesium oxide tablets (not a blend with additives and other electrolytes) at 250 mg/pill. Take 3 pills daily and monitor stools, we can increase or decrease based on symptoms.  5. Start Citrucel 1-4 capsules daily and monitor stool output.   6. Contact GI " clinic with an update in 4-8 weeks. Will adjust meds further from that point   7. Ok to continue enemas as needed  8. Continue your work with Doretha Vallejo, PhD, LP.  9. Let us know if you are interested in starting an SSRI for abdominal pain. Meds discussed.      RTC 3 months    It was a pleasure to participate in the care of this patient; please contact us with any further questions.  A total of 50 face-to-face minutes was spent with this patient, >50% of which was counseling regarding the above delineated issues.    Thelma Arvizu MD   of Medicine  Division of Gastroenterology, Hepatology and Nutrition  [facility]    ---------------------------------------------------------------------------------------------------  HPI:    Ms. Steiner is a 67 year old female with hypothyroidism, asthma, HL, reported systemic candidiasis, and prior TIA who carries the prior diagnoses of dysynnergic defecation/pelvic floor dysfunction and constipation (?colonic dysmotility) who was initially seen in GI clinic in June 2016 to establish care. She had previously been followed by gastroenterology at the [facility] (Dr. Munoz) and [facility] and has been treated for GI symptoms at the [facility] in the past. She had been seen at our institution by GI once before with Dr. Esteban Saravia in Feb 2016. She has been seen by several colorectal surgeons in the past including Dr. Andrade, Dr. Sheriff and most recently Dr. Christo rivera at [facility] (2013). She returns today for follow-up.       Taken from my prior documentation:  - years of laxative and enema use in order to move her bowels  - 2010 - pelvic floor testing reveals dysfunction, SBFT normal, GES normal, Sitz marker study with 75% of markers in the rectum --> starts biofeedback (at [facility]), surgery first discussed (at [facility])- concern that subtotal colectomy w/ Ileoanal anastomosis may not address her issues  - 2011 -  "biofeedback at [facility] [place], work-up there with motility capsule reveals normal gastric and small bowel transit times, prolonged colon transit - but comments are made that colon pressures are good and patient had many small BMs during study, more suggestive of pelvic floor issues. She was felt to have some IBS features and was treated for SIBO as well as SI fungal overgrowth during this time. She did not tolerate the fluconazole given for fungal overgrowth.  - 2012 - sent to [place] for colonic manometry, test not performed due to concern for anal stenosis (mentioned in a prior note from physical exam), biofeedback started at [place]  - 2013 - seen by Dr. Villafuerte here and surgery discussed, again concern that ileoanal anastomosis would be insufficient, pt not interested in pursuing  - late 2013/2014 - appears colonic manometry completed (see scanned report under Procedures tab) - poor phasic and tonic response to meal ingestion, normal response to neostygmine --> response to neostigmine excludes issues with colonic inertia, though poor meal response may indicate a requirement of pharmacologic stimulus to induce stools       Ms. Steiner has been seen twice in our GI clinic. She has expressed interest in obtaining help managing her symptoms but has been reluctant to trial any medications other than those she feels are most natural (primarily using magnesium, slippery elm, peppermint, and tap water enemas). We have discussed several titrations of these medications, though Ms. Steiner has not made these alterations. Today, she shares more about her complicated relationship with abdominal pain and her stooling pattern. She feels that these symptoms began during her \"difficult\" childhood and she has difficulty altering her therapies because she has been on them so long. Though she can acknowledge that her current treatments are not as helpful as she'd like them to be. Ms. Mckeon further details that she uses each of her " "various medications for a separate symptom and the dose of each varies greatly day-to-day. Her current medications are:    - mg O7, with 1035 mg elemental mg (blended ozonated mg oxides) - takes 6-8 a day.  -slippery elm 1/2 tsp as needed - at least a few times a week  - Peppermint gels (with peppermint oil, ginger oil, and fennel oil). Takes 1-5 capsules throughout the day - \"soothing\" for abdominal pain.  - charcoal tabs prn (ensures not taking close to other meds) - 3-12 capsules a day.  - helps with bloating  - unknown supplement with hydrocholic acid with pepsin  - unknown supplement with \"digestive enzymes\"  - tap water enemas at least a few times each week when feeling \"full\" or \"bloated\" --> she feels these have not been as effective at emptying her rectum as of late       She has intermittently pursued biofeedback, ultimately finding some success through a therapist at [facility] (who has since left). She was thus re[facility]ref[facility] visit to complete her biofeedback there, but her referral was declined due to her prior experience there. Today she states she is interested in talking about other options with another colorectal surgeon. She is aware that she may not be a candidate but would like to discuss this further.     Ms. Steiner has also been following with our GI health psychologist Doretha Vallejo, PhD, LP which she has found beneficial, though she shares that she's heard some of these recommendations previously. We discussed that these visits are intended to help her build on skills developed during earlier visits and continued follow-up was encouraged.        On GI ROS, Ms. Steiner denies weight loss stating that her weight has been stable. She adds that during periods of increased abdominal pain she will change to a more liquid diet. She does not do any fasting.       ROS:    Remainder of ROS is negative.     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 Provider to review       Vaginal atrophy 11/03/2012     Priority: Medium     High cholesterol 02/14/2012     Priority: Medium     Rash and other nonspecific skin eruption 07/22/2011     Priority: Medium     CARDIOVASCULAR SCREENING; LDL GOAL LESS THAN 130 06/11/2010     Priority: Medium     Transient cerebral ischemia 07/10/2009     Priority: Medium     Diagnosis updated by automated process. Provider to review and confirm.       Carotid artery dissection (H) 07/10/2009     Priority: Medium     IBS (irritable bowel syndrome) 07/10/2009     Priority: Medium     Screen for colon cancer 07/10/2009     Priority: Medium     Due.  Referral given.       Visit for screening mammogram 07/10/2009     Priority: Medium     Due.  Referral given.  (Problem list name updated by automated process. Provider to review and confirm.)       Encounter for routine gynecological examination 07/10/2009     Priority: Medium     Due.  Recommend return for physical and pap.  Problem list name updated by automated process. Provider to review       Hypothyroidism 07/10/2009     Priority: Medium       PERTINENT MEDICATIONS:  Current Outpatient Prescriptions   Medication     LIOTHYRONINE SODIUM PO     meclizine (ANTIVERT) 12.5 MG tablet     fluocinonide (LIDEX) 0.05 % external solution     albuterol (PROAIR HFA, PROVENTIL HFA, VENTOLIN HFA) 108 (90 BASE) MCG/ACT inhaler     estradiol (ESTRACE) 0.1 MG/GM vaginal cream     peppermint oil liquid     Hypromellose (ARTIFICIAL TEARS OP)     EPINEPHrine (EPIPEN) 0.3 MG/0.3ML injection     thyroid (ARMOUR THYROID) 60 MG tablet     PROGESTERONE 50MG CAPSULES COMPOUND     DiphenhydrAMINE HCl (BENADRYL ALLERGY PO)     simethicone (GAS-X EXTRA STRENGTH) 125 MG CHEW     Fiber POWD     MAGNESIUM  "OXIDE PO     Lactobacillus (ACIDOPHILUS) CAPS     Charcoal Activated (CHARCOAL PO)     Digestive Enzymes TABS     Omega-3 Fatty Acids (FISH OIL PO)     HYDROCHLORIC ACID     Cholecalciferol (VITAMIN D) 2000 UNIT tablet     No current facility-administered medications for this visit.          PHYSICAL EXAMINATION:  Vitals /69 (BP Location: Left arm, Patient Position: Chair, Cuff Size: Adult Regular)  Pulse 74  Temp 97.4  F (36.3  C)  Ht 1.6 m (5' 3\")  SpO2 98%   Wt   Wt Readings from Last 2 Encounters:   05/15/17 65.8 kg (145 lb)   07/13/16 65.8 kg (145 lb)   Gen: Pt sitting up in NAD, interactive and cooperative on exam  Eyes: sclerae anicteric, no injection  ENT:  MMM  Skin: Warm, dry, no jaundice  Neuro: alert, oriented, answers questions appropriately      PERTINENT STUDIES:    Orders Only on 01/18/2017   Component Date Value Ref Range Status     WBC 01/18/2017 7.2  4.0 - 11.0 10e9/L Final     RBC Count 01/18/2017 4.13  3.8 - 5.2 10e12/L Final     Hemoglobin 01/18/2017 12.8  11.7 - 15.7 g/dL Final     Hematocrit 01/18/2017 38.3  35.0 - 47.0 % Final     MCV 01/18/2017 93  78 - 100 fl Final     MCH 01/18/2017 31.0  26.5 - 33.0 pg Final     MCHC 01/18/2017 33.4  31.5 - 36.5 g/dL Final     RDW 01/18/2017 13.8  10.0 - 15.0 % Final     Platelet Count 01/18/2017 249  150 - 450 10e9/L Final     Diff Method 01/18/2017 Automated Method   Final     % Neutrophils 01/18/2017 62.3  % Final     % Lymphocytes 01/18/2017 27.8  % Final     % Monocytes 01/18/2017 6.7  % Final     % Eosinophils 01/18/2017 2.1  % Final     % Basophils 01/18/2017 0.8  % Final     % Immature Granulocytes 01/18/2017 0.3  % Final     Nucleated RBCs 01/18/2017 0  0 /100 Final     Absolute Neutrophil 01/18/2017 4.5  1.6 - 8.3 10e9/L Final     Absolute Lymphocytes 01/18/2017 2.0  0.8 - 5.3 10e9/L Final     Absolute Monocytes 01/18/2017 0.5  0.0 - 1.3 10e9/L Final     Absolute Eosinophils 01/18/2017 0.2  0.0 - 0.7 10e9/L Final     Absolute " Basophils 01/18/2017 0.1  0.0 - 0.2 10e9/L Final     Abs Immature Granulocytes 01/18/2017 0.0  0 - 0.4 10e9/L Final     Absolute Nucleated RBC 01/18/2017 0.0   Final     TSH 01/18/2017 0.76  0.40 - 4.00 mU/L Final     Sodium 01/18/2017 141  133 - 144 mmol/L Final     Potassium 01/18/2017 4.2  3.4 - 5.3 mmol/L Final     Chloride 01/18/2017 107  94 - 109 mmol/L Final     Carbon Dioxide 01/18/2017 27  20 - 32 mmol/L Final     Anion Gap 01/18/2017 7  3 - 14 mmol/L Final     Glucose 01/18/2017 85  70 - 99 mg/dL Final     Urea Nitrogen 01/18/2017 11  7 - 30 mg/dL Final     Creatinine 01/18/2017 0.73  0.52 - 1.04 mg/dL Final     GFR Estimate 01/18/2017 80  >60 mL/min/1.7m2 Final     GFR Estimate If Black 01/18/2017   >60 mL/min/1.7m2 Final                    Value:>90   GFR Calc       Calcium 01/18/2017 8.7  8.5 - 10.1 mg/dL Final

## 2017-12-06 NOTE — PATIENT INSTRUCTIONS
1. Referral to colorectal surgery to address your questions about surgery.  2. Stop charcoal as this can lead to constipation.  3. Stop supplements with digestive enzymes, and hydrocholoric acid with pepsin.  4. Use magnesium oxide tablets (not a blend with additives and other electrolytes) at 250 mg/pill. Take 3 pills daily and monitor stools, we can increase or decrease based on symptoms.  5. Start Citrucel 1-4 capsules daily and monitor stool output.   6. Contact GI clinic with an update in 4-8 weeks.   7. Continue your work with Doretha Vallejo, PhD, LP.  8. Let us know if you are interested in starting an SSRI for abdominal pain.   9. Follow-up in GI clinic in 3-4 months (next available).     If you have any questions, please don't hesitate to contact our GI RN Clinic Coordinator Aurora Bermudez at [phone].

## 2017-12-13 ENCOUNTER — TELEPHONE (OUTPATIENT)
Dept: SURGERY | Facility: CLINIC | Age: 67
End: 2017-12-13

## 2017-12-14 ENCOUNTER — TELEPHONE (OUTPATIENT)
Dept: SURGERY | Facility: CLINIC | Age: 67
End: 2017-12-14

## 2017-12-14 NOTE — TELEPHONE ENCOUNTER
Per the request of GI, called patient to schedule a surgical consult with a different surgeon, as Dr. Villafuerte will no longer at [facility] starting in January.  Left message with our direct scheduling number.

## 2017-12-14 NOTE — TELEPHONE ENCOUNTER
Called patient and left message stating that Dr. Villafuerte is leaving the colon and rectal clinic and that someone would be calling her to schedule an appointment with on of our other physicians. Left clinic number for patient if she has any questions.

## 2018-01-16 ENCOUNTER — TELEPHONE (OUTPATIENT)
Dept: INTERNAL MEDICINE | Facility: CLINIC | Age: 68
End: 2018-01-16

## 2018-01-16 NOTE — TELEPHONE ENCOUNTER
Late Entry for 1/11/18    Pt calling, states that she has been experiencing dizziness, headache, left hand tingling and numbness behind her right ear. Denied stoke sxs. She states she had TIA in the past and she is aware of stroke sxs well.  I advised to be seen in the clinic for further evaluation and pt agreed. I coordinated scheduling.

## 2018-01-25 ENCOUNTER — OFFICE VISIT (OUTPATIENT)
Dept: FAMILY MEDICINE | Facility: CLINIC | Age: 68
End: 2018-01-25
Payer: MEDICARE

## 2018-01-25 VITALS
BODY MASS INDEX: 24.8 KG/M2 | DIASTOLIC BLOOD PRESSURE: 67 MMHG | HEART RATE: 87 BPM | SYSTOLIC BLOOD PRESSURE: 101 MMHG | RESPIRATION RATE: 20 BRPM | WEIGHT: 140 LBS

## 2018-01-25 DIAGNOSIS — Z78.0 POSTMENOPAUSAL STATUS: Primary | ICD-10-CM

## 2018-01-25 DIAGNOSIS — R53.83 OTHER FATIGUE: ICD-10-CM

## 2018-01-25 ASSESSMENT — PAIN SCALES - GENERAL: PAINLEVEL: MODERATE PAIN (4)

## 2018-01-25 NOTE — PROGRESS NOTES
[facility]  Primary [facility] Center   Milan Alvarez MD  1/25/2018     Chief Complaint:   Ear Problem; medication check; headaches and tingling.      History of Present Illness:   Jennifer Steiner is a 67 year old female with a history of anemia, hypertension, hyperlipidemia, hypothyroidism, and carotid artery dissection among others, who presents for evaluation of numbness and tingling, left ear pain, and medication concerns.    Numbness and tingling: Two weeks ago, the patient mentions she woke up from a nap and noted numbness along the right side of her head. She additionally noted some tingling in her left arm. The tingling in her left arm is not new and she notes she experiences this occasionally when she turns her neck in an awkward position. She did not have any numbness or tingling in her face. She did not have a headache. The numbness was present for several minutes prior to improving on its own. Since this episode, she has had several headaches and balance problems. She is unsure if this could be related to a separate left ear issue, see below.    Left ear pain: She has been experiencing ear pain for the last several weeks. She has noticed some drainage from this ear, as well. She has tried using warm water and peroxide wash outs with little relief. There have been associated symptoms of nasal congestion, but this is not atypical for her in the winter, per her report. She is concerned her neighbor may be smoking cigarettes in their apartment building causing a worsening of her symptoms.     Medication concerns: Jennifer has a history of Hashimoto's thyroiditis and this is being managed by endo elsewhere. Per her report, she was noted to have low levels of T3 at the time of her last visit. She was given a prescription for liothyronine, 5 mcg, per day to help improve this. She subsequently developed multiple GI issues and decided to wean off the medication on her own accord. The sensation of numbness and tingling  occurred while she was still taking this medication and she mentions concern the two could be related. She has been unable to get in and see endo since discontinuing the medication and wanted to bring this to my attention.     Other concerns discussed:   1. She is requesting refills for her EpiPen and her inhaler today.   2. States she is due for a mammogram and bone density study.   3. She mentions she has been very tired as of late, more than is typical for her.     Recent Labs:   Component      Latest Ref Rng & Units 1/18/2017   WBC      4.0 - 11.0 10e9/L 7.2   RBC Count      3.8 - 5.2 10e12/L 4.13   Hemoglobin      11.7 - 15.7 g/dL 12.8   Hematocrit      35.0 - 47.0 % 38.3   MCV      78 - 100 fl 93   MCH      26.5 - 33.0 pg 31.0   MCHC      31.5 - 36.5 g/dL 33.4   RDW      10.0 - 15.0 % 13.8   Platelet Count      150 - 450 10e9/L 249   Diff Method       Automated Method   % Neutrophils      % 62.3   % Lymphocytes      % 27.8   % Monocytes      % 6.7   % Eosinophils      % 2.1   % Basophils      % 0.8   % Immature Granulocytes      % 0.3   Nucleated RBCs      0 /100 0   Absolute Neutrophil      1.6 - 8.3 10e9/L 4.5   Absolute Lymphocytes      0.8 - 5.3 10e9/L 2.0   Absolute Monocytes      0.0 - 1.3 10e9/L 0.5   Absolute Eosinophils      0.0 - 0.7 10e9/L 0.2   Absolute Basophils      0.0 - 0.2 10e9/L 0.1   Abs Immature Granulocytes      0 - 0.4 10e9/L 0.0   Absolute Nucleated RBC       0.0   Sodium      133 - 144 mmol/L 141   Potassium      3.4 - 5.3 mmol/L 4.2   Chloride      94 - 109 mmol/L 107   Carbon Dioxide      20 - 32 mmol/L 27   Anion Gap      3 - 14 mmol/L 7   Glucose      70 - 99 mg/dL 85   Urea Nitrogen      7 - 30 mg/dL 11   Creatinine      0.52 - 1.04 mg/dL 0.73   GFR Estimate      >60 mL/min/1.7m2 80   GFR Estimate If Black      >60 mL/min/1.7m2 >90 . . .   Calcium      8.5 - 10.1 mg/dL 8.7   TSH      0.40 - 4.00 mU/L 0.76      Review of Systems:   Pertinent items are noted in HPI.  All other  systems are negative.    Active Medications:       LIOTHYRONINE SODIUM PO, Take 5 mcg by mouth daily, Disp: , Rfl:      meclizine (ANTIVERT) 12.5 MG tablet, Take 1 tablet (12.5 mg) by mouth 3 times daily as needed for dizziness, Disp: 30 tablet, Rfl: 1     fluocinonide (LIDEX) 0.05 % external solution, Apply topically 2 times daily To scalp as needed., Disp: 60 mL, Rfl: 1     albuterol (PROAIR HFA, PROVENTIL HFA, VENTOLIN HFA) 108 (90 BASE) MCG/ACT inhaler, Inhale 2 puffs into the lungs every 6 hours as needed for shortness of breath / dyspnea or wheezing, Disp: 1 Inhaler, Rfl: 1     estradiol (ESTRACE) 0.1 MG/GM vaginal cream, Place 0.5 g vaginally twice a week, Disp: , Rfl:      peppermint oil liquid, 1 mL daily as needed for other Taking gel tabs not oil, not in EMR., Disp: , Rfl:      Hypromellose (ARTIFICIAL TEARS OP), Apply 1 drop to eye daily as needed, Disp: , Rfl:      EPINEPHrine (EPIPEN) 0.3 MG/0.3ML injection, Inject 0.3 mLs (0.3 mg) into the muscle once as needed for anaphylaxis, Disp: 1 each, Rfl: 3     thyroid (ARMOUR THYROID) 60 MG tablet, Take 45 mg by mouth daily, Disp: , Rfl:      PROGESTERONE 50MG CAPSULES COMPOUND, 150 mg At Bedtime 1 capsules in the morning, in addition to bedtime dose., Disp: , Rfl:      DiphenhydrAMINE HCl (BENADRYL ALLERGY PO), Take  by mouth as needed. allergies, Disp: , Rfl:      simethicone (GAS-X EXTRA STRENGTH) 125 MG CHEW, Take 2 tablets by mouth 2 times daily., Disp: , Rfl:      Fiber POWD, Take 1 teaspoonful by mouth daily. Mix with slippery elm., Disp: , Rfl:      MAGNESIUM OXIDE PO, Take 6-8 tablets by mouth daily , Disp: , Rfl:      Lactobacillus (ACIDOPHILUS) CAPS, Take 1 capsule by mouth daily., Disp: , Rfl:      Charcoal Activated (CHARCOAL PO), Take 6 capsules by mouth daily as needed. For indigestion, Disp: , Rfl:      Digestive Enzymes TABS, Use as directed, Disp: , Rfl:      Omega-3 Fatty Acids (FISH OIL PO), Take 1 capsule by mouth daily. Plus Vit E, Disp:  , Rfl:      HYDROCHLORIC ACID, Take as directed, Disp: , Rfl:      Cholecalciferol (VITAMIN D) 2000 UNIT tablet, Take 1 tablet by mouth daily., Disp: , Rfl:       Allergies:   Aspirin; Bee venom; Birds [feathers]; Cats; Dust mites; Fluoride; Mold; Novocain [procaine]; Penicillins; Seasonal allergies; Simvastatin; and Sulfa drugs.      Past Medical History:  Anemia.   Asthma.   Back injury.   Blepharitis.   Chronic constipation.   CVA.   Hyperlipidemia.   Hypothyroidism.   Immune disorder.   Irritable bowel disease.   Lung disease.   Neck injuries.   Pelvic floor dysfunction.   Postmenopausal hormone replacement therapy.   Reduced vision.   Scoliosis.   Shortness of breath.   Transient cerebral ischemia.   Carotid artery dissection.   Rash and other nonspecific skin eruption.   Vaginal atrophy.      Past Surgical History:  Stomach surgery procedure unlisted.   Hydrogen breath test.   Tonsillectomy.   Cosmetic skin removal.   Breast reduction.     Family History:   Father: Positive for diabetes, bowel obstruction.   Brother: Positive for alcohol/drug abuse, hepatitis, diabetes, skin cancer, anesthesia reaction, and allergies.   Sister: Positive for bone cancer, anxiety disorder, and substance abuse.    Mother: Positive for melanoma and osteoporosis.       Social History:   Non-smoker. Denies alcohol use.      Physical Exam:   /67  Pulse 87  Resp 20  Wt 63.5 kg (140 lb)  Breastfeeding? No  BMI 24.8 kg/m2   Constitutional: Alert. In no distress.  Head: Normocephalic. No masses, lesions, tenderness or abnormalities.  ENT: No neck nodes or sinus tenderness.   Neck: Range of motion did not reproduce any numbness or tingling.   Psychiatric: Mentation appears normal. Normal affect.      Assessment and Plan:  1. Tingling, left arm: Options discussed today include MR imaging of the cervical spine, consultation with an orthopedic specialist, formal physical therapy, or continued observation of symptoms. At this time,  she would like to proceed continued observation. She is not interested in trying a corticosteroid injection and therefore we will hold off on MRI. She will let me know if she would like to try formal physical therapy.     2. Ear pain: We discussed starting a daily allergy medication. She will continue to use the Xylitol nasal spray.     3. Chronic fatigue: CBC and CMP was ordered today. We will call her with any abnormal results.     4. Routing health maintenance: She is due for a mammogram and plans to schedule this at [facility]. I have placed an order for a DEXA scan.     Follow-up: She will follow up on an as needed basis.          Scribe Disclosure:   I, Jose Armando Griffin, am serving as a scribe to document services personally performed by Milan Alvarez MD at this visit, based upon the provider's statements to me. All documentation has been reviewed by the aforementioned provider prior to being entered into the official medical record.     Portions of this medical record were completed by a scribe. UPON MY REVIEW AND AUTHENTICATION BY ELECTRONIC SIGNATURE, this confirms (a) I performed the applicable clinical services, and (b) the record is accurate.   Milan Alvarez MD

## 2018-01-25 NOTE — PATIENT INSTRUCTIONS
Primary Care Center Phone Number [phone]  Primary Care Center Medication Refill Request Information:  * Please contact your pharmacy regarding ANY request for medication refills.  ** [facility] Prescription Fax = [phone]  * Please allow 3 business days for routine medication refills.  * Please allow 5 business days for controlled substance medication refills.     Primary Care Center Test Result notification information:  *You will be notified with in 7-10 days of your appointment day regarding the results of your test.  If you are on MyChart you will be notified as soon as the provider has reviewed the results and signed off on them.        Please schedule the following appointments:  Dexa Scan: Radiology ([facility] 1st floor)  [phone]

## 2018-01-25 NOTE — NURSING NOTE
Chief Complaint   Patient presents with     Ear Problem     Pt is here to discuss ear pain     Recheck Medication     pt is ehre for a medication follow up      Headache     pt is here to discuss headaches and tingling        Nathaly Richardson CMA at 2:57 PM on 1/25/2018

## 2018-01-25 NOTE — MR AVS SNAPSHOT
After Visit Summary   1/25/2018    Jennifer Steiner    MRN: [id number]           Patient Information     Date Of Birth          1950        Visit Information        Provider Department      1/25/2018 2:35 PM Milan Alvarez MD [facility]        Today's Diagnoses     Postmenopausal status    -  1    Other fatigue          Care Instructions    Primary Care Center Phone Number [phone]  Primary Care Center Medication Refill Request Information:  * Please contact your pharmacy regarding ANY request for medication refills.  ** [facility] Prescription Fax = [phone]  * Please allow 3 business days for routine medication refills.  * Please allow 5 business days for controlled substance medication refills.     [facility] Center Test Result notification information:  *You will be notified with in 7-10 days of your appointment day regarding the results of your test.  If you are on MyChart you will be notified as soon as the provider has reviewed the results and signed off on them.        Please schedule the following appointments:  Dexa Scan: Radiology ([facility] 1st floor)  [phone]               Follow-ups after your visit        Your next 10 appointments already scheduled     Apr 11, 2018 11:00 AM CDT   (Arrive by 10:45 AM)   Return Visit with Thelma Arvizu MD   [facility] ([facility])    [address] [place] [id number] [phone]              Future tests that were ordered for you today     Open Future Orders        Priority Expected Expires Ordered    CBC with platelets differential Routine 1/25/2018 2/8/2018 1/25/2018    Comprehensive metabolic panel Routine 1/25/2018 2/8/2018 1/25/2018    Dexa hip/pelvis/spine* Routine  1/25/2019 1/25/2018            Who to contact     Please call your clinic at [phone] to:    Ask questions about your health    Make or cancel  appointments    Discuss your medicines    Learn about your test results    Speak to your doctor   If you have compliments or concerns about an experience at your clinic, or if you wish to file a complaint, please contact [facility] Patient Relations at [phone] or email us at [email or web address]         Additional Information About Your Visit        Indixhart Information     nextsocialt is an electronic gateway that provides easy, online access to your medical records. With [facility] [employer], you can request a clinic appointment, read your test results, renew a prescription or communicate with your care team.     To sign up for [employer] visit the website at [email or web address]   You will be asked to enter the access code listed below, as well as some personal information. Please follow the directions to create your username and password.     Your access code is: [email or web address]  Expires: 2018  9:12 PM     Your access code will  in 90 days. If you need help or a new code, please contact your [facility] or call [phone] for assistance.        Care EveryWhere ID     This is your Care EveryWhere ID. This could be used by other organizations to access your [facility] medical records  [id number]        Your Vitals Were     Pulse Respirations Breastfeeding? BMI (Body Mass Index)          87 20 No 24.8 kg/m2         Blood Pressure from Last 3 Encounters:   18 101/67   17 113/69   17 115/72    Weight from Last 3 Encounters:   18 63.5 kg (140 lb)   05/15/17 65.8 kg (145 lb)   16 65.8 kg (145 lb)               Primary Care Provider Office Phone # Fax #    Milan Alvarez [phone]        [address] [address]        Equal Access to Services     FRANCY BROUSSARD : Jerica easley Sotasha, waaxda luqadaha, qaybta kaalmada aspen, marisol lacy. So  [facility] [phone].    ATENCIÓN: Si lynette parra, tiene a mcallister disposición servicios gratuitos de asistencia lingüística. Buddy romero [phone].    We comply with applicable federal civil rights laws and [place] laws. We do not discriminate on the basis of race, color, national origin, age, disability, sex, sexual orientation, or gender identity.            Thank you!     Thank you for choosing [facility]  for your care. Our goal is always to provide you with excellent care. Hearing back from our patients is one way we can continue to improve our services. Please take a few minutes to complete the written survey that you may receive in the mail after your visit with us. Thank you!             Your Updated Medication List - Protect others around you: Learn how to safely use, store and throw away your medicines at www.disposemymeds.org.          This list is accurate as of 1/25/18  3:35 PM.  Always use your most recent med list.                   Brand Name Dispense Instructions for use Diagnosis    acidophilus Caps      Take 1 capsule by mouth daily.        albuterol 108 (90 BASE) MCG/ACT Inhaler    PROAIR HFA/PROVENTIL HFA/VENTOLIN HFA    1 Inhaler    Inhale 2 puffs into the lungs every 6 hours as needed for shortness of breath / dyspnea or wheezing    Uncomplicated asthma, unspecified asthma severity, Osteoporosis       ARMOUR THYROID 60 MG tablet   Generic drug:  thyroid      Take 45 mg by mouth daily    Osteoporosis, unspecified       ARTIFICIAL TEARS OP      Apply 1 drop to eye daily as needed        BENADRYL ALLERGY PO      Take  by mouth as needed. allergies        CHARCOAL PO      Take 6 capsules by mouth daily as needed. For indigestion        Digestive Enzymes Tabs      Use as directed        EPINEPHrine 0.3 MG/0.3ML injection 2-pack    EPIPEN    1 each    Inject 0.3 mLs (0.3 mg) into the muscle once as needed for anaphylaxis    Urticaria       estradiol 0.1 MG/GM cream    ESTRACE     Place  0.5 g vaginally twice a week    High cholesterol, TIA (transient ischaemic attack)       Fiber Powd      Take 1 teaspoonful by mouth daily. Mix with slippery elm.        FISH OIL PO      Take 1 capsule by mouth daily. Plus Vit E        fluocinonide 0.05 % solution    LIDEX    60 mL    Apply topically 2 times daily To scalp as needed.    Dermatitis, seborrheic       GAS-X EXTRA STRENGTH 125 MG Chew chewable tablet   Generic drug:  simethicone      Take 2 tablets by mouth 2 times daily.    High cholesterol, Memory loss, Carotid artery dissection (H), Vertigo, Rash and other nonspecific skin eruption       HYDROCHLORIC ACID      Take as directed        LIOTHYRONINE SODIUM PO      Take 5 mcg by mouth daily        MAGNESIUM OXIDE PO      Take 6-8 tablets by mouth daily        meclizine 12.5 MG tablet    ANTIVERT    30 tablet    Take 1 tablet (12.5 mg) by mouth 3 times daily as needed for dizziness    Vertigo       peppermint oil oil      1 mL daily as needed for other Taking gel tabs not oil, not in EMR.    High cholesterol, TIA (transient ischaemic attack)       PROGESTERONE 50MG CAPSULES COMPOUND      150 mg At Bedtime 1 capsules in the morning, in addition to bedtime dose.    Osteoporosis, unspecified       vitamin D 2000 UNITS tablet      Take 1 tablet by mouth daily.

## 2018-04-06 ENCOUNTER — TELEPHONE (OUTPATIENT)
Dept: GASTROENTEROLOGY | Facility: CLINIC | Age: 68
End: 2018-04-06

## 2018-05-09 ENCOUNTER — OFFICE VISIT (OUTPATIENT)
Dept: FAMILY MEDICINE | Facility: CLINIC | Age: 68
End: 2018-05-09
Payer: MEDICARE

## 2018-05-09 VITALS
SYSTOLIC BLOOD PRESSURE: 94 MMHG | HEIGHT: 62 IN | DIASTOLIC BLOOD PRESSURE: 60 MMHG | HEART RATE: 73 BPM | BODY MASS INDEX: 24.35 KG/M2 | WEIGHT: 132.3 LBS

## 2018-05-09 DIAGNOSIS — E78.00 HIGH BLOOD CHOLESTEROL: ICD-10-CM

## 2018-05-09 DIAGNOSIS — M62.89 PELVIC FLOOR DYSFUNCTION: Primary | ICD-10-CM

## 2018-05-09 DIAGNOSIS — Z78.0 POSTMENOPAUSAL STATUS: ICD-10-CM

## 2018-05-09 DIAGNOSIS — R53.83 OTHER FATIGUE: ICD-10-CM

## 2018-05-09 DIAGNOSIS — L50.9 URTICARIA: ICD-10-CM

## 2018-05-09 DIAGNOSIS — E03.9 HYPOTHYROIDISM, UNSPECIFIED TYPE: ICD-10-CM

## 2018-05-09 LAB
ALBUMIN SERPL-MCNC: 3.6 G/DL (ref 3.4–5)
ALP SERPL-CCNC: 84 U/L (ref 40–150)
ALT SERPL W P-5'-P-CCNC: 13 U/L (ref 0–50)
ANION GAP SERPL CALCULATED.3IONS-SCNC: 7 MMOL/L (ref 3–14)
AST SERPL W P-5'-P-CCNC: 15 U/L (ref 0–45)
BILIRUB SERPL-MCNC: 0.4 MG/DL (ref 0.2–1.3)
BUN SERPL-MCNC: 10 MG/DL (ref 7–30)
CALCIUM SERPL-MCNC: 9 MG/DL (ref 8.5–10.1)
CHLORIDE SERPL-SCNC: 109 MMOL/L (ref 94–109)
CHOLEST SERPL-MCNC: 223 MG/DL
CO2 SERPL-SCNC: 25 MMOL/L (ref 20–32)
CORTIS SERPL-MCNC: 8 UG/DL (ref 4–22)
CREAT SERPL-MCNC: 0.7 MG/DL (ref 0.52–1.04)
GFR SERPL CREATININE-BSD FRML MDRD: 83 ML/MIN/1.7M2
GLUCOSE SERPL-MCNC: 86 MG/DL (ref 70–99)
HDLC SERPL-MCNC: 63 MG/DL
LDLC SERPL CALC-MCNC: 139 MG/DL
NONHDLC SERPL-MCNC: 160 MG/DL
POTASSIUM SERPL-SCNC: 4.5 MMOL/L (ref 3.4–5.3)
PROT SERPL-MCNC: 7.1 G/DL (ref 6.8–8.8)
SODIUM SERPL-SCNC: 140 MMOL/L (ref 133–144)
T3 SERPL-MCNC: 89 NG/DL (ref 60–181)
T4 FREE SERPL-MCNC: 0.86 NG/DL (ref 0.76–1.46)
TRIGL SERPL-MCNC: 108 MG/DL
TSH SERPL DL<=0.005 MIU/L-ACNC: 0.04 MU/L (ref 0.4–4)

## 2018-05-09 PROCEDURE — 84480 ASSAY TRIIODOTHYRONINE (T3): CPT | Performed by: FAMILY MEDICINE

## 2018-05-09 PROCEDURE — 82533 TOTAL CORTISOL: CPT | Performed by: FAMILY MEDICINE

## 2018-05-09 RX ORDER — EPINEPHRINE 0.3 MG/.3ML
0.3 INJECTION SUBCUTANEOUS
Qty: 0.3 ML | Refills: 1 | Status: SHIPPED | OUTPATIENT
Start: 2018-05-09 | End: 2020-12-29

## 2018-05-09 NOTE — PROGRESS NOTES
"[facility]   Milan Alvarez MD  05/09/2018      Chief Complaint:   Physical       History of Present Illness:   Jennifer Steiner is a 68 year old female with a history of anemia, hypertension, hyperlipidemia, hypothyroidism, and carotid artery dissection among others, who presents alone for her annual physical. The patient was last evaluated in clinic in January 2018.    Today, she reports that Julianna Otto has informed the patient that she needs to complete a physical prior to getting her insurance to cover an acupuncture visit. The patient's insurance denies requiring a physical prior to acupuncture visit, however the patient presents with a hand written form for completion of her past history and current status. The patient reports that she has been completing acupuncture for the last couple of years for her eye and abdominal pain management. She does also experience \"lung pain\" with associated difficulty breathing. The patient has been visiting a chiropractor and physical therapy for her neck and back pain management. In the past, the patient has visited a physical therapist for pelvic floor concerns and pelvic floor relaxation exercises. She has experienced chronic concerns regarding pelvic pain and constipation with pelvic floor muscle disorder diagnosis. The patient is not interested in surgical intervention for relieve of her pain.     She mentions a history of abnormal thyroid function laboratory studies as well as a history of overmedication for her thyroid concerns. She refuses to take a statin for her high cholesterol level due to concern for how this might alter her thyroid conditions.     The patient does not use shots or vaccinations as she reports a history of adverse reactions to injections due to her significant allergy history. She does not believe that her body responds well to this intervention. The patient does have a history of food and extreme ragweed allergies among " others.     Health Care Maintenance/Other:  1. Eye health- Dr. Elliott Cunningham  2. Mammogram- due at this time   3. Bone density- last 2 years ago, repeat evaluation due  4. Colonoscopy- regular follow up every five years, last in 2015  5. Pap smear- needs to be updated, extra testing required in the past   A. No new sexual partners      Review of Systems:   Pertinent items are noted in HPI, remainder of complete ROS is negative.      Active Medications:      Charcoal Activated (CHARCOAL PO), Take 6 capsules by mouth daily as needed. For indigestion, Disp: , Rfl:      Cholecalciferol (VITAMIN D) 2000 UNIT tablet, Take 1 tablet by mouth daily., Disp: , Rfl:      Digestive Enzymes TABS, Use as directed, Disp: , Rfl:      EPINEPHrine (EPIPEN) 0.3 MG/0.3ML injection, Inject 0.3 mLs (0.3 mg) into the muscle once as needed for anaphylaxis, Disp: 1 each, Rfl: 3     estradiol (ESTRACE) 0.1 MG/GM vaginal cream, Place 0.5 g vaginally twice a week, Disp: , Rfl:      HYDROCHLORIC ACID, Take as directed, Disp: , Rfl:      Hypromellose (ARTIFICIAL TEARS OP), Apply 1 drop to eye daily as needed, Disp: , Rfl:      Lactobacillus (ACIDOPHILUS) CAPS, Take 1 capsule by mouth daily., Disp: , Rfl:      MAGNESIUM OXIDE PO, Take 6-8 tablets by mouth daily , Disp: , Rfl:      peppermint oil liquid, 1 mL daily as needed for other Taking gel tabs not oil, not in EMR., Disp: , Rfl:      PROGESTERONE 50MG CAPSULES COMPOUND, 150 mg At Bedtime 1 capsules in the morning, in addition to bedtime dose., Disp: , Rfl:      thyroid (ARMOUR THYROID) 60 MG tablet, Take 45 mg by mouth daily, Disp: , Rfl:      Allergies:   Aspirin; Bee venom; Birds [feathers]; Cats; Dust mites; Fluoride; Mold; No clinical screening - see comments; Novocain [procaine]; Penicillins; Seasonal allergies; Simvastatin; and Sulfa drugs      Past Medical History:  Anemia   Asthma   Back injury   Blepharitis   Constipation, chronic   Chronic enema use  CVA (cerebral  "infarction)   Hyperlipidemia   Hypothyroidism   Immune disorder   Lung disease   Neck injuries   Pelvic floor dysfunction   Postmenopausal hormone replacement therapy   Reduced vision   Scoliosis   Shortness of breath   Carotid artery dissection  IBS (irritable bowel syndrome)  Cardiovascular screening; LDL goal <130  Rash and other nonspecific skin eruption  Vaginal atrophy  Blepharitis  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     Past Surgical History:  Stomach surgery procedure unlisted   Tonsillectomy   Cosmetic skin removal   Breast reduction     Family History:   Diabetes   Bowel obstruction   Alcohol/drug  Hepatitis   Heart disease (Brother)   Skin cancer   Allergies   Bone cancer   Melanoma   Anxiety disorder   Substance abuse    Anesthesia reaction   Asthma   Osteoporosis   Obesity   Alcoholism      Social History:   The patient is single, a nonsmoker, and does not consume alcohol.      Physical Exam:   BP 94/60  Pulse 73  Ht 1.581 m (5' 2.25\")  Wt 60 kg (132 lb 4.8 oz)  BMI 24 kg/m2      Constitutional: Alert. In no distress.  Head: Normocephalic. No masses, lesions, tenderness or abnormalities.  ENT: No neck nodes or sinus tenderness.  Cardiovascular: RRR. No murmurs, clicks, gallops, or rub.  Respiratory: Clear to auscultation bilaterally, no wheezes or crackles.  Gastrointestinal: Abdomen soft. Non-tender. BS normal. No masses or organomegaly.  Musculoskeletal: Extremities normal. No gross deformities noted. Normal muscle tone.  Skin: No suspicious lesions. No rashes.  Neurologic: Gait normal. Reflexes normal and symmetric. Sensation grossly WNL.  Psychiatric: Mentation appears normal. Normal affect.   Hematologic/Lymphatic/Immunologic: Normal cervical lymph nodes.      Assessment and Plan:  1. Urticaria  Patient has a number of allergies and would like to carry around an Epi-Pen for quick relief if needed. Patient brings in one page handwritten form of past history " and current status which I did review with her and scanned into EPIC. Form was needed for patient to move forward with acupuncture treatment for her extensive history of allergies.   - EPINEPHrine (EPIPEN) 0.3 MG/0.3ML injection 2-pack; Inject 0.3 mLs (0.3 mg) into the muscle once as needed for anaphylaxis  Dispense: 0.3 mL; Refill: 1    2. Pelvic floor dysfunction  Referral to Dr. Murrieta of OB GYN was provided for further pelvic floor relaxation intervention.   - OB/GYN REFERRAL    3. Chronic pain  Patient has chronic abdominal and eye pain, asthma, and diffuse myalgias which are relieved with acupuncture which is currently being completed by Julianna Otto. She required a physical examination to continue these interventions.     4. High blood cholesterol  - Lipid panel reflex to direct LDL Fasting; Future  - Comprehensive metabolic panel; Future    5. Hypothyroidism, unspecified type  Patient reports history of abnormal thyroid function laboratory studies. Repeat evaluation was requested at this time per patient, therefore the following orders were placed.   - T4 free; Future  - T3 total; Future  - TSH; Future    6. Postmenopausal status  Patient has a history of osteoporosis and is due to repeat bone density evaluation. Of note, she is currently on hormone replacement.    - Dexa hip/pelvis/spine*; Future    7. Other fatigue  - Cortisol; Future    8. Neck and upper back pain   She has also been undergoing chiropractic intervention and would like to continue. She has undergone physical therapy in the past and experienced good relief. Patient was offered physical therapy referral at this time. She would like to call in the future for a physical therapy referral to address her chronic pain if she feels that this is indicated. May refer the her to a location that works well for her.     9. Routine pap smear    I recommended that the patient present to [facility] regular pap smears as she has been  experiencing difficulty finding a provider that will work with her current hormone replacement regimen. Patient has a history of abnormal pap with extra testing, however she was found to be normal. Patient is due for repeat pap smear.     10. Health Maintenance  Patient was offered shingles, tetanus, and pneumonia vaccinations today, however she declined at this time as she typically likes to avoid vaccinations due to her extensive allergic reaction history.       Follow-up: Patient can return to clinic in one year or on a p.r.n. basis.          Scribe Disclosure:  I, Anushka Vidal, am serving as a scribe to document services personally performed by Milan Alvarez MD at this visit, based upon the provider's statements to me. All documentation has been reviewed by the aforementioned provider prior to being entered into the official medical record.     Portions of this medical record were completed by a scribe. UPON MY REVIEW AND AUTHENTICATION BY ELECTRONIC SIGNATURE, this confirms (a) I performed the applicable clinical services, and (b) the record is accurate.   Milan Alvarez MD

## 2018-05-09 NOTE — MR AVS SNAPSHOT
After Visit Summary   2018    eJnnifer Steiner    MRN: [id number]           Patient Information     Date Of Birth          1950        Visit Information        Provider Department      2018 1:35 PM Milan Alvarez MD [facility]        Today's Diagnoses     Pelvic floor dysfunction    -  1    Urticaria        High blood cholesterol        Hypothyroidism, unspecified type        Postmenopausal status        Other fatigue          Care Instructions    [facility] ([facility]) [phone] ([address], [address])     DEXA Screening Tool    Dexa Scan  Is the patient taking any calcium supplements? no, if yes patient must stop calcium supplements 24 hours prior to appointment.    Dexa Scan [phone] [facility]  [phone] [facility]  [phone] [facility]      Please go to the lab on the first floor before you leave today.     Ms Steiner was evaluated in Dr Alvarez office today for a physical.    Leigh Cuellar LPN            Follow-ups after your visit        Additional Services     OB/GYN REFERRAL       Your provider has referred you to:  [facility]: [facility] [phone]   [email or web address]    Dr Anders Murrieta    Please be aware that coverage of these services is subject to the terms and limitations of your health insurance plan.  Call member services at your health plan with any benefit or coverage questions.      Please bring the following with you to your appointment:    (1) Any X-Rays, CTs or MRIs which have been performed.  Contact the facility where they were done to arrange for  prior to your scheduled appointment.   (2) List of current medications   (3) This referral request   (4) Any documents/labs given to you for this referral                  Your next 10 appointments already scheduled     May 09,    3:00 PM CDT   LAB with  LAB   [facility] ([facility])    [address] [address] [phone]           Please do not eat 10-12 hours before your appointment if you are coming in fasting for labs on lipids, cholesterol, or glucose (sugar). This does not apply to pregnant women. Water, hot tea and black coffee (with nothing added) are okay. Do not drink other fluids, diet soda or chew gum.              Future tests that were ordered for you today     Open Future Orders        Priority Expected Expires Ordered    Lipid panel reflex to direct LDL Fasting Routine 2018    Comprehensive metabolic panel Routine 2018    Dexa hip/pelvis/spine* Routine  2019    Cortisol Routine 2018    T4 free Routine 2018    T3 total Routine 2018    TSH Routine 2018            Who to contact     Please call your clinic at [phone] to:    Ask questions about your health    Make or cancel appointments    Discuss your medicines    Learn about your test results    Speak to your doctor            Additional Information About Your Visit        Descomplicahart Information     Trampolinet is an electronic gateway that provides easy, online access to your medical records. With Transcepta, you can request a clinic appointment, read your test results, renew a prescription or communicate with your care team.     To sign up for Trampolinet visit the website at [email or web address]   You will be asked to enter the access code listed below, as well as some personal information. Please follow the directions to create your username and password.     Your access code is: [email or web address]  Expires: 2018  6:31 AM     Your access code will  in 90 days. If you need help or a new code, please contact your [facility] or  "call [phone] for assistance.        Care EveryWhere ID     This is your Care EveryWhere ID. This could be used by other organizations to access your [place] medical records  [id number]        Your Vitals Were     Pulse Height BMI (Body Mass Index)             73 1.581 m (5' 2.25\") 24 kg/m2          Blood Pressure from Last 3 Encounters:   05/09/18 94/60   01/25/18 101/67   12/06/17 113/69    Weight from Last 3 Encounters:   05/09/18 60 kg (132 lb 4.8 oz)   01/25/18 63.5 kg (140 lb)   05/15/17 65.8 kg (145 lb)              We Performed the Following     OB/GYN REFERRAL          Today's Medication Changes          These changes are accurate as of 5/9/18  2:40 PM.  If you have any questions, ask your nurse or doctor.               Stop taking these medicines if you haven't already. Please contact your care team if you have questions.     albuterol 108 (90 Base) MCG/ACT Inhaler   Commonly known as:  PROAIR HFA/PROVENTIL HFA/VENTOLIN HFA   Stopped by:  Milan Alvarez MD           BENADRYL ALLERGY PO   Stopped by:  Milan Alvarez MD           Fiber Powd   Stopped by:  Milan Alvarez MD           FISH OIL PO   Stopped by:  Milan Alvarez MD           fluocinonide 0.05 % solution   Commonly known as:  LIDEX   Stopped by:  Milan Alvarez MD           GAS-X EXTRA STRENGTH 125 MG Chew chewable tablet   Generic drug:  simethicone   Stopped by:  Milan Alvarez MD           LIOTHYRONINE SODIUM PO   Stopped by:  Milan Alvarez MD           meclizine 12.5 MG tablet   Commonly known as:  ANTIVERT   Stopped by:  Milan Alvarez MD                Where to get your medicines      These medications were sent to [facility] [address] [place] [address] AT [address] [address]    Hours:  24-hours Phone:  [phone]     EPINEPHrine 0.3 MG/0.3ML injection 2-pack                Primary Care Provider Office Phone # Fax #    Milan PEREZ " MD Antonio [phone]       [address] [address]        Equal Access to Services     FRANCY BROUSSARD : Hadii aad ku hadnavneetflorencio Elias, sarahi araujotristonha, fitocatarino yungerasmonatalie ewingtobin, marisol vonin hayaan kaylinmeenakshi johnston sarah alcy. So [facility] [phone].    ATENCIÓN: Si habla español, tiene a mcallister disposición servicios gratuitos de asistencia lingüística. Llame al [phone].    We comply with applicable federal civil rights laws and [place] laws. We do not discriminate on the basis of race, color, national origin, age, disability, sex, sexual orientation, or gender identity.            Thank you!     Thank you for choosing [facility]  for your care. Our goal is always to provide you with excellent care. Hearing back from our patients is one way we can continue to improve our services. Please take a few minutes to complete the written survey that you may receive in the mail after your visit with us. Thank you!             Your Updated Medication List - Protect others around you: Learn how to safely use, store and throw away your medicines at www.disposemymeds.org.          This list is accurate as of 5/9/18  2:40 PM.  Always use your most recent med list.                   Brand Name Dispense Instructions for use Diagnosis    acidophilus Caps      Take 1 capsule by mouth daily.        ARMOUR THYROID 60 MG tablet   Generic drug:  thyroid      Take 45 mg by mouth daily    Osteoporosis, unspecified       ARTIFICIAL TEARS OP      Apply 1 drop to eye daily as needed        CHARCOAL PO      Take 6 capsules by mouth daily as needed. For indigestion        Digestive Enzymes Tabs      Use as directed        EPINEPHrine 0.3 MG/0.3ML injection 2-pack    EPIPEN    0.3 mL    Inject 0.3 mLs (0.3 mg) into the muscle once as needed for anaphylaxis    Urticaria       estradiol 0.1 MG/GM cream    ESTRACE     Place 0.5 g vaginally twice a week    High cholesterol, TIA (transient ischaemic  attack)       HYDROCHLORIC ACID      Take as directed        MAGNESIUM OXIDE PO      Take 6-8 tablets by mouth daily        peppermint oil oil      1 mL daily as needed for other Taking gel tabs not oil, not in EMR.    High cholesterol, TIA (transient ischaemic attack)       PROGESTERONE 50MG CAPSULES COMPOUND      150 mg At Bedtime 1 capsules in the morning, in addition to bedtime dose.    Osteoporosis, unspecified       vitamin D 2000 units tablet      Take 1 tablet by mouth daily.

## 2018-05-09 NOTE — PATIENT INSTRUCTIONS
[facility] ([facility]) [phone] ([address], [address])     DEXA Screening Tool    Dexa Scan  Is the patient taking any calcium supplements? no, if yes patient must stop calcium supplements 24 hours prior to appointment.    Dexa Scan [phone] [facility]  [phone] [facility]  [phone] [facility]      Please go to the lab on the first floor before you leave today.     Ms Steiner was evaluated in Dr Alvarez office today for a physical.    Leigh Cuellar LPN

## 2018-05-10 NOTE — NURSING NOTE
Faxed H&P to Isa [facility] at [phone] and to Doretha Bolaños at [phone] and mailed copy to patient per patient request.

## 2018-06-13 ENCOUNTER — TELEPHONE (OUTPATIENT)
Dept: FAMILY MEDICINE | Facility: CLINIC | Age: 68
End: 2018-06-13

## 2018-06-16 ENCOUNTER — HEALTH MAINTENANCE LETTER (OUTPATIENT)
Age: 68
End: 2018-06-16

## 2018-06-28 ENCOUNTER — TELEPHONE (OUTPATIENT)
Dept: INTERNAL MEDICINE | Facility: CLINIC | Age: 68
End: 2018-06-28

## 2018-06-28 NOTE — TELEPHONE ENCOUNTER
Dr. Alvarez    Could you review or put the note on the results from 5/9/18 ? Pt is requesting the result letter. Thank you.    Ellen

## 2018-06-28 NOTE — LETTER
Patient:  Jennifer Steiner  :   1950  MRN:     [id number]        Ms. Jennifer Steiner  [address] [facility] [address]        2018    Dear Ms. Steiner,    Thank you for choosing the [facility] for your healthcare needs.  We appreciate the opportunity to serve you.    The following are your recent test results.     Labs show high cholesterol (so ideally work hard on healthy diet and exercise) and cortisol (adrenal test) is normal. Thyroid is ok but might be on a little more thyroid med than you need. Since TSH is low, please check with a doctor who you work with for the thyroid. Redo cholesterol lab next year.      Please contact your provider if you have any questions or concerns.  We look forward to serving your needs in the future.      Sincerely,      Primary Care Clinic                    Results for orders placed or performed in visit on 18   Lipid panel reflex to direct LDL Fasting   Result Value Ref Range    Cholesterol 223 (H) <200 mg/dL    Triglycerides 108 <150 mg/dL    HDL Cholesterol 63 >49 mg/dL    LDL Cholesterol Calculated 139 (H) <100 mg/dL    Non HDL Cholesterol 160 (H) <130 mg/dL   Comprehensive metabolic panel   Result Value Ref Range    Sodium 140 133 - 144 mmol/L    Potassium 4.5 3.4 - 5.3 mmol/L    Chloride 109 94 - 109 mmol/L    Carbon Dioxide 25 20 - 32 mmol/L    Anion Gap 7 3 - 14 mmol/L    Glucose 86 70 - 99 mg/dL    Urea Nitrogen 10 7 - 30 mg/dL    Creatinine 0.70 0.52 - 1.04 mg/dL    GFR Estimate 83 >60 mL/min/1.7m2    GFR Estimate If Black >90 >60 mL/min/1.7m2    Calcium 9.0 8.5 - 10.1 mg/dL    Bilirubin Total 0.4 0.2 - 1.3 mg/dL    Albumin 3.6 3.4 - 5.0 g/dL    Protein Total 7.1 6.8 - 8.8 g/dL    Alkaline Phosphatase 84 40 - 150 U/L    ALT 13 0 - 50 U/L    AST 15 0 - 45 U/L   Cortisol   Result Value Ref Range    Cortisol Serum 8.0 4 - 22 ug/dL   T4 free   Result Value Ref Range    T4 Free 0.86 0.76 - 1.46  ng/dL   T3 total   Result Value Ref Range    Triiodothyronine (T3) 89 60 - 181 ng/dL   TSH   Result Value Ref Range    TSH 0.04 (L) 0.40 - 4.00 mU/L

## 2018-06-29 NOTE — TELEPHONE ENCOUNTER
Labs show high cholesterol (so ideally work hard on healthy diet and exercise) and cortisol (adrenal test) is normal. Thyroid ok but might be on a little more thyroid med than she needs, since tsh is low. She works with a doctor elsewhere for the thyroid. Redo lipids next year.

## 2018-08-20 ENCOUNTER — PRE VISIT (OUTPATIENT)
Dept: UROLOGY | Facility: CLINIC | Age: 68
End: 2018-08-20

## 2018-08-20 NOTE — TELEPHONE ENCOUNTER
MEDICAL RECORDS REQUEST   [facility]  Urology Clinic  [address] [place] [address]  PHONE: [phone]  Fax: [phone]        FUTURE VISIT INFORMATION                                                   Jennifer Steiner, : 1950 scheduled for future visit at [facility]    APPOINTMENT INFORMATION:    Date: 2018    Provider:  Radha Ovalle    Reason for Visit/Diagnosis: Pelvic Floor issues    REFERRAL INFORMATION:    Referring provider:  self    Specialty: self    Referring providers clinic:  self    Clinic contact number:  self    RECORDS REQUESTED FOR VISIT                                                     NOTES  STATUS/DETAILS   OFFICE NOTE from referring provider  yes   OFFICE NOTE from other specialist  no   DISCHARGE SUMMARY from hospital  no   DISCHARGE REPORT from the ER  no   OPERATIVE REPORT  no   MEDICATION LIST  yes       PRE-VISIT CHECKLIST      Record collection complete Yes  In care everywhere   Appointment appropriately scheduled           (right time/right provider) Yes   MyChart activation Yes   Questionnaire complete If no, please explain in process     Completed by: Radha Rivas

## 2018-09-17 ENCOUNTER — PRE VISIT (OUTPATIENT)
Dept: UROLOGY | Facility: CLINIC | Age: 68
End: 2018-09-17

## 2018-09-17 NOTE — TELEPHONE ENCOUNTER
Reason for visit: pelvic floor dysfunction     Relevant information: self referred    Records/imaging/labs: all records available    Pt called: No need for a call    Rooming: dip/pvr

## 2018-09-20 ENCOUNTER — OFFICE VISIT (OUTPATIENT)
Dept: UROLOGY | Facility: CLINIC | Age: 68
End: 2018-09-20
Payer: MEDICARE

## 2018-09-20 VITALS
DIASTOLIC BLOOD PRESSURE: 68 MMHG | WEIGHT: 130 LBS | BODY MASS INDEX: 23.92 KG/M2 | HEIGHT: 62 IN | SYSTOLIC BLOOD PRESSURE: 108 MMHG | HEART RATE: 76 BPM

## 2018-09-20 DIAGNOSIS — R10.9 CHRONIC ABDOMINAL PAIN: ICD-10-CM

## 2018-09-20 DIAGNOSIS — R19.8 ABNORMAL DEFECATION: ICD-10-CM

## 2018-09-20 DIAGNOSIS — M79.18 MYALGIA OF PELVIC FLOOR: ICD-10-CM

## 2018-09-20 DIAGNOSIS — K59.09 OTHER CONSTIPATION: ICD-10-CM

## 2018-09-20 DIAGNOSIS — M62.89 PELVIC FLOOR DYSFUNCTION: Primary | ICD-10-CM

## 2018-09-20 DIAGNOSIS — G89.29 CHRONIC ABDOMINAL PAIN: ICD-10-CM

## 2018-09-20 ASSESSMENT — PAIN SCALES - GENERAL: PAINLEVEL: NO PAIN (0)
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
## 2018-09-20 NOTE — NURSING NOTE
Chief Complaint   Patient presents with     Consult     pelvic floor dysfunction              Samaria Vergara MA

## 2018-09-20 NOTE — PATIENT INSTRUCTIONS
Websites with free information:    American Urogynecologic Society patient website: www.voicesforpfd.org    Total Control Program: www.totalcontrolprogram.com    Call the compounding pharmacy to ask about what is used in the vaginal valium suppository in cocoa butter to see if this is something that you would like to try.  [phone]    Consider acupuncture/natropathy at [facility] in [place]    Please see one of the dedicated pelvic floor physical therapist-[facility] Liz Mackenzie [phone]    Please return to see me in 4 months, sooner if needed    It was a pleasure meeting with you today.  Thank you for allowing me and my team the privilege of caring for you today.  YOU are the reason we are here, and I truly hope we provided you with the excellent service you deserve.  Please let us know if there is anything else we can do for you so that we can be sure you are leaving completely satisfied with your care experience.

## 2018-09-24 PROBLEM — K59.09 OTHER CONSTIPATION: Status: ACTIVE | Noted: 2018-09-24

## 2018-09-24 PROBLEM — R19.8 ABNORMAL DEFECATION: Status: ACTIVE | Noted: 2018-09-24

## 2018-09-24 PROBLEM — G89.29 CHRONIC ABDOMINAL PAIN: Status: ACTIVE | Noted: 2018-09-24

## 2018-09-24 PROBLEM — R10.9 CHRONIC ABDOMINAL PAIN: Status: ACTIVE | Noted: 2018-09-24

## 2018-09-24 PROBLEM — M79.18 MYALGIA OF PELVIC FLOOR: Status: ACTIVE | Noted: 2018-09-24

## 2018-11-09 ENCOUNTER — TELEPHONE (OUTPATIENT)
Dept: OPHTHALMOLOGY | Facility: CLINIC | Age: 68
End: 2018-11-09

## 2018-11-10 NOTE — TELEPHONE ENCOUNTER
Health Call Center    Phone Message    May a detailed message be left on voicemail: yes    Reason for Call: Symptoms or Concerns     If patient has red-flag symptoms, warm transfer to triage line    Current symptom or concern: One eye is sinking and the other is popping out per pt.  Pt wants to be seen asap with Dr. Damir Cunningham     Symptoms have been present for:  Past couple of days    Has patient previously been seen for this? UNKNOWN    By Dr. Damir Cunningham    Date: 02/21/17    Are there any new or worsening symptoms? YES      Action Taken: Message routed to:  [facility] ([facility]): [facility] EYE GENERAL

## 2018-11-14 ENCOUNTER — TELEPHONE (OUTPATIENT)
Dept: OPHTHALMOLOGY | Facility: CLINIC | Age: 68
End: 2018-11-14

## 2018-11-14 NOTE — TELEPHONE ENCOUNTER
[facility] Call Center    Phone Message    May a detailed message be left on voicemail: yes    Reason for Call: Other: Pt called and stated that she wanted to ensure that Dr. Cunningham would be seeing her at her appt tomorrow, as it is difficult for her to come down to the [facility]. Pt stated she wanted to make it clear she would not come if she could not see Dr. Cunningham, as he is familiar w/ her. Pt stated she originally had an appt on 11/29/18 however this appt was CXL'd. Pt stated that she did not want to CXL this appt, and her appt w/ Dr. Rey tomorrow was scheduled as an emergency appt. Pt stated she's been trying to see Dr. Cunningham for a few weeks, and had not had any success. Pt is disappointed by the lack of communication on our team, as she's received errorneous information about Dr. Santos & Dr. Cunningham and the services theyt provide. Pt stated she needs to make sure that she see's Dr. Cunningham as she needs a comprehensive exam to compare, as their is the possibility of major surgery.      Action Taken: Message routed to:  [facility] ([facility]): Eye

## 2018-11-14 NOTE — TELEPHONE ENCOUNTER
[facility] Call Center    Phone Message    May a detailed message be left on voicemail: yes    Reason for Call: Other: Pt requests call back ASAP from someone at Eye Clinic letting her know if she will see Dr Cunningham after seeing Resident or not - she wants to know for sure - she will not come to Eye Clinic if only seeing a Resident and not also seeing Dr Cunningham - Please return Pt call either way ASAP - Thanks     Action Taken: Message routed to:  [facility] ([facility]): Eye Clinic

## 2018-11-14 NOTE — TELEPHONE ENCOUNTER
Note to Dr. Cunningham/facilitator for f/u on appt with Dr. Cunningham tomorrow-- currently on schedule with Dr. Cunningham's resident/Dr. Rey with dr. Cunningham staffing    Thomas Benson RN 9:08 AM 11/14/18

## 2018-11-15 ENCOUNTER — OFFICE VISIT (OUTPATIENT)
Dept: OPHTHALMOLOGY | Facility: CLINIC | Age: 68
End: 2018-11-15
Attending: OPHTHALMOLOGY
Payer: MEDICARE

## 2018-11-15 DIAGNOSIS — H25.13 NUCLEAR SENILE CATARACT OF BOTH EYES: ICD-10-CM

## 2018-11-15 DIAGNOSIS — H02.886 MEIBOMIAN GLAND DYSFUNCTION (MGD) OF BOTH EYES: Primary | ICD-10-CM

## 2018-11-15 DIAGNOSIS — H05.419: ICD-10-CM

## 2018-11-15 DIAGNOSIS — H52.13 MYOPIA WITH ASTIGMATISM AND PRESBYOPIA, BILATERAL: ICD-10-CM

## 2018-11-15 DIAGNOSIS — H01.003 ANGULAR BLEPHARITIS OF RIGHT EYE: ICD-10-CM

## 2018-11-15 DIAGNOSIS — H52.203 MYOPIA WITH ASTIGMATISM AND PRESBYOPIA, BILATERAL: ICD-10-CM

## 2018-11-15 DIAGNOSIS — H02.883 MEIBOMIAN GLAND DYSFUNCTION (MGD) OF BOTH EYES: Primary | ICD-10-CM

## 2018-11-15 DIAGNOSIS — H04.123 DRY EYE SYNDROME OF BOTH EYES: ICD-10-CM

## 2018-11-15 DIAGNOSIS — H52.4 MYOPIA WITH ASTIGMATISM AND PRESBYOPIA, BILATERAL: ICD-10-CM

## 2018-11-15 PROCEDURE — G0463 HOSPITAL OUTPT CLINIC VISIT: HCPCS | Mod: ZF

## 2018-11-15 RX ORDER — NEOMYCIN SULFATE, POLYMYXIN B SULFATE, AND DEXAMETHASONE 3.5; 10000; 1 MG/G; [USP'U]/G; MG/G
0.5 OINTMENT OPHTHALMIC AT BEDTIME
Qty: 1 TUBE | Refills: 1 | Status: SHIPPED | OUTPATIENT
Start: 2018-11-15 | End: 2018-12-20

## 2018-11-15 RX ORDER — NEOMYCIN SULFATE, POLYMYXIN B SULFATE AND DEXAMETHASONE 3.5; 10000; 1 MG/ML; [USP'U]/ML; MG/ML
1-2 SUSPENSION/ DROPS OPHTHALMIC 3 TIMES DAILY
Qty: 1 BOTTLE | Refills: 1 | Status: SHIPPED | OUTPATIENT
Start: 2018-11-15 | End: 2018-12-20

## 2018-11-15 ASSESSMENT — REFRACTION_WEARINGRX
OD_SPHERE: -5.75
OS_CYLINDER: +0.50
OS_AXIS: 100
OD_CYLINDER: +0.75
OS_SPHERE: -5.25
OD_AXIS: 078

## 2018-11-15 ASSESSMENT — VISUAL ACUITY
OD_CC+: -1
OS_CC: 20/30
OD_CC: 20/20
METHOD: SNELLEN - LINEAR
CORRECTION_TYPE: GLASSES

## 2018-11-15 ASSESSMENT — SLIT LAMP EXAM - LIDS: COMMENTS: SCURF, MGD

## 2018-11-15 ASSESSMENT — CONF VISUAL FIELD
OS_NORMAL: 1
OD_NORMAL: 1

## 2018-11-15 ASSESSMENT — CUP TO DISC RATIO
OS_RATIO: 0.3
OD_RATIO: 0.3

## 2018-11-15 ASSESSMENT — TONOMETRY
OS_IOP_MMHG: 12
IOP_METHOD: ICARE
OD_IOP_MMHG: 12

## 2018-11-15 ASSESSMENT — EXTERNAL EXAM - LEFT EYE: OS_EXAM: NORMAL

## 2018-11-15 NOTE — PROGRESS NOTES
Assessment & Plan    Pt w/ history of dry eye, right eye enophthalmos who presents with 1 month waxing and waning R > L lid swelling, eye pain moving from lateral canthus to lower central lid, redness, and blurry vision. Symptoms have been worse in the morning; today better.    Cold packs, nandini herbal spray, poltus w/ cholangal oil, ontiveros seal tincture when ability to secure appt was delayed. Using AT BID and more PRN each eye. While she feels these have helped, she has two concerns - whether this was/is an infection and if her eye is sinking more. VA stable. Has appt w/ Dr Santos next month and would like to defer dilation and refraction until she sees him.    Jennifer Steiner is a 64 year old female with the following diagnoses:   1. Meibomian gland dysfunction (MGD) of both eyes    2. 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 Dry eye syndrome of both eyes    4. Myopia with astigmatism and presbyopia, bilateral    5. Nuclear senile cataract of both eyes    6. Angular blepharitis of right eye         R angular blepharitis w/ follicular reaction.   Meibomian gland dysfunction with persistent scurf, telangiectasias both eyes   - Maxitrol ointment each eye at bedtime  - Maxitrol drops TID right eye   - Encouraged dry warm compresses twice a day and lid hygiene   - AT 3-4 X daily  - F/u 1 month     Stable enophthalmos right eye. Kirsty 95 base 13/15 Monitor, reassurance given.    Unable to best assess cataracts w/o dilation; pt deferred today.    Return in about 1 month (around 12/15/2018) for blepharitis f/u.      Pt was discussed and evaluated with Dr Cunningham.    Cricket Rey MD  Ophthalmology Resident, PGY2      Attending Physician Attestation:  Complete documentation of historical and exam elements from today's encounter can be found in the full encounter summary report (not reduplicated in this progress note).  I personally obtained the chief complaint(s) and history of present  illness.  I confirmed and edited as necessary the review of systems, past medical/surgical history, family history, social history, and examination findings as documented by others; and I examined the patient myself.  I personally reviewed the relevant tests, images, and reports as documented above.  I formulated and edited as necessary the assessment and plan and discussed the findings and management plan with the patient and family. . - Damir Cunningham MD

## 2018-11-15 NOTE — MR AVS SNAPSHOT
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      Your next 10 appointments already scheduled     Dec 11, 2018  1:40 PM CST   (Arrive by 1:25 PM)   RETURN GENERAL with Pancho Santos [facility] ([facility])    [address]  4th [facility] [id number] [phone]            Dec 20, 2018  2:30 PM CST   RETURN GENERAL with Damir Cunningham MD   Eye Clinic ([facility])    [address]  [facility] [id number] [phone]              Who to contact     Please call your clinic at [phone] to:    Ask questions about your health    Make or cancel appointments    Discuss your medicines    Learn about your test results    Speak to your doctor            Additional Information About Your Visit         Blood Pressure from Last 3 Encounters:   09/20/18 108/68   05/09/18 94/60   01/25/18 101/67    Weight from Last 3 Encounters:   09/20/18 59 kg (130 lb)   05/09/18 60 kg (132 lb 4.8 oz)   01/25/18 63.5 kg (140 lb)              Today, you had the following     No orders found for display         Today's Medication Changes          These changes are accurate as of 11/15/18  4:33 PM.  If you have any questions, ask  your nurse or doctor.               Start taking these medicines.        Dose/Directions    * neomycin-polymyxin-dexamethasone 3.5-08188-0.1 Oint ophthalmic ointment   Commonly known as:  MAXITROL   Used for:  Angular blepharitis of right eye   Started by:  Cricket Rey MD        Dose:  0.5 inch   Place 0.5 inches into both eyes At Bedtime   Quantity:  1 Tube   Refills:  1       * neomycin-polymyxin-dexamethasone 3.5-96822-8.1 Susp ophthalmic susp   Commonly known as:  MAXITROL   Used for:  Angular blepharitis of right eye   Started by:  Cricket Rey MD        Dose:  1-2 drop   Place 1-2 drops into the right eye 3 times daily   Quantity:  1 Bottle   Refills:  1       * Notice:  This list has 2 medication(s) that are the same as other medications prescribed for you. Read the directions carefully, and ask your doctor or other care provider to review them with you.         Where to get your medicines      These medications were sent to [facility] [address] [place] [address] AT [address] [address]    Hours:  24-hours Phone:  [phone]     neomycin-polymyxin-dexamethasone 3.5-00799-0.1 Oint ophthalmic ointment    neomycin-polymyxin-dexamethasone 3.5-13560-9.1 Susp ophthalmic susp                Primary Care Provider Office Phone # Fax #    Milan Alvarez [phone]       0 [address] [address]        Equal Access to Services     FRANCY BROUSSARD AH: Hadii dilia kramero Sotasha, waaxda luqadaha, qaybta kaalmada priyankayanatalie, marisol lacy. So [facility] [phone].    ATENCIÓN: Si habla aida, tiene a mcallister disposición servicios gratuitos de asistencia lingüística. Llame al [phone].    We comply with applicable federal civil rights laws and [place] laws. We do not discriminate on the basis of race, color, national origin, age, disability, sex, sexual orientation, or gender identity.             Thank you!     Thank you for choosing EYE CLINIC  for your care. Our goal is always to provide you with excellent care. Hearing back from our patients is one way we can continue to improve our services. Please take a few minutes to complete the written survey that you may receive in the mail after your visit with us. Thank you!             Your Updated Medication List - Protect others around you: Learn how to safely use, store and throw away your medicines at www.disposemymeds.org.          This list is accurate as of 11/15/18  4:33 PM.  Always use your most recent med list.                   Brand Name Dispense Instructions for use Diagnosis    ARMOUR THYROID 60 MG tablet   Generic drug:  thyroid      Take 60 mg by mouth daily    Osteoporosis, unspecified       ARTIFICIAL TEARS OP      Apply 1 drop to eye daily as needed        BENADRYL PO      Take by mouth daily as needed        CHARCOAL PO      Take 6 capsules by mouth daily as needed. For indigestion        Digestive Enzymes Tabs      Use as directed        EPINEPHrine 0.3 MG/0.3ML injection 2-pack    EPIPEN    0.3 mL    Inject 0.3 mLs (0.3 mg) into the muscle once as needed for anaphylaxis    Urticaria       estradiol 0.1 MG/GM cream    ESTRACE     Place 0.5 g vaginally Use one to two times weekly.    High cholesterol, TIA (transient ischaemic attack)       HYDROCHLORIC ACID      Take as directed        MAGNESIUM OXIDE PO      Take 6-8 tablets by mouth daily        * neomycin-polymyxin-dexamethasone 3.5-47764-7.1 Oint ophthalmic ointment    MAXITROL    1 Tube    Place 0.5 inches into both eyes At Bedtime    Angular blepharitis of right eye       * neomycin-polymyxin-dexamethasone 3.5-01596-4.1 Susp ophthalmic susp    MAXITROL    1 Bottle    Place 1-2 drops into the right eye 3 times daily    Angular blepharitis of right eye       peppermint oil oil      1 mL daily as needed for other Taking gel tabs not oil, not in EMR.    High cholesterol, TIA (transient  ischaemic attack)       PROGESTERONE 50MG CAPSULES COMPOUND      100 mg At Bedtime Take 2 capsules at bedtime.    Osteoporosis, unspecified       vitamin D 2000 units tablet      Take 1 tablet by mouth daily.        * Notice:  This list has 2 medication(s) that are the same as other medications prescribed for you. Read the directions carefully, and ask your doctor or other care provider to review them with you.

## 2018-11-26 ENCOUNTER — TELEPHONE (OUTPATIENT)
Dept: OPHTHALMOLOGY | Facility: CLINIC | Age: 68
End: 2018-11-26

## 2018-11-26 NOTE — TELEPHONE ENCOUNTER
[facility] Call Center    Phone Message    May a detailed message be left on voicemail: yes    Reason for Call: Other: The pt states that one of her recent meds is only supposed to be taken a wk per Dr Cunningham but she can't find those instructions on either the check pout summary or the rx itself to confirm that. Can you call the pt to discuss the med and dosing instructions? Thanks.    Action Taken: Message routed to:  [facility]

## 2018-11-27 NOTE — TELEPHONE ENCOUNTER
Pt calling back to review eye drops and left message on triage line 11-27-18    I left message with direct triage number at [phone]  Thomas Benson RN 5:15 PM 11/27/18

## 2018-11-29 NOTE — TELEPHONE ENCOUNTER
Spoke to pt this AM 11-29-18  Reviewed dry eye treatment-- treating glands with anna marie/poly/dex for inflammation at lid margin/glands  Reviewed the recommended treatments from last visit and reviewed to continue use of preservative free artificial tears and warm compresses. Encouraged use of tears before reading activities with eye breaks/conscious blinking  Pt would like to review if to use both maxitrol ointment and drops for one month or if one of them was only for week    Stated would review with dr. Cunningham the maxitrol drop/ointment regimen to confirm and call back  Thomas Benson RN 8:22 AM 11/29/18    Reviewed with dr. Cunningham  Pt to use maxitrol drops and ointment until next f/u appt (one month from last visit)  Pt aware   Thomas Benson RN 9:11 AM 11/29/18

## 2018-12-10 ENCOUNTER — PATIENT OUTREACH (OUTPATIENT)
Dept: CARE COORDINATION | Facility: CLINIC | Age: 68
End: 2018-12-10

## 2018-12-11 ENCOUNTER — OFFICE VISIT (OUTPATIENT)
Dept: OPHTHALMOLOGY | Facility: CLINIC | Age: 68
End: 2018-12-11
Payer: MEDICARE

## 2018-12-11 DIAGNOSIS — H02.88A MEIBOMIAN GLAND DYSFUNCTION (MGD) OF UPPER AND LOWER LIDS OF BOTH EYES: ICD-10-CM

## 2018-12-11 DIAGNOSIS — H02.88B MEIBOMIAN GLAND DYSFUNCTION (MGD) OF UPPER AND LOWER LIDS OF BOTH EYES: ICD-10-CM

## 2018-12-11 DIAGNOSIS — H25.13 NUCLEAR SCLEROSIS OF BOTH EYES: ICD-10-CM

## 2018-12-11 DIAGNOSIS — H52.13 MYOPIA OF BOTH EYES: Primary | ICD-10-CM

## 2018-12-11 ASSESSMENT — EXTERNAL EXAM - RIGHT EYE: OD_EXAM: ENOPHTHALMOS

## 2018-12-11 ASSESSMENT — REFRACTION_WEARINGRX
OS_SPHERE: -5.25
OS_AXIS: 100
OS_CYLINDER: +0.50
OD_SPHERE: -5.75
OD_CYLINDER: +0.75
OD_AXIS: 078

## 2018-12-11 ASSESSMENT — REFRACTION_MANIFEST
OS_SPHERE: -5.00
OD_CYLINDER: +0.75
OD_AXIS: 030
OS_CYLINDER: +0.50
OD_SPHERE: -6.00
OS_AXIS: 160

## 2018-12-11 ASSESSMENT — VISUAL ACUITY
OD_CC+: -/+
OS_CC: 20/25
CORRECTION_TYPE: GLASSES
METHOD: SNELLEN - LINEAR
OS_CC+: -
OD_CC: 20/25

## 2018-12-11 ASSESSMENT — TONOMETRY
OD_IOP_MMHG: 15
OS_IOP_MMHG: 16
IOP_METHOD: ICARE

## 2018-12-11 ASSESSMENT — CUP TO DISC RATIO
OS_RATIO: 0.3
OD_RATIO: 0.3

## 2018-12-11 ASSESSMENT — CONF VISUAL FIELD
OS_NORMAL: 1
OD_NORMAL: 1

## 2018-12-11 ASSESSMENT — EXTERNAL EXAM - LEFT EYE: OS_EXAM: NORMAL

## 2018-12-11 NOTE — PROGRESS NOTES
History  HPI     Blepharitis Follow-Up     In both eyes.  Associated symptoms include Negative for eye pain, mattering and tearing.  Duration of months.  Treatments tried include eye drops and antibiotic ointment.              Comments     Vision has improved since last visit. Some irritation and pressure left eye but right eye feeling good. No redness, crusting or tearing.    Using eye drops TID OD and jessica OU qhs.    Lluvia Sampson COT 1:52 PM December 11, 2018             Last edited by Lluvia Sampson on 12/11/2018  1:52 PM. (History)          Assessment/Plan  (H52.13) Myopia of both eyes  (primary encounter diagnosis)  Comment: Myopia OU  Plan: REFRACTION [40748]         Educated patient on condition and clinical findings. Dispensed spectacle prescription for full time wear. Educated patient on possibility of adaptation period, if symptoms do not improve return to clinic for further testing.    (H02.88A,  H02.88B) Meibomian gland dysfunction (MGD) of upper and lower lids of both eyes  Comment: Symptoms improved  Plan:  Educated patient on condition and clinical findings. Begin taper of Maxitrol (drops bid x1 week, every day x 1 week, then discontinue; ointment every other night for 1 week, then stop). Recommended warm compresses twice each day for ten minutes, ocusoft lid scrubs once weekly, and Systane Complete bid OU. Monitor at follow-up with Dr. Cunningham. Discontinue washing eyelids with soap.    (H25.13) Nuclear sclerosis of both eyes  Comment: Not visually significant  Plan:  No treatment indicated at this time.    Return to clinic in 1 year for comprehensive eye exam.    Complete documentation of historical and exam elements from today's encounter can  be found in the full encounter summary report (not reduplicated in this progress  note). I personally obtained the chief complaint(s) and history of present illness. I  confirmed and edited as necessary the review of systems, past medical/surgical  history, family  history, social history, and examination findings as documented by  others; and I examined the patient myself. I personally reviewed the relevant tests,  images, and reports as documented above. I formulated and edited as necessary the  assessment and plan and discussed the findings and management plan with the  patient and family.    Pancho Santos OD, FAAO

## 2018-12-11 NOTE — NURSING NOTE
Chief Complaints and History of Present Illnesses   Patient presents with     Blepharitis Follow-Up

## 2018-12-20 ENCOUNTER — OFFICE VISIT (OUTPATIENT)
Dept: OPHTHALMOLOGY | Facility: CLINIC | Age: 68
End: 2018-12-20
Attending: OPHTHALMOLOGY
Payer: MEDICARE

## 2018-12-20 DIAGNOSIS — H01.003 ANGULAR BLEPHARITIS OF RIGHT EYE: ICD-10-CM

## 2018-12-20 PROCEDURE — G0463 HOSPITAL OUTPT CLINIC VISIT: HCPCS | Mod: ZF

## 2018-12-20 RX ORDER — NEOMYCIN SULFATE, POLYMYXIN B SULFATE, AND DEXAMETHASONE 3.5; 10000; 1 MG/G; [USP'U]/G; MG/G
0.5 OINTMENT OPHTHALMIC AT BEDTIME
Qty: 1 TUBE | Refills: 1 | Status: SHIPPED | OUTPATIENT
Start: 2018-12-20 | End: 2019-03-05

## 2018-12-20 ASSESSMENT — TONOMETRY
OD_IOP_MMHG: 16
IOP_METHOD: ICARE
OS_IOP_MMHG: 14

## 2018-12-20 ASSESSMENT — VISUAL ACUITY
OS_CC+: -1
CORRECTION_TYPE: GLASSES
OD_CC: 20/20
METHOD: SNELLEN - LINEAR
OD_CC+: -2
OS_CC: 20/25

## 2018-12-20 ASSESSMENT — REFRACTION_WEARINGRX
OS_AXIS: 100
OD_SPHERE: -5.75
OD_CYLINDER: +0.75
OS_SPHERE: -5.25
OS_CYLINDER: +0.50
OD_AXIS: 078

## 2018-12-20 ASSESSMENT — EXTERNAL EXAM - LEFT EYE: OS_EXAM: NORMAL

## 2018-12-20 ASSESSMENT — CONF VISUAL FIELD
OS_NORMAL: 1
OD_NORMAL: 1

## 2018-12-20 ASSESSMENT — EXTERNAL EXAM - RIGHT EYE: OD_EXAM: ENOPHTHALMOS

## 2018-12-20 NOTE — PATIENT INSTRUCTIONS
Continue maxitrol ointment to eyelids at bedtime both eyes   Taper maxitrol drops to daily for 1 week, then stop  Continue warm compresses at least twice a day   Continue artificial tears twice a day

## 2018-12-20 NOTE — PROGRESS NOTES
Chief Complaint(s) and History of Present Illness(es)     Blepharitis Follow-Up     Laterality: both eyes    Duration: 1 month              Comments     Pt. States that she is doing well.  Eyes have been feeling more comfortable.  Can still feel a pressure sensation CLAUDETTE Cormier COT 3:02 PM December 20, 2018               Review of systems for the eyes was negative other than the pertinent positives/negatives listed in the HPI.      Assessment & Plan      Jennifer Steiner is a 68 year old female with the following diagnoses:   1. Angular blepharitis of right eye         Symptoms greatly improved in both eyes   Continue maxitrol ointment at bedtime both eyes   Taper maxitrol drops to daily for 1 week, then stop  Continue warm compresses and artificial tears       Patient disposition:   Return in about 2 months (around 2/20/2019) for VT only.          Attending Physician Attestation:  Complete documentation of historical and exam elements from today's encounter can be found in the full encounter summary report (not reduplicated in this progress note).  I personally obtained the chief complaint(s) and history of present illness.  I confirmed and edited as necessary the review of systems, past medical/surgical history, family history, social history, and examination findings as documented by others; and I examined the patient myself.  I personally reviewed the relevant tests, images, and reports as documented above.  I formulated and edited as necessary the assessment and plan and discussed the findings and management plan with the patient and family. . - Damir Cunningham MD

## 2018-12-26 ENCOUNTER — TELEPHONE (OUTPATIENT)
Dept: FAMILY MEDICINE | Facility: CLINIC | Age: 68
End: 2018-12-26

## 2018-12-26 NOTE — TELEPHONE ENCOUNTER
"Headache x 5 days    Spoke with patient she states that Sunday feeling fatigue, stiffness in shoulders and headache. Felt like she was going to \"blackout\" and nauseous when sitting on couch. Felt this way for 2 hours. Felt difficult to focus.    Feeling better now. Feeling tired and headache today. Not as intense headache. Appt scheduled for tomorrow 12/27 at 11:05.     Nathaly Richardson CMA at 12:23 PM on 12/26/2018      "

## 2018-12-26 NOTE — TELEPHONE ENCOUNTER
[facility] Call Center    Phone Message    May a detailed message be left on voicemail: yes    Reason for Call: Other: Per call from PT is requesting a call back to discuss headaches and stiffness.  Offered APPT to PT but she doesn't want to schedule until she speaks with a nurse. PT can be reached at the above #     Action Taken: Message routed to:  [facility] Primary Care

## 2018-12-27 ENCOUNTER — OFFICE VISIT (OUTPATIENT)
Dept: FAMILY MEDICINE | Facility: CLINIC | Age: 68
End: 2018-12-27
Payer: MEDICARE

## 2018-12-27 ENCOUNTER — ANCILLARY PROCEDURE (OUTPATIENT)
Dept: GENERAL RADIOLOGY | Facility: CLINIC | Age: 68
End: 2018-12-27
Attending: FAMILY MEDICINE
Payer: MEDICARE

## 2018-12-27 VITALS
HEART RATE: 79 BPM | OXYGEN SATURATION: 99 % | WEIGHT: 131 LBS | SYSTOLIC BLOOD PRESSURE: 106 MMHG | BODY MASS INDEX: 23.96 KG/M2 | DIASTOLIC BLOOD PRESSURE: 72 MMHG

## 2018-12-27 DIAGNOSIS — R55 NEAR SYNCOPE: ICD-10-CM

## 2018-12-27 DIAGNOSIS — R06.02 SHORTNESS OF BREATH: ICD-10-CM

## 2018-12-27 DIAGNOSIS — E03.9 HYPOTHYROIDISM, UNSPECIFIED TYPE: Primary | ICD-10-CM

## 2018-12-27 DIAGNOSIS — Z86.79 HISTORY OF CAROTID ARTERY DISSECTION: ICD-10-CM

## 2018-12-27 DIAGNOSIS — E03.9 HYPOTHYROIDISM, UNSPECIFIED TYPE: ICD-10-CM

## 2018-12-27 LAB
ANION GAP SERPL CALCULATED.3IONS-SCNC: 5 MMOL/L (ref 3–14)
BASOPHILS # BLD AUTO: 0.1 10E9/L (ref 0–0.2)
BASOPHILS NFR BLD AUTO: 0.9 %
BUN SERPL-MCNC: 9 MG/DL (ref 7–30)
CALCIUM SERPL-MCNC: 9 MG/DL (ref 8.5–10.1)
CHLORIDE SERPL-SCNC: 104 MMOL/L (ref 94–109)
CO2 SERPL-SCNC: 28 MMOL/L (ref 20–32)
CREAT SERPL-MCNC: 0.73 MG/DL (ref 0.52–1.04)
DIFFERENTIAL METHOD BLD: NORMAL
EOSINOPHIL # BLD AUTO: 0.1 10E9/L (ref 0–0.7)
EOSINOPHIL NFR BLD AUTO: 2 %
ERYTHROCYTE [DISTWIDTH] IN BLOOD BY AUTOMATED COUNT: 13.2 % (ref 10–15)
GFR SERPL CREATININE-BSD FRML MDRD: 85 ML/MIN/{1.73_M2}
GLUCOSE SERPL-MCNC: 88 MG/DL (ref 70–99)
HCT VFR BLD AUTO: 43.2 % (ref 35–47)
HGB BLD-MCNC: 13.6 G/DL (ref 11.7–15.7)
IMM GRANULOCYTES # BLD: 0 10E9/L (ref 0–0.4)
IMM GRANULOCYTES NFR BLD: 0.3 %
INTERPRETATION ECG - MUSE: NORMAL
LYMPHOCYTES # BLD AUTO: 1.4 10E9/L (ref 0.8–5.3)
LYMPHOCYTES NFR BLD AUTO: 20.1 %
MCH RBC QN AUTO: 29.8 PG (ref 26.5–33)
MCHC RBC AUTO-ENTMCNC: 31.5 G/DL (ref 31.5–36.5)
MCV RBC AUTO: 95 FL (ref 78–100)
MONOCYTES # BLD AUTO: 0.4 10E9/L (ref 0–1.3)
MONOCYTES NFR BLD AUTO: 6 %
NEUTROPHILS # BLD AUTO: 4.8 10E9/L (ref 1.6–8.3)
NEUTROPHILS NFR BLD AUTO: 70.7 %
NRBC # BLD AUTO: 0 10*3/UL
NRBC BLD AUTO-RTO: 0 /100
NT-PROBNP SERPL-MCNC: 207 PG/ML (ref 0–125)
PLATELET # BLD AUTO: 247 10E9/L (ref 150–450)
POTASSIUM SERPL-SCNC: 4.4 MMOL/L (ref 3.4–5.3)
RBC # BLD AUTO: 4.57 10E12/L (ref 3.8–5.2)
SODIUM SERPL-SCNC: 137 MMOL/L (ref 133–144)
T3 SERPL-MCNC: 186 NG/DL (ref 60–181)
T4 FREE SERPL-MCNC: 0.9 NG/DL (ref 0.76–1.46)
TSH SERPL DL<=0.005 MIU/L-ACNC: 0.06 MU/L (ref 0.4–4)
WBC # BLD AUTO: 6.8 10E9/L (ref 4–11)

## 2018-12-27 PROCEDURE — 84480 ASSAY TRIIODOTHYRONINE (T3): CPT | Performed by: FAMILY MEDICINE

## 2018-12-27 PROCEDURE — 84443 ASSAY THYROID STIM HORMONE: CPT | Performed by: FAMILY MEDICINE

## 2018-12-27 ASSESSMENT — PAIN SCALES - GENERAL: PAINLEVEL: MILD PAIN (2)

## 2018-12-27 NOTE — NURSING NOTE
EKG performed  Results to provider  Patient tolerated well. Olivia Love CMA at 12:09 PM on 12/27/2018

## 2018-12-27 NOTE — NURSING NOTE
Chief Complaint   Patient presents with     Headache     Pt is here to discuss headache.      Symptoms     Pt is having new different sx she would like to discuss.      Ely Jean LPN at 11:08 AM on 12/27/2018.

## 2018-12-27 NOTE — PROGRESS NOTES
"[facility] [place]   Milan Alvarez MD  12/27/2018      Chief Complaint:   Headache and Symptoms     History of Present Illness:   Jennifer Steiner is a 68 year old female with a history of cerebrovascular accident, hypertension, hyperlipidemia, hypothyroidism, and carotid artery dissection who presents for evaluation of headaches. She is not interested in statin therapy because of a previous reaction. She swims and walks for exercise.     She has had headaches since her carotid dissection. When that occurred, she had a severe headache and was unable to connect names to faces. She was able to remember names after about 15 minutes but the headache remained. Last week she was visiting her mother when she started to feel nausea and faint all at once. The nausea was not similar to what she feels related to GI symptoms. She does not recall vision changes, she did not cough prior to the spell. The event lasted about 2-3 seconds. This only happened once. She had felt some additional fatigue the two days prior, which reminded her of when she had a carotid artery dissection. She also felt like memories of dreams were being triggered throughout the day. She had difficulty remembering the word \"flour\" for a recipe.  She denies a history of migraines. She is concerned about another stroke or cardiac problems.    Short of breath lately. She had a productive cough with green sputum.     Has not had to user her Epipen. She saw the pelvic floor clinic and is getting PT care through [facility] in [place].      On Ace Thyroid. Previous TSH 0.04 in May 2018. She had a history of palpitations when she was on too high of a dose of thyroid replacement. She feels more edgy since being overmedicated and thinks it effected her adrenals, May 2018 cortisol was within normal limits at 8.0.     Other topic discussed:  1.no influenza vaccine    Review of Systems:   Pertinent items are noted in HPI, remainder of complete " ROS is negative.      Active Medications:     Current Outpatient Medications:      Charcoal Activated (CHARCOAL PO), Take 6 capsules by mouth daily as needed. For indigestion, Disp: , Rfl:      Cholecalciferol (VITAMIN D) 2000 UNIT tablet, Take 1 tablet by mouth daily., Disp: , Rfl:      Digestive Enzymes TABS, Use as directed, Disp: , Rfl:      DiphenhydrAMINE HCl (BENADRYL PO), Take by mouth daily as needed, Disp: , Rfl:      EPINEPHrine (EPIPEN) 0.3 MG/0.3ML injection 2-pack, Inject 0.3 mLs (0.3 mg) into the muscle once as needed for anaphylaxis, Disp: 0.3 mL, Rfl: 1     estradiol (ESTRACE) 0.1 MG/GM vaginal cream, Place 0.5 g vaginally Use one to two times weekly., Disp: , Rfl:      HYDROCHLORIC ACID, Take as directed, Disp: , Rfl:      Hypromellose (ARTIFICIAL TEARS OP), Apply 1 drop to eye daily as needed, Disp: , Rfl:      MAGNESIUM OXIDE PO, Take 6-8 tablets by mouth daily , Disp: , Rfl:      neomycin-polymyxin-dexamethasone (MAXITROL) 3.5-63868-4.1 ophthalmic ointment, Place 0.5 inches into both eyes At Bedtime, Disp: 1 Tube, Rfl: 1     peppermint oil liquid, 1 mL daily as needed for other Taking gel tabs not oil, not in EMR., Disp: , Rfl:      PROGESTERONE 50MG CAPSULES COMPOUND, 100 mg At Bedtime Take 2 capsules at bedtime., Disp: , Rfl:      thyroid (ARMOUR THYROID) 60 MG tablet, Take 60 mg by mouth daily , Disp: , Rfl:       Allergies:   Aspirin; Bee venom; Birds [feathers]; Cats; Dust mites; Fluoride; Mold; No clinical screening - see comments; Novocain [procaine]; Penicillins; Seasonal allergies; Simvastatin; and Sulfa drugs      Past Medical History:  Anemia  Asthma  Cerebrovascular accident  Lung disease  Hyperlipidemia  Hypothyroidism  Immune disorder  Irritable bowel disease  Scoliosis  Carotid artery dissection  Pelvic floor dysfunction     Past Surgical History:  Stomach surgery  Tonsillectomy  Skin removal, breast reduction    Family History:  Father: diabetes mellitus, gastrointestinal  disease, obesity  Brother: hepatitis, drug abuse, diabetes mellitus, allergies, anesthesia reaction  Mother: melanoma, osteoporosis  Sister: substance abuse, bone cancer, alcoholism    Social History:   Unmarried  Non smoker    Physical Exam:   /72   Pulse 79   Wt 59.4 kg (131 lb)   SpO2 99%   Breastfeeding? No   BMI 23.96 kg/m     Constitutional: Alert. In no distress.  Head: Normocephalic. No masses, lesions, tenderness or abnormalities.  Cardiovascular: RRR. No murmurs, clicks, gallops, or rub. No carotid bruits.   Respiratory: Clear to auscultation bilaterally, no wheezes or crackles.  Gastrointestinal: Abdomen soft. Non-tender. BS normal. No masses or organomegaly.  Musculoskeletal: Extremities normal. No gross deformities noted. Normal muscle tone.  Neurologic: Gait normal. Reflexes normal and symmetric. Sensation grossly WNL.  Psychiatric: Mentation appears normal. Normal affect.     Cardiac risk  The 10-year ASCVD risk score (Unity KRISTIAN Jr., et al., 2013) is: 4.8%    Values used to calculate the score:      Age: 68 years      Sex: Female      Is Non- : No      Diabetic: No      Tobacco smoker: No      Systolic Blood Pressure: 106 mmHg      Is BP treated: No      HDL Cholesterol: 63 mg/dL      Total Cholesterol: 223 mg/dL     EKG:  Indication:  Near syncope  Date:  Dec 27, 2018  Time:   13:00  Impression: Sinus bradycardia    Otherwise normal ECG    When compared with ECG of 01-MAY-2015 11:22,    No significant change was found   Vent. Rate: 59  R-axis:  57    Assessment and Plan:  Hypothyroidism, unspecified type  Gets natural thyroid from an outside provider, did discuss she is subclinical hypothyroidism range.   - TSH  - T3 total  - T4 free    Near syncope and history of carotid artery dissection  Unclear if vasovagal spell vs more significant event, will start with this workup and if negative will consider her doing a Holter monitor.   - Echo Stress Echocardiogram  - CBC  with platelets differential  - Basic metabolic panel  - EKG Performed in Clinic w/ Provider Reading Fee  - MR Brain for Stroke Cmpl w/o & w Contras    Shortness of breath  Vague symptoms for several days but did precede her set of symptoms.   - XR Chest 2 Views  - N terminal pro BNP          Follow-up: Data Unavailable         Scribe Disclosure:   I, Sander Roberts, am serving as a scribe to document services personally performed by Milan Alvarez MD at this visit, based upon the provider's statements to me. All documentation has been reviewed by the aforementioned provider prior to being entered into the official medical record.     Portions of this medical record were completed by a scribe. UPON MY REVIEW AND AUTHENTICATION BY ELECTRONIC SIGNATURE, this confirms (a) I performed the applicable clinical services, and (b) the record is accurate.   Milan Alvarez MD

## 2018-12-27 NOTE — PATIENT INSTRUCTIONS
[facility] Medication Refill Request Information:  * Please contact your pharmacy regarding ANY request for medication refills.  ** [facility] Prescription Fax = [phone]  * Please allow 3 business days for routine medication refills.  * Please allow 5 business days for controlled substance medication refills.     [facility] Test Result notification information:  *You will be notified with in 7-10 days of your appointment day regarding the results of your test.  If you are on MyChart you will be notified as soon as the provider has reviewed the results and signed off on them.    [facility]: [phone]

## 2018-12-28 ENCOUNTER — TELEPHONE (OUTPATIENT)
Dept: INTERNAL MEDICINE | Facility: CLINIC | Age: 68
End: 2018-12-28

## 2018-12-28 NOTE — TELEPHONE ENCOUNTER
Left a detailed message to the pt regarding the result and recommendations. Also result letter was mailed to home.    Soon-Mi  ---------------------------------------------------------        ----- Message from Milan Alvarez MD sent at 12/27/2018  3:20 PM CST -----  Can you let her know the thyroid dose is a bit too high now. T3 is up, along with low tsh. She sees a doctor elsewhere who prescribes her thyroid medicine. She should discuss with that doctor reducing the dose.

## 2019-01-08 ENCOUNTER — ANCILLARY PROCEDURE (OUTPATIENT)
Dept: MRI IMAGING | Facility: CLINIC | Age: 69
End: 2019-01-08
Payer: MEDICARE

## 2019-01-08 DIAGNOSIS — R55 NEAR SYNCOPE: ICD-10-CM

## 2019-01-08 DIAGNOSIS — R31.9 URINARY TRACT INFECTION WITH HEMATURIA, SITE UNSPECIFIED: ICD-10-CM

## 2019-01-08 DIAGNOSIS — N39.0 URINARY TRACT INFECTION WITH HEMATURIA, SITE UNSPECIFIED: ICD-10-CM

## 2019-01-08 DIAGNOSIS — Z86.79 HISTORY OF CAROTID ARTERY DISSECTION: ICD-10-CM

## 2019-01-08 LAB
ALBUMIN UR-MCNC: NEGATIVE MG/DL
APPEARANCE UR: CLEAR
BILIRUB UR QL STRIP: NEGATIVE
COLOR UR AUTO: YELLOW
GLUCOSE UR STRIP-MCNC: NEGATIVE MG/DL
HGB UR QL STRIP: NEGATIVE
KETONES UR STRIP-MCNC: NEGATIVE MG/DL
LEUKOCYTE ESTERASE UR QL STRIP: ABNORMAL
NITRATE UR QL: NEGATIVE
PH UR STRIP: 8 PH (ref 5–7)
RBC #/AREA URNS AUTO: 3 /HPF (ref 0–2)
SOURCE: ABNORMAL
SP GR UR STRIP: 1.01 (ref 1–1.03)
SQUAMOUS #/AREA URNS AUTO: <1 /HPF (ref 0–1)
UROBILINOGEN UR STRIP-MCNC: 0 MG/DL (ref 0–2)
WBC #/AREA URNS AUTO: 17 /HPF (ref 0–5)
WBC CLUMPS #/AREA URNS HPF: PRESENT /HPF

## 2019-01-08 PROCEDURE — 87086 URINE CULTURE/COLONY COUNT: CPT | Performed by: UROLOGY

## 2019-01-08 RX ORDER — GADOBUTROL 604.72 MG/ML
7.5 INJECTION INTRAVENOUS ONCE
Status: COMPLETED | OUTPATIENT
Start: 2019-01-08 | End: 2019-01-08

## 2019-01-08 RX ADMIN — GADOBUTROL 7.5 ML: 604.72 INJECTION INTRAVENOUS at 15:54

## 2019-01-08 NOTE — LETTER
Patient:  Jennifer Steiner  :   1950  MRN:     [id number]        Ms. Jennifer Steiner  [address] [address]        2019    Dear Ms. Jatin,    Thank you for choosing the [facility] for your healthcare needs.  We appreciate the opportunity to serve you.    The following are your recent test results.     MR is fine. Proceed as planned.     Dr. MORALES    Results for orders placed or performed in visit on 19   MR Soft Tissue Neck w/o Contrast    Narrative    MRI brain without and with contrast  MRA of the head without contrast  Neck MRA without and with contrast  Limited neck MRI without contrast    Provided History:  Stroke, follow up; history carotid dissection, new  episode near syncope/confusion (transient) four days ago; Near  syncope; History of carotid artery dissection.  ICD-10: Near syncope; History of carotid artery dissection    Comparison:  2017, 2018      Technique:   Brain MRI:  Axial diffusion, FLAIR, T2-weighted, susceptibility, and  coronal T1-weighted images were obtained without intravenous contrast.  Following intravenous gadolinium-based contrast administration, axial  and coronal T1-weighted images were obtained.    Head MRA: 3D time-of-flight MRA of the [place] was performed  without intravenous contrast.  Neck MRI: Axial T2-weighted and T1-weighted images with fat saturation  were obtained of the upper neck.  Neck MRA:  Limited non contrast 2DTOF images were obtained of the  mid-cervical region. Following intravenous gadolinium-based contrast  administration, a contrast enhanced MRA of the neck/cervical vessels  was performed.  Three-dimensional reconstructions of the neck and head MRA were  created, which were reviewed by the radiologist.    Dose: 7.5mL Gadavist    Findings:   Brain MRI: Axial diffusion weighted images demonstrate no definite  acute infarct. On the FLAIR images, there is  nonspecific mild pontine  and periventricular white matter T2-hyperintensity, which are most  likely related to chronic small vessel ischemic disease. There is no  definite intracranial hemorrhage on susceptibility images. Ventricles  are proportionate to the cerebral sulci. Contrast-enhanced images of  the brain demonstrate no abnormal intra- or extra-axial enhancement.  Incidentally noted maxillary mucosal thickening right more than left.  Neck MRI: No definite dissection identified in the carotid or  vertebral arteries.    Head MRA demonstrates no definite aneurysm or stenosis of the major  intracranial arteries.    Neck MRA demonstrates patent major cervical arteries. The normal  distal right internal carotid artery measures 5 mm. The normal distal  left internal carotid artery measures 5 mm. Antegrade flow in the  major cervical vasculature.      Impression    Impression:  1. No evidence of acute infarction or intracranial hemorrhage.  2. No abnormal enhancing lesions intracranially.  3. Head MRA demonstrates no definite aneurysm or stenosis of the major  intracranial arteries.  4. Neck MRA demonstrates patent major cervical arteries.  5. No evidence for arterial dissection.         I have personally reviewed the examination and initial interpretation  and I agree with the findings.    SERGEI BRISCOE MD

## 2019-01-08 NOTE — DISCHARGE INSTRUCTIONS
MRI Contrast Discharge Instructions    The IV contrast you received today will pass out of your body in your  urine. This will happen in the next 24 hours. You will not feel this process.  Your urine will not change color.    Drink at least 4 extra glasses of water or juice today (unless your doctor  has restricted your fluids). This reduces the stress on your kidneys.  You may take your regular medicines.    If you are on dialysis: It is best to have dialysis today.    If you have a reaction: Most reactions happen right away. If you have  any new symptoms after leaving the hospital (such as hives or swelling),  call your hospital at the correct number below. Or call your family doctor.  If you have breathing distress or wheezing, call 911.    Special instructions: ***    I have read and understand the above information.    Signature:______________________________________ Date:___________    Staff:__________________________________________ Date:___________     Time:__________    [facility] Departments:    ___[place]: [phone]  ___[facility]: [phone]  ___[place]: [phone] ___[facility]: [phone]  ___[facility]: [phone]  ___[facility]: [phone]  ___[phone]  ___[facility]: [phone]  ___[phone]

## 2019-01-09 LAB
BACTERIA SPEC CULT: NO GROWTH
Lab: NORMAL
SPECIMEN SOURCE: NORMAL

## 2019-01-10 ENCOUNTER — TELEPHONE (OUTPATIENT)
Dept: UROLOGY | Facility: CLINIC | Age: 69
End: 2019-01-10

## 2019-01-10 ENCOUNTER — TELEPHONE (OUTPATIENT)
Dept: INTERNAL MEDICINE | Facility: CLINIC | Age: 69
End: 2019-01-10

## 2019-01-10 NOTE — TELEPHONE ENCOUNTER
[facility] Call Center    Phone Message    May a detailed message be left on voicemail: yes    Reason for Call: Other: Pt returning call to discuss results, please call pt to discuss     Action Taken: Message routed to:  [facility] ([facility] Urology Clinic

## 2019-01-10 NOTE — TELEPHONE ENCOUNTER
I left a message for Jennifer informing her recent imaging was normal. Stated she can follow up with Dr. Alvarez after upcoming cardiology stress test as needed.    Cecille Freeman RN

## 2019-01-21 ENCOUNTER — ANCILLARY PROCEDURE (OUTPATIENT)
Dept: CARDIOLOGY | Facility: CLINIC | Age: 69
End: 2019-01-21
Payer: MEDICARE

## 2019-01-21 DIAGNOSIS — R55 NEAR SYNCOPE: ICD-10-CM

## 2019-01-21 RX ADMIN — Medication 5 ML: at 15:31

## 2019-01-22 ENCOUNTER — TELEPHONE (OUTPATIENT)
Dept: INTERNAL MEDICINE | Facility: CLINIC | Age: 69
End: 2019-01-22

## 2019-01-22 DIAGNOSIS — R55 NEAR SYNCOPE: Primary | ICD-10-CM

## 2019-01-22 DIAGNOSIS — R94.31 NONSPECIFIC ABNORMAL ELECTROCARDIOGRAM (ECG) (EKG): ICD-10-CM

## 2019-01-24 NOTE — TELEPHONE ENCOUNTER
Spoke with pt, informed the results and recommendations. Pt was scheduled with cardiology on 1/28/19 and she will keep this appt.

## 2019-01-25 NOTE — TELEPHONE ENCOUNTER
FUTURE VISIT INFORMATION:    Date: 1/28/19    Time: 1330    Location:  Cardiology  REFERRAL INFORMATION:    Referring provider:  Dr. Soto    Referring providers clinic:  [facility]  Reason for visit/diagnosis:    R55 (ICD-10-CM) - Near syncope   R94.31 (ICD-10-CM) - Nonspecific abnormal electrocardiogram (ECG) (EKG)     All records in epic.

## 2019-01-28 ENCOUNTER — OFFICE VISIT (OUTPATIENT)
Dept: CARDIOLOGY | Facility: CLINIC | Age: 69
End: 2019-01-28
Attending: INTERNAL MEDICINE
Payer: MEDICARE

## 2019-01-28 ENCOUNTER — PRE VISIT (OUTPATIENT)
Dept: CARDIOLOGY | Facility: CLINIC | Age: 69
End: 2019-01-28

## 2019-01-28 VITALS
HEART RATE: 70 BPM | HEIGHT: 64 IN | WEIGHT: 131.4 LBS | BODY MASS INDEX: 22.43 KG/M2 | SYSTOLIC BLOOD PRESSURE: 107 MMHG | DIASTOLIC BLOOD PRESSURE: 73 MMHG | OXYGEN SATURATION: 97 %

## 2019-01-28 DIAGNOSIS — R55 PRE-SYNCOPE: Primary | ICD-10-CM

## 2019-01-28 DIAGNOSIS — G45.9 TIA (TRANSIENT ISCHEMIC ATTACK): ICD-10-CM

## 2019-01-28 DIAGNOSIS — E78.5 HYPERLIPIDEMIA LDL GOAL <100: ICD-10-CM

## 2019-01-28 PROCEDURE — 99203 OFFICE O/P NEW LOW 30 MIN: CPT | Mod: ZP | Performed by: INTERNAL MEDICINE

## 2019-01-28 PROCEDURE — 93010 ELECTROCARDIOGRAM REPORT: CPT | Mod: ZP | Performed by: INTERNAL MEDICINE

## 2019-01-28 PROCEDURE — 93005 ELECTROCARDIOGRAM TRACING: CPT | Mod: ZF

## 2019-01-28 PROCEDURE — G0463 HOSPITAL OUTPT CLINIC VISIT: HCPCS

## 2019-01-28 ASSESSMENT — PAIN SCALES - GENERAL: PAINLEVEL: MILD PAIN (3)

## 2019-01-28 ASSESSMENT — MIFFLIN-ST. JEOR: SCORE: 1116.03

## 2019-01-28 NOTE — LETTER
1/28/2019      RE: Jennifer Steiner  [address]  [place] [facility] [address]       Dear Colleague,    Thank you for the opportunity to participate in the care of your patient, Jennifer Steiner, at the [facility]. Please see a copy of my visit note below.    Referring provider:Milan Alvarez MD    Chief complaint: pre-syncope    HPI: Ms. Jennifer Steiner is a 68 year old  female with PMH significant for HLD, statin intolerance, hypothyroidism, asthma and carotid artery dissection complicated by TIA  2009.     The patient is here today referred by  regarding recent pre-syncope event end of December 2018. She was at her mother`s place, while she was sitting on the chair she suddenly felt nauseated, felt like as if she is going to black out. This lasted for a few seconds.  She recovered and did not lose consciousness. She had memory issues after this brief event. She has underwent MRI of brain which was normal. No recurrence of similar symptom since then. She has SAAVEDRA due to asthma with no change over the last few years. The patient denies a history of chest discomfort, PND, orthopnea, pedal edema, palpitations or syncope.    The patient appears to lead a healthy lifestyle. She exercises (swims and walks) regularly with no complaints. The patient's risk factor profile is: (-) HTN, (-) diabetes, (+) hyperlipidemia not on treatment , (-) tobacco use, (-) family Hx CAD.     She was seen in cardiology in 2015 for hx of HLD. She has used simvastatin in the past and could not tolerate. Did not try other statins. Her LDL was 220mg  in 2015. Her most recent LDL is 139 mg/dl from 5/2018. She is not on treatment.     She had an exercise stress echocardiogram on 1/21/2019 which showed normal LV function with no inducible ischemia. I have also reviewed her ECG in clinic today which shows normal sinus rhythm.     Medications, personal, family, and social  history reviewed with patient and revised.    PAST MEDICAL HISTORY:  Past Medical History:   Diagnosis Date     Anemia      Asthma     since childhood     Back injury      Blepharitis      Constipation, chronic     Chronic enema use     CVA (cerebral infarction) 2009    carotid intimal repair, left     Hyperlipidemia age 58    identified age 58     Hypothyroidism      Immune disorder (H)      Irritable bowel disease      Lung disease      Neck injuries      Pelvic floor dysfunction 2010     Postmenopausal hormone replacement therapy     From Dr. Link     Reduced vision      Scoliosis      Shortness of breath        CURRENT MEDICATIONS:  Current Outpatient Medications   Medication Sig Dispense Refill     Charcoal Activated (CHARCOAL PO) Take 6 capsules by mouth daily as needed. For indigestion       Cholecalciferol (VITAMIN D) 2000 UNIT tablet Take 1 tablet by mouth daily.       Digestive Enzymes TABS Use as directed       DiphenhydrAMINE HCl (BENADRYL PO) Take by mouth daily as needed       EPINEPHrine (EPIPEN) 0.3 MG/0.3ML injection 2-pack Inject 0.3 mLs (0.3 mg) into the muscle once as needed for anaphylaxis 0.3 mL 1     estradiol (ESTRACE) 0.1 MG/GM vaginal cream Place 0.5 g vaginally Use one to two times weekly.       HYDROCHLORIC ACID Take as directed       Hypromellose (ARTIFICIAL TEARS OP) Apply 1 drop to eye daily as needed       MAGNESIUM OXIDE PO Take 6-8 tablets by mouth daily        neomycin-polymyxin-dexamethasone (MAXITROL) 3.5-23490-8.1 ophthalmic ointment Place 0.5 inches into both eyes At Bedtime 1 Tube 1     peppermint oil liquid 1 mL daily as needed for other Taking gel tabs not oil, not in EMR.       PROGESTERONE 50MG CAPSULES COMPOUND 100 mg At Bedtime Take 2 capsules at bedtime.       thyroid (ARMOUR THYROID) 60 MG tablet Take 60 mg by mouth daily          PAST SURGICAL HISTORY:  Past Surgical History:   Procedure Laterality Date     C STOMACH SURGERY PROCEDURE UNLISTED       HC BREATH  "HYDROGEN TEST  2013    Procedure: HYDROGEN BREATH TEST;  Surgeon: Esteban Short MD;  Location:  GI     SURGICAL HISTORY OF -       tonsillectomy     SURGICAL HISTORY OF -   ~    cosmetic skin removal: breast reduction; skin removal from arms legs and tummy tuck       ALLERGIES:     Allergies   Allergen Reactions     Aspirin GI Disturbance     GI upset     Bee Venom Swelling     Birds [Feathers]      Cats      Dust Mites      Fluoride Other (See Comments)     headaches     Mold      No Clinical Screening - See Comments Other (See Comments)     Some antibiotics but not cipro     Novocain [Procaine] Other (See Comments)     headache     Penicillins Unknown     As a child     Seasonal Allergies      Ragweed, pollen     Simvastatin Other (See Comments)     \"hot flash\", muscle weakness, dizziness     Sulfa Drugs Itching       FAMILY HISTORY:  Family History   Problem Relation Age of Onset     Diabetes Father      Gastrointestinal Disease Father 82         after surgery for Bowel obstruction     Obesity Father      Alcohol/Drug Brother      Gastrointestinal Disease Brother         Hepatitis     Heart Disease Brother 62        hx of drug abuse,  age 62     Diabetes Brother      Skin Cancer Brother      Substance Abuse Brother      Allergies Brother      Anesthesia Reaction Brother      Melanoma Mother      Skin Cancer Mother      Osteoporosis Mother      Substance Abuse Sister      Asthma Brother      Diabetes Brother      Cancer Sister      Cancer Sister 56        bone cancer     Anxiety Disorder Sister      Obesity Brother      Alcoholism Sister      Alcoholism Brother      Bone Cancer Sister      Colon Cancer No family hx of      Crohn's Disease No family hx of      Ulcerative Colitis No family hx of      Colon Polyps No family hx of      Glaucoma No family hx of      Macular Degeneration No family hx of          SOCIAL HISTORY:  Social History     Tobacco Use     Smoking status: Never Smoker " "    Smokeless tobacco: Never Used   Substance Use Topics     Alcohol use: No     Drug use: No       ROS:   Constitutional: No fever, chills, or sweats. Weight stable.   ENT: No visual disturbance, ear ache, epistaxis, sore throat.   Cardiovascular: As per HPI.   Respiratory: No cough, hemoptysis.    GI: Nausea +, No vomiting, hematemesis, melena, or hematochezia.   : No hematuria.   Integument: Negative.   Psychiatric: Negative.   Hematologic:  No easy bruising, no easy bleeding.  Neuro: Headache +. Dizziness +  Endocrinology: No significant heat or cold intolerance   Musculoskeletal: No myalgia.    Exam:  Ht 1.626 m (5' 4\")   Wt 59.6 kg (131 lb 6.4 oz)   BMI 22.55 kg/m     GENERAL APPEARANCE: alert and no distress  HEENT: no icterus, no central cyanosis  LYMPH/NECK: no adenopathy, no asymmetry, JVP not elevated, no carotid bruits.  RESPIRATORY: lungs clear to auscultation - no rales, rhonchi or wheezes, no use of accessory muscles, no retractions, respirations are unlabored, normal respiratory rate  CARDIOVASCULAR: regular rhythm, normal S1, S2, no S3 or S4 and no murmur, click or rub, precordium quiet with normal PMI.  GI: soft, non tender  EXTREMITIES: peripheral pulses normal, no edema  NEURO: alert, normal speech,and affect  VASC: Radial, dorsalis pedis and posterior tibialis pulses are normal in volumes and symmetric bilaterally.   SKIN: no ecchymoses, no rashes     I have reviewed the labs and personally reviewed the imaging below and made my comment in the assessment and plan.    Labs:  CBC RESULTS:   Lab Results   Component Value Date    WBC 6.8 12/27/2018    RBC 4.57 12/27/2018    HGB 13.6 12/27/2018    HCT 43.2 12/27/2018    MCV 95 12/27/2018    MCH 29.8 12/27/2018    MCHC 31.5 12/27/2018    RDW 13.2 12/27/2018     12/27/2018       BMP RESULTS:  Lab Results   Component Value Date     12/27/2018    POTASSIUM 4.4 12/27/2018    CHLORIDE 104 12/27/2018    CO2 28 12/27/2018    ANIONGAP 5 " 12/27/2018    GLC 88 12/27/2018    BUN 9 12/27/2018    CR 0.73 12/27/2018    GFRESTIMATED 85 12/27/2018    GFRESTBLACK >90 12/27/2018    TED 9.0 12/27/2018        INR RESULTS:  Lab Results   Component Value Date    INR 0.94 04/29/2009         Exercise stress echocardiogram 1/21/2019  Conclusion: Exercise stress echocardiogram negative for ischemia at a  diagnostic level of peak stress (85% MPHR) however EKG findings demonstrate  minimal EKG changes V2-V4.     Patient developed no chest pain during exercise.  Test terminated due to target HR achieved.  Exercise capacity below average for age.  Normal HR and BP response to exercise.  Baseline echocardiogram with normal LVEF of 60-65%. No regional wall motion  abnormalities.  At peak stress, all walls recruit normally however lateral wall poorly  visualized. LV augments with stress. EF 75%.  EKG at baseline with no ischemic changes. At peak stress, there are horizontal  1-1.5 mm ST depressions v3-v6.  Screening 2D echocardiogram demonstrated no significant valve disease. Normal  sized proximal ascending aorta.     Coronary CTA could be obtained if clinical suspicion for coronary artery  disease.      EKG 12/27/2019 normal.    Assessment and Plan:   Ms. Jennifer Steiner is a 68 year old  female with PMH significant for HLD, statin intolerance,  Hypothyroidism, asthma and carotid artery dissection complicated by TIA  2009.     1. Pre-syncope: The patient`s only event occurred few weeks ago with no LOC. No underlying structural heart disease. ECG and ECHO are normal. No cardiac cause suspected at this time. Recommended to contact us if recurrent events occur especially with LOC. In that case we might consider ECG monitoring.    2. HLD: Patient tried simvastatin in the past which caused side effects.  Currently not on treatment.  There is significant decline in LDL since 2015.    3.  TIA due to carotid artery dissection in 2009: Prior dissection resolved.  Not on  antiplatelet per neurology.    4. Headache and dizziness: Present since she had TIA in 2009.    Return to clinic as needed.    A total of 30 minutes spent face-toface with greater than 50% of the time spent in counseling and coordinating cares of the issues above.     Please donot hesitate to contact me if you have any questions or concerns. Again, thank you for allowing me to participate in the care of your patient.    Christina VALENZUELA MD  [facility] Division of Cardiology  Pager [phone]    Med Reconcile: Reviewed and verified all current medications with the patient. The updated medication list was printed and given to the patient.  Patient stated she understood all health information given and agreed to call with further questions or concerns.

## 2019-01-28 NOTE — PATIENT INSTRUCTIONS
Patient Instructions:  Follow up with Dr. South as needed.     It was a pleasure to see you in the cardiology clinic today.    We are encouraging the use of [employer] to communicate with your Healthcare Provider.  If you have any questions, call  Bert Moncada LPN, at [phone].  Press Option #1 for the [facility], and then press Option #3 for nursing.      If you have an urgent need after hours (8:00 am to 4:30 pm) please call [phone] and ask for the cardiology fellow on call.

## 2019-01-28 NOTE — PROGRESS NOTES
Referring provider:Milan Alvarez MD    Chief complaint: pre-syncope    HPI: Ms. Jennifer Steiner is a 68 year old  female with PMH significant for HLD, statin intolerance, hypothyroidism, asthma and carotid artery dissection complicated by TIA  2009.     The patient is here today referred by  regarding recent pre-syncope event end of December 2018. She was at her mother`s place, while she was sitting on the chair she suddenly felt nauseated, felt like as if she is going to black out. This lasted for a few seconds.  She recovered and did not lose consciousness. She had memory issues after this brief event. She has underwent MRI of brain which was normal. No recurrence of similar symptom since then. She has SAAVEDRA due to asthma with no change over the last few years. The patient denies a history of chest discomfort, PND, orthopnea, pedal edema, palpitations or syncope.    The patient appears to lead a healthy lifestyle. She exercises (swims and walks) regularly with no complaints. The patient's risk factor profile is: (-) HTN, (-) diabetes, (+) hyperlipidemia not on treatment , (-) tobacco use, (-) family Hx CAD.     She was seen in cardiology in 2015 for hx of HLD. She has used simvastatin in the past and could not tolerate. Did not try other statins. Her LDL was 220mg  in 2015. Her most recent LDL is 139 mg/dl from 5/2018. She is not on treatment.     She had an exercise stress echocardiogram on 1/21/2019 which showed normal LV function with no inducible ischemia. I have also reviewed her ECG in clinic today which shows normal sinus rhythm.     Medications, personal, family, and social history reviewed with patient and revised.    PAST MEDICAL HISTORY:  Past Medical History:   Diagnosis Date     Anemia      Asthma     since childhood     Back injury      Blepharitis      Constipation, chronic     Chronic enema use     CVA (cerebral infarction) 2009    carotid intimal repair, left     Hyperlipidemia age  58    identified age 58     Hypothyroidism      Immune disorder (H)      Irritable bowel disease      Lung disease      Neck injuries      Pelvic floor dysfunction 2010     Postmenopausal hormone replacement therapy     From Dr. Link     Reduced vision      Scoliosis      Shortness of breath        CURRENT MEDICATIONS:  Current Outpatient Medications   Medication Sig Dispense Refill     Charcoal Activated (CHARCOAL PO) Take 6 capsules by mouth daily as needed. For indigestion       Cholecalciferol (VITAMIN D) 2000 UNIT tablet Take 1 tablet by mouth daily.       Digestive Enzymes TABS Use as directed       DiphenhydrAMINE HCl (BENADRYL PO) Take by mouth daily as needed       EPINEPHrine (EPIPEN) 0.3 MG/0.3ML injection 2-pack Inject 0.3 mLs (0.3 mg) into the muscle once as needed for anaphylaxis 0.3 mL 1     estradiol (ESTRACE) 0.1 MG/GM vaginal cream Place 0.5 g vaginally Use one to two times weekly.       HYDROCHLORIC ACID Take as directed       Hypromellose (ARTIFICIAL TEARS OP) Apply 1 drop to eye daily as needed       MAGNESIUM OXIDE PO Take 6-8 tablets by mouth daily        neomycin-polymyxin-dexamethasone (MAXITROL) 3.5-96827-3.1 ophthalmic ointment Place 0.5 inches into both eyes At Bedtime 1 Tube 1     peppermint oil liquid 1 mL daily as needed for other Taking gel tabs not oil, not in EMR.       PROGESTERONE 50MG CAPSULES COMPOUND 100 mg At Bedtime Take 2 capsules at bedtime.       thyroid (ARMOUR THYROID) 60 MG tablet Take 60 mg by mouth daily          PAST SURGICAL HISTORY:  Past Surgical History:   Procedure Laterality Date     C STOMACH SURGERY PROCEDURE UNLISTED       HC BREATH HYDROGEN TEST  11/11/2013    Procedure: HYDROGEN BREATH TEST;  Surgeon: Esteban Short MD;  Location:  GI     SURGICAL HISTORY OF -       tonsillectomy     SURGICAL HISTORY OF -   ~1997    cosmetic skin removal: breast reduction; skin removal from arms legs and tummy tuck       ALLERGIES:     Allergies   Allergen  "Reactions     Aspirin GI Disturbance     GI upset     Bee Venom Swelling     Birds [Feathers]      Cats      Dust Mites      Fluoride Other (See Comments)     headaches     Mold      No Clinical Screening - See Comments Other (See Comments)     Some antibiotics but not cipro     Novocain [Procaine] Other (See Comments)     headache     Penicillins Unknown     As a child     Seasonal Allergies      Ragweed, pollen     Simvastatin Other (See Comments)     \"hot flash\", muscle weakness, dizziness     Sulfa Drugs Itching       FAMILY HISTORY:  Family History   Problem Relation Age of Onset     Diabetes Father      Gastrointestinal Disease Father 82         after surgery for Bowel obstruction     Obesity Father      Alcohol/Drug Brother      Gastrointestinal Disease Brother         Hepatitis     Heart Disease Brother 62        hx of drug abuse,  age 62     Diabetes Brother      Skin Cancer Brother      Substance Abuse Brother      Allergies Brother      Anesthesia Reaction Brother      Melanoma Mother      Skin Cancer Mother      Osteoporosis Mother      Substance Abuse Sister      Asthma Brother      Diabetes Brother      Cancer Sister      Cancer Sister 56        bone cancer     Anxiety Disorder Sister      Obesity Brother      Alcoholism Sister      Alcoholism Brother      Bone Cancer Sister      Colon Cancer No family hx of      Crohn's Disease No family hx of      Ulcerative Colitis No family hx of      Colon Polyps No family hx of      Glaucoma No family hx of      Macular Degeneration No family hx of          SOCIAL HISTORY:  Social History     Tobacco Use     Smoking status: Never Smoker     Smokeless tobacco: Never Used   Substance Use Topics     Alcohol use: No     Drug use: No       ROS:   Constitutional: No fever, chills, or sweats. Weight stable.   ENT: No visual disturbance, ear ache, epistaxis, sore throat.   Cardiovascular: As per HPI.   Respiratory: No cough, hemoptysis.    GI: Nausea +, No " "vomiting, hematemesis, melena, or hematochezia.   : No hematuria.   Integument: Negative.   Psychiatric: Negative.   Hematologic:  No easy bruising, no easy bleeding.  Neuro: Headache +. Dizziness +  Endocrinology: No significant heat or cold intolerance   Musculoskeletal: No myalgia.    Exam:  Ht 1.626 m (5' 4\")   Wt 59.6 kg (131 lb 6.4 oz)   BMI 22.55 kg/m    GENERAL APPEARANCE: alert and no distress  HEENT: no icterus, no central cyanosis  LYMPH/NECK: no adenopathy, no asymmetry, JVP not elevated, no carotid bruits.  RESPIRATORY: lungs clear to auscultation - no rales, rhonchi or wheezes, no use of accessory muscles, no retractions, respirations are unlabored, normal respiratory rate  CARDIOVASCULAR: regular rhythm, normal S1, S2, no S3 or S4 and no murmur, click or rub, precordium quiet with normal PMI.  GI: soft, non tender  EXTREMITIES: peripheral pulses normal, no edema  NEURO: alert, normal speech,and affect  VASC: Radial, dorsalis pedis and posterior tibialis pulses are normal in volumes and symmetric bilaterally.   SKIN: no ecchymoses, no rashes     I have reviewed the labs and personally reviewed the imaging below and made my comment in the assessment and plan.    Labs:  CBC RESULTS:   Lab Results   Component Value Date    WBC 6.8 12/27/2018    RBC 4.57 12/27/2018    HGB 13.6 12/27/2018    HCT 43.2 12/27/2018    MCV 95 12/27/2018    MCH 29.8 12/27/2018    MCHC 31.5 12/27/2018    RDW 13.2 12/27/2018     12/27/2018       BMP RESULTS:  Lab Results   Component Value Date     12/27/2018    POTASSIUM 4.4 12/27/2018    CHLORIDE 104 12/27/2018    CO2 28 12/27/2018    ANIONGAP 5 12/27/2018    GLC 88 12/27/2018    BUN 9 12/27/2018    CR 0.73 12/27/2018    GFRESTIMATED 85 12/27/2018    GFRESTBLACK >90 12/27/2018    TED 9.0 12/27/2018        INR RESULTS:  Lab Results   Component Value Date    INR 0.94 04/29/2009         Exercise stress echocardiogram 1/21/2019  Conclusion: Exercise stress " echocardiogram negative for ischemia at a  diagnostic level of peak stress (85% MPHR) however EKG findings demonstrate  minimal EKG changes V2-V4.     Patient developed no chest pain during exercise.  Test terminated due to target HR achieved.  Exercise capacity below average for age.  Normal HR and BP response to exercise.  Baseline echocardiogram with normal LVEF of 60-65%. No regional wall motion  abnormalities.  At peak stress, all walls recruit normally however lateral wall poorly  visualized. LV augments with stress. EF 75%.  EKG at baseline with no ischemic changes. At peak stress, there are horizontal  1-1.5 mm ST depressions v3-v6.  Screening 2D echocardiogram demonstrated no significant valve disease. Normal  sized proximal ascending aorta.     Coronary CTA could be obtained if clinical suspicion for coronary artery  disease.      EKG 12/27/2019 normal.    Assessment and Plan:   Ms. Jennifer Steiner is a 68 year old  female with PMH significant for HLD, statin intolerance,  Hypothyroidism, asthma and carotid artery dissection complicated by TIA  2009.     1. Pre-syncope: The patient`s only event occurred few weeks ago with no LOC. No underlying structural heart disease. ECG and ECHO are normal. No cardiac cause suspected at this time. Recommended to contact us if recurrent events occur especially with LOC. In that case we might consider ECG monitoring.    2. HLD: Patient tried simvastatin in the past which caused side effects.  Currently not on treatment.  There is significant decline in LDL since 2015.    3.  TIA due to carotid artery dissection in 2009: Prior dissection resolved.  Not on antiplatelet per neurology.    4. Headache and dizziness: Present since she had TIA in 2009.    Return to clinic as needed.    A total of 30 minutes spent face-toface with greater than 50% of the time spent in counseling and coordinating cares of the issues above.     Please donot hesitate to contact me if you have any  questions or concerns. Again, thank you for allowing me to participate in the care of your patient.    Christina VALENZUELA MD  [facility] Division of Cardiology  Pager [phone]

## 2019-01-29 LAB — INTERPRETATION ECG - MUSE: NORMAL

## 2019-02-04 ENCOUNTER — ANCILLARY PROCEDURE (OUTPATIENT)
Dept: ULTRASOUND IMAGING | Facility: CLINIC | Age: 69
End: 2019-02-04
Attending: OBSTETRICS & GYNECOLOGY
Payer: MEDICARE

## 2019-02-04 ENCOUNTER — OFFICE VISIT (OUTPATIENT)
Dept: OBGYN | Facility: CLINIC | Age: 69
End: 2019-02-04
Attending: OBSTETRICS & GYNECOLOGY
Payer: MEDICARE

## 2019-02-04 VITALS
DIASTOLIC BLOOD PRESSURE: 72 MMHG | BODY MASS INDEX: 22.36 KG/M2 | SYSTOLIC BLOOD PRESSURE: 106 MMHG | WEIGHT: 131 LBS | HEIGHT: 64 IN | HEART RATE: 69 BPM

## 2019-02-04 DIAGNOSIS — Z12.4 SCREENING FOR MALIGNANT NEOPLASM OF CERVIX: ICD-10-CM

## 2019-02-04 DIAGNOSIS — N95.0 POSTMENOPAUSAL BLEEDING: Primary | ICD-10-CM

## 2019-02-04 DIAGNOSIS — N95.0 POSTMENOPAUSAL BLEEDING: ICD-10-CM

## 2019-02-04 PROCEDURE — 76856 US EXAM PELVIC COMPLETE: CPT

## 2019-02-04 PROCEDURE — G0476 HPV COMBO ASSAY CA SCREEN: HCPCS | Performed by: OBSTETRICS & GYNECOLOGY

## 2019-02-04 PROCEDURE — 87624 HPV HI-RISK TYP POOLED RSLT: CPT | Performed by: OBSTETRICS & GYNECOLOGY

## 2019-02-04 PROCEDURE — G0145 SCR C/V CYTO,THINLAYER,RESCR: HCPCS | Performed by: OBSTETRICS & GYNECOLOGY

## 2019-02-04 PROCEDURE — G0463 HOSPITAL OUTPT CLINIC VISIT: HCPCS | Mod: 25

## 2019-02-04 ASSESSMENT — MIFFLIN-ST. JEOR: SCORE: 1114.21

## 2019-02-04 NOTE — NURSING NOTE
Chief Complaint   Patient presents with     Gyn Exam     Here for pap and to discuss vaginal discharge. Having PT for pelvic floor dysfunction. Noticing green/yellow vaginal discharge sometimes bloody.        See RADHA Mendoza 2/4/2019

## 2019-02-04 NOTE — LETTER
"  RE: Jennifer Steiner   [place] [facility] [address]     Dear Colleague,    Thank you for referring your patient, Jennifer Steiner, to the [facility] at [facility]. Please see a copy of my visit note below.    Progress Note    SUBJECTIVE:  Jennifer Steiner is an 68 year old, , who is here for postmenopausal bleeding and vaginal discharge. Her medical history is complicated including HLD, statin intolerance, hypothyroidism, asthma and carotid artery dissection complicated by TIA .  She has noticed \"smears\" of blood per vagina, however she is unable to say when this began. She also endorses frequent non-odorous discharge, ranging from yellow to green in color that requires her to wear a pad. She has pelvic floor dysfunction for which she goes to physical therapy. She has not noticed a correlation to the pelvic floor therapy and the timing of the vaginal bleeding. She has also noted some hematuria, and at times it is difficult for her tell if the blood that she sees from vagina, rectum (secondary to her IBS) or bladder. Of note, patient does use vaginal estrogen cream and oral progesterone for HRT and has been doing so for a long time.  She endorses baseline abdominal pain from her IBS and pelvic floor dysfunction. She denies any dysuria or flank pain. Of note she douches after enemas required for her IBS, and she uses estradiol cream vaginally once a week along with a daily progesterone pill.     Concerns today include:   - Postmenopausal bleeding: Patient unable to say when this began, she believes it could be related to \"thinning of the skin\" along with the pelvic physical therapy. The bleeding is minimal and non-cyclical. She did mention concern for \"a tumor\".   - Vaginal discharge: Patient reports non-odorous, greenish to yellow discharge that requires her to wear a pad.     Last    Lab Results   Component Value Date    PAP NIL " 2012     History of abnormal Pap smear: Patient reports possible cryotherapy 20-30 years ago.      Last No results found for: HPV16  Last No results found for: HPV18  Last No results found for: HRHPV    Last Colonoscopy:  10/7/2016, repeat in 5 years    HISTORY:  Prescription Medications as of 2019       Rx Number Disp Refills Start End Last Dispensed Date Next Fill Date Owning Pharmacy    Charcoal Activated (CHARCOAL PO)            Sig: Take 6 capsules by mouth daily as needed. For indigestion    Class: Historical    Route: Oral    Cholecalciferol (VITAMIN D) 2000 UNIT tablet            Sig: Take 1 tablet by mouth daily.    Class: Historical    Route: Oral    Digestive Enzymes TABS            Sig: Use as directed    Class: Historical    DiphenhydrAMINE HCl (BENADRYL PO)            Sig: Take by mouth daily as needed    Class: Historical    Route: Oral    EPINEPHrine (EPIPEN) 0.3 MG/0.3ML injection 2-pack  0.3 mL 1 2018    [facility] [address] [address] [place] [address]    Sig: Inject 0.3 mLs (0.3 mg) into the muscle once as needed for anaphylaxis    Class: E-Prescribe    Route: Intramuscular    estradiol (ESTRACE) 0.1 MG/GM vaginal cream            Sig: Place 0.5 g vaginally Use one to two times weekly.    Class: Historical    Route: Vaginal    HYDROCHLORIC ACID            Sig: Take as directed    Class: Historical    Hypromellose (ARTIFICIAL TEARS OP)            Sig: Apply 1 drop to eye daily as needed    Class: Historical    Route: Ophthalmic    MAGNESIUM OXIDE PO            Sig: Take 6-8 tablets by mouth daily     Class: Historical    Route: Oral    neomycin-polymyxin-dexamethasone (MAXITROL) 3.5-57882-6.1 ophthalmic ointment  1 Tube 1 2018    [facility] [address] [place] [address] [facility]    Sig: Place 0.5 inches into both eyes At Bedtime    Class: E-Prescribe    Route: Both Eyes    peppermint oil liquid            Si mL daily as  "needed for other Taking gel tabs not oil, not in EMR.    Class: Historical    Route: Does not apply    PROGESTERONE 50MG CAPSULES COMPOUND            Si mg At Bedtime Take 2 capsules at bedtime.    Class: Historical    thyroid (ARMOUR THYROID) 60 MG tablet            Sig: Take 60 mg by mouth daily     Class: Historical    Route: Oral        Allergies   Allergen Reactions     Aspirin GI Disturbance     GI upset     Bee Venom Swelling     Birds [Feathers]      Cats      Dust Mites      Fluoride Other (See Comments)     headaches     Mold      No Clinical Screening - See Comments Other (See Comments)     Some antibiotics but not cipro     Novocain [Procaine] Other (See Comments)     headache     Penicillins Unknown     As a child     Seasonal Allergies      Ragweed, pollen     Simvastatin Other (See Comments)     \"hot flash\", muscle weakness, dizziness     Sulfa Drugs Itching     Obstetric History       T0      L0     SAB0   TAB0   Ectopic0   Multiple0   Live Births0      Past Medical History:   Diagnosis Date     Anemia      Asthma     since childhood     Back injury      Blepharitis      Constipation, chronic     Chronic enema use     CVA (cerebral infarction) 2009    carotid intimal repair, left     Hyperlipidemia age 58    identified age 58     Hypothyroidism      Immune disorder (H)      Irritable bowel disease      Lung disease      Neck injuries      Pelvic floor dysfunction      Postmenopausal hormone replacement therapy     From Dr. Link     Reduced vision      Scoliosis      Shortness of breath      Past Surgical History:   Procedure Laterality Date     C STOMACH SURGERY PROCEDURE UNLISTED       HC BREATH HYDROGEN TEST  2013    Procedure: HYDROGEN BREATH TEST;  Surgeon: Esteban Short MD;  Location:  GI     SURGICAL HISTORY OF -       tonsillectomy     SURGICAL HISTORY OF -   ~    cosmetic skin removal: breast reduction; skin removal from arms legs and tummy tuck " "    Family History   Problem Relation Age of Onset     Diabetes Father      Gastrointestinal Disease Father 82         after surgery for Bowel obstruction     Obesity Father      Alcohol/Drug Brother      Gastrointestinal Disease Brother         Hepatitis     Heart Disease Brother 62        hx of drug abuse,  age 62     Diabetes Brother      Skin Cancer Brother      Substance Abuse Brother      Allergies Brother      Anesthesia Reaction Brother      Melanoma Mother      Skin Cancer Mother      Osteoporosis Mother      Substance Abuse Sister      Asthma Brother      Diabetes Brother      Cancer Sister      Cancer Sister 56        bone cancer     Anxiety Disorder Sister      Obesity Brother      Alcoholism Sister      Alcoholism Brother      Bone Cancer Sister      Colon Cancer No family hx of      Crohn's Disease No family hx of      Ulcerative Colitis No family hx of      Colon Polyps No family hx of      Glaucoma No family hx of      Macular Degeneration No family hx of      Social History     Socioeconomic History     Marital status: Single     Spouse name: None     Number of children: None     Years of education: None     Highest education level: None   Social Needs     Financial resource strain: None     Food insecurity - worry: None     Food insecurity - inability: None     Transportation needs - medical: None     Transportation needs - non-medical: None   Occupational History     Occupation: disability     Comment:    Tobacco Use     Smoking status: Never Smoker     Smokeless tobacco: Never Used   Substance and Sexual Activity     Alcohol use: No     Drug use: No     Sexual activity: Not Currently     Birth control/protection: Post-menopausal   Other Topics Concern     Parent/sibling w/ CABG, MI or angioplasty before 65F 55M? Not Asked   Social History Narrative     None     EXAM:  Blood pressure 106/72, pulse 69, height 1.626 m (5' 4\"), weight 59.4 kg (131 lb), not currently " breastfeeding. Body mass index is 22.49 kg/m .  General - pleasant female in no acute distress.  Skin - no suspicious lesions or rashes  EENT-  PERRLA, euthyroid with out palpable nodules  Neck - supple without lymphadenopathy.  Lungs - clear to auscultation bilaterally.  Heart - regular rate and rhythm without murmur. Normal S1, S2.   Abdomen - soft, nontender, nondistended, no masses or organomegaly noted.  Musculoskeletal - no gross deformities.  Neurological - normal strength, sensation, and mental status.    Pelvic Exam:  EG/BUS: Estrogen loss atrophy present. Normal genital architecture without lesions, erythema or abnormal secretions Bartholin's, Urethra, Malinta's normal   Urethral meatus: normal   Urethra: no masses, tenderness, or scarring   Bladder: no masses or tenderness   Vagina: atrophic, thin, dry with creamy, odorless and yellow secretions  Cervix: no lesions, pale, dry, stenotic and friable with pap  Uterus: hard to palpate on exam, appeared retroverted, hard to tell if enlarged but no obvious masses appreciated  Adnexa: No masses, nodularity, tenderness  Rectum: hemorrhoids visualized    ASSESSMENT:  Encounter Diagnosis   Name Primary?     Postmenopausal bleeding Yes   Jennifer Steiner is a 67 yo,  who presents with postmenopausal bleeding and vaginal discharge. Patient currently taking oral progesterone daily and estradiol cream weekly, concern for endometrial hyperplasia or carcinoma however pelvic exam revealed atrophic and thin vaginal tissue with discharge concerning for atrophic vaginitis.     PLAN:   Postmenopausal bleeding:  Orders Placed This Encounter   Procedures     US Pelvic Complete with Transvaginal    - Potential need for endometrial biopsy with patient, depending on pelvic ultrasound results.  Will call patient with results and plan as appropriate.  Patient has a history of trauma and as such exams are challenging for her.  May require sampling in OR if necessary.     Pap smear:  -  Per patients request, discussed if normal there will be no need for continued screening    Vaginal discharge:  - Increase frequency of estradiol cream to twice weekly, but will await this until evaluation completed and concerns for endometrial hyperplasia and cancer evaluated.    Liliana Fraga, MS3    Pt seen and discussed with Dr. Huber.     Physician Attestation     I, Marybeth Huber, was present with the medical student who participated in the service and in the documentation of the note.  I have verified the history and personally performed the physical exam and medical decision making.  I agree with the assessment and plan of care as documented in the note.       Marybeth Huber MD
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
## 2019-02-06 ENCOUNTER — PRE VISIT (OUTPATIENT)
Dept: UROLOGY | Facility: CLINIC | Age: 69
End: 2019-02-06

## 2019-02-06 NOTE — TELEPHONE ENCOUNTER
Reason for visit: PT follow up     Relevant information: pelvic floor dysfunction    Records/imaging/labs/orders: all records available    Pt called: no need for a call    At Rooming: regular

## 2019-02-07 ENCOUNTER — TELEPHONE (OUTPATIENT)
Dept: OBGYN | Facility: CLINIC | Age: 69
End: 2019-02-07

## 2019-02-07 DIAGNOSIS — R93.89 THICKENED ENDOMETRIUM: Primary | ICD-10-CM

## 2019-02-07 LAB
COPATH REPORT: NORMAL
PAP: NORMAL

## 2019-02-07 RX ORDER — MISOPROSTOL 200 UG/1
400 TABLET ORAL ONCE
Qty: 2 TABLET | Refills: 0 | Status: SHIPPED | OUTPATIENT
Start: 2019-02-07 | End: 2019-03-12

## 2019-02-07 NOTE — TELEPHONE ENCOUNTER
Attempted to call patient with results of ultrasound which showed a thickened endometrium.  Would like to discuss with patient options for evaluation.  Asked patient to call back to discuss at her earlier convenience.  Given patient's history and difficulty with exam, will likely recommend Hysteroscopy, D&C in the OR.  Will await patient to call back to schedule and discuss recommendation  Dr. Huber
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
## 2019-02-08 LAB
FINAL DIAGNOSIS: NORMAL
HPV HR 12 DNA CVX QL NAA+PROBE: NEGATIVE
HPV16 DNA SPEC QL NAA+PROBE: NEGATIVE
HPV18 DNA SPEC QL NAA+PROBE: NEGATIVE
SPECIMEN DESCRIPTION: NORMAL
SPECIMEN SOURCE CVX/VAG CYTO: NORMAL

## 2019-02-12 ENCOUNTER — TELEPHONE (OUTPATIENT)
Dept: OBGYN | Facility: CLINIC | Age: 69
End: 2019-02-12

## 2019-02-12 NOTE — TELEPHONE ENCOUNTER
Received call from Jennifer asking for a copy of her records from her visits with Dr. Huber including what was recommended and her labs and imaging as she is going to another doctor and wants to review them with the other doctor.    Printed these and placed at  for her to pick-up.

## 2019-02-14 ENCOUNTER — TELEPHONE (OUTPATIENT)
Dept: UROLOGY | Facility: CLINIC | Age: 69
End: 2019-02-14

## 2019-02-14 ENCOUNTER — OFFICE VISIT (OUTPATIENT)
Dept: UROLOGY | Facility: CLINIC | Age: 69
End: 2019-02-14
Payer: MEDICARE

## 2019-02-14 VITALS
DIASTOLIC BLOOD PRESSURE: 60 MMHG | HEIGHT: 64 IN | BODY MASS INDEX: 22.2 KG/M2 | WEIGHT: 130 LBS | HEART RATE: 122 BPM | SYSTOLIC BLOOD PRESSURE: 114 MMHG

## 2019-02-14 DIAGNOSIS — R31.0 GROSS HEMATURIA: Primary | ICD-10-CM

## 2019-02-14 RX ORDER — CEFAZOLIN SODIUM 2 G/50ML
2 SOLUTION INTRAVENOUS
Status: CANCELLED | OUTPATIENT
Start: 2019-02-14

## 2019-02-14 RX ORDER — CEFAZOLIN SODIUM 1 G/50ML
1 INJECTION, SOLUTION INTRAVENOUS SEE ADMIN INSTRUCTIONS
Status: CANCELLED | OUTPATIENT
Start: 2019-02-14

## 2019-02-14 ASSESSMENT — MIFFLIN-ST. JEOR: SCORE: 1109.68

## 2019-02-14 ASSESSMENT — PAIN SCALES - GENERAL: PAINLEVEL: NO PAIN (0)

## 2019-02-14 NOTE — NURSING NOTE
"Chief Complaint   Patient presents with     Follow Up     after PT       Blood pressure 114/60, pulse 122, height 1.626 m (5' 4\"), weight 59 kg (130 lb), not currently breastfeeding. Body mass index is 22.31 kg/m .    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     Pelvic floor dysfunction     Myalgia of pelvic floor     Other constipation     Abnormal defecation     Chronic abdominal pain       Allergies   Allergen Reactions     Aspirin GI Disturbance     GI upset     Bee Venom Swelling     Birds [Feathers]      Cats      Dust Mites      Fluoride Other (See Comments)     headaches     Mold      No Clinical Screening - See Comments Other (See Comments)     Some antibiotics but not cipro     Novocain [Procaine] Other (See Comments)     headache     Penicillins Unknown     As a child     Seasonal Allergies      Ragweed, pollen     Simvastatin Other (See Comments)     \"hot flash\", muscle weakness, dizziness     Sulfa Drugs Itching       Current Outpatient Medications   Medication Sig Dispense Refill     Charcoal Activated (CHARCOAL PO) Take 6 capsules by mouth daily as needed. For indigestion       Cholecalciferol (VITAMIN D) 2000 UNIT tablet Take 1 tablet by mouth daily.       Digestive Enzymes TABS Use as directed       DiphenhydrAMINE HCl (BENADRYL PO) Take by mouth daily as needed       EPINEPHrine (EPIPEN) 0.3 MG/0.3ML injection 2-pack Inject 0.3 mLs (0.3 mg) into the muscle once as needed for anaphylaxis 0.3 mL 1     estradiol (ESTRACE) 0.1 MG/GM vaginal " cream Place 0.5 g vaginally Use one to two times weekly.       HYDROCHLORIC ACID Take as directed       Hypromellose (ARTIFICIAL TEARS OP) Apply 1 drop to eye daily as needed       MAGNESIUM OXIDE PO Take 6-8 tablets by mouth daily        neomycin-polymyxin-dexamethasone (MAXITROL) 3.5-45158-8.1 ophthalmic ointment Place 0.5 inches into both eyes At Bedtime 1 Tube 1     peppermint oil liquid 1 mL daily as needed for other Taking gel tabs not oil, not in EMR.       PROGESTERONE 50MG CAPSULES COMPOUND 100 mg At Bedtime Take 2 capsules at bedtime.       thyroid (ARMOUR THYROID) 60 MG tablet Take 60 mg by mouth daily          Social History     Tobacco Use     Smoking status: Never Smoker     Smokeless tobacco: Never Used   Substance Use Topics     Alcohol use: No     Drug use: No       Abbey Acosta LPN  2/14/2019  2:07 PM
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
## 2019-02-14 NOTE — TELEPHONE ENCOUNTER
Patient is scheduled for surgery with Dr. Ovalle and Dr Huber      Spoke or left message with: left voice mail with phone number [phone] for patient to call back    Date of Surgery: 3/25/19    Location: [place] OR    Informed patient they will need an adult  yes    Pre-op with surgeon (if applicable): n/a    H&P: Scheduled with pcp    Additional imaging/appointments: n/a    Surgery packet: mailed 2/15/19     Additional comments: n/a

## 2019-02-15 ENCOUNTER — TELEPHONE (OUTPATIENT)
Dept: UROLOGY | Facility: CLINIC | Age: 69
End: 2019-02-15

## 2019-02-20 ENCOUNTER — TELEPHONE (OUTPATIENT)
Dept: UROLOGY | Facility: CLINIC | Age: 69
End: 2019-02-20

## 2019-02-20 ENCOUNTER — NURSE TRIAGE (OUTPATIENT)
Dept: INTERNAL MEDICINE | Facility: CLINIC | Age: 69
End: 2019-02-20

## 2019-02-20 NOTE — TELEPHONE ENCOUNTER
Patient notified that the reason for the CT scan is that she was concerned she was having gross hematuria (blood in the urine) and this is part of the evaluation.  The CT scan needs to be done BEFORE her cystoscopy in the OR-per Dr. Ovalle. Patient agreed with the plan. She would have a CT prior to her cystoscopy in the OR.        Shantel Farias MA
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
## 2019-02-20 NOTE — TELEPHONE ENCOUNTER
[facility] Call Center    Phone Message    May a detailed message be left on voicemail: no    Reason for Call: Other: Pt wants to know if the CT Scan she has scheduled on 2/25/19 can be postponed until after her cysto/bladder biopsy on 3/25/19. Pt is concerned about the effect of the dye/radiation on her kidneys. Pt also wants to discuss getting anesthesia vs sedation for her procedure. Pt doesn't know either if the CT is needed at all, either, for her procedure. Please call Pt back to discuss.     Action Taken: Message routed to:  [facility] ([facility] UROLOGY ADULT [facility]

## 2019-02-20 NOTE — TELEPHONE ENCOUNTER
Tried to reach Jennifer but received voicemail.  Left message to call back to discuss concerns and questions regarding upcoming procedure.

## 2019-02-21 ENCOUNTER — TELEPHONE (OUTPATIENT)
Dept: SURGERY | Facility: CLINIC | Age: 69
End: 2019-02-21

## 2019-02-21 ENCOUNTER — TELEPHONE (OUTPATIENT)
Dept: OBGYN | Facility: CLINIC | Age: 69
End: 2019-02-21

## 2019-02-21 NOTE — TELEPHONE ENCOUNTER
Patient called and told that when she goes to the PAC appointment on march 12th they will go in too detail regarding how she is going to be put to sleep Radha Vance LPN Staff Nurse

## 2019-02-21 NOTE — TELEPHONE ENCOUNTER
Health Call Center    Phone Message    May a detailed message be left on voicemail: yes    Reason for Call: Other: Pt had called yesterday to ask questions on upcoming procedure; she specifically wanted information mailed to her about general anesthesia vs. Sedation. Please see encounter from 2/20 for additional info. Pt would like a phone call on whether or not clinic can do this. Please advise.    Action Taken: Message routed to:  [facility] ([facility]

## 2019-02-21 NOTE — TELEPHONE ENCOUNTER
Received voicemail from patient stating she has questions regarding her upcoming procedure with Dr. Huber.    Returned call to Jennifer. Patient has concerns regarding the type of sedation that will be used for her upcoming procedure. She states she had vaginal ultrasound in clinic and that it was extremely painful and traumatizing for her and that she is worried about the upcoming procedure and how she will tolerate this.    Patient state that she has made a PAC appointment and scheduled a followup visit with Dr. Huber prior to scheduled procedure in March for more clarity on her concerns. Discussed with Jennifer that these are both appropriate ways of getting her questions answered and making her more comfortable with the upcoming procedure. Answered all questions.

## 2019-02-22 ENCOUNTER — TELEPHONE (OUTPATIENT)
Dept: UROLOGY | Facility: CLINIC | Age: 69
End: 2019-02-22

## 2019-02-22 NOTE — TELEPHONE ENCOUNTER
Discussed surgery details with the patient in clinic and over the phone. Patient is scheduled for Pac 3/15/19 and will have her urine done then. If she has more questions she can stop in then to have them answered    Gricelda Cabezas RN   Care Coordinator Urology

## 2019-02-22 NOTE — TELEPHONE ENCOUNTER
----- Message from Conchis Pack sent at 2/14/2019  3:17 PM CST -----  Regarding: teaching  Dr Ovalle and Dr Huber    cystoscopy with possible bladder biopsy, Hysteroscopy, Dilation and Curettage, Possible Morcellation of polyp/fibroid    Surgery scheduled on 3/25/19 @ 7:30 am @ [place] [facility]    Left voice mail for patient to call, packet mailed, please call for teaching    Thanks  Conchis

## 2019-03-05 ENCOUNTER — OFFICE VISIT (OUTPATIENT)
Dept: OPHTHALMOLOGY | Facility: CLINIC | Age: 69
End: 2019-03-05
Payer: MEDICARE

## 2019-03-05 DIAGNOSIS — H02.88B MEIBOMIAN GLAND DYSFUNCTION (MGD) OF UPPER AND LOWER LIDS OF BOTH EYES: ICD-10-CM

## 2019-03-05 DIAGNOSIS — H01.003 ANGULAR BLEPHARITIS OF RIGHT EYE: Primary | ICD-10-CM

## 2019-03-05 DIAGNOSIS — H05.419: ICD-10-CM

## 2019-03-05 DIAGNOSIS — H02.88A MEIBOMIAN GLAND DYSFUNCTION (MGD) OF UPPER AND LOWER LIDS OF BOTH EYES: ICD-10-CM

## 2019-03-05 PROCEDURE — G0463 HOSPITAL OUTPT CLINIC VISIT: HCPCS | Mod: ZF

## 2019-03-05 RX ORDER — NEOMYCIN SULFATE, POLYMYXIN B SULFATE AND DEXAMETHASONE 3.5; 10000; 1 MG/ML; [USP'U]/ML; MG/ML
1-2 SUSPENSION/ DROPS OPHTHALMIC AT BEDTIME
Qty: 1 BOTTLE | Refills: 3 | Status: SHIPPED | OUTPATIENT
Start: 2019-03-05 | End: 2020-03-05

## 2019-03-05 ASSESSMENT — VISUAL ACUITY
OS_CC+: -1
METHOD: SNELLEN - LINEAR
OD_CC+: -1
CORRECTION_TYPE: GLASSES
OD_CC: 20/20
OS_CC: 20/25

## 2019-03-05 ASSESSMENT — EXTERNAL EXAM - LEFT EYE: OS_EXAM: NORMAL

## 2019-03-05 ASSESSMENT — REFRACTION_WEARINGRX
OD_CYLINDER: +0.75
OS_SPHERE: -5.25
OS_CYLINDER: +0.50
OS_AXIS: 100
OD_SPHERE: -5.75
OD_AXIS: 078

## 2019-03-05 ASSESSMENT — CONF VISUAL FIELD
OD_NORMAL: 1
METHOD: COUNTING FINGERS
OS_NORMAL: 1

## 2019-03-05 ASSESSMENT — TONOMETRY
IOP_METHOD: ICARE
OS_IOP_MMHG: 17
OD_IOP_MMHG: 16

## 2019-03-05 ASSESSMENT — CUP TO DISC RATIO
OD_RATIO: 0.3
OS_RATIO: 0.3

## 2019-03-05 ASSESSMENT — EXTERNAL EXAM - RIGHT EYE: OD_EXAM: ENOPHTHALMOS

## 2019-03-05 NOTE — PROGRESS NOTES
Chief Complaint(s) and History of Present Illness(es)     Blepharitis Follow Up     Laterality: both eyes    Severity: moderate    Duration: months    Frequency: constantly    Pain scale: 0/10              Comments     +floaters are new.  Has had them in the past but has seen them for the   past 2 weeks  +flashes but now new  States va is the same since last visit  Seble Pepe COT 10:13 AM March 5, 2019               Review of systems for the eyes was negative other than the pertinent positives/negatives listed in the HPI.      Assessment & Plan      Jennifer Steiner is a 68 year old female with the following diagnoses:   1. Angular blepharitis of right eye    2. Meibomian gland dysfunction (MGD) of upper and lower lids of both eyes    3. 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  Appears quiescent today   Recommend to wait 1-2 months before cessation for warmer weather  Continue artificial tears and warm compresses as before   Consider resuming maxitrol at bedtime in the Fall when allergies start to manifest        Patient disposition:   Data Unavailable          Attending Physician Attestation:  Complete documentation of historical and exam elements from today's encounter can be found in the full encounter summary report (not reduplicated in this progress note).  I personally obtained the chief complaint(s) and history of present illness.  I confirmed and edited as necessary the review of systems, past medical/surgical history, family history, social history, and examination findings as documented by others; and I examined the patient myself.  I personally reviewed the relevant tests, images, and reports as documented above.  I formulated and edited as necessary the assessment and plan and discussed the findings and management plan with the patient and family. . - Damir Cunningham MD

## 2019-03-05 NOTE — NURSING NOTE
Chief Complaints and History of Present Illnesses   Patient presents with     Blepharitis Follow Up     Chief Complaint(s) and History of Present Illness(es)     Blepharitis Follow Up     Laterality: both eyes    Severity: moderate    Duration: months    Frequency: constantly    Pain scale: 0/10              Comments     +floaters are new.  Has had them in the past but has seen them for the past 2 weeks  +flashes but now new  States va is the same since last visit  Seble MAY 10:13 AM March 5, 2019

## 2019-03-11 ENCOUNTER — OFFICE VISIT (OUTPATIENT)
Dept: OBGYN | Facility: CLINIC | Age: 69
End: 2019-03-11
Attending: OBSTETRICS & GYNECOLOGY
Payer: MEDICARE

## 2019-03-11 VITALS
HEIGHT: 64 IN | HEART RATE: 68 BPM | DIASTOLIC BLOOD PRESSURE: 76 MMHG | BODY MASS INDEX: 22.2 KG/M2 | SYSTOLIC BLOOD PRESSURE: 113 MMHG | WEIGHT: 130 LBS

## 2019-03-11 DIAGNOSIS — N95.0 POSTMENOPAUSAL BLEEDING: Primary | ICD-10-CM

## 2019-03-11 ASSESSMENT — MIFFLIN-ST. JEOR: SCORE: 1109.68
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
## 2019-03-12 ENCOUNTER — OFFICE VISIT (OUTPATIENT)
Dept: SURGERY | Facility: CLINIC | Age: 69
End: 2019-03-12
Payer: MEDICARE

## 2019-03-12 ENCOUNTER — ANESTHESIA EVENT (OUTPATIENT)
Dept: SURGERY | Facility: CLINIC | Age: 69
End: 2019-03-12
Payer: MEDICARE

## 2019-03-12 VITALS
HEART RATE: 67 BPM | SYSTOLIC BLOOD PRESSURE: 115 MMHG | WEIGHT: 130 LBS | HEIGHT: 64 IN | OXYGEN SATURATION: 98 % | TEMPERATURE: 97.9 F | DIASTOLIC BLOOD PRESSURE: 80 MMHG | BODY MASS INDEX: 22.2 KG/M2

## 2019-03-12 DIAGNOSIS — Z01.818 PREOP GENERAL PHYSICAL EXAM: ICD-10-CM

## 2019-03-12 DIAGNOSIS — Z01.818 PREOP GENERAL PHYSICAL EXAM: Primary | ICD-10-CM

## 2019-03-12 LAB
ALBUMIN UR-MCNC: NEGATIVE MG/DL
ANION GAP SERPL CALCULATED.3IONS-SCNC: 6 MMOL/L (ref 3–14)
APPEARANCE UR: CLEAR
BILIRUB UR QL STRIP: NEGATIVE
BUN SERPL-MCNC: 8 MG/DL (ref 7–30)
CALCIUM SERPL-MCNC: 8.8 MG/DL (ref 8.5–10.1)
CHLORIDE SERPL-SCNC: 106 MMOL/L (ref 94–109)
CO2 SERPL-SCNC: 27 MMOL/L (ref 20–32)
COLOR UR AUTO: ABNORMAL
CREAT SERPL-MCNC: 0.61 MG/DL (ref 0.52–1.04)
ERYTHROCYTE [DISTWIDTH] IN BLOOD BY AUTOMATED COUNT: 13.8 % (ref 10–15)
GFR SERPL CREATININE-BSD FRML MDRD: >90 ML/MIN/{1.73_M2}
GLUCOSE SERPL-MCNC: 84 MG/DL (ref 70–99)
GLUCOSE UR STRIP-MCNC: NEGATIVE MG/DL
HCT VFR BLD AUTO: 40.7 % (ref 35–47)
HGB BLD-MCNC: 12.9 G/DL (ref 11.7–15.7)
HGB UR QL STRIP: NEGATIVE
KETONES UR STRIP-MCNC: NEGATIVE MG/DL
LEUKOCYTE ESTERASE UR QL STRIP: ABNORMAL
MCH RBC QN AUTO: 30 PG (ref 26.5–33)
MCHC RBC AUTO-ENTMCNC: 31.7 G/DL (ref 31.5–36.5)
MCV RBC AUTO: 95 FL (ref 78–100)
NITRATE UR QL: NEGATIVE
PH UR STRIP: 7 PH (ref 5–7)
PLATELET # BLD AUTO: 259 10E9/L (ref 150–450)
POTASSIUM SERPL-SCNC: 3.9 MMOL/L (ref 3.4–5.3)
RBC # BLD AUTO: 4.3 10E12/L (ref 3.8–5.2)
RBC #/AREA URNS AUTO: 1 /HPF (ref 0–2)
SODIUM SERPL-SCNC: 138 MMOL/L (ref 133–144)
SOURCE: ABNORMAL
SP GR UR STRIP: 1.01 (ref 1–1.03)
SQUAMOUS #/AREA URNS AUTO: <1 /HPF (ref 0–1)
UROBILINOGEN UR STRIP-MCNC: 0 MG/DL (ref 0–2)
WBC # BLD AUTO: 6.9 10E9/L (ref 4–11)
WBC #/AREA URNS AUTO: 45 /HPF (ref 0–5)

## 2019-03-12 PROCEDURE — 87086 URINE CULTURE/COLONY COUNT: CPT | Performed by: NURSE PRACTITIONER

## 2019-03-12 ASSESSMENT — MIFFLIN-ST. JEOR: SCORE: 1109.68
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
## 2019-03-12 NOTE — H&P
Pre-Operative H & P     CC:  Preoperative exam to assess for increased cardiopulmonary risk while undergoing surgery and anesthesia.    Date of Encounter: 3/12/2019  Primary Care Physician:  Milan Alvarez    Reason for visit:  Preop general physical exam  Thickened endometrial strip  Pelvic floor symptoms      HPI  Jennifer Steiner is a 68 year old female who presents for pre-operative H & P in preparation for  CYSTOSCOPY, BIOPSY BLADDER, DILATION AND CURETTAGE, OPERATIVE HYSTEROSCOPY WITH MORCELLATOR on by Dr. Ovalle and  in treatment of thickened endometrial strip at [facility].     History is obtained from the patient.       Previous anesthesia without complications.     1) Cardiac: No known cardiac diagnosis or symptoms. EKG 1/2019 SR. Negative stress test as well on 1/2019  2) Pulmonary: intermittent asthma, she has not used her albuterol in over a year  3) GI: IBS, uses frequent enemas due to constipation  4) /Gyn: pelvic floor symptoms. Thicken endometrial strip  5) Endo: hypothyroidism, daily armour thyroid  6) Neuro: In 2011, she presented with difficulty speaking for approximately 15 minutes.  She also had a headache that was different from her usual headaches that was on the left side and it spread around to the occipital area. 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 CT perfusion study was done to evaluate deficit in case she needed a stent, but it was normal. She was placed on Plavix for a year and has not had any further issues             Past Medical History  Past Medical History:   Diagnosis Date     Anemia      Asthma     since childhood     Blepharitis      Constipation, chronic     Chronic enema use     CVA (cerebral infarction) 2009    carotid intimal repair, left     Hyperlipidemia age 58    identified age 58     Hypothyroidism      Irritable bowel disease       Neck injuries      Pelvic floor dysfunction 2010     Postmenopausal hormone replacement therapy     From Dr. Link     Scoliosis        Past Surgical History  Past Surgical History:   Procedure Laterality Date     C STOMACH SURGERY PROCEDURE UNLISTED       HC BREATH HYDROGEN TEST  11/11/2013    Procedure: HYDROGEN BREATH TEST;  Surgeon: Esteban Short MD;  Location:  GI     SURGICAL HISTORY OF -       tonsillectomy     SURGICAL HISTORY OF -   ~1997    cosmetic skin removal: breast reduction; skin removal from arms legs and tummy tuck       Hx of Blood transfusions/reactions: none    Hx of abnormal bleeding or anti-platelet use: none    Menstrual history: No LMP recorded. Patient is postmenopausal.:     Steroid use in the last year: none    Personal or FH with difficulty with Anesthesia:  None          Prior to Admission Medications  Current Outpatient Medications   Medication Sig Dispense Refill     Charcoal Activated (CHARCOAL PO) Take 6 capsules by mouth daily as needed. For indigestion       Cholecalciferol (VITAMIN D) 2000 UNIT tablet Take 1 tablet by mouth daily.       Digestive Enzymes TABS Use as directed       DiphenhydrAMINE HCl (BENADRYL PO) Take by mouth daily as needed       estradiol (ESTRACE) 0.1 MG/GM vaginal cream Place 0.5 g vaginally Use one to two times weekly.       HYDROCHLORIC ACID Take as directed       Hypromellose (ARTIFICIAL TEARS OP) Apply 1 drop to eye daily as needed       MAGNESIUM OXIDE PO Take 6-8 tablets by mouth daily        neomycin-polymyxin-dexamethasone (MAXITROL) 3.5-07954-3.1 SUSP ophthalmic susp Place 1-2 drops into both eyes At Bedtime 1 Bottle 3     peppermint oil liquid 1 mL daily as needed for other Taking gel tabs not oil, not in EMR.       PROGESTERONE 50MG CAPSULES COMPOUND 100 mg At Bedtime Take 2 capsules at bedtime.       Testosterone Propionate (FIRST-TESTOSTERONE MC) 2 % CREA        thyroid (ARMOUR THYROID) 60 MG tablet Take 60 mg by mouth every  "morning        EPINEPHrine (EPIPEN) 0.3 MG/0.3ML injection 2-pack Inject 0.3 mLs (0.3 mg) into the muscle once as needed for anaphylaxis 0.3 mL 1       Allergies  Allergies   Allergen Reactions     Aspirin GI Disturbance     GI upset     Aspirin GI Disturbance     Bee Venom Swelling     Birds [Feathers]      Cats      Dust Mites      Fluoride Other (See Comments) and Unknown     headaches     Liothyronine Unknown     Severe gut reactions, ears itching, throat closing     Mold      No Clinical Screening - See Comments Other (See Comments)     Some antibiotics but not Zpak-Can tolerate Azithromycin     Procaine Other (See Comments)     headache     Seasonal Allergies      Ragweed, pollen     Simvastatin Other (See Comments)     \"hot flash\", muscle weakness, dizziness     Simvastatin Unknown     Penicillins Unknown and Rash     As a child     Sulfa Drugs Itching, Other (See Comments), Rash and Hives       Social History  Social History     Socioeconomic History     Marital status: Single     Spouse name: Not on file     Number of children: Not on file     Years of education: Not on file     Highest education level: Not on file   Occupational History     Occupation: disability     Comment:    Social Needs     Financial resource strain: Not on file     Food insecurity:     Worry: Not on file     Inability: Not on file     Transportation needs:     Medical: Not on file     Non-medical: Not on file   Tobacco Use     Smoking status: Never Smoker     Smokeless tobacco: Never Used   Substance and Sexual Activity     Alcohol use: No     Drug use: No     Sexual activity: Not Currently     Birth control/protection: Post-menopausal   Lifestyle     Physical activity:     Days per week: Not on file     Minutes per session: Not on file     Stress: Not on file   Relationships     Social connections:     Talks on phone: Not on file     Gets together: Not on file     Attends Shinto service: Not on file     Active " member of club or organization: Not on file     Attends meetings of clubs or organizations: Not on file     Relationship status: Not on file     Intimate partner violence:     Fear of current or ex partner: Not on file     Emotionally abused: Not on file     Physically abused: Not on file     Forced sexual activity: Not on file   Other Topics Concern     Parent/sibling w/ CABG, MI or angioplasty before 65F 55M? Not Asked   Social History Narrative     Not on file       Family History  Family History   Problem Relation Age of Onset     Diabetes Father      Gastrointestinal Disease Father 82         after surgery for Bowel obstruction     Obesity Father      Alcohol/Drug Brother      Gastrointestinal Disease Brother         Hepatitis     Heart Disease Brother 62        hx of drug abuse,  age 62     Diabetes Brother      Skin Cancer Brother      Substance Abuse Brother      Allergies Brother      Anesthesia Reaction Brother      Melanoma Mother      Skin Cancer Mother      Osteoporosis Mother      Substance Abuse Sister      Asthma Brother      Diabetes Brother      Cancer Sister      Cancer Sister 56        bone cancer     Anxiety Disorder Sister      Obesity Brother      Alcoholism Sister      Alcoholism Brother      Bone Cancer Sister      Colon Cancer No family hx of      Crohn's Disease No family hx of      Ulcerative Colitis No family hx of      Colon Polyps No family hx of      Glaucoma No family hx of      Macular Degeneration No family hx of              Anesthesia Pre-Procedure Evaluation    Patient: Jennifer Steiner   MRN:     [id number] Gender:   female   Age:    68 year old :      1950        Preoperative Diagnosis: Growth Thickend Endometrial Bleeding   Procedure(s):  CYSTOSCOPY, BIOPSY BLADDER  DILATION AND CURETTAGE, OPERATIVE HYSTEROSCOPY WITH MORCELLATOR     Past Medical History:   Diagnosis Date     Anemia      Asthma     since childhood     Blepharitis      Constipation, chronic      Chronic enema use     CVA (cerebral infarction) 2009    carotid intimal repair, left     Hyperlipidemia age 58    identified age 58     Hypothyroidism      Irritable bowel disease      Neck injuries      Pelvic floor dysfunction 2010     Postmenopausal hormone replacement therapy     From Dr. Link     Scoliosis       Past Surgical History:   Procedure Laterality Date     C STOMACH SURGERY PROCEDURE UNLISTED       HC BREATH HYDROGEN TEST  11/11/2013    Procedure: HYDROGEN BREATH TEST;  Surgeon: Esteban Short MD;  Location:  GI     SURGICAL HISTORY OF -       tonsillectomy     SURGICAL HISTORY OF -   ~1997    cosmetic skin removal: breast reduction; skin removal from arms legs and tummy tuck          Anesthesia Evaluation     . Pt has had prior anesthetic. Type: General and MAC           ROS/MED HX    ENT/Pulmonary:     (+)Intermittent asthma , . .    Neurologic:     (+)TIA date: left carotid dissection without deficits    Cardiovascular:  - neg cardiovascular ROS   (+) ----. : . . . :. . Previous cardiac testing date:results:Stress Testdate:1/21/2019 results:ECG reviewed date:1/28/2019 results: date: results:          METS/Exercise Tolerance:  >4 METS   Hematologic:  - neg hematologic  ROS       Musculoskeletal:  - neg musculoskeletal ROS       GI/Hepatic:     (+) Inflammatory bowel disease,       Renal/Genitourinary:     (+) Other Renal/ Genitourinary, pelvic floor dysfunction      Endo:     (+) thyroid problem hypothyroidism, .      Psychiatric:  - neg psychiatric ROS       Infectious Disease:  - neg infectious disease ROS       Malignancy:      - no malignancy   Other:    (+) No chance of pregnancy C-spine cleared: No, no other significant disability                  PHYSICAL EXAM:   Mental Status/Neuro: A/A/O   Airway: Facies: Feasible  Mallampati: II  Mouth/Opening: Full  TM distance: > 6 cm  Neck ROM: Full   Respiratory: Auscultation: CTAB     Resp. Rate: Normal     Resp. Effort: Normal      CV:  "Rhythm: Regular  Rate: Age appropriate  Heart: Normal Sounds   Comments:      Dental: Normal                  Preop Vitals  BP Readings from Last 3 Encounters:   03/12/19 115/80   03/11/19 113/76   02/14/19 114/60    Pulse Readings from Last 3 Encounters:   03/12/19 67   03/11/19 68   02/14/19 122      Resp Readings from Last 3 Encounters:   01/25/18 20   01/18/17 18   02/17/16 16    SpO2 Readings from Last 3 Encounters:   03/12/19 98%   01/28/19 97%   12/27/18 99%      Temp Readings from Last 1 Encounters:   03/12/19 97.9  F (36.6  C) (Oral)    Ht Readings from Last 1 Encounters:   03/12/19 1.626 m (5' 4\")      Wt Readings from Last 1 Encounters:   03/12/19 59 kg (130 lb)    Estimated body mass index is 22.31 kg/m  as calculated from the following:    Height as of this encounter: 1.626 m (5' 4\").    Weight as of this encounter: 59 kg (130 lb).           The complete review of systems is negative other than noted in the HPI or here.   Temp: 97.9  F (36.6  C) Temp src: Oral BP: 115/80 Pulse: 67     SpO2: 98 %         130 lbs 0 oz  5' 4\"   Body mass index is 22.31 kg/m .       Physical Exam  Constitutional: Awake, alert, cooperative, no apparent distress, and appears stated age.  Eyes: Pupils equal, round and reactive to light, extra ocular muscles intact, sclera clear, conjunctiva normal.  HENT: Normocephalic, oral pharynx with moist mucus membranes, good dentition. No goiter appreciated.   Respiratory: Clear to auscultation bilaterally, no crackles or wheezing.  Cardiovascular: Regular rate and rhythm, normal S1 and S2, and no murmur noted.  Carotids +2, no bruits. No edema. Palpable pulses to radial  DP and PT arteries.   GI: Normal bowel sounds, soft, non-distended, non-tender, no masses palpated, no hepatosplenomegaly.   Lymph/Hematologic: No cervical lymphadenopathy and no supraclavicular lymphadenopathy.  Genitourinary:  Deferred   Skin: Warm and dry.  No rashes at anticipated surgical site.   Musculoskeletal: " Full ROM of neck. There is no redness, warmth, or swelling of the joints. Gross motor strength is normal.    Neurologic: Awake, alert, oriented to name, place and time. Cranial nerves II-XII are grossly intact. Gait is normal.   Neuropsychiatric: Calm, cooperative. Normal affect.     Labs: (personally reviewed)  Results for MARTINE COBIAN (MRN [id number]) as of 3/14/2019 07:25   Ref. Range 3/12/2019 17:21 3/12/2019 17:40   Sodium Latest Ref Range: 133 - 144 mmol/L 138    Potassium Latest Ref Range: 3.4 - 5.3 mmol/L 3.9    Chloride Latest Ref Range: 94 - 109 mmol/L 106    Carbon Dioxide Latest Ref Range: 20 - 32 mmol/L 27    Urea Nitrogen Latest Ref Range: 7 - 30 mg/dL 8    Creatinine Latest Ref Range: 0.52 - 1.04 mg/dL 0.61    GFR Estimate Latest Ref Range: >60 mL/min/1.73_m2 >90    GFR Estimate If Black Latest Ref Range: >60 mL/min/1.73_m2 >90    Calcium Latest Ref Range: 8.5 - 10.1 mg/dL 8.8    Anion Gap Latest Ref Range: 3 - 14 mmol/L 6    Glucose Latest Ref Range: 70 - 99 mg/dL 84    WBC Latest Ref Range: 4.0 - 11.0 10e9/L 6.9    Hemoglobin Latest Ref Range: 11.7 - 15.7 g/dL 12.9    Hematocrit Latest Ref Range: 35.0 - 47.0 % 40.7    Platelet Count Latest Ref Range: 150 - 450 10e9/L 259    RBC Count Latest Ref Range: 3.8 - 5.2 10e12/L 4.30    MCV Latest Ref Range: 78 - 100 fl 95    MCH Latest Ref Range: 26.5 - 33.0 pg 30.0    MCHC Latest Ref Range: 31.5 - 36.5 g/dL 31.7    RDW Latest Ref Range: 10.0 - 15.0 % 13.8    Color Urine Unknown  Straw   Appearance Urine Unknown  Clear   Glucose Urine Latest Ref Range: NEG^Negative mg/dL  Negative   Bilirubin Urine Latest Ref Range: NEG^Negative   Negative   Ketones Urine Latest Ref Range: NEG^Negative mg/dL  Negative   Specific Gravity Urine Latest Ref Range: 1.003 - 1.035   1.006   pH Urine Latest Ref Range: 5.0 - 7.0 pH  7.0   Protein Albumin Urine Latest Ref Range: NEG^Negative mg/dL  Negative   Urobilinogen mg/dL Latest Ref Range: 0.0 - 2.0 mg/dL  0.0   Nitrite  Urine Latest Ref Range: NEG^Negative   Negative   Blood Urine Latest Ref Range: NEG^Negative   Negative   Leukocyte Esterase Urine Latest Ref Range: NEG^Negative   Moderate (A)   Source Unknown  Midstream Urine   WBC Urine Latest Ref Range: 0 - 5 /HPF  45 (H)   RBC Urine Latest Ref Range: 0 - 2 /HPF  1   Squamous Epithelial /HPF Urine Latest Ref Range: 0 - 1 /HPF  <1   Specimen Description Unknown  Midstream Urine   Culture Micro Unknown  No growth   URINE CULTURE AEROBIC BACTERIAL Unknown  Rpt       EKG: Personally reviewed but formal cardiology read pendin2019 SR      Stress echocardiogram 2019  Interpretation Summary  Conclusion: Exercise stress echocardiogram negative for ischemia at a  diagnostic level of peak stress (85% MPHR) however EKG findings demonstrate  minimal EKG changes V2-V4.     Patient developed no chest pain during exercise.  Test terminated due to target HR achieved.  Exercise capacity below average for age.  Normal HR and BP response to exercise.  Baseline echocardiogram with normal LVEF of 60-65%. No regional wall motion  abnormalities.  At peak stress, all walls recruit normally however lateral wall poorly  visualized. LV augments with stress. EF 75%.  EKG at baseline with no ischemic changes. At peak stress, there are horizontal  1-1.5 mm ST depressions v3-v6.  Screening 2D echocardiogram demonstrated no significant valve disease. Normal  sized proximal ascending aorta.         Outside records reviewed from: care everywhere        ASSESSMENT and PLAN  Jennifer Steiner is a 68 year old female scheduled to undergo  CYSTOSCOPY, BIOPSY BLADDER, DILATION AND CURETTAGE, OPERATIVE HYSTEROSCOPY WITH MORCELLATOR. She has the following specific operative considerations:   - RCRI : Low serious cardiac risks.  0.9% risk of major adverse cardiac event.   - Anesthesia considerations:  Refer to PAC assessment in anesthesia records  - VTE risk: 0.5%  - PRIMITIVO # of risks 2/8 = low risk  - Post-op  delirium risk: high risk due to age    - Risk of PONV score = 2.  If > 2, anti-emetic intervention recommended.    Pt optimized for surgery. AVS with information on surgery time/arrival time, meds and NPO status given by nursing staff    I spent 30 minutes with patient, greater than 50% educating on preop meds, counseling on anesthesia and coordinating care for surgery  All labs and imaging personally reviewed.     Patient was discussed with Dr Chowdary.    CHIN Wharton CNS  Preoperative Assessment Center  [facility]  Clinic and Surgery Center  Phone: [phone]  Fax: [phone]

## 2019-03-12 NOTE — PATIENT INSTRUCTIONS
Preparing for Your Surgery      Name:  Jennifer Steiner   MRN:  [id number]   :  1950   Today's Date:  3/12/2019     Arriving for surgery:  Surgery date:  3/25/19  Arrival time:  5:30 am    Please come to:     [facility] [address]  [address] [place]  [address]    - parking is available in front of [facility] from 5:15AM to 8:00PM. If you prefer, park your car in the Green Lot.    -Proceed to the 3rd floor, check in at the Adult Surgery Waiting Lounge. [phone]    If an escort is needed stop at the Information Desk in the lobby. Inform the information person that you are here for surgery. An escort to the Adult Surgery Waiting Lounge will be provided.    -  Bring your ID and insurance card.    What can I eat or drink?  -  You may have solid food or milk products until 8 hours prior to your surgery. (Until 11:30 pm 3-24-19 )  -  You may have water, apple juice or 7up/Sprite until 2 hours prior to your surgery. (Until 5:30 am )    Which medicines can I take?       -  Do not bring your own medications to the hospital.        -  Follow Urology Clinic instructions regarding Ibuprofen. If no instructions given, NO Ibuprofen the day prior to surgery.        -  Hold Peppermint Oil 7 days prior to surgery. Last dose 3-17-19.    -  Do NOT take these medications in the morning, the day of surgery:  Vitamin D, Digestive enzymes, Magnesium, Hydrochloric Acid    -  Please take these medications the morning of surgery:  Thyroid      Acetaminophen (Tylenol) if needed    How do I prepare myself?  -  Take two showers: one the night before surgery; and one the morning of surgery.         Use Scrubcare or Hibiclens to wash from neck down.  You may use your own shampoo and conditioner. No other hair products.   -  Do NOT use lotion, powder, colognes, deodorant, or antiperspirant the day of your surgery.  -  Do NOT wear any makeup, fingernail polish or  marina.    Questions or Concerns:  If you have questions or concerns prior to your surgery, call [phone]. (Mon - Fri   8 am- 5:30 pm)  Questions about surgery, contact your Surgeons office.    Influenza visitor restrictions are in force at this time.  The [facility] [place] is prohibiting the following visitors:  -Any sick persons regardless of age  -Anyone with known exposure to a communicable illness  -Anyone under the age of 5    AFTER YOUR SURGERY  Breathing exercises   Breathing exercises help you recover faster. Take deep breaths and let the air out slowly. This will:     Help you wake up after surgery.    Help prevent complications like pneumonia.  Preventing complications will help you go home sooner.   We may give you a breathing device (incentive spirometer) to encourage you to breathe deeply.   Nausea and vomiting   You may feel sick to your stomach after surgery; if so, let your nurse know.    Pain control:  After surgery, you may have pain. Our goal is to help you manage your pain. Pain medicine will help you feel comfortable enough to do activities that will help you heal.  These activities may include breathing exercises, walking and physical therapy.   To help your health care team treat your pain we will ask: 1) If you have pain  2) where it is located 3) describe your pain in your words  Methods of pain control include medications given by mouth, vein or by nerve block for some surgeries.  Sequential Compression Device (SCD) or Pneumo Boots:  You may need to wear SCD S on your legs or feet. These are wraps connected to a machine that pumps in air and releases it. The repeated pumping helps prevent blood clots from forming.

## 2019-03-12 NOTE — ANESTHESIA PREPROCEDURE EVALUATION
Anesthesia Pre-Procedure Evaluation    Patient: Jennifer Steiner   MRN:     [id number] Gender:   female   Age:    68 year old :      1950        Preoperative Diagnosis: Growth Thickend Endometrial Bleeding   Procedure(s):  CYSTOSCOPY, BIOPSY BLADDER  DILATION AND CURETTAGE, OPERATIVE HYSTEROSCOPY WITH MORCELLATOR     Past Medical History:   Diagnosis Date     Anemia      Asthma     since childhood     Blepharitis      Constipation, chronic     Chronic enema use     CVA (cerebral infarction) 2009    carotid intimal repair, left     Hyperlipidemia age 58    identified age 58     Hypothyroidism      Irritable bowel disease      Neck injuries      Pelvic floor dysfunction      Postmenopausal hormone replacement therapy     From Dr. Link     Scoliosis       Past Surgical History:   Procedure Laterality Date     C STOMACH SURGERY PROCEDURE UNLISTED       HC BREATH HYDROGEN TEST  2013    Procedure: HYDROGEN BREATH TEST;  Surgeon: Esteban Short MD;  Location:  GI     SURGICAL HISTORY OF -       tonsillectomy     SURGICAL HISTORY OF -   ~    cosmetic skin removal: breast reduction; skin removal from arms legs and tummy tuck          Anesthesia Evaluation     . Pt has had prior anesthetic. Type: General and MAC           ROS/MED HX    ENT/Pulmonary:     (+)Intermittent asthma , . .    Neurologic:     (+)TIA date:  features: speech difficulties, left carotid dissection, medical management,     Cardiovascular:  - neg cardiovascular ROS   (+) ----. : . . . :. . Previous cardiac testing date:results:Stress Testdate:2019 results:ECG reviewed date:2019 results: date: results:          METS/Exercise Tolerance:  >4 METS   Hematologic:  - neg hematologic  ROS       Musculoskeletal:  - neg musculoskeletal ROS       GI/Hepatic:     (+) Inflammatory bowel disease,       Renal/Genitourinary:     (+) Other Renal/ Genitourinary, pelvic floor dysfunction      Endo:     (+) thyroid problem  hypothyroidism, .      Psychiatric:  - neg psychiatric ROS       Infectious Disease:  - neg infectious disease ROS       Malignancy:      - no malignancy   Other:    (+) No chance of pregnancy C-spine cleared: No, no other significant disability                        PHYSICAL EXAM:   Mental Status/Neuro: A/A/O   Airway: Facies: Feasible  Mallampati: II  Mouth/Opening: Full  TM distance: > 6 cm  Neck ROM: Full   Respiratory: Auscultation: CTAB     Resp. Rate: Normal     Resp. Effort: Normal      CV: Rhythm: Regular  Rate: Age appropriate  Heart: Normal Sounds   Comments:      Dental: Normal                Stress echocardiogram 1/21/2019  Interpretation Summary  Conclusion: Exercise stress echocardiogram negative for ischemia at a  diagnostic level of peak stress (85% MPHR) however EKG findings demonstrate  minimal EKG changes V2-V4.     Patient developed no chest pain during exercise.  Test terminated due to target HR achieved.  Exercise capacity below average for age.  Normal HR and BP response to exercise.  Baseline echocardiogram with normal LVEF of 60-65%. No regional wall motion  abnormalities.  At peak stress, all walls recruit normally however lateral wall poorly  visualized. LV augments with stress. EF 75%.  EKG at baseline with no ischemic changes. At peak stress, there are horizontal  1-1.5 mm ST depressions v3-v6.  Screening 2D echocardiogram demonstrated no significant valve disease. Normal  sized proximal ascending aorta.         Results for MARTINE COBIAN (MRN [id number]) as of 3/14/2019 07:25   Ref. Range 3/12/2019 17:21 3/12/2019 17:40   Sodium Latest Ref Range: 133 - 144 mmol/L 138    Potassium Latest Ref Range: 3.4 - 5.3 mmol/L 3.9    Chloride Latest Ref Range: 94 - 109 mmol/L 106    Carbon Dioxide Latest Ref Range: 20 - 32 mmol/L 27    Urea Nitrogen Latest Ref Range: 7 - 30 mg/dL 8    Creatinine Latest Ref Range: 0.52 - 1.04 mg/dL 0.61    GFR Estimate Latest Ref Range: >60 mL/min/1.73_m2 >90     GFR Estimate If Black Latest Ref Range: >60 mL/min/1.73_m2 >90    Calcium Latest Ref Range: 8.5 - 10.1 mg/dL 8.8    Anion Gap Latest Ref Range: 3 - 14 mmol/L 6    Glucose Latest Ref Range: 70 - 99 mg/dL 84    WBC Latest Ref Range: 4.0 - 11.0 10e9/L 6.9    Hemoglobin Latest Ref Range: 11.7 - 15.7 g/dL 12.9    Hematocrit Latest Ref Range: 35.0 - 47.0 % 40.7    Platelet Count Latest Ref Range: 150 - 450 10e9/L 259    RBC Count Latest Ref Range: 3.8 - 5.2 10e12/L 4.30    MCV Latest Ref Range: 78 - 100 fl 95    MCH Latest Ref Range: 26.5 - 33.0 pg 30.0    MCHC Latest Ref Range: 31.5 - 36.5 g/dL 31.7    RDW Latest Ref Range: 10.0 - 15.0 % 13.8    Color Urine Unknown  Straw   Appearance Urine Unknown  Clear   Glucose Urine Latest Ref Range: NEG^Negative mg/dL  Negative   Bilirubin Urine Latest Ref Range: NEG^Negative   Negative   Ketones Urine Latest Ref Range: NEG^Negative mg/dL  Negative   Specific Gravity Urine Latest Ref Range: 1.003 - 1.035   1.006   pH Urine Latest Ref Range: 5.0 - 7.0 pH  7.0   Protein Albumin Urine Latest Ref Range: NEG^Negative mg/dL  Negative   Urobilinogen mg/dL Latest Ref Range: 0.0 - 2.0 mg/dL  0.0   Nitrite Urine Latest Ref Range: NEG^Negative   Negative   Blood Urine Latest Ref Range: NEG^Negative   Negative   Leukocyte Esterase Urine Latest Ref Range: NEG^Negative   Moderate (A)   Source Unknown  Midstream Urine   WBC Urine Latest Ref Range: 0 - 5 /HPF  45 (H)   RBC Urine Latest Ref Range: 0 - 2 /HPF  1   Squamous Epithelial /HPF Urine Latest Ref Range: 0 - 1 /HPF  <1   Specimen Description Unknown  Midstream Urine   Culture Micro Unknown  No growth   URINE CULTURE AEROBIC BACTERIAL Unknown  Rpt               Preop Vitals  BP Readings from Last 3 Encounters:   03/12/19 115/80   03/11/19 113/76   02/14/19 114/60    Pulse Readings from Last 3 Encounters:   03/12/19 67   03/11/19 68   02/14/19 122      Resp Readings from Last 3 Encounters:   01/25/18 20   01/18/17 18   02/17/16 16    SpO2  "Readings from Last 3 Encounters:   03/12/19 98%   01/28/19 97%   12/27/18 99%      Temp Readings from Last 1 Encounters:   03/12/19 97.9  F (36.6  C) (Oral)    Ht Readings from Last 1 Encounters:   03/12/19 1.626 m (5' 4\")      Wt Readings from Last 1 Encounters:   03/12/19 59 kg (130 lb)    Estimated body mass index is 22.31 kg/m  as calculated from the following:    Height as of this encounter: 1.626 m (5' 4\").    Weight as of this encounter: 59 kg (130 lb).     LDA:            Assessment:   ASA SCORE: 2    NPO Status: > 6 hours since completed [employer]   Documentation: H&P complete; Preop Testing complete; Consents complete   Proceeding: Proceed without further delay  Tobacco Use:  NO Active use of Tobacco/UNKNOWN Tobacco use status     Plan:   Anes. Type:  General   Pre-Induction: None   Induction:  IV (Standard)   Airway: LMA   Access/Monitoring: PIV   Maintenance: Balanced   Emergence: Procedure Site   Logistics: Same Day Surgery     Postop Pain/Sedation Strategy:  Standard-Options: Opioids PRN     PONV Management:  Adult Risk Factors: Female, Non-Smoker, Postop Opioids  Prevention: Ondansetron; Dexamethasone     CONSENT: Direct conversation   Plan and risks discussed with: Patient                     PAC Discussion and Assessment    ASA Classification: 2  Case is suitable for: [facility]  Anesthetic techniques and relevant risks discussed: GA  Invasive monitoring and risk discussed:   Types:   Possibility and Risk of blood transfusion discussed:   NPO instructions given:   Additional anesthetic preparation and risks discussed:   Needs early admission to pre-op area:   Other:     PAC Resident/NP Anesthesia Assessment:  Jennifer Steiner is a 69 yo female scheduled for CYSTOSCOPY, BIOPSY BLADDER, DILATION AND CURETTAGE, OPERATIVE HYSTEROSCOPY WITH MORCELLATOR on by Dr. Ovalle and  in treatment of thickened endometrial strip     Previous anesthesia without complications. EKG 1/2019 SR. Negative stress test as well on " 1/2019    1) Cardiac: No known cardiac diagnosis or symptoms.  2) Pulmonary: intermittent asthma, she has not used her albuterol in over a year  3) GI: IBS, uses frequent enemas due to constipation  4) /Gyn: pelvic floor symptoms. Thicken endometrial strip  5) Endo: hypothyroidism, daily armour thyroid  6) Neuro: In 2011, she presented with difficulty speaking for approximately 15 minutes.  She also had a headache that was different from her usual headaches that was on the left side and it spread around to the occipital area. 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 CT perfusion study was done to evaluate deficit in case she needed a stent, but it was normal. She was placed on Plavix for a year and has not had any further issues               Reviewed and Signed by PAC Mid-Level Provider/Resident  Mid-Level Provider/Resident: Gisela Belle, CHIN  Date: 3/12/2019  Time: 1500    Attending Anesthesiologist Anesthesia Assessment:  68 year old for cysto, biopsy, hysteroscopy, D&C in ongoing management of pelvic floor pain. Patient does not have significant cardiac or pulmonary disease. After spending some time with her, I would strong suggest GA - she has significant fear of this procedure, paramjity has significant pelvic floor pain, and even heavy sedation might not suit. Typical LMA/propofol GA for cysto would likely suit very well and be a win for everyone involved.    Chart reviewed, patient seen and evaluated; agree with above assessment.    Patient is appropriate for the planned procedure without further workup or medical management change. The final anesthesia plan will be determined by the physician anesthesiologist caring for the patient on the day of surgery.      Reviewed and Signed by PAC Anesthesiologist  Anesthesiologist: [email or web address]  Date: 3/12/2019  Time:   Pass/Fail: Pass  Disposition:     PAC Pharmacist  Assessment:        Pharmacist:   Date:   Time:        CHIN Wharton CNS

## 2019-03-13 ENCOUNTER — TELEPHONE (OUTPATIENT)
Dept: UROLOGY | Facility: CLINIC | Age: 69
End: 2019-03-13

## 2019-03-13 LAB
BACTERIA SPEC CULT: NO GROWTH
Lab: NORMAL
SPECIMEN SOURCE: NORMAL

## 2019-03-13 NOTE — TELEPHONE ENCOUNTER
[facility] Call Center    Phone Message    May a detailed message be left on voicemail: yes    Reason for Call: Other: Tara has sent to two sets of progress notes. She is requesting that Dr. Ovalle sign and return the notes.  Please fax- [phone]    Action Taken: Message routed to:  [facility] [[email or web address]] urology

## 2019-03-15 ENCOUNTER — TELEPHONE (OUTPATIENT)
Dept: UROLOGY | Facility: CLINIC | Age: 69
End: 2019-03-15

## 2019-03-15 NOTE — TELEPHONE ENCOUNTER
Dr Ovalle out of clinic, moved patient and CT scan to the week before. Left detailed VM to call back to confirm and get details.

## 2019-03-18 ENCOUNTER — TELEPHONE (OUTPATIENT)
Dept: OBGYN | Facility: CLINIC | Age: 69
End: 2019-03-18

## 2019-03-18 NOTE — TELEPHONE ENCOUNTER
Pt called to request surgery letter for Dr. Huber's portion of her upcoming surgery as she has only received instruction from urology. Drafted and sent letter per patient request

## 2019-03-25 ENCOUNTER — HOSPITAL ENCOUNTER (OUTPATIENT)
Facility: CLINIC | Age: 69
Discharge: HOME | End: 2019-03-25
Attending: UROLOGY | Admitting: UROLOGY
Payer: MEDICARE

## 2019-03-25 ENCOUNTER — ANESTHESIA (OUTPATIENT)
Dept: SURGERY | Facility: CLINIC | Age: 69
End: 2019-03-25
Payer: MEDICARE

## 2019-03-25 VITALS
BODY MASS INDEX: 22.13 KG/M2 | SYSTOLIC BLOOD PRESSURE: 104 MMHG | HEIGHT: 64 IN | HEART RATE: 80 BPM | TEMPERATURE: 97.9 F | RESPIRATION RATE: 16 BRPM | OXYGEN SATURATION: 99 % | DIASTOLIC BLOOD PRESSURE: 63 MMHG | WEIGHT: 129.63 LBS

## 2019-03-25 DIAGNOSIS — Z98.890 STATUS POST HYSTEROSCOPY: Primary | ICD-10-CM

## 2019-03-25 DIAGNOSIS — R93.89 THICKENED ENDOMETRIUM: ICD-10-CM

## 2019-03-25 DIAGNOSIS — R31.0 GROSS HEMATURIA: ICD-10-CM

## 2019-03-25 LAB
ABO + RH BLD: NORMAL
ABO + RH BLD: NORMAL
BLD GP AB SCN SERPL QL: NORMAL
BLOOD BANK CMNT PATIENT-IMP: NORMAL
GLUCOSE SERPL-MCNC: 94 MG/DL (ref 70–99)
SPECIMEN EXP DATE BLD: NORMAL

## 2019-03-25 PROCEDURE — 36415 COLL VENOUS BLD VENIPUNCTURE: CPT | Performed by: OBSTETRICS & GYNECOLOGY

## 2019-03-25 PROCEDURE — 37000008 ZZH ANESTHESIA TECHNICAL FEE, 1ST 30 MIN: Performed by: UROLOGY

## 2019-03-25 PROCEDURE — 25000128 H RX IP 250 OP 636: Performed by: ANESTHESIOLOGY

## 2019-03-25 PROCEDURE — 86901 BLOOD TYPING SEROLOGIC RH(D): CPT | Performed by: OBSTETRICS & GYNECOLOGY

## 2019-03-25 PROCEDURE — A9270 NON-COVERED ITEM OR SERVICE: HCPCS | Mod: GY | Performed by: ANESTHESIOLOGY

## 2019-03-25 PROCEDURE — 71000014 ZZH RECOVERY PHASE 1 LEVEL 2 FIRST HR: Performed by: UROLOGY

## 2019-03-25 PROCEDURE — 25800030 ZZH RX IP 258 OP 636: Performed by: NURSE ANESTHETIST, CERTIFIED REGISTERED

## 2019-03-25 PROCEDURE — 71000027 ZZH RECOVERY PHASE 2 EACH 15 MINS: Performed by: UROLOGY

## 2019-03-25 PROCEDURE — 36000057 ZZH SURGERY LEVEL 3 1ST 30 MIN - UMMC: Performed by: UROLOGY

## 2019-03-25 PROCEDURE — 25000125 ZZHC RX 250: Performed by: NURSE ANESTHETIST, CERTIFIED REGISTERED

## 2019-03-25 PROCEDURE — 37000009 ZZH ANESTHESIA TECHNICAL FEE, EACH ADDTL 15 MIN: Performed by: UROLOGY

## 2019-03-25 PROCEDURE — 88305 TISSUE EXAM BY PATHOLOGIST: CPT | Mod: 26 | Performed by: OBSTETRICS & GYNECOLOGY

## 2019-03-25 PROCEDURE — 82947 ASSAY GLUCOSE BLOOD QUANT: CPT | Performed by: ANESTHESIOLOGY

## 2019-03-25 PROCEDURE — 86850 RBC ANTIBODY SCREEN: CPT | Performed by: OBSTETRICS & GYNECOLOGY

## 2019-03-25 PROCEDURE — 88112 CYTOPATH CELL ENHANCE TECH: CPT | Performed by: UROLOGY

## 2019-03-25 PROCEDURE — 88305 TISSUE EXAM BY PATHOLOGIST: CPT | Performed by: OBSTETRICS & GYNECOLOGY

## 2019-03-25 PROCEDURE — 86900 BLOOD TYPING SEROLOGIC ABO: CPT | Performed by: OBSTETRICS & GYNECOLOGY

## 2019-03-25 PROCEDURE — 25000566 ZZH SEVOFLURANE, EA 15 MIN: Performed by: UROLOGY

## 2019-03-25 PROCEDURE — 88112 CYTOPATH CELL ENHANCE TECH: CPT | Mod: 26 | Performed by: UROLOGY

## 2019-03-25 PROCEDURE — 40000171 ZZH STATISTIC PRE-PROCEDURE ASSESSMENT III: Performed by: UROLOGY

## 2019-03-25 PROCEDURE — 36000059 ZZH SURGERY LEVEL 3 EA 15 ADDTL MIN UMMC: Performed by: UROLOGY

## 2019-03-25 PROCEDURE — 25000132 ZZH RX MED GY IP 250 OP 250 PS 637: Mod: GY | Performed by: ANESTHESIOLOGY

## 2019-03-25 PROCEDURE — 27210794 ZZH OR GENERAL SUPPLY STERILE: Performed by: UROLOGY

## 2019-03-25 PROCEDURE — 25000128 H RX IP 250 OP 636: Performed by: NURSE ANESTHETIST, CERTIFIED REGISTERED

## 2019-03-25 RX ORDER — SODIUM CHLORIDE 9 MG/ML
INJECTION, SOLUTION INTRAVENOUS CONTINUOUS PRN
Status: DISCONTINUED | OUTPATIENT
Start: 2019-03-25 | End: 2019-03-25

## 2019-03-25 RX ORDER — ONDANSETRON 2 MG/ML
INJECTION INTRAMUSCULAR; INTRAVENOUS PRN
Status: DISCONTINUED | OUTPATIENT
Start: 2019-03-25 | End: 2019-03-25

## 2019-03-25 RX ORDER — FENTANYL CITRATE 50 UG/ML
25-50 INJECTION, SOLUTION INTRAMUSCULAR; INTRAVENOUS
Status: DISCONTINUED | OUTPATIENT
Start: 2019-03-25 | End: 2019-03-25 | Stop reason: HOSPADM

## 2019-03-25 RX ORDER — ONDANSETRON 4 MG/1
4 TABLET, ORALLY DISINTEGRATING ORAL
Status: DISCONTINUED | OUTPATIENT
Start: 2019-03-25 | End: 2019-03-25 | Stop reason: HOSPADM

## 2019-03-25 RX ORDER — NALOXONE HYDROCHLORIDE 0.4 MG/ML
.1-.4 INJECTION, SOLUTION INTRAMUSCULAR; INTRAVENOUS; SUBCUTANEOUS
Status: DISCONTINUED | OUTPATIENT
Start: 2019-03-25 | End: 2019-03-25 | Stop reason: HOSPADM

## 2019-03-25 RX ORDER — FENTANYL CITRATE 50 UG/ML
25-50 INJECTION, SOLUTION INTRAMUSCULAR; INTRAVENOUS EVERY 5 MIN PRN
Status: DISCONTINUED | OUTPATIENT
Start: 2019-03-25 | End: 2019-03-25 | Stop reason: HOSPADM

## 2019-03-25 RX ORDER — LIDOCAINE HYDROCHLORIDE 20 MG/ML
INJECTION, SOLUTION INFILTRATION; PERINEURAL PRN
Status: DISCONTINUED | OUTPATIENT
Start: 2019-03-25 | End: 2019-03-25

## 2019-03-25 RX ORDER — HYDROMORPHONE HYDROCHLORIDE 1 MG/ML
.3-.5 INJECTION, SOLUTION INTRAMUSCULAR; INTRAVENOUS; SUBCUTANEOUS EVERY 10 MIN PRN
Status: DISCONTINUED | OUTPATIENT
Start: 2019-03-25 | End: 2019-03-25 | Stop reason: HOSPADM

## 2019-03-25 RX ORDER — FENTANYL CITRATE 50 UG/ML
INJECTION, SOLUTION INTRAMUSCULAR; INTRAVENOUS PRN
Status: DISCONTINUED | OUTPATIENT
Start: 2019-03-25 | End: 2019-03-25

## 2019-03-25 RX ORDER — ONDANSETRON 4 MG/1
4 TABLET, ORALLY DISINTEGRATING ORAL EVERY 30 MIN PRN
Status: DISCONTINUED | OUTPATIENT
Start: 2019-03-25 | End: 2019-03-25 | Stop reason: HOSPADM

## 2019-03-25 RX ORDER — SODIUM CHLORIDE, SODIUM LACTATE, POTASSIUM CHLORIDE, CALCIUM CHLORIDE 600; 310; 30; 20 MG/100ML; MG/100ML; MG/100ML; MG/100ML
INJECTION, SOLUTION INTRAVENOUS CONTINUOUS
Status: DISCONTINUED | OUTPATIENT
Start: 2019-03-25 | End: 2019-03-25 | Stop reason: HOSPADM

## 2019-03-25 RX ORDER — DEXAMETHASONE SODIUM PHOSPHATE 4 MG/ML
INJECTION, SOLUTION INTRA-ARTICULAR; INTRALESIONAL; INTRAMUSCULAR; INTRAVENOUS; SOFT TISSUE PRN
Status: DISCONTINUED | OUTPATIENT
Start: 2019-03-25 | End: 2019-03-25

## 2019-03-25 RX ORDER — PROPOFOL 10 MG/ML
INJECTION, EMULSION INTRAVENOUS CONTINUOUS PRN
Status: DISCONTINUED | OUTPATIENT
Start: 2019-03-25 | End: 2019-03-25

## 2019-03-25 RX ORDER — SACCHAROMYCES BOULARDII 250 MG
250 CAPSULE ORAL DAILY
COMMUNITY
End: 2019-04-25

## 2019-03-25 RX ORDER — ONDANSETRON 2 MG/ML
4 INJECTION INTRAMUSCULAR; INTRAVENOUS EVERY 30 MIN PRN
Status: DISCONTINUED | OUTPATIENT
Start: 2019-03-25 | End: 2019-03-25 | Stop reason: HOSPADM

## 2019-03-25 RX ORDER — LIDOCAINE 40 MG/G
CREAM TOPICAL
Status: DISCONTINUED | OUTPATIENT
Start: 2019-03-25 | End: 2019-03-25 | Stop reason: HOSPADM

## 2019-03-25 RX ORDER — PROPOFOL 10 MG/ML
INJECTION, EMULSION INTRAVENOUS PRN
Status: DISCONTINUED | OUTPATIENT
Start: 2019-03-25 | End: 2019-03-25

## 2019-03-25 RX ORDER — EPHEDRINE SULFATE 50 MG/ML
INJECTION, SOLUTION INTRAVENOUS PRN
Status: DISCONTINUED | OUTPATIENT
Start: 2019-03-25 | End: 2019-03-25

## 2019-03-25 RX ORDER — CEFAZOLIN SODIUM 1 G/3ML
1 INJECTION, POWDER, FOR SOLUTION INTRAMUSCULAR; INTRAVENOUS SEE ADMIN INSTRUCTIONS
Status: DISCONTINUED | OUTPATIENT
Start: 2019-03-25 | End: 2019-03-25 | Stop reason: HOSPADM

## 2019-03-25 RX ORDER — HYDRALAZINE HYDROCHLORIDE 20 MG/ML
2.5-5 INJECTION INTRAMUSCULAR; INTRAVENOUS EVERY 10 MIN PRN
Status: DISCONTINUED | OUTPATIENT
Start: 2019-03-25 | End: 2019-03-25 | Stop reason: HOSPADM

## 2019-03-25 RX ORDER — CEFAZOLIN SODIUM 2 G/100ML
2 INJECTION, SOLUTION INTRAVENOUS
Status: DISCONTINUED | OUTPATIENT
Start: 2019-03-25 | End: 2019-03-25 | Stop reason: HOSPADM

## 2019-03-25 RX ADMIN — SODIUM CHLORIDE: 9 INJECTION, SOLUTION INTRAVENOUS at 08:27

## 2019-03-25 RX ADMIN — Medication 0.2 MG: at 10:36

## 2019-03-25 RX ADMIN — ROCURONIUM BROMIDE 50 MG: 10 INJECTION INTRAVENOUS at 08:41

## 2019-03-25 RX ADMIN — MIDAZOLAM 4 MG: 1 INJECTION INTRAMUSCULAR; INTRAVENOUS at 08:27

## 2019-03-25 RX ADMIN — EPHEDRINE SULFATE 7.5 MG: 50 INJECTION, SOLUTION INTRAVENOUS at 09:02

## 2019-03-25 RX ADMIN — ONDANSETRON 4 MG: 2 INJECTION INTRAMUSCULAR; INTRAVENOUS at 09:41

## 2019-03-25 RX ADMIN — Medication 0.3 MG: at 10:12

## 2019-03-25 RX ADMIN — SUGAMMADEX 120 MG: 100 INJECTION, SOLUTION INTRAVENOUS at 09:43

## 2019-03-25 RX ADMIN — ACETAMINOPHEN AND CODEINE PHOSPHATE 1 TABLET: 300; 30 TABLET ORAL at 10:57

## 2019-03-25 RX ADMIN — DEXAMETHASONE SODIUM PHOSPHATE 10 MG: 4 INJECTION, SOLUTION INTRAMUSCULAR; INTRAVENOUS at 08:45

## 2019-03-25 RX ADMIN — LIDOCAINE HYDROCHLORIDE 100 MG: 20 INJECTION, SOLUTION INFILTRATION; PERINEURAL at 08:36

## 2019-03-25 RX ADMIN — HYDROMORPHONE HYDROCHLORIDE 0.25 MG: 1 INJECTION, SOLUTION INTRAMUSCULAR; INTRAVENOUS; SUBCUTANEOUS at 09:07

## 2019-03-25 RX ADMIN — ONDANSETRON 4 MG: 2 INJECTION INTRAMUSCULAR; INTRAVENOUS at 10:17

## 2019-03-25 RX ADMIN — SODIUM CHLORIDE: 9 INJECTION, SOLUTION INTRAVENOUS at 09:42

## 2019-03-25 RX ADMIN — PROPOFOL 125 MCG/KG/MIN: 10 INJECTION, EMULSION INTRAVENOUS at 08:39

## 2019-03-25 RX ADMIN — HYDROMORPHONE HYDROCHLORIDE 0.25 MG: 1 INJECTION, SOLUTION INTRAMUSCULAR; INTRAVENOUS; SUBCUTANEOUS at 08:54

## 2019-03-25 RX ADMIN — FENTANYL CITRATE 100 MCG: 50 INJECTION, SOLUTION INTRAMUSCULAR; INTRAVENOUS at 08:37

## 2019-03-25 RX ADMIN — PROPOFOL 120 MG: 10 INJECTION, EMULSION INTRAVENOUS at 08:37

## 2019-03-25 ASSESSMENT — MIFFLIN-ST. JEOR: SCORE: 1095.06

## 2019-03-25 NOTE — PROGRESS NOTES
March 25, 2019    Patient seen in the preoperative area.  She denies any changes in her health since last visit.  She has multiple questions.  Allergies confirmed.  Procedure and its risks again discussed.  At this time questions answered and consents signed for cystoscopy with possible biopsy given a question of gross hematuria.  Cytology collected this AM.  Plan to proceed as scheduled.  Patient has refused to have any perioperative antibiotics given today given her allergies and concern that she will have a reaction while under anesthesia.    Radha Ovalle MD MPH   of Urology

## 2019-03-25 NOTE — ANESTHESIA CARE TRANSFER NOTE
Patient: Jennifer Steienr    Procedure(s):  CYSTOSCOPY  , OPERATIVE HYSTEROSCOPY WITH MORCELLATOR    Diagnosis: Growth Thickend Endometrial Bleeding  Diagnosis Additional Information: No value filed.    Anesthesia Type:   No value filed.     Note:  Airway :Face Mask  Patient transferred to:PACU  Handoff Report: Identifed the Patient, Identified the Reponsible Provider, Reviewed the pertinent medical history, Discussed the surgical course, Reviewed Intra-OP anesthesia mangement and issues during anesthesia, Set expectations for post-procedure period and Allowed opportunity for questions and acknowledgement of understanding      Vitals: (Last set prior to Anesthesia Care Transfer)    CRNA VITALS  3/25/2019 0927 - 3/25/2019 1003      3/25/2019             Pulse:  74    SpO2:  100 %                Electronically Signed By: CHIN Gillette CRNA  March 25, 2019  10:03 AM
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
## 2019-03-25 NOTE — OP NOTE
Operative Note  3/25/19    Pre-operative Diagnosis:  Postmenopausal bleeding  Thickened endometrium    Post-operative Diagnosis:  Same as above, s/p procedure below    Procedure: Hysteroscopy, Morcellation of endometrial lesions    Surgeon: Dr. Huber    Anesthesia: GETA    IVF: 1200 ml crystalloid    EBL: 25 ml    UOP: Not measured    Deficit: 380 cc    Specimens: Fragments of endometrial polyps/fibroid, Endometrial curettings    Findings: Small midposition uterus, atrophic vagina with cervix flush to apex of vagina.  Hysteroscopy with evidence of likely fibroid in left anterior wall, as well as multiple polyp like lesions throughout endometrium including posterior right upper and lower wall, posterior lower uterine segment.    Reason for procedure: Patient is a 68 yo female who presented with concerns of PMB.  Evaluation of causes for bleeding included Pelvic US which showed concerns for thickened endometrium.  As such, patient recommended to undergo procedure today.    Technique: The patient was taken to the operating room where GETA was completed.  Patient was then placed in the dorsal lithotomy position.  She was prepped and draped in the standard fashion.  A surgical timeout was completed.  Dr. Ovalle initiated procedure with Cystoscopy, please see her separate note for details of that procedure.  After completion of cystoscopy, a medium Slava speculum was placed in the vagina with the findings noted above.  The cervix was flush to the apex of the vagina.  The anterior lip of the cervix was grasped with a single toothed tenaculum.  Attempted dilation without ability to advance past internal os.  Under US guidance, attempt to visualize the uterus was hard.  As such, the bladder was backfilled with 180 ml of sterile saline.  Good visualization of the uterus was achieved with noted thickened endometrium.  The cervix was then able to be serially dilated under US guidance up to 6.5 using Half-size Hegar dilators.   The hysteroscope was then advanced into the uterine cavity with the findings noted above.  Pictures were taken.  The Myosure device was advanced through the operative channel of the hysteroscope and used to resect the lesions as described above.  Endometrial sampling was also completed on all 4 uterine walls.  Decision given remaining atrophic appearance of uterus to not proceed with sharp curettage.  The hysteroscope was removed.  The tenaculum was removed and bleeding was controlled with pressure.  The speculum was removed from the vagina.  The patient tolerated the procedure well and was taken to the PACU in stable condition.    Marybeth Huber MD

## 2019-03-25 NOTE — ANESTHESIA POSTPROCEDURE EVALUATION
Anesthesia POST Procedure Evaluation    Patient: Jennifer Steiner   MRN:     [id number] Gender:   female   Age:    69 year old :      1950        Preoperative Diagnosis: Growth Thickend Endometrial Bleeding   Procedure(s):  CYSTOSCOPY  , OPERATIVE HYSTEROSCOPY WITH MORCELLATOR   Postop Comments: No value filed.       Anesthesia Type:  General    Reportable Event: NO     PAIN: Uncomplicated   Sign Out status: Comfortable, Well controlled pain     PONV: No PONV   Sign Out status:  No Nausea or Vomiting     Neuro/Psych: Uneventful perioperative course   Sign Out Status: Preoperative baseline; Age appropriate mentation     Airway/Resp.: Uneventful perioperative course   Sign Out Status: Non labored breathing, age appropriate RR; Resp. Status within EXPECTED Parameters     CV: Uneventful perioperative course   Sign Out status: Appropriate BP and perfusion indices; Appropriate HR/Rhythm     Disposition:   Sign Out in:  PACU  Disposition:  Phase II; Home  Recovery Course: Uneventful  Follow-Up: Not required           Last Anesthesia Record Vitals:  CRNA VITALS  3/25/2019 0927 - 3/25/2019 1027      3/25/2019             SpO2:  99 %    Resp Rate (observed):  14    EKG:  Sinus rhythm          Last PACU/Preop Vitals:  Vitals:    19 1000 19 1015 19 1030   BP:  100/60 97/70   Pulse:  76 74   Resp: 12 11 12   Temp: 36.7  C (98.1  F)  36.6  C (97.9  F)   SpO2: 100% 100% 100%         Electronically Signed By: Demar Escalona MD, 2019, 10:47 AM

## 2019-03-25 NOTE — DISCHARGE INSTRUCTIONS
Same-Day Surgery   Adult Discharge Orders & Instructions     For 24 hours after surgery:  1. Get plenty of rest.  A responsible adult must stay with you for at least 24 hours after you leave the hospital.   2. Pain medication can slow your reflexes. Do not drive or use heavy equipment.  If you have weakness or tingling, don't drive or use heavy equipment until this feeling goes away.  3. Mixing alcohol and pain medication can cause dizziness and slow your breathing. It can even be fatal. Do not drink alcohol while taking pain medication.  4. Avoid strenuous or risky activities.  Ask for help when climbing stairs.   5. You may feel lightheaded.  If so, sit for a few minutes before standing.  Have someone help you get up.   6. If you have nausea (feel sick to your stomach), drink only clear liquids such as apple juice, ginger ale, broth or 7-Up.  Rest may also help.  Be sure to drink enough fluids.  Move to a regular diet as you feel able. Take pain medications with a small amount of solid food, such as toast or crackers, to avoid nausea.   7. A slight fever is normal. Call the doctor if your fever is over 100 F (37.7 C) (taken under the tongue) or lasts longer than 24 hours.  8. You may have a dry mouth, muscle aches, trouble sleeping or a sore throat.  These symptoms should go away after 24 hours.  9. Do not make important or legal decisions.   Pain Management:      1. Take pain medication (if prescribed) for pain as directed by your physician.        2. WARNING: If the pain medication you have been prescribed contains Tylenol  (acetaminophen), DO NOT take additional doses of Tylenol (acetaminophen).     Call your doctor for any of the followin.  Signs of infection (fever, growing tenderness at the surgery site, severe pain, a large amount of drainage or bleeding, foul-smelling drainage, redness, swelling).    2.  It has been over 8 to 10 hours since surgery and you are still not able to urinate (pee).    3.   Headache for over 24 hours.    4.  Numbness, tingling or weakness the day after surgery (if you had spinal anesthesia).  To contact a doctor, call _____________________________________ or:      [phone] and ask for the Resident On Call for:          __________________________________________ (answered 24 hours a day)      Emergency Department:  [place] [facility]: [phone]  [place] [facility]: [phone]               Rev. 10/2014   If you use hormonal birth control (such as the pill, patch, ring or implants):  You will need a second form of birth control for 7 days (condoms, a diaphragm or contraceptive foam).  While in the surgery center, you received a medicine called Sugammadex.  Hormonal birth control (such as the pill, patch, ring or implants) may not work as well for a week after taking this medicine.    What happens after hysteroscopy?  You may have cramps and bleeding for 24 hours after the procedure. This is normal. Use pads instead of tampons.  Do not douche or use tampons until your health care provider says it s OK.  Do not use any vaginal medicines until you are told it s OK.  Ask your health care provider when it s OK to have sex again.  When should I call my health care provider?  Call your health care provider if you have:  Heavy bleeding (more than 1 pad an hour for 2 or more hours)  A fever over 100.4 F (38.0 C)  Increasing abdominal pain or tenderness  Foul-smelling discharge  Follow-up care  Schedule a follow-up visit with your health care provider. Based on the results of your test, you may need more treatment. Be sure to follow instructions and keep your appointments.      4480-7601 [employer]. [address] [place] [address]. All rights reserved. This information is not intended as a substitute for professional medical care. Always follow your healthcare professional's instructions.       Cystoscopy    Cystoscopy is a procedure that  lets your doctor look directly inside your urethra and bladder. It can be used to:    Help diagnose a problem with your urethra, bladder, or kidneys.    Take a sample (biopsy) of bladder or urethral tissue.    Treat certain problems (such as removing kidney stones).    Place a stent to bypass an obstruction.    Take special X-rays of the kidneys.  Based on the findings, your doctor may recommend other tests or treatments.  What is a cystoscope?  A cystoscope is a telescope-like instrument that contains lenses and fiberoptics (small glass wires that make bright light). The cystoscope may be straight and rigid, or flexible to bend around curves in the urethra. The doctor may look directly into the cystoscope, or project the image onto a monitor.  Getting ready    Ask your doctor if you should stop taking any medicines before the procedure.    Ask whether you should avoid eating or drinking anything after midnight before the procedure.    Follow any other instructions your doctor gives you.  Tell your doctor before the exam if you:    Take any medicines, such as aspirin or blood thinners    Have allergies to any medicines    Are pregnant   The procedure  Cystoscopy is done in the doctor s office, surgery center, or hospital. The doctor and a nurse are present during the procedure. It takes only a few minutes, longer if a biopsy, X-ray, or treatment needs to be done.  During the procedure:    You lie on an exam table on your back, knees bent and legs apart. You are covered with a drape.    Your urethra and the area around it are washed. Anesthetic jelly may be applied to numb the urethra. Other pain medicine is usually not needed. In some cases, you may be offered a mild sedative to help you relax. If a more extensive procedure is to be done, such as a biopsy or kidney stone removal, general anesthesia may be needed.    The cystoscope is inserted. A sterile fluid is put into the bladder to expand it. You may feel  pressure from this fluid.    When the procedure is done, the cystoscope is removed.  After the procedure  If you had a sedative, general anesthesia, or spinal anesthesia, you must have someone drive you home. Once you re home:    Drink plenty of fluids.    You may have burning or light bleeding when you urinate--this is normal.    Medicines may be prescribed to ease any discomfort or prevent infection. Take these as directed.    Call your doctor if you have heavy bleeding or blood clots, burning that lasts more than a day, a fever over 100 F  (38  C), or trouble urinating.  Date Last Reviewed: 1/1/2017 2000-2018 [employer]. [address] [place] [address], [place] [address]. All rights reserved. This information is not intended as a substitute for professional medical care. Always follow your healthcare professional's instructions.

## 2019-03-25 NOTE — LETTER
March 26, 2019       TO: Jennifer Steiner  [address] [facility] [address]       DearMs.Aglynn,    We are writing to inform you of your test results.    Your test results fall within the expected range(s) or remain unchanged from previous results.  Please continue with current treatment plan.    Resulted Orders   Cytology non gyn   Result Value Ref Range    Copath Report       Patient Name: JENNIFER STEINER  MR#: [id number]  Specimen #: [id number]  Collected: 3/25/2019  Received: 3/25/2019  Reported: 3/26/2019 12:05  Ordering Phy(s): JOE SEE VENESSA FREEDK    For improved result formatting, select 'View Enhanced Report Format' under   Linked Documents section.    SPECIMEN/STAIN PROCESS:  Urine, voided       Pap-Cyto x 1    ----------------------------------------------------------------    CYTOLOGIC INTERPRETATION:     Urine, voided:   Negative for High-Grade Urothelial Carcinoma  Acute inflammation present.  Specimen Adequacy: Satisfactory for evaluation.    I have personally reviewed all specimens and/or slides, including the   listed special stains, and used them  with my medical judgement to determine or confirm the final diagnosis.    Electronically signed out by:  Daren Alejo M.D., [facility]    Processed and screened at [facility]    CLINICAL HISTORY:  69-year-old woman with a history of  post menopausal bleeding, thickened   endometrium and hematuria.    ,    GROSS:  Urine, voided:  Received 10 ml of yellow, clear fluid, processed as 1 Pap   stained [employer]..    MICROSCOPIC:  Microscopic examination was performed.    Galina Roberts MD, Cytopathology Fellow; Melo Mercedes MD, Attending    CPT Codes:  A: [id number]    TESTING LAB LOCATION:  [facility], [address]   [id number] [phone]    COLLECTION SITE:  Client:  [facility]  [facility], [place]  Location:  [facility] (B)    Resident  [id number]         Thank you,  PALLAVI Murillo   for Dr. Radha Ovalle

## 2019-03-26 LAB — COPATH REPORT: NORMAL

## 2019-03-27 ENCOUNTER — PRE VISIT (OUTPATIENT)
Dept: UROLOGY | Facility: CLINIC | Age: 69
End: 2019-03-27

## 2019-03-27 NOTE — TELEPHONE ENCOUNTER
Reason for visit: post op - cystoscopy     Relevant information: history of hysteroscopy     Records/imaging/labs/orders: all records available    Pt called: no need for a call    At Rooming: regular

## 2019-03-28 ENCOUNTER — CARE COORDINATION (OUTPATIENT)
Dept: UROLOGY | Facility: CLINIC | Age: 69
End: 2019-03-28

## 2019-03-28 NOTE — PROGRESS NOTES
Left message to have her call me to let me know how she is doing since her surgery on Monday with Dr Vasquez Cabezas, RN   Care Coordinator Urology

## 2019-03-29 LAB — COPATH REPORT: NORMAL

## 2019-03-29 NOTE — PROGRESS NOTES
Patient returned call to say she is doing fine since surgery. She stopped taking pain medication because it was causing insomnia. She denies pain. She has fishy smelling discharge with small amount of light drainage to pad. She did change the pad 4 times yesterday. The pad was not saturated. Cytology reviewed with the patient also.    Gricelda Cabezas RN   Care Coordinator Urology

## 2019-04-02 ENCOUNTER — TELEPHONE (OUTPATIENT)
Dept: OBGYN | Facility: CLINIC | Age: 69
End: 2019-04-02

## 2019-04-02 NOTE — TELEPHONE ENCOUNTER
Spoke with Jennifer who reports she was told she may need antibiotics based on the result of the pathology from her procedure last week and she is wondering if she can see Dr. Huber on 4/4 to coincide with her appointment with Dr. Ovalle so she only has to make one trip to the .    Advised note would be sent to Dr. Huber to approve adding her on 4/4 at 1145. She agreed with plan and will await a callback.

## 2019-04-02 NOTE — TELEPHONE ENCOUNTER
Spoke with Dr. Huber who advised she can see Jennifer on 4/4 at 0715.    Tried to reach Jennifer but received voicemail.  Left message with date and time of appointment availability - please call back to confirm this works with your schedule.

## 2019-04-04 ENCOUNTER — OFFICE VISIT (OUTPATIENT)
Dept: UROLOGY | Facility: CLINIC | Age: 69
End: 2019-04-04
Payer: MEDICARE

## 2019-04-04 ENCOUNTER — OFFICE VISIT (OUTPATIENT)
Dept: OBGYN | Facility: CLINIC | Age: 69
End: 2019-04-04
Attending: OBSTETRICS & GYNECOLOGY
Payer: MEDICARE

## 2019-04-04 ENCOUNTER — ANCILLARY PROCEDURE (OUTPATIENT)
Dept: CT IMAGING | Facility: CLINIC | Age: 69
End: 2019-04-04
Attending: UROLOGY
Payer: MEDICARE

## 2019-04-04 VITALS
WEIGHT: 130 LBS | DIASTOLIC BLOOD PRESSURE: 62 MMHG | HEIGHT: 64 IN | SYSTOLIC BLOOD PRESSURE: 102 MMHG | BODY MASS INDEX: 22.2 KG/M2 | HEART RATE: 67 BPM

## 2019-04-04 VITALS
WEIGHT: 129.5 LBS | HEART RATE: 93 BPM | BODY MASS INDEX: 22.11 KG/M2 | HEIGHT: 64 IN | DIASTOLIC BLOOD PRESSURE: 65 MMHG | SYSTOLIC BLOOD PRESSURE: 115 MMHG

## 2019-04-04 DIAGNOSIS — N71.1 CHRONIC ENDOMETRITIS: ICD-10-CM

## 2019-04-04 DIAGNOSIS — R31.0 GROSS HEMATURIA: ICD-10-CM

## 2019-04-04 DIAGNOSIS — Z87.448 HISTORY OF HEMATURIA: Primary | ICD-10-CM

## 2019-04-04 DIAGNOSIS — M62.89 PELVIC FLOOR DYSFUNCTION: ICD-10-CM

## 2019-04-04 DIAGNOSIS — Z98.890 STATUS POST HYSTEROSCOPIC POLYPECTOMY: Primary | ICD-10-CM

## 2019-04-04 LAB — RADIOLOGIST FLAGS: NORMAL

## 2019-04-04 PROCEDURE — G0463 HOSPITAL OUTPT CLINIC VISIT: HCPCS | Mod: ZF

## 2019-04-04 RX ORDER — IOPAMIDOL 755 MG/ML
80 INJECTION, SOLUTION INTRAVASCULAR ONCE
Status: COMPLETED | OUTPATIENT
Start: 2019-04-04 | End: 2019-04-04

## 2019-04-04 RX ORDER — AZITHROMYCIN 250 MG/1
TABLET, FILM COATED ORAL
Qty: 6 TABLET | Refills: 0 | Status: SHIPPED | OUTPATIENT
Start: 2019-04-04 | End: 2019-04-17

## 2019-04-04 RX ADMIN — IOPAMIDOL 80 ML: 755 INJECTION, SOLUTION INTRAVASCULAR at 08:38

## 2019-04-04 ASSESSMENT — PAIN SCALES - GENERAL: PAINLEVEL: MILD PAIN (3)

## 2019-04-04 ASSESSMENT — MIFFLIN-ST. JEOR
SCORE: 1094.47
SCORE: 1096.74

## 2019-04-04 NOTE — PROGRESS NOTES
"Gynecology Visit Note  4/4/19    Reason for visit: Discuss pathology results    S: Patient is a 68 yo nulligravid postmenopausal female who presents today to discuss pathology results from hysteroscopy, morcellation of endometrial polyps on 3/25/19 for PMB, thickened stripe and vaginal discharge.  Pathology showed no evidence of atypia or malignancy, polyps and chronic endometritis.  Patient was informed of these results via phone message and wanted to come in to discuss consideration of antibiotics for the chronic endometritis which is likely the cause of the vaginal discharge she had been having.      Since her procedure, she has been doing well.  Continues to have some bleeding, but now lighter and darker in color.  No other drainage.  Denies fevers/chills.  Took pain meds for a couple days but had bad side effects with GI and sleep issues so stopped taking them.  Otherwise thinks things are going ok.  Wants to know how much longer to wait for her Pelvic Floor PT initiation again and when she can get back in pool.    O:  /65   Pulse 93   Ht 1.613 m (5' 3.5\")   Wt 58.7 kg (129 lb 8 oz)   BMI 22.58 kg/m       General: NAD, A&Ox3  Resp: Non-labored breathing    Pathology 3/25/19:  FINAL DIAGNOSIS:   Endometrium, hysteroscopic resection:   -Fragments of endometrial polyp(s)   -Chronic endometritis, marked   -Proliferative pattern endometrium with extensive stromal and glandular   breakdown   -Negative for atypia or malignancy    A/P: 68 yo nulligravid female presents to discuss consideration of antibiotics for chronic endometritis found on pathology  1) Chronic endometritis: Discussed with patient recommendation for either doxycycline 100 mg bid x 10-14 days versus Azithromycin 500 mg first day then 250 mg x 4 days.  Discussed this would likely help with the drainage she has been having and to ensure it does not persist post-surgery.  Patient has decided to proceed with Azithromycin as she has tolerated " this antibiotic well in past and has had issues with others in the past.  Rx sent.  Discussed post-operative recovery and instructions.  Went over imaging from procedure.  She is very grateful for her care and appreciative of our discussion today.  Desires record release form to get her records to share with her integrative medicine physician, form given to complete today.    Marybeth Huber MD

## 2019-04-04 NOTE — LETTER
April 24, 2019       TO: [facility]  [address] [facility] [address] [address]       DearMs.Jatin,    We are writing to inform you of your test results.    You have a small kidney stone that can be watched and if causes issues we can have you see one of our kidney stone providers.    The gas in the bladder may be left over from the recent procedure and the endometrial thickening has already been evaluated.    Your liver cysts (appear simple) and the bony demineralization are things that you can follow up with your primary care provider about.    Resulted Orders   CT Urogram   Result Value Ref Range    Radiologist flags Air within the urinary bladder. See above     Narrative    EXAMINATION: CT ABDOMEN PELVIS W/O & W CONTRAST, 4/4/2019 8:51  AM    TECHNIQUE:  Helical CT images from the lung bases through the  symphysis pubis were obtained with and without IV contrast into the  urogram protocol. Contrast dose: 80mL Isovue-370    COMPARISON: 11/30/2010    HISTORY: Hematuria, unknown cause; gross hematuria; Gross hematuria.  Recent endometrial biopsy showing chronic endometritis.    FINDINGS:    Abdomen and pelvis: Nonobstructing 2 mm stone in the left kidney.  Multiple simple renal parenchymal cysts bilaterally. No masses or  hydronephrosis. Small amount of gas in the urinary bladder. Unchanged  thickening of the endometrial stripe in the fundus.    Numerous simple hepatic cysts. 14 x 9 mm hemangioma in the right lobe  of the liver (series 6 image 103). No suspicious hepatic lesions.  Inferior accessory hepatic vein. The gallbladder, pancreas, spleen,  and adrenal glands are normal. No dilated or thickened loops of bowel.  Normal appendix. No free air/fluid in the abdomen or pelvis. Abdominal  aorta is nonaneurysmal.    Lung bases: Small fat-containing left Bochdalek hernia. Bibasilar  atelectasis. Heart size is normal. No pericardial effusion.    Bones and soft tissues: Diffuse bony demineralization.  Grade 1  anterolisthesis of L4 on L5.      Impression    IMPRESSION:   1. Nonobstructing 2 mm left renal calculus.  2. Small amount of gas in the urinary bladder without associated  inflammatory changes. If no recent instrumentation was performed,  cystoscopy did not demonstrate abnormal findings, and there are no  overt signs of urinary infection, one option would be to obtain a  cystogram to exclude possibility of fistulization.  3. Unchanged endometrial thickening recently biopsied to show chronic  endometritis.  4. Diffuse bony demineralization.     [Consider Follow Up: Air within the urinary bladder. See above  recommendation]    This report will be copied to the [facility] to ensure a  provider acknowledges the finding.     I have personally reviewed the examination and initial interpretation  and I agree with the findings.    BUTCH LINO MD       Thank you,    PALLAVI Murillo    for Dr. Radha Ovalle

## 2019-04-04 NOTE — LETTER
"4/4/2019       RE: Jennifer Steiner  [address]  [facility] [address]     Dear Colleague,    Thank you for referring your patient, Jennifer Steiner, to the [facility] at [facility]. Please see a copy of my visit note below.    Gynecology Visit Note  4/4/19    Reason for visit: Discuss pathology results    S: Patient is a 68 yo nulligravid postmenopausal female who presents today to discuss pathology results from hysteroscopy, morcellation of endometrial polyps on 3/25/19 for PMB, thickened stripe and vaginal discharge.  Pathology showed no evidence of atypia or malignancy, polyps and chronic endometritis.  Patient was informed of these results via phone message and wanted to come in to discuss consideration of antibiotics for the chronic endometritis which is likely the cause of the vaginal discharge she had been having.      Since her procedure, she has been doing well.  Continues to have some bleeding, but now lighter and darker in color.  No other drainage.  Denies fevers/chills.  Took pain meds for a couple days but had bad side effects with GI and sleep issues so stopped taking them.  Otherwise thinks things are going ok.  Wants to know how much longer to wait for her Pelvic Floor PT initiation again and when she can get back in pool.    O:  /65   Pulse 93   Ht 1.613 m (5' 3.5\")   Wt 58.7 kg (129 lb 8 oz)   BMI 22.58 kg/m        General: NAD, A&Ox3  Resp: Non-labored breathing    Pathology 3/25/19:  FINAL DIAGNOSIS:   Endometrium, hysteroscopic resection:   -Fragments of endometrial polyp(s)   -Chronic endometritis, marked   -Proliferative pattern endometrium with extensive stromal and glandular   breakdown   -Negative for atypia or malignancy    A/P: 68 yo nulligravid female presents to discuss consideration of antibiotics for chronic endometritis found on pathology  1) Chronic endometritis: Discussed with patient recommendation " for either doxycycline 100 mg bid x 10-14 days versus Azithromycin 500 mg first day then 250 mg x 4 days.  Discussed this would likely help with the drainage she has been having and to ensure it does not persist post-surgery.  Patient has decided to proceed with Azithromycin as she has tolerated this antibiotic well in past and has had issues with others in the past.  Rx sent.  Discussed post-operative recovery and instructions.  Went over imaging from procedure.  She is very grateful for her care and appreciative of our discussion today.  Desires record release form to get her records to share with her integrative medicine physician, form given to complete today.    Marybeth Huber MD

## 2019-04-04 NOTE — DISCHARGE INSTRUCTIONS

## 2019-04-04 NOTE — NURSING NOTE
"Chief Complaint   Patient presents with     Follow Up     F/U after surgery, review CT       Blood pressure 102/62, pulse 67, height 1.613 m (5' 3.5\"), weight 59 kg (130 lb), not currently breastfeeding. Body mass index is 22.67 kg/m .    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     Pelvic floor dysfunction     Myalgia of pelvic floor     Other constipation     Abnormal defecation     Chronic abdominal pain     Backache     Chronic fatigue syndrome     Food intolerance     Generalized pain     Hemorrhoids     Major depressive disorder, recurrent episode (H)     Symptomatic menopausal or female climacteric states       Allergies   Allergen Reactions     Aspirin GI Disturbance     GI upset     Aspirin GI Disturbance     Bee Venom Swelling     Birds [Feathers]      Cats      Dust Mites      Fluoride Other (See Comments) and Unknown     headaches     Liothyronine Unknown     Severe gut reactions, ears itching, throat closing     Mold      No Clinical Screening - See Comments Other (See Comments)     Some antibiotics but not Zpak-Can tolerate Azithromycin     Procaine Other (See Comments)     headache     Seasonal Allergies      Ragweed, pollen     Simvastatin Other (See Comments)     \"hot flash\", muscle weakness, dizziness     Simvastatin Unknown     Penicillins Unknown and Rash     As a child     Sulfa Drugs Itching, Other (See Comments), Rash and Hives       Current Outpatient Medications   Medication Sig Dispense Refill     azithromycin (ZITHROMAX) 250 MG " tablet Take 2 tablets (500 mg) by mouth daily for 1 day, THEN 1 tablet (250 mg) daily for 4 days. 6 tablet 0     Charcoal Activated (CHARCOAL PO) Take 6 capsules by mouth daily as needed. For indigestion       Cholecalciferol (VITAMIN D) 2000 UNIT tablet Take 1 tablet by mouth daily.       Digestive Enzymes TABS Use as directed       DiphenhydrAMINE HCl (BENADRYL PO) Take by mouth daily as needed       EPINEPHrine (EPIPEN) 0.3 MG/0.3ML injection 2-pack Inject 0.3 mLs (0.3 mg) into the muscle once as needed for anaphylaxis 0.3 mL 1     estradiol (ESTRACE) 0.1 MG/GM vaginal cream Place 0.5 g vaginally Use one to two times weekly.       HYDROCHLORIC ACID Take as directed       Hypromellose (ARTIFICIAL TEARS OP) Apply 1 drop to eye daily as needed       MAGNESIUM OXIDE PO Take 6-8 tablets by mouth daily        neomycin-polymyxin-dexamethasone (MAXITROL) 3.5-08255-3.1 SUSP ophthalmic susp Place 1-2 drops into both eyes At Bedtime 1 Bottle 3     peppermint oil liquid 1 mL daily as needed for other Taking gel tabs not oil, not in EMR.       Probiotic Product (ALOE 01460 & PROBIOTICS) CAPS        PROGESTERONE 50MG CAPSULES COMPOUND 100 mg At Bedtime Take 2 capsules at bedtime.       Testosterone Propionate (FIRST-TESTOSTERONE MC) 2 % CREA        thyroid (ARMOUR THYROID) 60 MG tablet Take 60 mg by mouth every morning        saccharomyces boulardii (FLORASTOR) 250 MG capsule Take 250 mg by mouth daily         Social History     Tobacco Use     Smoking status: Never Smoker     Smokeless tobacco: Never Used   Substance Use Topics     Alcohol use: No     Drug use: No       Abbey Acosta LPN  4/4/2019  9:18 AM

## 2019-04-04 NOTE — LETTER
"  RE: Jennifer Steiner  [address] [facility] [address]     Dear Colleague,    Thank you for referring your patient, Jennifer Steiner, to the [facility] UROLOGY AND INST FOR PROSTATE AND UROLOGIC CANCERS at [facility]. Please see a copy of my visit note below.    April 4, 2019    Return visit    Patient returns today for follow up.   She saw Dr Huber this AM and then had her CT scan.  She denies any changes in her health since last visit.    /62   Pulse 67   Ht 1.613 m (5' 3.5\")   Wt 59 kg (130 lb)   BMI 22.67 kg/m     She is comfortable, in no distress, non-labored breathing.      CT urogram final report not in but based on my review that contributed to her gross hematuria    Cytology negative for high grade malignancy    A/P: 69 year old F with history of gross hematuria s/p negative urologic evaluation, pelvic floor dysfunction    Will let her know the final CT read    At this time she will continue her pelvic floor therapy exercises    RTC prn    15 minutes spent with patient today in discussion      Radha Ovalle MD MPH   of Urology        CC  Patient Care Team:  No Ref-Primary, Physician as PCP - General  Ladonna Reaves MD as MD (Specialist)  Samantha Figueroa APRN CNP as Nurse Practitioner (Nurse Practitioner)  Calderon La MD as MD (Cardiology)  Damir Cunningham MD as MD (Ophthalmology)  Thelma Arvizu MD as MD (Internal Medicine)  Kristan Kaba, RN as Registered Nurse  Radha Ovalle MD as MD (Urology)  Gricelda Cabezas, RN as Registered Nurse (Urology)  Pancho Santos OD as MD (Optometry)  Milan Alvarez MD as Referring Physician (Family Practice)  Kim Herrmann APRN CNP as Nurse Practitioner (Nurse Practitioner)  MILAN ALVAREZ    "

## 2019-04-04 NOTE — PATIENT INSTRUCTIONS
We will let you know the final CT scan results    We will have you continue pelvic floor therapy    It was a pleasure meeting with you today.  Thank you for allowing me and my team the privilege of caring for you today.  YOU are the reason we are here, and I truly hope we provided you with the excellent service you deserve.  Please let us know if there is anything else we can do for you so that we can be sure you are leaving completely satisfied with your care experience.  It was a pleasure meeting with you today.  Thank you for allowing me and my team the privilege of caring for you today.  YOU are the reason we are here, and I truly hope we provided you with the excellent service you deserve.  Please let us know if there is anything else we can do for you so that we can be sure you are leaving completely satisfied with your care experience.        Abbey Acosta LPN

## 2019-04-10 NOTE — TELEPHONE ENCOUNTER
[facility] Call [facility]    Phone Message    May a detailed message be left on voicemail: yes    Reason for Call: Other: pt would like the copies of results and visit notes sent to her home, she asked for them and never got them from the visit with dr Alvarez from May 9.  She wants the the comments on the lab results too    Action Taken: Message routed to:  [facility] Primary Care    
Called patient, left message for a call back.     June 28, 2018 9:57 AM  Called patient back, she states she never got her lab results in the mail and the visit notes. I looked in her chart and see there is a message from the nurse stating they will mail a copy of the visit note. Will get this mail out to the patient, confirmed an address with the patient.   
no

## 2019-04-11 ENCOUNTER — TELEPHONE (OUTPATIENT)
Dept: GASTROENTEROLOGY | Facility: CLINIC | Age: 69
End: 2019-04-11

## 2019-04-11 NOTE — TELEPHONE ENCOUNTER
[facility] Call Center    Phone Message    May a detailed message be left on voicemail: yes    Reason for Call: Other: Patient wants to be seen today or tommor for GI and pelvic floor issues.  She is bleeding and having pain.  Please call asap.      Action Taken: Message routed to:  [facility] nir

## 2019-04-11 NOTE — TELEPHONE ENCOUNTER
"Called patient and discussed her current symptoms.  Patient reports that she feels she has a hard ball of stool in her rectum she cannot pass. Patient does report that she feels like she passed a \"rock\" several days ago and she thinks there is another one in there that she cannot pass.  Patient has complaints of rectal pain and rectal pressure. And feels \"very uncomfortable\" . Patient does not have omplaints of abdominal pain, fever, nausea or vomiting.  She does say she is \"scared to eat because she charles not want the \"rock to hurt on the way out\".     Patient has tried increasing her magnesium oxide,  Added ban oil to tap water enemas and digital stimulation to try to remove the hard piece of stool.      Patient reports having surgery several weeks ago and being on a pain medication regimen for a few days.     Patient informed that if she feels that she cannot pass stool and needs help \"removing the rock\"  The patient should report to her PCP or her local urgent care or Emergency room for assistance with her constipation.  Patient was also encouraged to take miralax daily for several days to help soften her stools.     Patient became upset with the medical advice, she states that she feels that she is not \"constipated, but has rocks in her tight  rectum\" which was causing her not to pass her stool.  It was explained to patient that since she was unable to pass any stool,pass the hard stool  constipation was the word used, not that she was always constipated or had continuous trouble with constipation. Patient wanted to see a provider \"urgently\" int the GI clinic so they could use \"tools\" to remove the stool. Patient was informed that there were no urgent clinic appointments available in the speciality clinic, but her PCP or urgent care would be the appropriate place to report to if she feels this is an urgent condition.      At the end of the conversation patient had agreed to seek hep from her PCP or urgent care " and to maybe try Miralax to soften her stools.

## 2019-04-17 ENCOUNTER — OFFICE VISIT (OUTPATIENT)
Dept: FAMILY MEDICINE | Facility: CLINIC | Age: 69
End: 2019-04-17
Payer: MEDICARE

## 2019-04-17 VITALS
HEART RATE: 80 BPM | OXYGEN SATURATION: 97 % | SYSTOLIC BLOOD PRESSURE: 100 MMHG | BODY MASS INDEX: 22.2 KG/M2 | HEIGHT: 64 IN | WEIGHT: 130 LBS | DIASTOLIC BLOOD PRESSURE: 66 MMHG

## 2019-04-17 DIAGNOSIS — N20.0 KIDNEY STONE: ICD-10-CM

## 2019-04-17 DIAGNOSIS — Z78.0 POSTMENOPAUSAL STATUS: ICD-10-CM

## 2019-04-17 DIAGNOSIS — M54.9 UPPER BACK PAIN: ICD-10-CM

## 2019-04-17 DIAGNOSIS — M54.2 CERVICAL PAIN: Primary | ICD-10-CM

## 2019-04-17 ASSESSMENT — MIFFLIN-ST. JEOR: SCORE: 1096.81

## 2019-04-17 ASSESSMENT — PAIN SCALES - GENERAL: PAINLEVEL: MILD PAIN (3)

## 2019-04-17 NOTE — PROGRESS NOTES
"[facility] [place]   Milan Alvarez MD  04/17/2019      Chief Complaint:   Back Pain       History of Present Illness:   Jennifer Steiner is a 69 year old female with a history of irritable bowel syndrome, pelvic flare ups, and neck pain who presents for evaluation of neck/back pain and follow-up.    Upper back/neck pain  The patient states that existing \"pinching\" pain in her upper back and neck has been worse recently without known cause. Pain is relieved when she is reclined and exacerbated by sitting and carrying items. She believes someone suggested it is due to lateral scoliosis but there is not evidence of testing for this and she does not believe it is true. She would like a PT referral.     Pelvic floor pain  She currently attends PT for pelvic floor at [facility], which was ordered by Dr. Ovalle. She states this was improving symptoms until a recent occurrence of gross hematuria, which is being managed by Dr. Ovalle and was discussed at the patient's 04/04/2019 Urology appointment. She had not received full results, which are noted in exam below.    Constipation  On 04/11/2019 the patient presented at [facility] with constipation and abdominal spasms/cramping. A 2 cm diameter very hard ball of stool was removed and she was discharged with instructions to take MiraLAX and oral fluids with GI follow-up. She notes that her pelvic floor pain progress has reversed during this episode and she is continuing to experience pelvic floor and abdominal pain.    Irritable bowel syndrome  She has been seeing Dr. Arvizu for GI, but does not feel that the match is a good one as the patient prefers more natural remedies. Dr. Ovalle ordered a biofeedback study at [facility] but it was not allowed as she has previously had this done. She states she is looking on her own for a GI physician and does not need a referral. She has seen Dr. Goldstein at [facility] previously, but they did not want to continue " follow-up for the patient's concerns, per patient.    Hypothyroidism  Her TSH on 12/27/2018 was 0.06, T4 was 0.9, and T3 was 186.    Other concerns discussed:  She notes an allergy to cipro and possibly other antibiotics, as she is uncertain if this is present on her chart. She tolerates Z-pac well, per patient.  She notes appreciation for her care.     Healthcare maintenance  Mammogram (females age 40 - 75): yearly or every 2 years? - Patient Declined  Colon Cancer screening (age 50 - 75): yearly FIT or colonoscopy every 5 - 10 years - Up to Date (due 2021)  Dexa (females age ? 65, males age ? 70): every 2 years - Recommended  Immunizations (Tetanus every 10 years, Influenza yearly, Pneumonia PPV-23 and PCV-13 age ? 65 or sooner if risk factors) - Patient Declined     There is no immunization history on file for this patient.     The following health maintenance issues were also reviewed and addressed as needed:  Blood pressure (>18 years annually)  Depression - PHQ-2 Score (04/04/2019): 0/6  Lifestyle habits (including exercise, diet, weight)  Health risk behaviors (sexual history, alcohol, tobacco, drug use, partner violence)  Routine eye, dental, hearing, and skin exams  Advanced directives    Review of Systems:   Pertinent items are noted in HPI, remainder of complete ROS is negative.      Active Medications:      Charcoal Activated (CHARCOAL PO), Take 6 capsules by mouth daily as needed. For indigestion, Disp: , Rfl:      Cholecalciferol (VITAMIN D) 2000 UNIT tablet, Take 1 tablet by mouth daily., Disp: , Rfl:      Digestive Enzymes TABS, Use as directed, Disp: , Rfl:      DiphenhydrAMINE HCl (BENADRYL PO), Take by mouth daily as needed, Disp: , Rfl:      EPINEPHrine (EPIPEN) 0.3 MG/0.3ML injection 2-pack, Inject 0.3 mLs (0.3 mg) into the muscle once as needed for anaphylaxis, Disp: 0.3 mL, Rfl: 1     estradiol (ESTRACE) 0.1 MG/GM vaginal cream, Place 0.5 g vaginally Use one to two times weekly., Disp: , Rfl:       HYDROCHLORIC ACID, Take as directed, Disp: , Rfl:      Hypromellose (ARTIFICIAL TEARS OP), Apply 1 drop to eye daily as needed, Disp: , Rfl:      MAGNESIUM OXIDE PO, Take 6-8 tablets by mouth daily , Disp: , Rfl:      neomycin-polymyxin-dexamethasone (MAXITROL) 3.5-70145-7.1 SUSP ophthalmic susp, Place 1-2 drops into both eyes At Bedtime, Disp: 1 Bottle, Rfl: 3     peppermint oil liquid, 1 mL daily as needed for other Taking gel tabs not oil, not in EMR., Disp: , Rfl:      Probiotic Product (ALOE 40907 & PROBIOTICS) CAPS, , Disp: , Rfl:      PROGESTERONE 50MG CAPSULES COMPOUND, 100 mg At Bedtime Take 2 capsules at bedtime., Disp: , Rfl:      saccharomyces boulardii (FLORASTOR) 250 MG capsule, Take 250 mg by mouth daily, Disp: , Rfl:      Testosterone Propionate (FIRST-TESTOSTERONE MC) 2 % CREA, , Disp: , Rfl:      thyroid (ARMOUR THYROID) 60 MG tablet, Take 60 mg by mouth every morning , Disp: , Rfl:       Allergies:   Aspirin; Aspirin; Bee venom; Birds [feathers]; Cats; Dust mites; Fluoride; Liothyronine; Mold; No clinical screening - see comments; Procaine; Seasonal allergies; Simvastatin; Simvastatin; Penicillins; and Sulfa drugs      Past Medical History:  Anemia  Asthma  Blepharitis  Constipation, chronic  Chronic edema use  Cerebral infarction  Hyperlipidemia  Hypothyroidism  Irritable bowel sydnrome  Neck injuries  Postmenopausal hormone replacement therapy, from Dr. 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  Pelvic floor dysfunction  Myalgia of pelvic floor  Constipation  Abnormal defecation  Chronic abdominal pain  Backache  Chronic fatigue syndrome  Food intolerance  Generalized pain  Hemorrhoids  Major depressive disorder  Hematuria     Past Surgical History:  Stomach surgery  Cystoscopy  Dilation and curettage, operative hysteroscopy with morcellator, combined  Breath  "hydrogen test  Tonsillectomy  Breast reduction  Skin removal from arms, legs  Tummy tuck    Family History:   Father - diabetes, bowel obstruction ( post-surgical age 82), obesity  Broter - alcohol/drug abuse, hepatitis, heart disease (history of drug abuse,  age 62), diabetes, skin cancer, allergies, anesthesia reaction  Mother - melanoma, osteoporosis  Sister - substance abuse  Brother - asthma, diabetes  Sister - bone cancer, anxiety disorder  Brother - obesity  Sister - alcoholism       Social History:   The patient was alone.  Smoking Status: Never   Smokeless Tobacco: Never   Alcohol Use: No        Physical Exam:   /66 (BP Location: Right arm, Patient Position: Sitting, Cuff Size: Adult Regular)   Pulse 80   Ht 1.613 m (5' 3.5\")   Wt 59 kg (130 lb)   LMP  (LMP Unknown)   SpO2 97%   Breastfeeding? No   BMI 22.66 kg/m       Constitutional: Alert. In no distress.  Head: The scalp, face, and head appear normal.  Musculoskeletal: Extremities appear normal. No gross deformities noted.   Neurologic: Gait normal. Speech is normal and fluent.  Psychiatric: Mentation appears normal. Normal affect.     Recent laboratory results  Component      Latest Ref Rng & Units 2018   T4 Free      0.76 - 1.46 ng/dL 0.90   Triiodothyronine (T3)      60 - 181 ng/dL 186 (H)   TSH      0.40 - 4.00 mU/L 0.06 (L)       CT Urogram  (Was performed following cystoscopy)  2019   IMPRESSION:   1. Nonobstructing 2 mm left renal calculus.  2. Small amount of gas in the urinary bladder without associated  inflammatory changes. If no recent instrumentation was performed,  cystoscopy did not demonstrate abnormal findings, and there are no  overt signs of urinary infection, one option would be to obtain a  cystogram to exclude possibility of fistulization.  3. Unchanged endometrial thickening recently biopsied to show chronic  endometritis.  4. Diffuse bony demineralization.   [Consider Follow Up: Air within the " urinary bladder. See above  recommendation]    This report will be copied to the [facility] to ensure a  provider acknowledges the finding.      I have personally reviewed the examination and initial interpretation  and I agree with the findings.     BUTCH LINO MD    Ultrasound Pelvis Complete  Uterine findings:              Presence: Visible Size: Normal 4.7x3.3 cm.  Endometrium = 14.2 mm.              Cx length = 3.3 cm.                 Flexion:  Retroverted  Position: Deviated right           Margins: Smooth         Shape: Normal              Contour: Regular        Texture: Heterogeneous         Cavity: Abnormal - visualized portion of endometrium appears thickened, irregular.          Masses: no definite masses seen.  Pelvic findings:               Right Adnexa: Normal              Left Adnexa: Normal              Bladder:  Normal                                                           Cul - de - sac fluid: None  Ovarian follicles:              Right ovary:  Not visualized               Left ovary:  Not visualized   Comments:  Thick endometrium for post-menopausal female  Roberta Menjivar RDMS  Marybeth Huber MD    Assessment and Plan:  Cervical and upper back pain  Chronic and slowly worse.   - PHYSICAL THERAPY REFERRAL    Postmenopausal status  Due for recheck.   - Dexa hip/pelvis/spine*     Hypothyroidism  Thyroid disorder managed by outside clinician who is aware her last thyroid labs were abnoral.    Healthcare maintenance  She declines immunizations.   Per patient, she is due for colonoscopy in two years.  She will get a mammogram at [facility].    Chronic abdominal pain  She would like to find a new GI doctor and is going to look on her own for someone she feels is suited to her.    Cardiology visit reviewed. She is asymptomatic.    No kidney stone sx, discussed what those would be, this is new dx for her    Follow-up: prn        Scribe Disclosure:  I, Hilton Coelho, am serving as a  scribe to document services personally performed by Milan Alvarez MD at this visit, based upon the provider's statements to me. All documentation has been reviewed by the aforementioned provider prior to being entered into the official medical record.     Portions of this medical record were completed by a scribe. UPON MY REVIEW AND AUTHENTICATION BY ELECTRONIC SIGNATURE, this confirms (a) I performed the applicable clinical services, and (b) the record is accurate.   Milan Alvarez MD

## 2019-04-17 NOTE — NURSING NOTE
Chief Complaint   Patient presents with     Back Pain     follow up on back pain per pt        Patricia Michael EMT at 10:57 AM on 4/17/2019.

## 2019-04-17 NOTE — PATIENT INSTRUCTIONS
[facility] Medication Refill Request Information:  * Please contact your pharmacy regarding ANY request for medication refills.  ** [facility] Prescription Fax = [phone]  * Please allow 3 business days for routine medication refills.  * Please allow 5 business days for controlled substance medication refills.     [facility] Test Result notification information:  *You will be notified with in 7-10 days of your appointment day regarding the results of your test.  If you are on MyChart you will be notified as soon as the provider has reviewed the results and signed off on them.    [facility] ([place]): [phone] (Imaging);

## 2019-04-25 ENCOUNTER — TELEPHONE (OUTPATIENT)
Dept: UROLOGY | Facility: CLINIC | Age: 69
End: 2019-04-25

## 2019-04-25 ENCOUNTER — OFFICE VISIT (OUTPATIENT)
Dept: OBGYN | Facility: CLINIC | Age: 69
End: 2019-04-25
Attending: FAMILY MEDICINE
Payer: MEDICARE

## 2019-04-25 VITALS
WEIGHT: 130 LBS | SYSTOLIC BLOOD PRESSURE: 107 MMHG | DIASTOLIC BLOOD PRESSURE: 57 MMHG | HEART RATE: 63 BPM | BODY MASS INDEX: 22.2 KG/M2 | HEIGHT: 64 IN

## 2019-04-25 DIAGNOSIS — N71.9 ENDOMETRITIS: Primary | ICD-10-CM

## 2019-04-25 PROCEDURE — G0463 HOSPITAL OUTPT CLINIC VISIT: HCPCS | Mod: ZF

## 2019-04-25 ASSESSMENT — PAIN SCALES - GENERAL: PAINLEVEL: NO PAIN (0)

## 2019-04-25 ASSESSMENT — MIFFLIN-ST. JEOR: SCORE: 1096.74

## 2019-04-25 NOTE — LETTER
RE: Jennifer Steiner  [address] [address]     Dear Colleague,    Thank you for referring your patient, Jennifer Steiner, to the [facility]. Please see a copy of my visit note below.  Progress Note    SUBJECTIVE:  Jennifer Steiner is an 69 year old, , who requests a consultation regrading preventing endometrial polyps and fibroids.    Concerns today include:     Postmenopausal female who presents today to discuss pathology results from hysteroscopy, morcellation of endometrial polyps on 3/25/19 for PMB, thickened stripe and vaginal discharge. Pathology showed no evidence of atypia or malignancy, polyps and chronic endometritis.     Concerns about how to prevent recurrence of endometritis and wanting clarification on how endometritis developed. Wondering if there is preventive integrative therapy to reduce lining of the endometrium. Aware that she had polyps removed but is seeking understanding of what polyps are. Jennifer was concerned that endometrial polyp size correlated with how long polyps have been present. Patient has been doing reading on studies and the potential correlation between diseases and polyps.    Jennifer sees natural healers and TCM provider in addition to conventional health care.    Menstrual History:  No flowsheet data found.      HISTORY:    Current Outpatient Medications on File Prior to Visit:  Charcoal Activated (CHARCOAL PO) Take 6 capsules by mouth daily as needed. For indigestion   Cholecalciferol (VITAMIN D) 2000 UNIT tablet Take 1 tablet by mouth daily.   Digestive Enzymes TABS Use as directed   DiphenhydrAMINE HCl (BENADRYL PO) Take by mouth daily as needed   EPINEPHrine (EPIPEN) 0.3 MG/0.3ML injection 2-pack Inject 0.3 mLs (0.3 mg) into the muscle once as needed for anaphylaxis   estradiol (ESTRACE) 0.1 MG/GM vaginal cream Place 0.5 g vaginally Use one to two times weekly.   HYDROCHLORIC  "ACID Take as directed   Hypromellose (ARTIFICIAL TEARS OP) Apply 1 drop to eye daily as needed   MAGNESIUM OXIDE PO Take 6-8 tablets by mouth daily    neomycin-polymyxin-dexamethasone (MAXITROL) 3.5-99140-1.1 SUSP ophthalmic susp Place 1-2 drops into both eyes At Bedtime   peppermint oil liquid 1 mL daily as needed for other Taking gel tabs not oil, not in EMR.   Probiotic Product (ALOE 21735 & PROBIOTICS) CAPS    PROGESTERONE 50MG CAPSULES COMPOUND 100 mg At Bedtime Take 2 capsules at bedtime.   Testosterone Propionate (FIRST-TESTOSTERONE MC) 2 % CREA    thyroid (ARMOUR THYROID) 60 MG tablet Take 60 mg by mouth every morning      No current facility-administered medications on file prior to visit.   Allergies   Allergen Reactions     Aspirin GI Disturbance     GI upset     Aspirin GI Disturbance     Bee Venom Swelling     Birds [Feathers]      Cats      Dust Mites      Fluoride Other (See Comments) and Unknown     headaches     Liothyronine Unknown     Severe gut reactions, ears itching, throat closing     Mold      No Clinical Screening - See Comments Other (See Comments)     Some antibiotics but not Zpak-Can tolerate Azithromycin     Procaine Other (See Comments)     headache     Seasonal Allergies      Ragweed, pollen     Simvastatin Other (See Comments)     \"hot flash\", muscle weakness, dizziness     Simvastatin Unknown     Penicillins Unknown and Rash     As a child     Sulfa Drugs Itching, Other (See Comments), Rash and Hives       There is no immunization history on file for this patient.    OB History    Para Term  AB Living   0 0 0 0 0 0   SAB TAB Ectopic Multiple Live Births   0 0 0 0 0     Past Medical History:   Diagnosis Date     Anemia      Asthma     since childhood     Blepharitis      Constipation, chronic     Chronic enema use     CVA (cerebral infarction)     carotid intimal repair, left     Hyperlipidemia age 58    identified age 58     Hypothyroidism      Irritable bowel " disease      Neck injuries      Pelvic floor dysfunction      Postmenopausal hormone replacement therapy     From Dr. Link     Scoliosis      Past Surgical History:   Procedure Laterality Date     C STOMACH SURGERY PROCEDURE UNLISTED       CYSTOSCOPY N/A 3/25/2019    Procedure: CYSTOSCOPY;  Surgeon: Radha Ovalle MD;  Location: [facility] OR     DILATION AND CURETTAGE, OPERATIVE HYSTEROSCOPY WITH MORCELLATOR, COMBINED N/A 3/25/2019    Procedure: OPERATIVE HYSTEROSCOPY WITH MORCELLATOR;  Surgeon: Marybeth Huber MD;  Location: [facility] OR     HC BREATH HYDROGEN TEST  2013    Procedure: HYDROGEN BREATH TEST;  Surgeon: Esteban Short MD;  Location: [facility]     SURGICAL HISTORY OF -       tonsillectomy     SURGICAL HISTORY OF -   ~    cosmetic skin removal: breast reduction; skin removal from arms legs and tummy tuck     Family History   Problem Relation Age of Onset     Diabetes Father      Gastrointestinal Disease Father 82         after surgery for Bowel obstruction     Obesity Father      Alcohol/Drug Brother      Gastrointestinal Disease Brother         Hepatitis     Heart Disease Brother 62        hx of drug abuse,  age 62     Diabetes Brother      Skin Cancer Brother      Substance Abuse Brother      Allergies Brother      Anesthesia Reaction Brother      Melanoma Mother      Skin Cancer Mother      Osteoporosis Mother      Substance Abuse Sister      Asthma Brother      Diabetes Brother      Cancer Sister      Cancer Sister 56        bone cancer     Anxiety Disorder Sister      Obesity Brother      Alcoholism Sister      Alcoholism Brother      Bone Cancer Sister      Colon Cancer No family hx of      Crohn's Disease No family hx of      Ulcerative Colitis No family hx of      Colon Polyps No family hx of      Glaucoma No family hx of      Macular Degeneration No family hx of      Social History     Socioeconomic History     Marital status: Single     Spouse name: None     Number of  "children: None     Years of education: None     Highest education level: None   Occupational History     Occupation: disability     Comment:    Social Needs     Financial resource strain: None     Food insecurity:     Worry: None     Inability: None     Transportation needs:     Medical: None     Non-medical: None   Tobacco Use     Smoking status: Never Smoker     Smokeless tobacco: Never Used   Substance and Sexual Activity     Alcohol use: No     Drug use: No     Sexual activity: Not Currently     Birth control/protection: Post-menopausal   Lifestyle     Physical activity:     Days per week: None     Minutes per session: None     Stress: None   Relationships     Social connections:     Talks on phone: None     Gets together: None     Attends Temple service: None     Active member of club or organization: None     Attends meetings of clubs or organizations: None     Relationship status: None     Intimate partner violence:     Fear of current or ex partner: None     Emotionally abused: None     Physically abused: None     Forced sexual activity: None   Other Topics Concern     Parent/sibling w/ CABG, MI or angioplasty before 65F 55M? Not Asked   Social History Narrative     None       ROS  [unfilled]  No flowsheet data found.  No flowsheet data found.    EXAM:  Blood pressure 107/57, pulse 63, height 1.613 m (5' 3.5\"), weight 59 kg (130 lb), not currently breastfeeding. Body mass index is 22.67 kg/m .  Constitutional: Well appearing woman in no acute distress, appropriately groomed   Oriented to time and place, engaged and interactive   Psychological: Appropriate mood  Eyes symmetrical, sclera clear and white, conjunctiva pink and moist   Mouth: moist mucous membranes, no visible dental caries   Skin: warm and dry, no visible rashes or lesions  Neuro: normal gait, no visible tremor   Musculoskeletal: Full ROM, no muscular weakness visible   30 minutes was spent in direct contact with patient and > " 50% of the total  time in patient education and coordination of care.     ASSESSMENT:  Encounter Diagnosis   Name Primary?     Endometritis Yes        PLAN:     Explained that there is no clear etiology of the endometritis. Discussed differences and definitions of polyps, fibroids, and cysts. Discussed that polyps likely won't reoccur after removal and reassured that uterine polyps are not markers for cancer.     Provided information on the value of literature review for researching polyps, fibroids, and endometritis.     Recommended scheduling with Dr. Carpenter to discuss operative report if more detail needed+    Advised next visit with Dr Vogt for 40 minute integrative assessment    Again, thank you for allowing me to participate in the care of your patient.      Sincerely,    CHIN Medrano CNP

## 2019-04-25 NOTE — TELEPHONE ENCOUNTER
Health Call Center    Phone Message    May a detailed message be left on voicemail: yes    Reason for Call: Other: pt had a surgery back in march and just now recieved the notes and read that she was given antibotics during the surgery. The pt was wondering what antiobotics was given do her due to the fact they discussed to not give her those because she sometimes has reactions. Please call pt back to discuss.     Action Taken: Message routed to:  [facility] urology

## 2019-04-25 NOTE — PATIENT INSTRUCTIONS

## 2019-04-25 NOTE — PROGRESS NOTES
Progress Note    SUBJECTIVE:  Jennifer Steiner is an 69 year old, , who requests a consultation regrading preventing endometrial polyps and fibroids.    Concerns today include:     Postmenopausal female who presents today to discuss pathology results from hysteroscopy, morcellation of endometrial polyps on 3/25/19 for PMB, thickened stripe and vaginal discharge. Pathology showed no evidence of atypia or malignancy, polyps and chronic endometritis.     Concerns about how to prevent recurrence of endometritis and wanting clarification on how endometritis developed. Wondering if there is preventive integrative therapy to reduce lining of the endometrium. Aware that she had polyps removed but is seeking understanding of what polyps are. Jennifer was concerned that endometrial polyp size correlated with how long polyps have been present. Patient has been doing reading on studies and the potential correlation between diseases and polyps.    Jennifer sees natural healers and TCM provider in addition to conventional health care.    Menstrual History:  No flowsheet data found.      HISTORY:    Current Outpatient Medications on File Prior to Visit:  Charcoal Activated (CHARCOAL PO) Take 6 capsules by mouth daily as needed. For indigestion   Cholecalciferol (VITAMIN D) 2000 UNIT tablet Take 1 tablet by mouth daily.   Digestive Enzymes TABS Use as directed   DiphenhydrAMINE HCl (BENADRYL PO) Take by mouth daily as needed   EPINEPHrine (EPIPEN) 0.3 MG/0.3ML injection 2-pack Inject 0.3 mLs (0.3 mg) into the muscle once as needed for anaphylaxis   estradiol (ESTRACE) 0.1 MG/GM vaginal cream Place 0.5 g vaginally Use one to two times weekly.   HYDROCHLORIC ACID Take as directed   Hypromellose (ARTIFICIAL TEARS OP) Apply 1 drop to eye daily as needed   MAGNESIUM OXIDE PO Take 6-8 tablets by mouth daily    neomycin-polymyxin-dexamethasone (MAXITROL) 3.5-03395-2.1 SUSP ophthalmic susp Place 1-2 drops into both eyes At Bedtime  "  peppermint oil liquid 1 mL daily as needed for other Taking gel tabs not oil, not in EMR.   Probiotic Product (ALOE 54126 & PROBIOTICS) CAPS    PROGESTERONE 50MG CAPSULES COMPOUND 100 mg At Bedtime Take 2 capsules at bedtime.   Testosterone Propionate (FIRST-TESTOSTERONE MC) 2 % CREA    thyroid (ARMOUR THYROID) 60 MG tablet Take 60 mg by mouth every morning      No current facility-administered medications on file prior to visit.   Allergies   Allergen Reactions     Aspirin GI Disturbance     GI upset     Aspirin GI Disturbance     Bee Venom Swelling     Birds [Feathers]      Cats      Dust Mites      Fluoride Other (See Comments) and Unknown     headaches     Liothyronine Unknown     Severe gut reactions, ears itching, throat closing     Mold      No Clinical Screening - See Comments Other (See Comments)     Some antibiotics but not Zpak-Can tolerate Azithromycin     Procaine Other (See Comments)     headache     Seasonal Allergies      Ragweed, pollen     Simvastatin Other (See Comments)     \"hot flash\", muscle weakness, dizziness     Simvastatin Unknown     Penicillins Unknown and Rash     As a child     Sulfa Drugs Itching, Other (See Comments), Rash and Hives       There is no immunization history on file for this patient.    OB History    Para Term  AB Living   0 0 0 0 0 0   SAB TAB Ectopic Multiple Live Births   0 0 0 0 0     Past Medical History:   Diagnosis Date     Anemia      Asthma     since childhood     Blepharitis      Constipation, chronic     Chronic enema use     CVA (cerebral infarction) 2009    carotid intimal repair, left     Hyperlipidemia age 58    identified age 58     Hypothyroidism      Irritable bowel disease      Neck injuries      Pelvic floor dysfunction      Postmenopausal hormone replacement therapy     From Dr. Link     Scoliosis      Past Surgical History:   Procedure Laterality Date     C STOMACH SURGERY PROCEDURE UNLISTED       CYSTOSCOPY N/A 3/25/2019    " Procedure: CYSTOSCOPY;  Surgeon: Radha Ovalle MD;  Location: [facility]     DILATION AND CURETTAGE, OPERATIVE HYSTEROSCOPY WITH MORCELLATOR, COMBINED N/A 3/25/2019    Procedure: OPERATIVE HYSTEROSCOPY WITH MORCELLATOR;  Surgeon: Marybeth Huber MD;  Location: [facility]     HC BREATH HYDROGEN TEST  2013    Procedure: HYDROGEN BREATH TEST;  Surgeon: Esteban Short MD;  Location: [facility]     SURGICAL HISTORY OF -       tonsillectomy     SURGICAL HISTORY OF -   ~    cosmetic skin removal: breast reduction; skin removal from arms legs and tummy tuck     Family History   Problem Relation Age of Onset     Diabetes Father      Gastrointestinal Disease Father 82         after surgery for Bowel obstruction     Obesity Father      Alcohol/Drug Brother      Gastrointestinal Disease Brother         Hepatitis     Heart Disease Brother 62        hx of drug abuse,  age 62     Diabetes Brother      Skin Cancer Brother      Substance Abuse Brother      Allergies Brother      Anesthesia Reaction Brother      Melanoma Mother      Skin Cancer Mother      Osteoporosis Mother      Substance Abuse Sister      Asthma Brother      Diabetes Brother      Cancer Sister      Cancer Sister 56        bone cancer     Anxiety Disorder Sister      Obesity Brother      Alcoholism Sister      Alcoholism Brother      Bone Cancer Sister      Colon Cancer No family hx of      Crohn's Disease No family hx of      Ulcerative Colitis No family hx of      Colon Polyps No family hx of      Glaucoma No family hx of      Macular Degeneration No family hx of      Social History     Socioeconomic History     Marital status: Single     Spouse name: None     Number of children: None     Years of education: None     Highest education level: None   Occupational History     Occupation: disability     Comment:    Social Needs     Financial resource strain: None     Food insecurity:     Worry: None     Inability: None      "Transportation needs:     Medical: None     Non-medical: None   Tobacco Use     Smoking status: Never Smoker     Smokeless tobacco: Never Used   Substance and Sexual Activity     Alcohol use: No     Drug use: No     Sexual activity: Not Currently     Birth control/protection: Post-menopausal   Lifestyle     Physical activity:     Days per week: None     Minutes per session: None     Stress: None   Relationships     Social connections:     Talks on phone: None     Gets together: None     Attends Oriental orthodox service: None     Active member of club or organization: None     Attends meetings of clubs or organizations: None     Relationship status: None     Intimate partner violence:     Fear of current or ex partner: None     Emotionally abused: None     Physically abused: None     Forced sexual activity: None   Other Topics Concern     Parent/sibling w/ CABG, MI or angioplasty before 65F 55M? Not Asked   Social History Narrative     None       ROS  [unfilled]  No flowsheet data found.  No flowsheet data found.      EXAM:  Blood pressure 107/57, pulse 63, height 1.613 m (5' 3.5\"), weight 59 kg (130 lb), not currently breastfeeding. Body mass index is 22.67 kg/m .  Constitutional: Well appearing woman in no acute distress, appropriately groomed   Oriented to time and place, engaged and interactive   Psychological: Appropriate mood  Eyes symmetrical, sclera clear and white, conjunctiva pink and moist   Mouth: moist mucous membranes, no visible dental caries   Skin: warm and dry, no visible rashes or lesions  Neuro: normal gait, no visible tremor   Musculoskeletal: Full ROM, no muscular weakness visible   30 minutes was spent in direct contact with patient and > 50% of the total  time in patient education and coordination of care.         ASSESSMENT:  Encounter Diagnosis   Name Primary?     Endometritis Yes        PLAN:     Explained that there is no clear etiology of the endometritis. Discussed differences and definitions of " polyps, fibroids, and cysts. Discussed that polyps likely won't reoccur after removal and reassured that uterine polyps are not markers for cancer.     Provided information on the value of literature review for researching polyps, fibroids, and endometritis.     Recommended scheduling with Dr. Carpenter to discuss operative report if more detail needed+    Advised next visit with Dr Vogt for 40 minute integrative assessment    Kim BAEZ

## 2019-04-25 NOTE — NURSING NOTE
Chief Complaint   Patient presents with     Establish Care     Pt is here to discuss symptom management with integrative therapies.

## 2019-04-25 NOTE — TELEPHONE ENCOUNTER
Patient called and told that no antibiotics were given during the surgery per dr attila Vance LPN Staff Nurse

## 2019-05-06 ENCOUNTER — TELEPHONE (OUTPATIENT)
Dept: OBGYN | Facility: CLINIC | Age: 69
End: 2019-05-06

## 2019-05-06 NOTE — TELEPHONE ENCOUNTER
"----- Message from Mar Lopes sent at 5/6/2019  9:52 AM CDT -----  Regarding: Bleeding post op (surg March 25)  Contact: [phone]  Pt had polyps removed March 25.  Bleeding stopped about week and a half ago, but started again over the weekend.  Somewhat dark but she's experiencing some pain \"like getting period\" pain.    "

## 2019-05-06 NOTE — TELEPHONE ENCOUNTER
Spoke with Jennifer who reports she had surgery on 3/25 and the bleeding stopped about 1.5 weeks ago. When the bleeding stopped she restarted taking estrogen cream. She has continued to take progesterone and testosterone cream which she applies to the back of her knees.  On Saturday (May 4) she began to have brown/purple blood with wiping and a small amount goes on her underwear. She also has 'irritation' and a feeling of 'heaviness' as if you are about to get a period.  She reports she took all the antibiotic after the surgery for the infection. Denies fever/chils and odor to blood.    Spoke with Dr. Plaza who advised she have a follow-up clinic visit with Dr. Huber - since she had negative pathology recently, bleeding could be from hormones.    Spoke again with Jennifer and gave her recommendation. She agreed with plan and will call back to schedule.

## 2019-06-10 ENCOUNTER — TRANSFERRED RECORDS (OUTPATIENT)
Dept: HEALTH INFORMATION MANAGEMENT | Facility: CLINIC | Age: 69
End: 2019-06-10

## 2019-07-31 ENCOUNTER — MEDICAL CORRESPONDENCE (OUTPATIENT)
Dept: HEALTH INFORMATION MANAGEMENT | Facility: CLINIC | Age: 69
End: 2019-07-31

## 2019-09-11 ENCOUNTER — TRANSFERRED RECORDS (OUTPATIENT)
Dept: HEALTH INFORMATION MANAGEMENT | Facility: CLINIC | Age: 69
End: 2019-09-11

## 2019-10-18 ENCOUNTER — TELEPHONE (OUTPATIENT)
Dept: UROLOGY | Facility: CLINIC | Age: 69
End: 2019-10-18

## 2019-10-18 NOTE — TELEPHONE ENCOUNTER
[facility] Call Center    Phone Message    May a detailed message be left on voicemail: yes    Reason for Call: Form or Letter   Type or form/letter needing completion: Progress Note Date 10.2.2019  Another form will be faxed today to clinic fax [phone]    Provider: Please have Dr. Ovalle sign at return  Date form needed: ASAP  Once completed: Fax [phone]      Action Taken: Other: [facility] urology

## 2019-10-22 NOTE — TELEPHONE ENCOUNTER
[facility] Call Center    Phone Message    May a detailed message be left on voicemail: yes    Reason for Call: Other: [employer] is having trouble getting a Fax through to us at the clinic fax machine.  Gave them the referral fax line which they will attempt today.     Action Taken: Message routed to:  [facility] urology

## 2020-03-05 ENCOUNTER — OFFICE VISIT (OUTPATIENT)
Dept: OPHTHALMOLOGY | Facility: CLINIC | Age: 70
End: 2020-03-05
Attending: OPHTHALMOLOGY
Payer: MEDICARE

## 2020-03-05 DIAGNOSIS — H02.88A MEIBOMIAN GLAND DYSFUNCTION (MGD) OF UPPER AND LOWER LIDS OF BOTH EYES: Primary | ICD-10-CM

## 2020-03-05 DIAGNOSIS — H25.13 NUCLEAR SCLEROSIS OF BOTH EYES: ICD-10-CM

## 2020-03-05 DIAGNOSIS — H02.88B MEIBOMIAN GLAND DYSFUNCTION (MGD) OF UPPER AND LOWER LIDS OF BOTH EYES: Primary | ICD-10-CM

## 2020-03-05 DIAGNOSIS — H05.419: ICD-10-CM

## 2020-03-05 PROCEDURE — G0463 HOSPITAL OUTPT CLINIC VISIT: HCPCS | Mod: ZF

## 2020-03-05 ASSESSMENT — EXTERNAL EXAM - RIGHT EYE: OD_EXAM: ENOPHTHALMOS

## 2020-03-05 ASSESSMENT — CONF VISUAL FIELD
OS_NORMAL: 1
METHOD: COUNTING FINGERS
OD_NORMAL: 1

## 2020-03-05 ASSESSMENT — EXTERNAL EXAM - LEFT EYE: OS_EXAM: NORMAL

## 2020-03-05 ASSESSMENT — REFRACTION_WEARINGRX
OD_CYLINDER: +0.75
OS_CYLINDER: +0.50
OS_SPHERE: -5.25
OD_SPHERE: -5.75
OD_AXIS: 078
OS_AXIS: 100

## 2020-03-05 ASSESSMENT — CUP TO DISC RATIO
OS_RATIO: 0.3
OD_RATIO: 0.3

## 2020-03-05 ASSESSMENT — TONOMETRY
OD_IOP_MMHG: 12
IOP_METHOD: ICARE
OS_IOP_MMHG: 11

## 2020-03-05 ASSESSMENT — VISUAL ACUITY
METHOD: SNELLEN - LINEAR
CORRECTION_TYPE: GLASSES
OD_CC+: -3
OS_PH_CC: 20/25
OD_CC: 20/20
OS_CC: 20/30

## 2020-03-05 NOTE — NURSING NOTE
Chief Complaints and History of Present Illnesses   Patient presents with     Follow Up     1 year follow up Angular blepharitis of right eye      Chief Complaint(s) and History of Present Illness(es)     Follow Up     Comments: 1 year follow up Angular blepharitis of right eye               Comments     Pt states vision is the same as last visit. No eye pain today.  No new flashes or floaters. Pt reports seeing occasional floaters in both eyes.    IVANA Cifuentes March 5, 2020 2:32 PM

## 2020-03-05 NOTE — PROGRESS NOTES
Chief Complaint(s) and History of Present Illness(es)     Follow Up     Comments: 1 year follow up Angular blepharitis of right eye               Comments     Pt states vision is the same as last visit. No eye pain today.  No new flashes or floaters. Pt reports seeing occasional floaters in both   eyes.    IVANA Cifuentes March 5, 2020 2:32 PM          Pt reports right is sinking in while left eye in protruding out. She had MRI Brain last year and it was normal. She has chronic flashes and floaters in both eyes        Review of systems for the eyes was negative other than the pertinent positives/negatives listed in the HPI.      Assessment & Plan      Jennifer Steiner is a 69 year old female with the following diagnoses:   1. Meibomian gland dysfunction (MGD) of upper and lower lids of both eyes    2. 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 Nuclear sclerosis of both eyes       Doing well.  She has been proactive with warm compresses and use of a sauna.   Looks good on exam today  Continue artificial tears and warm compresses as before     Cataract, both eyes  Not visually significant      Patient disposition:   Return in about 1 year (around 3/5/2021).    Larry Marc MD  Ophthalmology Resident, PGY-2       Attending Physician Attestation:  Complete documentation of historical and exam elements from today's encounter can be found in the full encounter summary report (not reduplicated in this progress note).  I personally obtained the chief complaint(s) and history of present illness.  I confirmed and edited as necessary the review of systems, past medical/surgical history, family history, social history, and examination findings as documented by others; and I examined the patient myself.  I personally reviewed the relevant tests, images, and reports as documented above.  I formulated and edited as necessary the assessment and plan and discussed the findings and management  plan with the patient and family. . - Damir Cunningham MD

## 2020-03-20 ENCOUNTER — TELEPHONE (OUTPATIENT)
Dept: OPHTHALMOLOGY | Facility: CLINIC | Age: 70
End: 2020-03-20

## 2020-03-20 NOTE — TELEPHONE ENCOUNTER
Spoke to pt at 1157    Left eye symptoms times 1-2 weeks    Crusting and burning and aching pain.    Vision stable    Maybe a little redness in left eye   No new light sensitivities    Maybe a little swollen under left eye    Pt has h/o seasonal allergies    Would like to review starting anna marie/poly/dex drops while symptomatic   ( reviewed possibility of restarting in 3-2019 visit)    Note to Dr. De La Cruz to assess symptoms/plan of care    Pt does not have [employer]/computer to send images    Thomas Benson RN 12:10 PM 03/20/20                    Jennifer Steiner [phone]      Left message at 1025  Reviewed OTC lubricating artificial tears and warm compresses can by ongoing    (no medicated eye drops ordered)    Thomas Benson RN 10:26 AM 03/20/20          [facility] Call Center    Phone Message    May a detailed message be left on voicemail: yes     Reason for Call: Other: Patient wants to know if she can take 2 eye meds ongoing?  Please call for clarification and questions.      Action Taken: Message routed to:  [facility]: Eye    Travel Screening: Not Applicable

## 2020-03-25 ENCOUNTER — VIRTUAL VISIT (OUTPATIENT)
Dept: FAMILY MEDICINE | Facility: CLINIC | Age: 70
End: 2020-03-25
Payer: MEDICARE

## 2020-03-25 ENCOUNTER — TELEPHONE (OUTPATIENT)
Dept: OPHTHALMOLOGY | Facility: CLINIC | Age: 70
End: 2020-03-25

## 2020-03-25 ENCOUNTER — TELEPHONE (OUTPATIENT)
Dept: FAMILY MEDICINE | Facility: CLINIC | Age: 70
End: 2020-03-25

## 2020-03-25 DIAGNOSIS — H01.003 ANGULAR BLEPHARITIS OF RIGHT EYE: ICD-10-CM

## 2020-03-25 DIAGNOSIS — H02.88B MEIBOMIAN GLAND DYSFUNCTION (MGD) OF UPPER AND LOWER LIDS OF BOTH EYES: Primary | ICD-10-CM

## 2020-03-25 DIAGNOSIS — R30.0 DYSURIA: Primary | ICD-10-CM

## 2020-03-25 DIAGNOSIS — H02.88A MEIBOMIAN GLAND DYSFUNCTION (MGD) OF UPPER AND LOWER LIDS OF BOTH EYES: Primary | ICD-10-CM

## 2020-03-25 DIAGNOSIS — R30.0 DYSURIA: ICD-10-CM

## 2020-03-25 DIAGNOSIS — R05.9 COUGH: Primary | ICD-10-CM

## 2020-03-25 RX ORDER — NEOMYCIN SULFATE, POLYMYXIN B SULFATE, AND DEXAMETHASONE 3.5; 10000; 1 MG/G; [USP'U]/G; MG/G
0.5 OINTMENT OPHTHALMIC
Qty: 1 TUBE | Refills: 4 | Status: SHIPPED | OUTPATIENT
Start: 2020-03-25 | End: 2020-04-01

## 2020-03-25 RX ORDER — NEOMYCIN SULFATE, POLYMYXIN B SULFATE AND DEXAMETHASONE 3.5; 10000; 1 MG/ML; [USP'U]/ML; MG/ML
1 SUSPENSION/ DROPS OPHTHALMIC 2 TIMES DAILY PRN
Qty: 5 ML | Refills: 4 | Status: SHIPPED | OUTPATIENT
Start: 2020-03-25 | End: 2020-10-20 | Stop reason: ALTCHOICE

## 2020-03-25 ASSESSMENT — PAIN SCALES - GENERAL: PAINLEVEL: MODERATE PAIN (4)

## 2020-03-25 NOTE — TELEPHONE ENCOUNTER
[facility] Call Center    Phone Message    May a detailed message be left on voicemail: no     Reason for Call: Other: Pt is calling back stating no one has called her back about her symptoms and she called on 03/20/20, pt would like a call today     Action Taken: Message routed to:  [facility]    Travel Screening: Not Applicable

## 2020-03-25 NOTE — PROGRESS NOTES
"Jennifer Steiner is a 70 year old female who is being evaluated via a billable telephone visit.      The patient has been notified of following:     \"This telephone visit will be conducted via a call between you and your physician/provider. We have found that certain health care needs can be provided without the need for a physical exam.  This service lets us provide the care you need with a short phone conversation.  If a prescription is necessary we can send it directly to your pharmacy.  If lab work is needed we can place an order for that and you can then stop by our lab to have the test done at a later time.    If during the course of the call the physician/provider feels a telephone visit is not appropriate, you will not be charged for this service.\"     Jennifer Steiner complains of    Chief Complaint   Patient presents with     Recheck Medication     pt would like to discuss lab/tests due for, colonoscopy/dexa     Kidney Stone Related     pt would like to disucss possible kidney stone     Heart Problem     pt would like to disucss \"irregular rhythms\"     Eye Problem     pt would like previous eye drops     UTI     pt would like to disucss possible uti       I have reviewed and updated the patient's Past Medical History, Social History, Family History and Medication List.    ALLERGIES  Aspirin; Aspirin; Bee venom; Birds [feathers]; Cats; Dust mites; Fluoride; Liothyronine; Mold; No clinical screening - see comments; Procaine; Seasonal allergies; Simvastatin; Simvastatin; Penicillins; and Sulfa drugs     Current Outpatient Medications   Medication     Charcoal Activated (CHARCOAL PO)     Cholecalciferol (VITAMIN D) 2000 UNIT tablet     Digestive Enzymes TABS     DiphenhydrAMINE HCl (BENADRYL PO)     EPINEPHrine (EPIPEN) 0.3 MG/0.3ML injection 2-pack     estradiol (ESTRACE) 0.1 MG/GM vaginal cream     HYDROCHLORIC ACID     Hypromellose (ARTIFICIAL TEARS OP)     MAGNESIUM OXIDE PO     Omega-3 Fatty Acids (FISH OIL " PO)     peppermint oil liquid     Probiotic Product (ALOE 83487 & PROBIOTICS) CAPS     PROGESTERONE 50MG CAPSULES COMPOUND     Testosterone Propionate (FIRST-TESTOSTERONE MC) 2 % CREA     thyroid (ARMOUR THYROID) 60 MG tablet     No current facility-administered medications for this visit.          1-cough for a month since exposed to moms wet moldy basement. Not worse. Discussed symptoms to call for; dyspnea, fever.  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 Discussed if must go, keep mom in another room, and just be across the room for minimum amounts of time, wipe everything down.  3-discussed covid avoidance and symptoms at length, since no online access she'd call us if ill  4--mild dysuria for a few days, no other  symptoms, she will contact and go to [facility] if worse as it is handier    She will see me when we can see people for routine issues again. Colonoscopy due late next year, could do dexa now, she defers mammograms. Doing gyn cares now at [facility].        Assessment/Plan:  Cough: monitor, call if worse  Covid discussion as above  Dysuria: UA if worse, push fluids    Long talk mainly about covid concerns      Phone call duration:  22 minutes    Milan Alvarez MD MD

## 2020-03-25 NOTE — TELEPHONE ENCOUNTER
Called and left patient voice message:    UA order has been signed so patient can get this done at [facility].  No order need to be sent since we are within the same network.  [facility] should be able to pull up the order in patient's chart.      Erich Ayoub CMA ([facility]) at 4:44 PM on 3/25/2020

## 2020-03-25 NOTE — TELEPHONE ENCOUNTER
I spoke to the patient who notes left eye irritation, redness, crusty discharge and swelling.  She would like to be prescribed the two medications she had in November, 2018.  She had Maxitrol ointment and Maxitrol drops.  She states the doctor said this could be her back up drops if she had a flare up.    She had been going to [facility] and using the sauna three times weekly which she notes helped immensely.  [facility]   Currently, she uses warm compresses and artificial tears.   I told her I would review with the Doctor and let her know the plan.

## 2020-03-25 NOTE — TELEPHONE ENCOUNTER
[facility] Call Center    Phone Message    May a detailed message be left on voicemail: yes     Reason for Call: Order(s): Other:   Please send UA orders to [facility].      Please call Jennifer when this is completed so she knows when to get this scheduled.        Action Taken: Message routed to:  [facility] P[facility]    Travel Screening: Not Applicable

## 2020-03-26 NOTE — TELEPHONE ENCOUNTER
I spoke to the patient who had her annual exam this month.  She will use the Maxitrol when she has a flare up. She intends to make an appointment for follow up in September.

## 2020-04-01 ENCOUNTER — TELEPHONE (OUTPATIENT)
Dept: OPHTHALMOLOGY | Facility: CLINIC | Age: 70
End: 2020-04-01

## 2020-04-01 DIAGNOSIS — H01.003 ANGULAR BLEPHARITIS OF RIGHT EYE: ICD-10-CM

## 2020-04-01 DIAGNOSIS — H02.88B MEIBOMIAN GLAND DYSFUNCTION (MGD) OF UPPER AND LOWER LIDS OF BOTH EYES: ICD-10-CM

## 2020-04-01 DIAGNOSIS — H02.88A MEIBOMIAN GLAND DYSFUNCTION (MGD) OF UPPER AND LOWER LIDS OF BOTH EYES: ICD-10-CM

## 2020-04-01 RX ORDER — NEOMYCIN SULFATE, POLYMYXIN B SULFATE, AND DEXAMETHASONE 3.5; 10000; 1 MG/G; [USP'U]/G; MG/G
0.5 OINTMENT OPHTHALMIC
Qty: 1 TUBE | Refills: 4 | Status: SHIPPED | OUTPATIENT
Start: 2020-04-01 | End: 2020-08-05

## 2020-04-01 NOTE — TELEPHONE ENCOUNTER
rx for Maxitrol ointment sent to new pharmacy per request    Thomas Benson RN 9:41 AM 04/01/20        [facility] Call Center    Phone Message    May a detailed message be left on voicemail: yes     Reason for Call: Medication Question or concern regarding medication   Prescription Clarification  Name of Medication: neomycin-polymyxin-dexamethasone (MAXITROL) 3.5-66329-6.1 ophthalmic ointment   Prescribing Provider: Dr. Rodriguez   Pharmacy: [facility] on [address] [place] [address] Ph: [phone]   What on the order needs clarification? [facility] ran out of the ointment so pt was wondering if the prescription can be sent to [facility]. Please call pt if there's any questions.            Action Taken: Message routed to:  [facility] Eye    Travel Screening: Not Applicable

## 2020-08-04 DIAGNOSIS — H02.88B MEIBOMIAN GLAND DYSFUNCTION (MGD) OF UPPER AND LOWER LIDS OF BOTH EYES: ICD-10-CM

## 2020-08-04 DIAGNOSIS — H02.88A MEIBOMIAN GLAND DYSFUNCTION (MGD) OF UPPER AND LOWER LIDS OF BOTH EYES: ICD-10-CM

## 2020-08-04 DIAGNOSIS — H01.003 ANGULAR BLEPHARITIS OF RIGHT EYE: ICD-10-CM

## 2020-08-05 RX ORDER — NEOMYCIN SULFATE, POLYMYXIN B SULFATE, AND DEXAMETHASONE 3.5; 10000; 1 MG/G; [USP'U]/G; MG/G
0.5 OINTMENT OPHTHALMIC
Qty: 1 TUBE | Refills: 4 | Status: SHIPPED | OUTPATIENT
Start: 2020-08-05 | End: 2020-10-20

## 2020-08-05 NOTE — TELEPHONE ENCOUNTER
Medication: neomycin-polymyxin-dexamethasone (MAXITROL) 3.5-87275-8.1 ophthalmic ointment    Diagnosis:   Meibomian gland dysfunction (MGD) of upper and lower lids of both eyes [H02.88A, H02.88B]        Angular blepharitis of right eye [H01.003]         Requested directions: same  Current directions on the medication list: Place 0.5 inches into both eyes nightly as needed (Irritation)    Last Written Prescription Date:  4/1/20  Last Fill Quantity: 1 tube,   # refills: 4    Last Office Visit: 3/5/20  Future Office visit: none    Attending Provider: Damir Cnuningham MD  Last Clinic Note: 3/5/2020   Return in about 1 year (around 3/5/2021      Routing refill request to provider for review/approval because:  Requires provider review/approval

## 2020-08-21 NOTE — TELEPHONE ENCOUNTER
Please see 7/20/17 telephone encounter:    Patient left voicemail stating she would like to proceed with Physical Therapy. Order placed. Left msg on patient's voice mail with number to call for scheduling PT if she has not heard from them within in two business days([phone].)    Hermila Lawrence, RN Care Coordinator  [facility] Otolaryngology/[facility] Neck Surgery  Direct contact: [phone]     [FreeTextEntry1] : 42 year old female desiring abdominoplasty. She is a good candidate and understands that the scar will be hip to hip. She is not worried about hiding the scar underneath her underwear line and primarily wants improvement of contour. She understands she will have a scar around the belly button. She will discuss and decide whether she wants to book for surgery. \par - Will discuss plan for abdominoplasty\par - Photos taken

## 2020-10-19 ENCOUNTER — TELEPHONE (OUTPATIENT)
Dept: OPHTHALMOLOGY | Facility: CLINIC | Age: 70
End: 2020-10-19

## 2020-10-19 DIAGNOSIS — H01.003 ANGULAR BLEPHARITIS OF RIGHT EYE: ICD-10-CM

## 2020-10-19 DIAGNOSIS — H02.88B MEIBOMIAN GLAND DYSFUNCTION (MGD) OF UPPER AND LOWER LIDS OF BOTH EYES: ICD-10-CM

## 2020-10-19 DIAGNOSIS — H02.88A MEIBOMIAN GLAND DYSFUNCTION (MGD) OF UPPER AND LOWER LIDS OF BOTH EYES: ICD-10-CM

## 2020-10-19 RX ORDER — NEOMYCIN SULFATE, POLYMYXIN B SULFATE AND DEXAMETHASONE 3.5; 10000; 1 MG/ML; [USP'U]/ML; MG/ML
1 SUSPENSION/ DROPS OPHTHALMIC 2 TIMES DAILY PRN
Qty: 5 ML | Refills: 11 | OUTPATIENT
Start: 2020-10-19

## 2020-10-19 NOTE — TELEPHONE ENCOUNTER
Medication: neomycin-polymyxin-dexamethasone (MAXITROL) 3.5-79009-5.1 SUSP ophthalmic susp   Requested directions:Place 1 drop into both eyes 2 times daily as needed (Irritation) - Both Eyes    Current directions on the medication list: Place 1 drop into both eyes 2 times daily as needed (Irritation) - Both Eyes     Last Written Prescription Date:  3/25/20  Last Fill Quantity: 5 ml ,   # refills: 4     Last Office Visit: 3/5/20  Future Office visit: none     Attending Provider: Damir Cunningham MD  Last Clinic Note: 3/5/2020  Next visit: 3/23/2021    Routing refill request to provider for review/approval because:     Requires provider review     *Pt said Pharmacy still doesn't have ointment so uses this prescription much faster      Discontinue usage of this medication for long-term risk, given side effect profile/unclear usage.   - Will schedule pt for sooner visit if [place] worsen with d/c'ed usage.

## 2020-10-19 NOTE — TELEPHONE ENCOUNTER
[facility] Call [facility]    Phone Message    May a detailed message be left on voicemail: yes     Reason for Call: Medication Refill Request    Has the patient contacted the pharmacy for the refill? Yes   Name of medication being requested: neomycin-polymyxin-dexamethasone (MAXITROL) 3.5-80988-5.1 SUSP ophthalmic susp  Provider who prescribed the medication: Dr Cunningham  Pharmacy: [facility][id number] [address]  Date medication is needed: ASAP   Pt said Pharmacy still doesn't have ointment so uses this prescription much faster, If any additional questions please call Pt  Thank you,      Action Taken: Message routed to:  [facility]    Travel Screening: Not Applicable

## 2020-10-20 RX ORDER — NEOMYCIN SULFATE, POLYMYXIN B SULFATE, AND DEXAMETHASONE 3.5; 10000; 1 MG/G; [USP'U]/G; MG/G
OINTMENT OPHTHALMIC
Qty: 1 TUBE | Refills: 3 | Status: SHIPPED | OUTPATIENT
Start: 2020-10-20 | End: 2021-01-27

## 2020-10-27 NOTE — PROGRESS NOTES
HISTORY OF PRESENT ILLNESS:  Jennifer Steiner presents.  She was seen by me previously for incidental finding of a maxillary sinus mucous retention cyst.  She comes in now because in January she had a bout of acute vertigo.  She was evaluated here in the ENT Clinic several weeks after onset of symptoms.  An MRI was obtained which was read as normal.  She does have a history of carotid artery dissection.  This was not identified as the source of her symptoms back in January.  She was improving by the time she was seen in the ENT Clinic.  She states that she currently is not quite yet back to baseline and is wondering if anything else can be done.  She has no other otologic symptoms such as hearing loss or tinnitus currently and no other concerning ENT symptoms.      PHYSICAL EXAMINATION:  She is examined.  Ears demonstrate normal canals, auricles and tympanic membranes.  The nasal passages demonstrate clear rhinorrhea.  Oral cavity, oropharynx is normal.  Facial movement is symmetric.      ASSESSMENT AND PLAN:  This is a patient who experienced acute vertigo.  She is slowly recovering.  I do think that she may benefit from vestibular therapy.  We will put in a consult for this.  She seems comfortable with this plan.  I am certainly happy to see her back if she has any additional issues.        Uric acid high as expected but actually a bit lower than previous. No change in treatment.

## 2020-12-29 ENCOUNTER — TELEPHONE (OUTPATIENT)
Dept: NURSING | Facility: CLINIC | Age: 70
End: 2020-12-29

## 2020-12-29 ENCOUNTER — VIRTUAL VISIT (OUTPATIENT)
Dept: FAMILY MEDICINE | Facility: CLINIC | Age: 70
End: 2020-12-29
Payer: MEDICARE

## 2020-12-29 DIAGNOSIS — L50.9 URTICARIA: ICD-10-CM

## 2020-12-29 DIAGNOSIS — Z71.89 COUNSELED ABOUT COVID-19 VIRUS INFECTION: Primary | ICD-10-CM

## 2020-12-29 PROCEDURE — 99443 PR PHYSICIAN TELEPHONE EVALUATION 21-30 MIN: CPT | Mod: 95 | Performed by: FAMILY MEDICINE

## 2020-12-29 RX ORDER — EPINEPHRINE 0.3 MG/.3ML
0.3 INJECTION SUBCUTANEOUS
Qty: 0.3 ML | Refills: 1 | Status: SHIPPED | OUTPATIENT
Start: 2020-12-29

## 2020-12-29 NOTE — NURSING NOTE
Chief Complaint   Patient presents with     Covid Concern     pt would like to discuss concerns of catching covid and receiving vaccine due to health issues and reactions to medications     Recheck Medication     pt would like to discuss general health, sinus problems in winter       Brandon Arndt CMA, EMT at 8:45 AM on 12/29/2020.

## 2020-12-29 NOTE — NURSING NOTE
Chief Complaint   Patient presents with     Covid Concern     pt would like to discuss concerns of catching covid and receiving vaccine     Recheck Medication     pt would like to discuss general health, sinus problems in winter       Brandon Arndt CMA, EMT at 8:42 AM on 12/29/2020.

## 2020-12-29 NOTE — TELEPHONE ENCOUNTER
Patient calls with questions regarding covid-19 testing. She does not currently have any symptoms but wants to know the process just in case she would get it. Patient is concerned about getting it because she has many underlying medical diagnosis. Patient is advised that if she were having emergent symptoms she would need to go to the ED. Otherwise she can call to set up a phone visit with a provider and they would order the test for her to schedule at one of the testing sites. Patient verbalizes understanding and denies further questions at this time.    Angie Cummings RN  [facility]

## 2020-12-29 NOTE — PROGRESS NOTES
"Jennifer Steiner is a 70 year old female who is being evaluated via a billable telephone visit.      The patient has been notified of following:     \"This telephone visit will be conducted via a call between you and your physician/provider. We have found that certain health care needs can be provided without the need for a physical exam.  This service lets us provide the care you need with a short phone conversation.  If a prescription is necessary we can send it directly to your pharmacy.  If lab work is needed we can place an order for that and you can then stop by our lab to have the test done at a later time.    Telephone visits are billed at different rates depending on your insurance coverage. During this emergency period, for some insurers they may be billed the same as an in-person visit.  Please reach out to your insurance provider with any questions.    If during the course of the call the physician/provider feels a telephone visit is not appropriate, you will not be charged for this service.\"    Patient has given verbal consent for Telephone visit?  Yes    What phone number would you like to be contacted at? [phone]    How would you like to obtain your AVS? Mail a copy    Subjective     Jennifer Steiner is a 70 year old female who presents via phone visit today for the following health issues:    HPI     No acute concerns; multiple questions about covid shots and exposures and the course of illness and treatments available. Many concerns especially given allergy history that might have allergic reaction.     Past Medical History:   Diagnosis Date     Anemia      Asthma     since childhood     Blepharitis      Constipation, chronic     Chronic enema use     CVA (cerebral infarction) 2009    carotid intimal repair, left     Hyperlipidemia age 58    identified age 58     Hypothyroidism      Irritable bowel disease      Neck injuries      Pelvic floor dysfunction 2010     Postmenopausal hormone replacement " therapy     From Dr. Link     Scoliosis      Past Surgical History:   Procedure Laterality Date     C STOMACH SURGERY PROCEDURE UNLISTED       CYSTOSCOPY N/A 3/25/2019    Procedure: CYSTOSCOPY;  Surgeon: Radha Ovalle MD;  [facility]     DILATION AND CURETTAGE, OPERATIVE HYSTEROSCOPY WITH MORCELLATOR, COMBINED N/A 3/25/2019    Procedure: OPERATIVE HYSTEROSCOPY WITH MORCELLATOR;  Surgeon: Marybeth Huber MD;  [facility]     HC BREATH HYDROGEN TEST  11/11/2013    Procedure: HYDROGEN BREATH TEST;  Surgeon: Esteban Short MD;  Location: [facility]     SURGICAL HISTORY OF -       tonsillectomy     SURGICAL HISTORY OF -   ~1997    cosmetic skin removal: breast reduction; skin removal from arms legs and tummy tuck     Current Outpatient Medications   Medication     Charcoal Activated (CHARCOAL PO)     Cholecalciferol (VITAMIN D) 2000 UNIT tablet     Digestive Enzymes TABS     DiphenhydrAMINE HCl (BENADRYL PO)     EPINEPHrine (EPIPEN) 0.3 MG/0.3ML injection 2-pack     estradiol (ESTRACE) 0.1 MG/GM vaginal cream     HYDROCHLORIC ACID     Hypromellose (ARTIFICIAL TEARS OP)     MAGNESIUM OXIDE PO     neomycin-polymyxin-dexamethasone (MAXITROL) 3.5-22901-9.1 ophthalmic ointment     Omega-3 Fatty Acids (FISH OIL PO)     peppermint oil liquid     Probiotic Product (ALOE 05844 & PROBIOTICS) CAPS     progesterone (PROMETRIUM) 200 MG capsule     PROGESTERONE 50MG CAPSULES COMPOUND     Testosterone Propionate (FIRST-TESTOSTERONE MC) 2 % CREA     thyroid (ARMOUR THYROID) 60 MG tablet     No current facility-administered medications for this visit.      Allergies   Allergen Reactions     Aspirin GI Disturbance     GI upset     Aspirin GI Disturbance     Bee Venom Swelling     Birds [Feathers]      Cats      Dust Mites      Fluoride Other (See Comments) and Unknown     headaches     Liothyronine Unknown     Severe gut reactions, ears itching, throat closing     Mold      No Clinical Screening - See Comments  "Other (See Comments)     Some antibiotics but not Zpak-Can tolerate Azithromycin     Procaine Other (See Comments)     headache     Seasonal Allergies      Ragweed, pollen     Simvastatin Other (See Comments)     \"hot flash\", muscle weakness, dizziness     Simvastatin Unknown     Penicillins Unknown and Rash     As a child     Sulfa Drugs Itching, Other (See Comments), Rash and Hives           Review of Systems          Objective          Vitals:  No vitals were obtained today due to virtual visit.    healthy, alert and no distress  PSYCH: Alert and oriented times 3; coherent speech, normal   rate and volume, able to articulate logical thoughts, able   to abstract reason, no tangential thoughts, no hallucinations   or delusions  Her affect is normal  RESP: No cough, no audible wheezing, able to talk in full sentences  Remainder of exam unable to be completed due to telephone visits            Assessment/Plan:    25 minute call  All re: covid issues as above. She has limited online abilities so will call us if any further questions or any covid infection concerns. She is very cautious. I did epi refill in case gets shot can carry with.  Milan Alvarez MD                "

## 2021-01-26 ENCOUNTER — TELEPHONE (OUTPATIENT)
Dept: OPHTHALMOLOGY | Facility: CLINIC | Age: 71
End: 2021-01-26

## 2021-01-26 DIAGNOSIS — H02.88A MEIBOMIAN GLAND DYSFUNCTION (MGD) OF UPPER AND LOWER LIDS OF BOTH EYES: ICD-10-CM

## 2021-01-26 DIAGNOSIS — H01.003 ANGULAR BLEPHARITIS OF RIGHT EYE: ICD-10-CM

## 2021-01-26 DIAGNOSIS — H02.88B MEIBOMIAN GLAND DYSFUNCTION (MGD) OF UPPER AND LOWER LIDS OF BOTH EYES: ICD-10-CM

## 2021-01-26 DIAGNOSIS — H02.889 MEIBOMIAN GLAND DYSFUNCTION: Primary | ICD-10-CM

## 2021-01-26 NOTE — TELEPHONE ENCOUNTER
Reviewed by Dr. Cunningham and ok for Pierce/Poly/Dex suspension (drops) one drop each eye 2/day    Discontinue the ointment at night    Rx sent with comment replaces pierce/poly/dex ointment      Left message with pt at 1140 reviewing ok to use pierce/poly/dex drops instead of ointment and Rx sent    Provided direct number for further questions    Thomas Benson RN 11:40 AM 01/27/21    ----        Spoke to pt at 1000    Pt aware of pierce/poly/dex Ointment sent to pharmacy    Her [facility] not able to obtain    Reviewed able to send to [facility] pharmacy and can review mailing options (confirmed [facility] able to order)    Pt uses Pierce/poly/dex PRN worsening symptoms    Pt states was also Ok to use pierce/poly/dex DROPS twice daily-- pt prefers    Reviewed would f/u with Dr. Cunningham to review OK     Thomas Benson RN 10:56 AM 01/27/21    -----      Left message with direct number at [phone]    Thomas Benson RN 9:23 AM 01/27/21    ---    Jennifer Steiner [phone]      Left message with direct number at [phone]    Thomas Benson RN 11:33 AM 01/26/21           [facility]    Phone Message    May a detailed message be left on voicemail: yes     Reason for Call: Other: Pt called in to request that the Rx for the eye drops be renewed. She states that the Rx for the eye ointment was renewed but that the eye drops were discontinued. Her pharmacy does not have the ointment and hasn't had any for months. She had been using extra eye drops due to the pharmacy being unable to get the ointment in stock. Pt would like a call back to discuss the ointment and eye drops. Thank you.     Action Taken: Message routed to:  [facility] ([facility]): EYE    Travel Screening: Not Applicable

## 2021-01-27 RX ORDER — NEOMYCIN SULFATE, POLYMYXIN B SULFATE, AND DEXAMETHASONE 3.5; 10000; 1 MG/G; [USP'U]/G; MG/G
OINTMENT OPHTHALMIC
Qty: 1 TUBE | Refills: 1 | Status: SHIPPED | OUTPATIENT
Start: 2021-01-27 | End: 2021-01-27 | Stop reason: ALTCHOICE

## 2021-01-27 RX ORDER — NEOMYCIN SULFATE, POLYMYXIN B SULFATE AND DEXAMETHASONE 3.5; 10000; 1 MG/ML; [USP'U]/ML; MG/ML
1 SUSPENSION/ DROPS OPHTHALMIC 2 TIMES DAILY PRN
Qty: 5 ML | Refills: 2 | Status: SHIPPED | OUTPATIENT
Start: 2021-01-27 | End: 2021-10-05

## 2021-01-27 NOTE — TELEPHONE ENCOUNTER
neomycin-polymyxin-dexamethasone (MAXITROL) 3.5-53748-6.1 ophthalmic ointment    Requested directions: 0.5 inch strip each eye at night as needed for irritation  Current directions on the medication list: 0.5 inch strip each eye at night as needed for irritation    Last Written Prescription Date:  10/20/2020  Last Fill Quantity: 1,   # refills: 3    Last Office Visit: 3/5/2020  Future Office visit:  2/23/2021    Attending Provider:     Damir Cunningham MD  Ophthalmology     Last Clinic Note:  3/5/2020    Assessment & Plan   Assessment & Plan         Jennifer Steiner is a 69 year old female with the following diagnoses:   1. Meibomian gland dysfunction (MGD) of upper and lower lids of both eyes    2. 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 Nuclear sclerosis of both eyes       Doing well.  She has been proactive with warm compresses and use of a sauna.   Looks good on exam today  Continue artificial tears and warm compresses as before      Cataract, both eyes  Not visually significant        Patient disposition:   Return in about 1 year (around 3/5/2021).     Larry Marc MD  Ophthalmology Resident, PGY-2      Routing refill request to provider for review/approval because:  Needs review and okay for further refills for Pt care.       Refer to clinic for review     Samaria Levine RN  Central Triage Red Flags/Med Refills

## 2021-03-08 ENCOUNTER — VIRTUAL VISIT (OUTPATIENT)
Dept: FAMILY MEDICINE | Facility: CLINIC | Age: 71
End: 2021-03-08
Payer: MEDICARE

## 2021-03-08 DIAGNOSIS — L50.9 HIVES: Primary | ICD-10-CM

## 2021-03-08 PROCEDURE — 99442 PR PHYSICIAN TELEPHONE EVALUATION 11-20 MIN: CPT | Mod: 95 | Performed by: FAMILY MEDICINE

## 2021-03-08 NOTE — NURSING NOTE
Chief Complaint   Patient presents with     Referral     Pt would like some referrals       Stefany Alvarenga EMT at 11:17 AM sign on 3/8/2021

## 2021-03-08 NOTE — PROGRESS NOTES
Video Visit Technology for this patient: No video technology available to patient, please call patient over the phone     Jennifer is a 70 year old who is being evaluated via a billable telephone visit.      What phone number would you like to be contacted at? [phone]  How would you like to obtain your AVS? Mail a copy    Assessment & Plan     Hives  See allergist on own  - Rheumatology Referral; Future  See me back after specialty visits      20 minutes spent on the date of the encounter doing chart review, history and exam, documentation and further activities as noted above    Milan Alvarez MD  [facility]   Jennifer is a 70 year old who presents for the following health issues     HPI Concern if should see immunologist. H/o Hashimoto's, hives chronic w/o cause found. Her [facility] MD would like her to see one but would like me to identify one and make the referral. Only immunogist per se in town I know of is [facility].    She mentions that she has sees Dr Alex allergist and would be willing to see him again in case can identify allergy cause of hives    She would like to see rheum too, given hashimotis perhaps some underlying autoimmune d/o exists that is causing hvies too    No acute c/o; does get resp symptoms off and on, she wonders if should see ENT, I suggest if seeing allergist start with that    Past Medical History:   Diagnosis Date     Anemia      Asthma     since childhood     Blepharitis      Constipation, chronic     Chronic enema use     CVA (cerebral infarction) 2009    carotid intimal repair, left     Hyperlipidemia age 58    identified age 58     Hypothyroidism      Irritable bowel disease      Neck injuries      Pelvic floor dysfunction 2010     Postmenopausal hormone replacement therapy     From Dr. Link     Scoliosis      Past Surgical History:   Procedure Laterality Date     C STOMACH SURGERY PROCEDURE  UNLISTED       CYSTOSCOPY N/A 3/25/2019    Procedure: CYSTOSCOPY;  Surgeon: Radha Ovalle MD;  [facility]     DILATION AND CURETTAGE, OPERATIVE HYSTEROSCOPY WITH MORCELLATOR, COMBINED N/A 3/25/2019    Procedure: OPERATIVE HYSTEROSCOPY WITH MORCELLATOR;  Surgeon: Marybeth Huber MD;  [facility]     HC BREATH HYDROGEN TEST  11/11/2013    Procedure: HYDROGEN BREATH TEST;  Surgeon: Esteban Short MD;  [facility]     SURGICAL HISTORY OF -       tonsillectomy     SURGICAL HISTORY OF -   ~1997    cosmetic skin removal: breast reduction; skin removal from arms legs and tummy tuck     Current Outpatient Medications   Medication     Charcoal Activated (CHARCOAL PO)     Cholecalciferol (VITAMIN D) 2000 UNIT tablet     Digestive Enzymes TABS     DiphenhydrAMINE HCl (BENADRYL PO)     EPINEPHrine (EPIPEN) 0.3 MG/0.3ML injection 2-pack     estradiol (ESTRACE) 0.1 MG/GM vaginal cream     HYDROCHLORIC ACID     Hypromellose (ARTIFICIAL TEARS OP)     MAGNESIUM OXIDE PO     neomycin-polymyxin-dexamethasone (MAXITROL) 3.5-79778-4.1 SUSP ophthalmic susp     Omega-3 Fatty Acids (FISH OIL PO)     peppermint oil liquid     Probiotic Product (ALOE 12822 & PROBIOTICS) CAPS     progesterone (PROMETRIUM) 200 MG capsule     PROGESTERONE 50MG CAPSULES COMPOUND     Testosterone Propionate (FIRST-TESTOSTERONE MC) 2 % CREA     thyroid (ARMOUR THYROID) 60 MG tablet     No current facility-administered medications for this visit.      Allergies   Allergen Reactions     Aspirin GI Disturbance     GI upset     Aspirin GI Disturbance     Bee Venom Swelling     Birds [Feathers]      Cats      Dust Mites      Fluoride Other (See Comments) and Unknown     headaches     Liothyronine Unknown     Severe gut reactions, ears itching, throat closing     Mold      No Clinical Screening - See Comments Other (See Comments)     Some antibiotics but not Zpak-Can tolerate Azithromycin     Procaine Other (See Comments)     headache      "Seasonal Allergies      Ragweed, pollen     Simvastatin Other (See Comments)     \"hot flash\", muscle weakness, dizziness     Simvastatin Unknown     Penicillins Unknown and Rash     As a child     Sulfa Drugs Itching, Other (See Comments), Rash and Hives                 Review of Systems         Objective           Vitals:  No vitals were obtained today due to virtual visit.    Physical Exam   healthy, alert and no distress  PSYCH: Alert and oriented times 3; coherent speech, normal   rate and volume, able to articulate logical thoughts, able   to abstract reason, no tangential thoughts, no hallucinations   or delusions  Her affect is normal  RESP: No cough, no audible wheezing, able to talk in full sentences  Remainder of exam unable to be completed due to telephone visits            Phone call duration: 15 minutes      "

## 2021-03-16 ENCOUNTER — OFFICE VISIT (OUTPATIENT)
Dept: OPHTHALMOLOGY | Facility: CLINIC | Age: 71
End: 2021-03-16
Payer: MEDICARE

## 2021-03-16 DIAGNOSIS — H02.88A MEIBOMIAN GLAND DYSFUNCTION (MGD) OF UPPER AND LOWER LIDS OF BOTH EYES: ICD-10-CM

## 2021-03-16 DIAGNOSIS — H52.13 MYOPIA OF BOTH EYES: Primary | ICD-10-CM

## 2021-03-16 DIAGNOSIS — H02.88B MEIBOMIAN GLAND DYSFUNCTION (MGD) OF UPPER AND LOWER LIDS OF BOTH EYES: ICD-10-CM

## 2021-03-16 PROCEDURE — 92012 INTRM OPH EXAM EST PATIENT: CPT | Performed by: OPTOMETRIST

## 2021-03-16 PROCEDURE — 92015 DETERMINE REFRACTIVE STATE: CPT | Mod: GY | Performed by: OPTOMETRIST

## 2021-03-16 ASSESSMENT — REFRACTION_MANIFEST
OD_SPHERE: -3.75
OD_SPHERE: -6.25
OD_ADD: +2.50
OD_CYLINDER: +0.50
OS_AXIS: 160
OS_ADD: +2.50
OS_SPHERE: -3.00
OS_CYLINDER: +0.75
OS_CYLINDER: +0.75
OS_AXIS: 160
OD_AXIS: 060
OS_SPHERE: -5.50
OD_CYLINDER: +0.50
OD_AXIS: 060

## 2021-03-16 ASSESSMENT — VISUAL ACUITY
OS_CC+: -1
OD_CC+: -2
CORRECTION_TYPE: GLASSES
OD_CC: 20/30
OS_CC: 20/30
METHOD: SNELLEN - LINEAR

## 2021-03-16 ASSESSMENT — REFRACTION_WEARINGRX
OD_SPHERE: -5.75
OS_CYLINDER: +0.50
OD_CYLINDER: +0.75
OS_SPHERE: -5.25
OD_AXIS: 078
OS_AXIS: 100

## 2021-03-16 ASSESSMENT — CONF VISUAL FIELD
OD_NORMAL: 1
OS_NORMAL: 1

## 2021-03-16 ASSESSMENT — TONOMETRY
IOP_METHOD: ICARE
OS_IOP_MMHG: 14
OD_IOP_MMHG: 12

## 2021-03-16 ASSESSMENT — CUP TO DISC RATIO
OD_RATIO: 0.3
OS_RATIO: 0.3

## 2021-03-16 ASSESSMENT — EXTERNAL EXAM - RIGHT EYE: OD_EXAM: ENOPHTHALMOS

## 2021-03-16 ASSESSMENT — EXTERNAL EXAM - LEFT EYE: OS_EXAM: NORMAL

## 2021-03-16 NOTE — PROGRESS NOTES
History  HPI     Annual Eye Exam     In both eyes.  This started 1 year ago.              Comments     Pt. States that she wants to get updated prescription for glasses today. No change in VA BE. No pain BE. Dryness has been pretty well controlled. Seeing Dr. Cunningham in 2 weeks for follow up on Blepharitis.   Roslyn Cormier COT 2:31 PM March 16, 2021             Last edited by Roslyn Cormier on 3/16/2021  2:31 PM. (History)          Assessment/Plan  (H52.13) Myopia of both eyes  (primary encounter diagnosis)  Comment: Myopia both eyes with presbyopia   Plan: REFRACTION         Educated patient on condition and clinical findings. Dispensed spectacle prescription for full time wear. Monitor annually.    (H02.88A,  H02.88B) Meibomian gland dysfunction (MGD) of upper and lower lids of both eyes  Comment: Reports eye pain left eye, was improved when using sauna more frequently  Plan:  Continue use of non-preserved artificial tears as needed. Recommended warm compresses twice each day for ten minutes. Monitor at comprehensive eye exam with Dr. Cunningham.    Return to clinic on 3/23/21 with Dr. Cunningham for comprehensive eye exam as scheduled.    Complete documentation of historical and exam elements from today's encounter can  be found in the full encounter summary report (not reduplicated in this progress  note). I personally obtained the chief complaint(s) and history of present illness. I  confirmed and edited as necessary the review of systems, past medical/surgical  history, family history, social history, and examination findings as documented by  others; and I examined the patient myself. I personally reviewed the relevant tests,  images, and reports as documented above. I formulated and edited as necessary the  assessment and plan and discussed the findings and management plan with the  patient and family.    Pancho Santos, OD, FAAO

## 2021-03-16 NOTE — NURSING NOTE
Chief Complaints and History of Present Illnesses   Patient presents with     Annual Eye Exam     Chief Complaint(s) and History of Present Illness(es)     Annual Eye Exam     Laterality: both eyes    Onset: 1 year ago              Comments     Pt. States that she wants to get updated prescription for glasses today. No change in VA BE. No pain BE. Dryness has been pretty well controlled. Seeing Dr. Cunningham in 2 weeks for follow up on Blepharitis.   Roslyn Cormier COT 2:31 PM March 16, 2021

## 2021-03-23 ENCOUNTER — OFFICE VISIT (OUTPATIENT)
Dept: OPHTHALMOLOGY | Facility: CLINIC | Age: 71
End: 2021-03-23
Attending: OPHTHALMOLOGY
Payer: MEDICARE

## 2021-03-23 DIAGNOSIS — H02.88B MEIBOMIAN GLAND DYSFUNCTION (MGD) OF UPPER AND LOWER LIDS OF BOTH EYES: Primary | ICD-10-CM

## 2021-03-23 DIAGNOSIS — H04.123 DRY EYES: ICD-10-CM

## 2021-03-23 DIAGNOSIS — H25.13 NUCLEAR SCLEROSIS OF BOTH EYES: ICD-10-CM

## 2021-03-23 DIAGNOSIS — H02.88A MEIBOMIAN GLAND DYSFUNCTION (MGD) OF UPPER AND LOWER LIDS OF BOTH EYES: Primary | ICD-10-CM

## 2021-03-23 PROCEDURE — G0463 HOSPITAL OUTPT CLINIC VISIT: HCPCS

## 2021-03-23 PROCEDURE — 92014 COMPRE OPH EXAM EST PT 1/>: CPT | Mod: GC | Performed by: OPHTHALMOLOGY

## 2021-03-23 ASSESSMENT — TONOMETRY
IOP_METHOD: TONOPEN
OD_IOP_MMHG: 13
OS_IOP_MMHG: 13

## 2021-03-23 ASSESSMENT — SLIT LAMP EXAM - LIDS
COMMENTS: 1+ MGD
COMMENTS: 1+ MGD

## 2021-03-23 ASSESSMENT — CUP TO DISC RATIO
OS_RATIO: 0.3
OD_RATIO: 0.3

## 2021-03-23 ASSESSMENT — VISUAL ACUITY
METHOD: SNELLEN - LINEAR
OS_CC+: -2
CORRECTION_TYPE: GLASSES
OS_CC: 20/30
OD_CC: 20/30
OD_CC+: -2

## 2021-03-23 ASSESSMENT — EXTERNAL EXAM - LEFT EYE: OS_EXAM: NORMAL

## 2021-03-23 ASSESSMENT — CONF VISUAL FIELD
METHOD: COUNTING FINGERS
OD_NORMAL: 1
OS_NORMAL: 1

## 2021-03-23 NOTE — PROGRESS NOTES
Chief Complaint(s) and History of Present Illness(es)     Annual Eye Exam     Laterality: both eyes    Timing: throughout the day    Course: stable    Associated symptoms: itching.  Negative for redness, eye pain and tearing      Pain scale: 0/10              Comments     Pt had MR at last visit with Dr. Santos 3/16/2021. Pt is understanding   that she needs to keep following with Dr. Cunningham due to keeping up with   DFE, and hx of MGD.  Usually does WC BID. PFAT at least BID to BE.  Danyelle Hernandez, COT COT 1:10 PM 03/23/2021     LCV 3/16/21 with Dr. Santos. She was given a new Rx for glasses at that appointment, has not obtained new glasses yet. She presents today to follow up for blepharitis. She denies recent eye pain. She notes intermittent dryness and discomfort. She uses PFAT 2-4x daily in both eyes. She uses WC BID.      Review of systems for the eyes was negative other than the pertinent positives/negatives listed in the HPI.      Assessment & Plan      Jennifer Steiner is a 71 year old female with the following diagnoses:   1. Meibomian gland dysfunction (MGD) of upper and lower lids of both eyes    2. Dry eyes    3. Nuclear sclerosis of both eyes       Discussed that discomfort, fatigue, redness, increased tearing, a gavin/gritty or burning sensation, and/or fluctuating vision are all symptoms of surface dryness. The patient was educated on the chronicity of surface dryness and emphasized on the importance of consistent use of treatments to prevent symptoms.  - AT BID both eyes  - Warm compresses for 10-15 minutes 2-4x daily    Age-related cataracts, both eyes  -Minimally visually significant at this time  -Continue to monitor    Discussed that she can follow for annual eye exams and glasses prescription with Dr. Santos going forward.  He can refer back to me as needed.  She is ok with this plan    Tawny Finn MD  Ophthalmology Resident, PGY-2    Attending Physician Attestation:  Complete documentation of  historical and exam elements from today's encounter can be found in the full encounter summary report (not reduplicated in this progress note).  I personally obtained the chief complaint(s) and history of present illness.  I confirmed and edited as necessary the review of systems, past medical/surgical history, family history, social history, and examination findings as documented by others; and I examined the patient myself.  I personally reviewed the relevant tests, images, and reports as documented above.  I formulated and edited as necessary the assessment and plan and discussed the findings and management plan with the patient and family. . - Damir Cunningham MD

## 2021-03-23 NOTE — NURSING NOTE
Chief Complaints and History of Present Illnesses   Patient presents with     Annual Eye Exam     Chief Complaint(s) and History of Present Illness(es)     Annual Eye Exam     Laterality: both eyes    Timing: throughout the day    Course: stable    Associated symptoms: itching.  Negative for redness, eye pain and tearing    Pain scale: 0/10              Comments     Pt had MR at last visit with Dr. Santos 3/16/2021. Pt is understanding that she needs to keep following with Dr. Cunningham due to keeping up with DFE, and hx of MGD.  Usually does WC BID. PFAT at least BID to BE.  Danyelle Hernandez, COT COT 1:10 PM 03/23/2021

## 2021-03-24 ENCOUNTER — TELEPHONE (OUTPATIENT)
Dept: OPHTHALMOLOGY | Facility: CLINIC | Age: 71
End: 2021-03-24

## 2021-03-24 NOTE — TELEPHONE ENCOUNTER
[facility] Call [facility]    Phone Message    May a detailed message be left on voicemail: yes     Reason for Call: Other: Pt has questions about measurements that she does not think were done at her Appt on 03/23. She has some specific questions she had forgotten to ask and would like to speak with an RN. Please call Pt back. Thanks!    Action Taken: Message routed to:  [facility] E[facility]    Travel Screening: Not Applicable

## 2021-03-25 ENCOUNTER — TELEPHONE (OUTPATIENT)
Dept: OPHTHALMOLOGY | Facility: CLINIC | Age: 71
End: 2021-03-25

## 2021-03-25 NOTE — TELEPHONE ENCOUNTER
[facility]    Phone Message    May a detailed message be left on voicemail: yes     Reason for Call: Other: Pt would like to discuss a couple more issues regarding her eye condition. Please call back-Pt is available today until noon or tomorrow until 1PM. Thank you.     Action Taken: Message routed to:  [facility]    Travel Screening: Not Applicable

## 2021-03-25 NOTE — TELEPHONE ENCOUNTER
I spoke to the patient who notes that she forgot to mention at her last visit that she still has her right eye sinking into the orbit and her left eye popping out.  She notes that measurements have been taken in the past  and it appeared to be sable.  She wants to know if it is still stable.    I left a VM after Dr. Cunningham's review of her records.  There is no change in her eye recession or protrusion.

## 2021-03-30 DIAGNOSIS — J45.909 ASTHMA: Primary | ICD-10-CM

## 2021-04-01 ENCOUNTER — TELEPHONE (OUTPATIENT)
Dept: PULMONOLOGY | Facility: CLINIC | Age: 71
End: 2021-04-01

## 2021-04-01 NOTE — TELEPHONE ENCOUNTER
Called patient to discuss message below. Reassured patient that PFTs safe at this time and encouraged her to complete prior to her appointment.

## 2021-04-01 NOTE — TELEPHONE ENCOUNTER
RECORDS RECEIVED FROM: Internal   DATE RECEIVED: 6.7.21   NOTES STATUS DETAILS   OFFICE NOTE from referring provider N/A    OFFICE NOTE from other specialist Internal 12.27.18 [facility]  *nothing else within 2 years   DISCHARGE SUMMARY from hospital N/A    DISCHARGE REPORT from the ER N/A    MEDICATION LIST Internal    IMAGING  (NEED IMAGES AND REPORTS)     CT SCAN N/A    CHEST XRAY (CXR) Internal 12.27.18   TESTS     PULMONARY FUNCTION TESTING (PFT) Internal *most recent 5.1.15

## 2021-04-01 NOTE — TELEPHONE ENCOUNTER
[facility] Call Center    Phone Message    May a detailed message be left on voicemail: yes     Reason for Call: Other: Pt would like a call back to discuss the pft as she is concerned about using the same machine as others and would like to discuss     Action Taken: Message routed to:  [facility] ([facility] Pulm    Travel Screening: Not Applicable

## 2021-04-01 NOTE — TELEPHONE ENCOUNTER
LVM with direct call back # and call center # to schedule Chest X-ray ordered by Dr. Jean-Baptiste, pulmonary, who she is seeing in-person on 6/7. CXR should be done same day prior (before her scheduled FPFT as well)

## 2021-06-07 ENCOUNTER — OFFICE VISIT (OUTPATIENT)
Dept: PULMONOLOGY | Facility: CLINIC | Age: 71
End: 2021-06-07
Payer: MEDICARE

## 2021-06-07 ENCOUNTER — PRE VISIT (OUTPATIENT)
Dept: PULMONOLOGY | Facility: CLINIC | Age: 71
End: 2021-06-07

## 2021-06-07 ENCOUNTER — ANCILLARY PROCEDURE (OUTPATIENT)
Dept: GENERAL RADIOLOGY | Facility: CLINIC | Age: 71
End: 2021-06-07
Payer: MEDICARE

## 2021-06-07 VITALS
OXYGEN SATURATION: 98 % | WEIGHT: 135 LBS | HEART RATE: 63 BPM | DIASTOLIC BLOOD PRESSURE: 68 MMHG | BODY MASS INDEX: 23.54 KG/M2 | SYSTOLIC BLOOD PRESSURE: 104 MMHG | RESPIRATION RATE: 18 BRPM

## 2021-06-07 DIAGNOSIS — J45.909 ASTHMA: ICD-10-CM

## 2021-06-07 DIAGNOSIS — J45.20 MILD INTERMITTENT ASTHMA WITHOUT COMPLICATION: Primary | ICD-10-CM

## 2021-06-07 PROCEDURE — 94375 RESPIRATORY FLOW VOLUME LOOP: CPT

## 2021-06-07 PROCEDURE — G0463 HOSPITAL OUTPT CLINIC VISIT: HCPCS | Mod: 25

## 2021-06-07 PROCEDURE — 94729 DIFFUSING CAPACITY: CPT

## 2021-06-07 PROCEDURE — 71046 X-RAY EXAM CHEST 2 VIEWS: CPT | Mod: GC | Performed by: RADIOLOGY

## 2021-06-07 PROCEDURE — 99204 OFFICE O/P NEW MOD 45 MIN: CPT | Mod: 25

## 2021-06-07 PROCEDURE — 94726 PLETHYSMOGRAPHY LUNG VOLUMES: CPT

## 2021-06-07 ASSESSMENT — PAIN SCALES - GENERAL: PAINLEVEL: NO PAIN (0)

## 2021-06-07 NOTE — LETTER
"    6/7/2021         RE: Jennifer Steiner  [address]  [facility] [id number]        Dear Colleague,    Thank you for referring your patient, Jennifer Steiner, to the [facility] [place]. Please see a copy of my visit note below.    CC: new, asthma      HPI:  71F with PMH of asthma, hashimoto's presents today for check in of asthma per PCP.    PFTs last done in 2014 at which time she did not appear to meet criteria for asthma as bronchodilator test negative and pattern was nonobstructive. Last seen at that time by Dr. Huizar in 2014 at which time was prescribed advair and subsequently was seen by an allergist. She states she never started the advair.    She has a history of multiple food allergies including prior episode of anaphylaxis. Triggers includes certain foods, ragweed, pollen, smoke, outside air quality, perfumes. She has symptoms of wheezing, cough, chest tightness. She currently manages these symptoms by avoiding allergens and \"not going outside\" but is not taking any medications or inhalers for asthma. Often in the ocampo she will have cough productive of green sputum which resolves on its own; sometimes she takes antibiotics for this but tries to avoid these without taking any mediations. She has never been hospitalized or went to the ED for asthma.     Frequency of her symptoms varies with the seasons. If air quality is bad, she has daily symptoms. She denies nighttime awakenings. She does not use an albuterol rescue inhaler.     She used to get allergy shots in the past but had bad reactions. She was unable to afford allergies drops. For allergies she takes PRN benadryl but does not take daily medication for asthma.     Today she is not interested in starting any medications but is largely wondering how her lung function tests today compare to 2014 and has several questions about managing her disease and lung infections.    ROS:  10 pt ROS " reviewed and otherwise positive per HPI.      Past Medical History:   Diagnosis Date     Anemia      Asthma     since childhood     Blepharitis      Constipation, chronic     Chronic enema use     CVA (cerebral infarction) 2009    carotid intimal repair, left     Hyperlipidemia age 58    identified age 58     Hypothyroidism      Irritable bowel disease      Neck injuries      Pelvic floor dysfunction      Postmenopausal hormone replacement therapy     From Dr. Link     Scoliosis      Past Surgical History:   Procedure Laterality Date     C STOMACH SURGERY PROCEDURE UNLISTED       CYSTOSCOPY N/A 3/25/2019    Procedure: CYSTOSCOPY;  Surgeon: Radha Ovalle MD;  Location: [facility]     DILATION AND CURETTAGE, OPERATIVE HYSTEROSCOPY WITH MORCELLATOR, COMBINED N/A 3/25/2019    Procedure: OPERATIVE HYSTEROSCOPY WITH MORCELLATOR;  Surgeon: Marybeth Huber MD;  Location: [facility] OR     HC BREATH HYDROGEN TEST  2013    Procedure: HYDROGEN BREATH TEST;  Surgeon: Esteban Short MD;  Location: [facility]     SURGICAL HISTORY OF -       tonsillectomy     SURGICAL HISTORY OF -   ~    cosmetic skin removal: breast reduction; skin removal from arms legs and tummy tuck     Family History   Problem Relation Age of Onset     Diabetes Father      Gastrointestinal Disease Father 82         after surgery for Bowel obstruction     Obesity Father      Alcohol/Drug Brother      Gastrointestinal Disease Brother         Hepatitis     Heart Disease Brother 62        hx of drug abuse,  age 62     Diabetes Brother      Skin Cancer Brother      Substance Abuse Brother      Allergies Brother      Anesthesia Reaction Brother      Melanoma Mother      Skin Cancer Mother      Osteoporosis Mother      Substance Abuse Sister      Asthma Brother      Diabetes Brother      Cancer Sister      Cancer Sister 56        bone cancer     Anxiety Disorder Sister      Obesity Brother      Alcoholism Sister      Alcoholism Brother       Bone Cancer Sister      Colon Cancer No family hx of      Crohn's Disease No family hx of      Ulcerative Colitis No family hx of      Colon Polyps No family hx of      Glaucoma No family hx of      Macular Degeneration No family hx of      Social History     Tobacco Use     Smoking status: Never Smoker     Smokeless tobacco: Never Used   Substance Use Topics     Alcohol use: No     Drug use: No     Current Outpatient Medications   Medication Sig Dispense Refill     Charcoal Activated (CHARCOAL PO) Take 2 capsules by mouth daily as needed For indigestion       Cholecalciferol (VITAMIN D) 2000 UNIT tablet Take 1 tablet by mouth daily.       Digestive Enzymes TABS Use as directed       DiphenhydrAMINE HCl (BENADRYL PO) Take by mouth daily as needed       EPINEPHrine (EPIPEN) 0.3 MG/0.3ML injection 2-pack Inject 0.3 mLs (0.3 mg) into the muscle once as needed for anaphylaxis 0.3 mL 1     estradiol (ESTRACE) 0.1 MG/GM vaginal cream Place 0.5 g vaginally Use one to two times weekly.       HYDROCHLORIC ACID Take as directed       Hypromellose (ARTIFICIAL TEARS OP) Apply 1 drop to eye daily as needed       MAGNESIUM OXIDE PO Take 6-8 tablets by mouth daily        neomycin-polymyxin-dexamethasone (MAXITROL) 3.5-78862-5.1 SUSP ophthalmic susp Place 1 drop into both eyes 2 times daily as needed (as needed for worsening eye symptoms) 5 mL 2     peppermint oil liquid 1 mL daily as needed for other Taking gel tabs not oil, not in EMR.       Probiotic Product (ALOE 73030 & PROBIOTICS) CAPS        progesterone (PROMETRIUM) 200 MG capsule        PROGESTERONE 50MG CAPSULES COMPOUND 100 mg At Bedtime Take 2 capsules at bedtime.       thyroid (ARMOUR THYROID) 60 MG tablet Take 60 mg by mouth every morning        Omega-3 Fatty Acids (FISH OIL PO) Take by mouth as needed       Testosterone Propionate (FIRST-TESTOSTERONE MC) 2 % CREA           Allergies   Allergen Reactions     Aspirin GI Disturbance     GI upset     Aspirin GI  "Disturbance     Bee Venom Swelling     Birds [Feathers]      Cats      Dust Mites      Fluoride Other (See Comments) and Unknown     headaches     Liothyronine Unknown     Severe gut reactions, ears itching, throat closing     Mold      No Clinical Screening - See Comments Other (See Comments)     Some antibiotics but not Zpak-Can tolerate Azithromycin     Procaine Other (See Comments)     headache     Seasonal Allergies      Ragweed, pollen     Simvastatin Other (See Comments)     \"hot flash\", muscle weakness, dizziness     Simvastatin Unknown     Penicillins Unknown and Rash     As a child     Sulfa Drugs Itching, Other (See Comments), Rash and Hives         /68   Pulse 63   Resp 18   Wt 61.2 kg (135 lb)   LMP  (LMP Unknown)   SpO2 98%   BMI 23.54 kg/m    Physical Examination:    General:  Conversant, generally healthy appearing, no acute distress  Head: atraumatic  Neck:  Trachea midline, Full AROM, supple, thyroid smooth, symmetric, not enlarged, no nodules, no neck lymphadenopathy  Lungs:  Clear to auscultation throughout, no wheezes, rhonchi or rales. Normal respiratory effort and no intercostal retractions.  C/V:  Regular rate and rhythm, no murmurs, rubs or gallops.    Lymph:  No cervical lymph nodes.  Skin:   Normal temperature., turgor and texture. No rashes, suspicious lesions, or jaundice on exposed skin surfaces.   Extremities:  No peripheral edema, no digital cyanosis  Psych:  Alert and oriented. Appropriate affect.  Not psychomotor slowed.  No signs of anxiety or agitation.      A&P:  Mild Persistent Asthma  Patient's symptoms are reasonably controlled at the moment by allergen avoidance. No asthma exacerbations, hospitalizations, or nighttime awakenings. PFTs today largely unremarkable except for some mild air trapping as evidenced by increased RV. Likely her symptoms would be even further reduced by initiation of ICS however she is not interested in starting a medication at this time. " Much of the visit was spent in counseling patient on potential asthma regimens, answering questions about symptoms of exacerbation or respiratory infections to watch for, and course of her disease, etc.  -return to clinic PRN  -pt to contact the clinic if having persistent or worsened symptoms for initiation of a controller medication    Silvia Carrero MD   Resident PGY-1  Pager [phone]    This patient was discussed and seen with Dr. Jean-Baptiste.      Attestation signed by Johnie Jean-Baptiste MD at 6/9/2021 10:22 AM:  Physician Attestation   I, Johnie Jean-Baptiste MD, saw this patient and agree with the findings and plan of care as documented in the note.      Items personally reviewed/procedural attestation: vitals, labs, imaging and agree with the interpretation documented in the note, and spirometry report and agree with the interpretation documented in the note.    History of likely asthma.  Many triggers as well as sign/symptoms for asthma.  Does not quite meet PFT criteria, but reasonable assume that asthma is correct diagnosis.  We discussed treatment options, which would essentially be an inhaled steroid.  Her lung function is a bit worse than what it was in 2014; more decline than I would expect. She does not want to take an inhaler regularly right now.   She will get in touch with us if she develops worsening symptoms.      Johnie Jean-Baptiste MD

## 2021-06-07 NOTE — NURSING NOTE
Chief Complaint   Patient presents with     Consult     Asthma      Medications reviewed and updated.  Vitals taken  Sussy Lomeli CMA

## 2021-06-07 NOTE — PROGRESS NOTES
"CC: new, asthma      HPI:  71F with PMH of asthma, hashimoto's presents today for check in of asthma per PCP.    PFTs last done in 2014 at which time she did not appear to meet criteria for asthma as bronchodilator test negative and pattern was nonobstructive. Last seen at that time by Dr. Huizar in 2014 at which time was prescribed advair and subsequently was seen by an allergist. She states she never started the advair.    She has a history of multiple food allergies including prior episode of anaphylaxis. Triggers includes certain foods, ragweed, pollen, smoke, outside air quality, perfumes. She has symptoms of wheezing, cough, chest tightness. She currently manages these symptoms by avoiding allergens and \"not going outside\" but is not taking any medications or inhalers for asthma. Often in the ocampo she will have cough productive of green sputum which resolves on its own; sometimes she takes antibiotics for this but tries to avoid these without taking any mediations. She has never been hospitalized or went to the ED for asthma.     Frequency of her symptoms varies with the seasons. If air quality is bad, she has daily symptoms. She denies nighttime awakenings. She does not use an albuterol rescue inhaler.     She used to get allergy shots in the past but had bad reactions. She was unable to afford allergies drops. For allergies she takes PRN benadryl but does not take daily medication for asthma.     Today she is not interested in starting any medications but is largely wondering how her lung function tests today compare to 2014 and has several questions about managing her disease and lung infections.    ROS:  10 pt ROS reviewed and otherwise positive per HPI.      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 Link     Scoliosis      Past Surgical History:   Procedure Laterality Date     C STOMACH SURGERY PROCEDURE UNLISTED       CYSTOSCOPY N/A 3/25/2019    Procedure: CYSTOSCOPY;  Surgeon: Radha Ovalle MD;  [facility]     DILATION AND CURETTAGE, OPERATIVE HYSTEROSCOPY WITH MORCELLATOR, COMBINED N/A 3/25/2019    Procedure: OPERATIVE HYSTEROSCOPY WITH MORCELLATOR;  Surgeon: Marybeth Huber MD;  [facility]: [facility]     HC BREATH HYDROGEN TEST  2013    Procedure: HYDROGEN BREATH TEST;  Surgeon: Esteban Short MD;  Location: [facility]     SURGICAL HISTORY OF -       tonsillectomy     SURGICAL HISTORY OF -   ~    cosmetic skin removal: breast reduction; skin removal from arms legs and tummy tuck     Family History   Problem Relation Age of Onset     Diabetes Father      Gastrointestinal Disease Father 82         after surgery for Bowel obstruction     Obesity Father      Alcohol/Drug Brother      Gastrointestinal Disease Brother         Hepatitis     Heart Disease Brother 62        hx of drug abuse,  age 62     Diabetes Brother      Skin Cancer Brother      Substance Abuse Brother      Allergies Brother      Anesthesia Reaction Brother      Melanoma Mother      Skin Cancer Mother      Osteoporosis Mother      Substance Abuse Sister      Asthma Brother      Diabetes Brother      Cancer Sister      Cancer Sister 56        bone cancer     Anxiety Disorder Sister      Obesity Brother      Alcoholism Sister      Alcoholism Brother      Bone Cancer Sister      Colon Cancer No family hx of      Crohn's Disease No family hx of      Ulcerative Colitis No family hx of      Colon Polyps No family hx of      Glaucoma No family hx of      Macular Degeneration No family hx of      Social History     Tobacco Use     Smoking  status: Never Smoker     Smokeless tobacco: Never Used   Substance Use Topics     Alcohol use: No     Drug use: No     Current Outpatient Medications   Medication Sig Dispense Refill     Charcoal Activated (CHARCOAL PO) Take 2 capsules by mouth daily as needed For indigestion       Cholecalciferol (VITAMIN D) 2000 UNIT tablet Take 1 tablet by mouth daily.       Digestive Enzymes TABS Use as directed       DiphenhydrAMINE HCl (BENADRYL PO) Take by mouth daily as needed       EPINEPHrine (EPIPEN) 0.3 MG/0.3ML injection 2-pack Inject 0.3 mLs (0.3 mg) into the muscle once as needed for anaphylaxis 0.3 mL 1     estradiol (ESTRACE) 0.1 MG/GM vaginal cream Place 0.5 g vaginally Use one to two times weekly.       HYDROCHLORIC ACID Take as directed       Hypromellose (ARTIFICIAL TEARS OP) Apply 1 drop to eye daily as needed       MAGNESIUM OXIDE PO Take 6-8 tablets by mouth daily        neomycin-polymyxin-dexamethasone (MAXITROL) 3.5-75285-8.1 SUSP ophthalmic susp Place 1 drop into both eyes 2 times daily as needed (as needed for worsening eye symptoms) 5 mL 2     peppermint oil liquid 1 mL daily as needed for other Taking gel tabs not oil, not in EMR.       Probiotic Product (ALOE 25330 & PROBIOTICS) CAPS        progesterone (PROMETRIUM) 200 MG capsule        PROGESTERONE 50MG CAPSULES COMPOUND 100 mg At Bedtime Take 2 capsules at bedtime.       thyroid (ARMOUR THYROID) 60 MG tablet Take 60 mg by mouth every morning        Omega-3 Fatty Acids (FISH OIL PO) Take by mouth as needed       Testosterone Propionate (FIRST-TESTOSTERONE MC) 2 % CREA           Allergies   Allergen Reactions     Aspirin GI Disturbance     GI upset     Aspirin GI Disturbance     Bee Venom Swelling     Birds [Feathers]      Cats      Dust Mites      Fluoride Other (See Comments) and Unknown     headaches     Liothyronine Unknown     Severe gut reactions, ears itching, throat closing     Mold      No Clinical Screening - See Comments Other (See  "Comments)     Some antibiotics but not Zpak-Can tolerate Azithromycin     Procaine Other (See Comments)     headache     Seasonal Allergies      Ragweed, pollen     Simvastatin Other (See Comments)     \"hot flash\", muscle weakness, dizziness     Simvastatin Unknown     Penicillins Unknown and Rash     As a child     Sulfa Drugs Itching, Other (See Comments), Rash and Hives         /68   Pulse 63   Resp 18   Wt 61.2 kg (135 lb)   LMP  (LMP Unknown)   SpO2 98%   BMI 23.54 kg/m    Physical Examination:    General:  Conversant, generally healthy appearing, no acute distress  Head: atraumatic  Neck:  Trachea midline, Full AROM, supple, thyroid smooth, symmetric, not enlarged, no nodules, no neck lymphadenopathy  Lungs:  Clear to auscultation throughout, no wheezes, rhonchi or rales. Normal respiratory effort and no intercostal retractions.  C/V:  Regular rate and rhythm, no murmurs, rubs or gallops.    Lymph:  No cervical lymph nodes.  Skin:   Normal temperature., turgor and texture. No rashes, suspicious lesions, or jaundice on exposed skin surfaces.   Extremities:  No peripheral edema, no digital cyanosis  Psych:  Alert and oriented. Appropriate affect.  Not psychomotor slowed.  No signs of anxiety or agitation.      A&P:  Mild Persistent Asthma  Patient's symptoms are reasonably controlled at the moment by allergen avoidance. No asthma exacerbations, hospitalizations, or nighttime awakenings. PFTs today largely unremarkable except for some mild air trapping as evidenced by increased RV. Likely her symptoms would be even further reduced by initiation of ICS however she is not interested in starting a medication at this time. Much of the visit was spent in counseling patient on potential asthma regimens, answering questions about symptoms of exacerbation or respiratory infections to watch for, and course of her disease, etc.  -return to clinic PRN  -pt to contact the clinic if having persistent or worsened " symptoms for initiation of a controller medication    Silvia Carrreo MD  [facility] Resident PGY-1  Pager [phone]    This patient was discussed and seen with Dr. Jean-Baptiste.

## 2021-06-08 LAB
DLCOUNC-%PRED-PRE: 112 %
DLCOUNC-PRE: 21.19 ML/MIN/MMHG
DLCOUNC-PRED: 18.81 ML/MIN/MMHG
ERV-%PRED-PRE: 83 %
ERV-PRE: 0.61 L
ERV-PRED: 0.73 L
EXPTIME-PRE: 6.66 SEC
FEF2575-%PRED-PRE: 100 %
FEF2575-PRE: 1.84 L/SEC
FEF2575-PRED: 1.83 L/SEC
FEFMAX-%PRED-PRE: 110 %
FEFMAX-PRE: 6.05 L/SEC
FEFMAX-PRED: 5.49 L/SEC
FEV1-%PRED-PRE: 99 %
FEV1-PRE: 2.13 L
FEV1FEV6-PRE: 79 %
FEV1FEV6-PRED: 79 %
FEV1FVC-PRE: 78 %
FEV1FVC-PRED: 78 %
FEV1SVC-PRE: 76 %
FEV1SVC-PRED: 73 %
FIFMAX-PRE: 5.35 L/SEC
FRCPLETH-%PRED-PRE: 131 %
FRCPLETH-PRE: 3.49 L
FRCPLETH-PRED: 2.65 L
FVC-%PRED-PRE: 99 %
FVC-PRE: 2.74 L
FVC-PRED: 2.75 L
IC-%PRED-PRE: 99 %
IC-PRE: 2.19 L
IC-PRED: 2.21 L
RVPLETH-%PRED-PRE: 142 %
RVPLETH-PRE: 2.88 L
RVPLETH-PRED: 2.02 L
TLCPLETH-%PRED-PRE: 118 %
TLCPLETH-PRE: 5.68 L
TLCPLETH-PRED: 4.77 L
VA-%PRED-PRE: 105 %
VA-PRE: 4.77 L
VC-%PRED-PRE: 95 %
VC-PRE: 2.79 L
VC-PRED: 2.93 L

## 2021-06-08 ASSESSMENT — ASTHMA QUESTIONNAIRES: ACT_TOTALSCORE: 17

## 2021-06-28 ENCOUNTER — VIRTUAL VISIT (OUTPATIENT)
Dept: FAMILY MEDICINE | Facility: CLINIC | Age: 71
End: 2021-06-28
Payer: MEDICARE

## 2021-06-28 DIAGNOSIS — E78.00 HIGH CHOLESTEROL: ICD-10-CM

## 2021-06-28 DIAGNOSIS — E03.9 HYPOTHYROIDISM, UNSPECIFIED TYPE: ICD-10-CM

## 2021-06-28 DIAGNOSIS — Z78.0 POSTMENOPAUSAL STATUS: Primary | ICD-10-CM

## 2021-06-28 DIAGNOSIS — L98.9 SKIN LESION: ICD-10-CM

## 2021-06-28 DIAGNOSIS — R00.2 PALPITATIONS: ICD-10-CM

## 2021-06-28 DIAGNOSIS — M25.559 HIP PAIN: ICD-10-CM

## 2021-06-28 DIAGNOSIS — M54.2 CERVICALGIA: ICD-10-CM

## 2021-06-28 PROCEDURE — 99443 PR PHYSICIAN TELEPHONE EVALUATION 21-30 MIN: CPT | Mod: 95 | Performed by: FAMILY MEDICINE

## 2021-06-28 NOTE — PROGRESS NOTES
Jennifer is a 71 year old who is being evaluated via a billable telephone visit.      What phone number would you like to be contacted at? [facility]  How would you like to obtain your AVS? MyChart    Assessment & Plan     Postmenopausal status  Due  - Dexa hip/pelvis/spine*; Future    Cervicalgia  [facility]  - PHYSICAL THERAPY REFERRAL; Future    Hip pain  Same  - PHYSICAL THERAPY REFERRAL; Future    High cholesterol  Due  - Basic metabolic panel; Future  - Lipid panel reflex to direct LDL Fasting; Future    Hypothyroidism, unspecified type  Due  - TSH with free T4 reflex; Future    Palpitations  She'd like thorough eval.   - CARDIOLOGY EVAL ADULT REFERRAL  - CBC with platelets differential; Future    Skin lesion  For age  - ADULT DERMATOLOGY REFERRAL      30 minutes spent on the date of the encounter doing chart review, history and exam, documentation and further activities per the note    Milan Alvarez MD  [facility]   Jennifer is a 71 year old who presents for the following health issues     HPI Overall stable. Due for colonscopy, polyps, she'll think about where to go and let me know  At 71, would like general skin check  Due for routine dexa  Chronic intermittent unpredictable brief palpitations. Feels fast HR, lasts one minute at most. Can be days/weeks between. Worried. Not triggered by exertion or other obvious cause.   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 Link     Scoliosis      Past Surgical History:   Procedure Laterality Date     C STOMACH SURGERY PROCEDURE UNLISTED       CYSTOSCOPY N/A 3/25/2019    Procedure: CYSTOSCOPY;  Surgeon: Radha Ovalle MD;  [facility]     DILATION AND CURETTAGE, OPERATIVE HYSTEROSCOPY WITH MORCELLATOR, COMBINED N/A 3/25/2019    Procedure: OPERATIVE HYSTEROSCOPY WITH MORCELLATOR;  Surgeon: Marybeth Huber MD;  [facility]     HC BREATH HYDROGEN TEST  11/11/2013    Procedure: HYDROGEN BREATH TEST;  Surgeon: Esteban Short MD;  [facility]     SURGICAL HISTORY OF -       tonsillectomy     SURGICAL HISTORY OF -   ~1997    cosmetic skin removal: breast reduction; skin removal from arms legs and tummy tuck     Current Outpatient Medications   Medication     Charcoal Activated (CHARCOAL PO)     Cholecalciferol (VITAMIN D) 2000 UNIT tablet     Digestive Enzymes TABS     DiphenhydrAMINE HCl (BENADRYL PO)     EPINEPHrine (EPIPEN) 0.3 MG/0.3ML injection 2-pack     estradiol (ESTRACE) 0.1 MG/GM vaginal cream     HYDROCHLORIC ACID     Hypromellose (ARTIFICIAL TEARS OP)     MAGNESIUM OXIDE PO     neomycin-polymyxin-dexamethasone (MAXITROL) 3.5-45616-6.1 SUSP ophthalmic susp     Omega-3 Fatty Acids (FISH OIL PO)     peppermint oil liquid     Probiotic Product (ALOE 78739 & PROBIOTICS) CAPS     progesterone (PROMETRIUM) 200 MG capsule     PROGESTERONE 50MG CAPSULES COMPOUND     Testosterone Propionate (FIRST-TESTOSTERONE MC) 2 % CREA     thyroid (ARMOUR THYROID) 60 MG tablet     No current facility-administered medications for this visit.      Allergies   Allergen Reactions     Aspirin GI Disturbance     GI upset     Aspirin GI Disturbance     Bee Venom Swelling     Birds [Feathers]      Cats      Dust Mites      Fluoride Other (See Comments) and Unknown     headaches     Liothyronine Unknown     Severe gut reactions, ears itching, throat closing     Mold      No Clinical Screening - See Comments Other (See  "Comments)     Some antibiotics but not Zpak-Can tolerate Azithromycin     Procaine Other (See Comments)     headache     Seasonal Allergies      Ragweed, pollen     Simvastatin Other (See Comments)     \"hot flash\", muscle weakness, dizziness     Simvastatin Unknown     Penicillins Unknown and Rash     As a child     Sulfa Drugs Itching, Other (See Comments), Rash and Hives                     Review of Systems         Objective           Vitals:  No vitals were obtained today due to virtual visit.    Physical Exam   healthy, alert and no distress  PSYCH: Alert and oriented times 3; coherent speech, normal   rate and volume, able to articulate logical thoughts, able   to abstract reason, no tangential thoughts, no hallucinations   or delusions  Her affect is normal  RESP: No cough, no audible wheezing, able to talk in full sentences  Remainder of exam unable to be completed due to telephone visits        Phone call duration: 25 minutes    "

## 2021-06-29 ENCOUNTER — TELEPHONE (OUTPATIENT)
Dept: FAMILY MEDICINE | Facility: CLINIC | Age: 71
End: 2021-06-29

## 2021-07-07 ENCOUNTER — TELEPHONE (OUTPATIENT)
Dept: CARDIOLOGY | Facility: CLINIC | Age: 71
End: 2021-07-07

## 2021-07-07 DIAGNOSIS — R00.2 PALPITATIONS: Primary | ICD-10-CM

## 2021-07-07 NOTE — TELEPHONE ENCOUNTER
"Pt needs to schedule an appointment with an electrophysiology cardiologist per a referral.    CARDIOLOGY EVAL ADULT REFERRAL HAS BEEN CANCELLED; can be found in the \"finalized requests\" tab of the pt's appointment desk. PLEASE REINSTATE (right click on request and select \"reinstate\") AND LINK TO APPOINTMENT WHEN SCHEDULING.     "

## 2021-07-21 ENCOUNTER — TELEPHONE (OUTPATIENT)
Dept: FAMILY MEDICINE | Facility: CLINIC | Age: 71
End: 2021-07-21

## 2021-07-21 NOTE — TELEPHONE ENCOUNTER
[facility] Call Center    Phone Message    May a detailed message be left on voicemail: yes     Reason for Call: Other: Patient calling to request her Dexa order is extended, the order expires on 7/28/21 and she is wanting that done in August. Please call with questions thank you.  Patient also requesting her lab orders be extended and for a return call to inform her when that has happened. Thank you.     Action Taken: Message routed to:  [facility]    Travel Screening: Not Applicable

## 2021-07-21 NOTE — TELEPHONE ENCOUNTER
Called and left pt VM.  Dexa and lab orders extended to  in 6 months.  Pt good to go!        Erich Ayoub CMA ([facility]) at 1:22 PM on 2021

## 2021-07-27 NOTE — TELEPHONE ENCOUNTER
RECORDS RECEIVED FROM:  [facility] notes: palpitations   DATE RECEIVED: 8.17.21   NOTES STATUS DETAILS   OFFICE NOTE from referring provider    Internal 7.7.21 ANA MARIA Alvarez [facility]   OFFICE NOTE from other cardiologist    N/A    DISCHARGE SUMMARY from hospital    N/A    DISCHARGE REPORT from the ER   N/A    OPERATIVE REPORT    N/A    MEDICATION LIST   Internal    LABS     BMP   Internal 3.12.19   CBC   Internal 3.12.19   CMP   Internal 5.9.18   Lipids   Internal 5.9.18   TSH   Care Everywhere 6.12.20   DIAGNOSTIC PROCEDURES     EKG   Internal 1.28.19   Monitor Reports   N/A    IMAGING (DISC & REPORT)      Echo   Internal 1.21.19   Stress Tests   N/A    Cath   N/A    MRI/MRA   N/A    CT/CTA   N/A

## 2021-08-17 ENCOUNTER — LAB (OUTPATIENT)
Dept: LAB | Facility: CLINIC | Age: 71
End: 2021-08-17
Payer: MEDICARE

## 2021-08-17 ENCOUNTER — OFFICE VISIT (OUTPATIENT)
Dept: CARDIOLOGY | Facility: CLINIC | Age: 71
End: 2021-08-17
Attending: INTERNAL MEDICINE
Payer: MEDICARE

## 2021-08-17 ENCOUNTER — TELEPHONE (OUTPATIENT)
Dept: INTERNAL MEDICINE | Facility: CLINIC | Age: 71
End: 2021-08-17

## 2021-08-17 ENCOUNTER — PRE VISIT (OUTPATIENT)
Dept: CARDIOLOGY | Facility: CLINIC | Age: 71
End: 2021-08-17

## 2021-08-17 ENCOUNTER — ANCILLARY PROCEDURE (OUTPATIENT)
Dept: BONE DENSITY | Facility: CLINIC | Age: 71
End: 2021-08-17
Attending: FAMILY MEDICINE
Payer: MEDICARE

## 2021-08-17 VITALS
SYSTOLIC BLOOD PRESSURE: 122 MMHG | HEART RATE: 68 BPM | HEIGHT: 63 IN | OXYGEN SATURATION: 98 % | BODY MASS INDEX: 23.92 KG/M2 | WEIGHT: 135 LBS | DIASTOLIC BLOOD PRESSURE: 93 MMHG

## 2021-08-17 DIAGNOSIS — K58.9 IBS (IRRITABLE BOWEL SYNDROME): ICD-10-CM

## 2021-08-17 DIAGNOSIS — R79.89 LOW SERUM VITAMIN D: ICD-10-CM

## 2021-08-17 DIAGNOSIS — G93.32 CHRONIC FATIGUE SYNDROME: ICD-10-CM

## 2021-08-17 DIAGNOSIS — R00.2 PALPITATIONS: ICD-10-CM

## 2021-08-17 DIAGNOSIS — R10.9 CHRONIC ABDOMINAL PAIN: ICD-10-CM

## 2021-08-17 DIAGNOSIS — R00.2 PALPITATIONS: Primary | ICD-10-CM

## 2021-08-17 DIAGNOSIS — E03.9 HYPOTHYROIDISM, UNSPECIFIED TYPE: ICD-10-CM

## 2021-08-17 DIAGNOSIS — G89.29 CHRONIC ABDOMINAL PAIN: ICD-10-CM

## 2021-08-17 DIAGNOSIS — Z78.0 POSTMENOPAUSAL STATUS: ICD-10-CM

## 2021-08-17 DIAGNOSIS — K90.49 FOOD INTOLERANCE: ICD-10-CM

## 2021-08-17 DIAGNOSIS — E78.00 HIGH CHOLESTEROL: ICD-10-CM

## 2021-08-17 DIAGNOSIS — R79.89 LOW VITAMIN D LEVEL: ICD-10-CM

## 2021-08-17 LAB
ANION GAP SERPL CALCULATED.3IONS-SCNC: 7 MMOL/L (ref 3–14)
BASOPHILS # BLD AUTO: 0 10E3/UL (ref 0–0.2)
BASOPHILS NFR BLD AUTO: 1 %
BUN SERPL-MCNC: 5 MG/DL (ref 7–30)
CALCIUM SERPL-MCNC: 9.3 MG/DL (ref 8.5–10.1)
CHLORIDE BLD-SCNC: 110 MMOL/L (ref 94–109)
CHOLEST SERPL-MCNC: 240 MG/DL
CO2 SERPL-SCNC: 26 MMOL/L (ref 20–32)
CREAT SERPL-MCNC: 0.7 MG/DL (ref 0.52–1.04)
EOSINOPHIL # BLD AUTO: 0.2 10E3/UL (ref 0–0.7)
EOSINOPHIL NFR BLD AUTO: 5 %
ERYTHROCYTE [DISTWIDTH] IN BLOOD BY AUTOMATED COUNT: 13.5 % (ref 10–15)
FASTING STATUS PATIENT QL REPORTED: NO
GFR SERPL CREATININE-BSD FRML MDRD: 88 ML/MIN/1.73M2
GLUCOSE BLD-MCNC: 94 MG/DL (ref 70–99)
HCT VFR BLD AUTO: 39.6 % (ref 35–47)
HDLC SERPL-MCNC: 63 MG/DL
HGB BLD-MCNC: 12.8 G/DL (ref 11.7–15.7)
HOLD SPECIMEN: NORMAL
IMM GRANULOCYTES # BLD: 0 10E3/UL
IMM GRANULOCYTES NFR BLD: 0 %
LDLC SERPL CALC-MCNC: 152 MG/DL
LYMPHOCYTES # BLD AUTO: 1.4 10E3/UL (ref 0.8–5.3)
LYMPHOCYTES NFR BLD AUTO: 29 %
MCH RBC QN AUTO: 30.9 PG (ref 26.5–33)
MCHC RBC AUTO-ENTMCNC: 32.3 G/DL (ref 31.5–36.5)
MCV RBC AUTO: 96 FL (ref 78–100)
MONOCYTES # BLD AUTO: 0.3 10E3/UL (ref 0–1.3)
MONOCYTES NFR BLD AUTO: 6 %
NEUTROPHILS # BLD AUTO: 3 10E3/UL (ref 1.6–8.3)
NEUTROPHILS NFR BLD AUTO: 59 %
NONHDLC SERPL-MCNC: 177 MG/DL
NRBC # BLD AUTO: 0 10E3/UL
NRBC BLD AUTO-RTO: 0 /100
PLATELET # BLD AUTO: 250 10E3/UL (ref 150–450)
POTASSIUM BLD-SCNC: 4.4 MMOL/L (ref 3.4–5.3)
RBC # BLD AUTO: 4.14 10E6/UL (ref 3.8–5.2)
SODIUM SERPL-SCNC: 143 MMOL/L (ref 133–144)
T4 FREE SERPL-MCNC: 0.91 NG/DL (ref 0.76–1.46)
TRIGL SERPL-MCNC: 123 MG/DL
TSH SERPL DL<=0.005 MIU/L-ACNC: 0.02 MU/L (ref 0.4–4)
WBC # BLD AUTO: 5 10E3/UL (ref 4–11)

## 2021-08-17 PROCEDURE — G0463 HOSPITAL OUTPT CLINIC VISIT: HCPCS | Mod: 25

## 2021-08-17 PROCEDURE — 80048 BASIC METABOLIC PNL TOTAL CA: CPT | Performed by: PATHOLOGY

## 2021-08-17 PROCEDURE — 93005 ELECTROCARDIOGRAM TRACING: CPT

## 2021-08-17 PROCEDURE — 77080 DXA BONE DENSITY AXIAL: CPT | Performed by: INTERNAL MEDICINE

## 2021-08-17 PROCEDURE — 84443 ASSAY THYROID STIM HORMONE: CPT | Performed by: PATHOLOGY

## 2021-08-17 PROCEDURE — 80061 LIPID PANEL: CPT | Performed by: PATHOLOGY

## 2021-08-17 PROCEDURE — 82306 VITAMIN D 25 HYDROXY: CPT | Performed by: FAMILY MEDICINE

## 2021-08-17 PROCEDURE — 85025 COMPLETE CBC W/AUTO DIFF WBC: CPT | Performed by: PATHOLOGY

## 2021-08-17 PROCEDURE — 84439 ASSAY OF FREE THYROXINE: CPT | Performed by: PATHOLOGY

## 2021-08-17 PROCEDURE — 99214 OFFICE O/P EST MOD 30 MIN: CPT | Performed by: INTERNAL MEDICINE

## 2021-08-17 PROCEDURE — 36415 COLL VENOUS BLD VENIPUNCTURE: CPT | Performed by: PATHOLOGY

## 2021-08-17 RX ORDER — ASCORBIC ACID 100 MG
TABLET,CHEWABLE ORAL EVERY MORNING
COMMUNITY
End: 2023-06-01

## 2021-08-17 ASSESSMENT — PAIN SCALES - GENERAL: PAINLEVEL: NO PAIN (0)

## 2021-08-17 ASSESSMENT — MIFFLIN-ST. JEOR: SCORE: 1095.1

## 2021-08-17 NOTE — LETTER
August 18, 2021      Jennifer Steiner  [address] [facility] [address]      Dear ,    We are writing to inform you of your test results.    I'll ask a nurse to call.    Resulted Orders   Dexa hip/pelvis/spine*    [facility] - [address], [place] [address]  Phone: [phone]   Fax: [phone]      Patient name:   Jennifer Steiner  Patient demographics:  71 year old White Female   History:  Family History of Fracture, Family History of Osteoporosis, Post   Menopausal and Thyroid Condition  Current treatments:  Evista/Raloxifene, Steroid Inhalers, Testosterone,   Thyroid Medications, Vitamin D, history of Calcium supplement and   Cholesterol medication  Scan:    Informaat      Conclusions:  The most negative and valid T-score of -4.1 at the level of the L1 - L3   lumbar spine corresponds with osteoporosis according to WHO criteria for   postmenopausal females and men age 50 and over. The risk of osteoporotic   fracture increases approximately 2-fold for each 1.0 SD decrease in   T-score.    Low bone density is not the only risk factor for fracture; consider   factors such as patient's age, fall risk, injury risk, previous   osteoporotic fracture, family history of osteoporosis, etc.  People with   an elevated risk of fracture should be regularly evaluated for low bone   mineral density.  For patients eligible for Medicare, routine testing is   allowed once every 2 years. Testing frequency can be increased for   patients on corticosteroids. Clinical correlation is recommended.     Comparison Exams:  Taking into account the precision errors (ref #3 below) for this center:  These calculated changes in BMD to the previous exam are significantly   decreased at the level of the lumbar spine and mean total femur by -11.9%   and -10.4% respectively.  These calculated changes in BMD to the inital exam are significantly    decreased at the level of the lumbar spine and mean total femur by -27.9%   and -15% respectively.  Percent changes not mentioned or within remaining regions are either   insignificant or invalid.    Please refer to BMD values in PACS.    Bone densitometry cannot rule out secondary causes of bone loss.   Therefore, further metabolic testing to look for secondary causes of low   BMD should be performed if indicated. Clinical correlation is recommended     Clinical correlation is strongly recommended.    Recommend repeat DXA in 2 years.     Feel free to contact DXA services if you have any questions or comments.    Thank you for the opportunity to be of service to you and your patient.      Principal result :  Zeny Nevarez MD, MD, CCD  Division of Endocrinology and Diabetes    Department of Medicine    Technical comments:   Satisfactory.   Abnormal vertebrae may be excluded from analysis if there is more than a   1.0 T-score difference between the vertebrae in question and adjacent   vertebrae. Note the wide range in BMD values and T-scores within the   lumbar spine. In the circumstances, the lumbar spine is represented by   L1-L3    References:  Ref. 1. WHO categories:          T-score > -1.0  = normal             .                                T-score -1.0 to -2.5 = low bone density  T-score < -2.5 = osteoporosis        .     Ref. 2. 2015 ISCD official position statements:  www.iscd.org.     Ref. 3.  (LSC = least significant change)  AP spine =  0.032 g/cm2  (6.26.2007)  Left hip = 0.029 g/cm2  (6.15.2007)  Right hip = 0.018 g/cm2  (6.15.2007)  Left mid radius = 0.043 g/cm2  (6.26.2007)  Lateral spine region = pending  Total body = pending  According to the ISCD position statements, total hip rather than femoral   neck regions are to be compared because larger areas give better   precision.     Ref. 4.  By definition, osteoporosis may be diagnosed in the presence or   with the history of a low  trauma or fragility fracture.  Fragility and low   trauma fracture is defined as a fracture resulting from the force of a   fall from a standing height or less or a bone that breaks under conditions   that would not cause a normal bone to break.       Ref. 5. NOF Physician's Guideline Website address:  www.nof.org.       If you have any questions or concerns, please call the clinic at the number listed above.       Sincerely,      Milan Alvarez MD

## 2021-08-17 NOTE — LETTER
8/17/2021      RE: Jennifer Steiner  [address] [facility] [address]       Dear Colleague,    Thank you for the opportunity to participate in the care of your patient, Jennifer Steiner, at the [facility] [place] [facility]. Please see a copy of my visit note below.       SUBJECTIVE:  Jennifer Steiner is a 71 year old female who presents for evaluation of palpitations.  Patient with hyperlipidemia and statin intolerance.  Normal lipids as per patient during last check.  Known left carotid artery stenosis with intermittent TIA symptoms.  She was evaluated with an exercise stress echo in 2019.  This reported some ECG abnormalities.  Subsequently patient had a cardiology consult and she was told that she is on the marginal case and may require further evaluation.  She is very active with no cardiac symptoms.  She has no significant cardiac risk factors apart from hyperlipidemia which according to her is well controlled.  No hypertension diabetes.  No premature coronary artery disease in family.  Patient is a non-smoker.  Patient feels palpitation mostly at rest.  Brief episodes.  No associated symptoms.  Never had dizziness or loss of consciousness.  No chest pain shortness of breath associated with palpitation.  Self-limited episodes which are known interfering with her lifestyle.  No symptoms during activity.  Nonprogressive symptoms.  Her major concern was that she was told that her EKG during stress test was abnormal.  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 Provider to review       Generalized pain 04/04/2014     Priority: Medium     Food intolerance 11/20/2013     Priority: Medium     Chronic fatigue syndrome 01/07/2013     Priority: Medium     Backache 12/16/2012     Priority: Medium     Vaginal atrophy 11/03/2012     Priority: Medium     High cholesterol 02/14/2012     Priority: Medium     Rash and other nonspecific skin eruption 07/22/2011     Priority: Medium     CARDIOVASCULAR SCREENING; LDL GOAL LESS THAN 130 06/11/2010     Priority: Medium     Transient cerebral ischemia 07/10/2009     Priority: Medium     Diagnosis updated by automated process. Provider to review and confirm.       Carotid artery dissection (H) 07/10/2009     Priority: Medium     IBS (irritable bowel syndrome) 07/10/2009     Priority: Medium     Screen for colon cancer 07/10/2009     Priority: Medium     Due.  Referral given.       Visit for screening mammogram 07/10/2009     Priority: Medium     Due.  Referral given.  (Problem list name updated by automated process. Provider to review and confirm.)       Encounter for routine gynecological examination 07/10/2009     Priority: Medium     Due.  Recommend return for physical and pap.  Problem list name updated by automated process. Provider to review       Hypothyroidism 07/10/2009     Priority: Medium     Hemorrhoids 05/02/2008     Priority: Medium     Major depressive disorder, recurrent episode (H) 05/02/2008     Priority: Medium     Overview:   With recurrent siuicidal ideation.  Has visited psychologist in past. Has never tried medication.  Brother has been on numerous medications with no relief.       Symptomatic menopausal or female climacteric states 05/02/2008     Priority: Medium    .  Current Outpatient Medications   Medication Sig     Ascorbic  Acid (VITAMIN C) 100 MG CHEW      Charcoal Activated (CHARCOAL PO) Take 2 capsules by mouth daily as needed For indigestion     Cholecalciferol (VITAMIN D) 2000 UNIT tablet Take 1 tablet by mouth daily.     Digestive Enzymes TABS Use as directed     DiphenhydrAMINE HCl (BENADRYL PO) Take by mouth daily as needed     EPINEPHrine (EPIPEN) 0.3 MG/0.3ML injection 2-pack Inject 0.3 mLs (0.3 mg) into the muscle once as needed for anaphylaxis     estradiol (ESTRACE) 0.1 MG/GM vaginal cream Place 0.5 g vaginally Use one to two times weekly.     HYDROCHLORIC ACID Take as directed     Hypromellose (ARTIFICIAL TEARS OP) Apply 1 drop to eye daily as needed     MAGNESIUM OXIDE PO Take 6-8 tablets by mouth daily      neomycin-polymyxin-dexamethasone (MAXITROL) 3.5-59024-6.1 SUSP ophthalmic susp Place 1 drop into both eyes 2 times daily as needed (as needed for worsening eye symptoms)     peppermint oil liquid 1 mL daily as needed for other Taking gel tabs not oil, not in EMR.     Probiotic Product (ALOE 43809 & PROBIOTICS) CAPS      progesterone (PROMETRIUM) 200 MG capsule      PROGESTERONE 50MG CAPSULES COMPOUND 100 mg At Bedtime Take 2 capsules at bedtime.     Quercetin 50 MG TABS      Testosterone Propionate (FIRST-TESTOSTERONE MC) 2 % CREA      thyroid (ARMOUR THYROID) 60 MG tablet Take 60 mg by mouth every morning      Omega-3 Fatty Acids (FISH OIL PO) Take by mouth as needed (Patient not taking: Reported on 8/17/2021)     No current facility-administered medications for this visit.     Past Medical History:   Diagnosis Date     Anemia      Asthma     since childhood     Blepharitis      Constipation, chronic     Chronic enema use     CVA (cerebral infarction) 2009    carotid intimal repair, left     Hyperlipidemia age 58    identified age 58     Hypothyroidism      Irritable bowel disease      Neck injuries      Pelvic floor dysfunction 2010     Postmenopausal hormone replacement therapy     From Dr. Link     Scoliosis   "    Past Surgical History:   Procedure Laterality Date     C STOMACH SURGERY PROCEDURE UNLISTED       CYSTOSCOPY N/A 3/25/2019    Procedure: CYSTOSCOPY;  Surgeon: Radha Ovalle MD;  [facility]     DILATION AND CURETTAGE, OPERATIVE HYSTEROSCOPY WITH MORCELLATOR, COMBINED N/A 3/25/2019    Procedure: OPERATIVE HYSTEROSCOPY WITH MORCELLATOR;  Surgeon: Marybeth Huber MD;  [facility]     HC BREATH HYDROGEN TEST  11/11/2013    Procedure: HYDROGEN BREATH TEST;  Surgeon: Esteban Short MD;  [facility]     SURGICAL HISTORY OF -       tonsillectomy     SURGICAL HISTORY OF -   ~1997    cosmetic skin removal: breast reduction; skin removal from arms legs and tummy tuck     Allergies   Allergen Reactions     Aspirin GI Disturbance     GI upset     Aspirin GI Disturbance     Bee Venom Swelling     Birds [Feathers]      Cats      Dust Mites      Fluoride Other (See Comments) and Unknown     headaches     Liothyronine Unknown     Severe gut reactions, ears itching, throat closing     Mold      No Clinical Screening - See Comments Other (See Comments)     Some antibiotics but not Zpak-Can tolerate Azithromycin     Procaine Other (See Comments)     headache     Seasonal Allergies      Ragweed, pollen     Simvastatin Other (See Comments)     \"hot flash\", muscle weakness, dizziness     Simvastatin Unknown     Penicillins Unknown and Rash     As a child     Sulfa Drugs Itching, Other (See Comments), Rash and Hives     Social History     Socioeconomic History     Marital status: Single     Spouse name: Not on file     Number of children: Not on file     Years of education: Not on file     Highest education level: Not on file   Occupational History     Occupation: disability     Comment:    Tobacco Use     Smoking status: Never Smoker     Smokeless tobacco: Never Used   Substance and Sexual Activity     Alcohol use: No     Drug use: No     Sexual activity: Not Currently     Birth " control/protection: Post-menopausal   Other Topics Concern     Parent/sibling w/ CABG, MI or angioplasty before 65F 55M? Not Asked   Social History Narrative     Not on file     Social Determinants of Health     Financial Resource Strain:      Difficulty of Paying Living Expenses:    Food Insecurity:      Worried About Running Out of Food in the Last Year:      Ran Out of Food in the Last Year:    Transportation Needs:      Lack of Transportation (Medical):      Lack of Transportation (Non-Medical):    Physical Activity:      Days of Exercise per Week:      Minutes of Exercise per Session:    Stress:      Feeling of Stress :    Social Connections:      Frequency of Communication with Friends and Family:      Frequency of Social Gatherings with Friends and Family:      Attends Jew Services:      Active Member of Clubs or Organizations:      Attends Club or Organization Meetings:      Marital Status:    Intimate Partner Violence:      Fear of Current or Ex-Partner:      Emotionally Abused:      Physically Abused:      Sexually Abused:      Family History   Problem Relation Age of Onset     Diabetes Father      Gastrointestinal Disease Father 82         after surgery for Bowel obstruction     Obesity Father      Alcohol/Drug Brother      Gastrointestinal Disease Brother         Hepatitis     Heart Disease Brother 62        hx of drug abuse,  age 62     Diabetes Brother      Skin Cancer Brother      Substance Abuse Brother      Allergies Brother      Anesthesia Reaction Brother      Melanoma Mother      Skin Cancer Mother      Osteoporosis Mother      Substance Abuse Sister      Asthma Brother      Diabetes Brother      Cancer Sister      Cancer Sister 56        bone cancer     Anxiety Disorder Sister      Obesity Brother      Alcoholism Sister      Alcoholism Brother      Bone Cancer Sister      Colon Cancer No family hx of      Crohn's Disease No family hx of      Ulcerative Colitis No family hx of       "Colon Polyps No family hx of      Glaucoma No family hx of      Macular Degeneration No family hx of           REVIEW OF SYSTEMS:  General: negative, fever, chills, night sweats  Skin: negative, acne, rash and scaling  Eyes: negative, double vision, eye pain and photophobia  Ears/Nose/Throat: negative, nasal congestion and purulent rhinorrhea  Respiratory: No dyspnea on exertion, No cough, No hemoptysis and negative  Cardiovascular: negative, chest pain, exertional chest pain or pressure, paroxysmal nocturnal dyspnea, dyspnea on exertion and orthopnea       OBJECTIVE:  Blood pressure (!) 122/93, pulse 68, height 1.59 m (5' 2.6\"), weight 61.2 kg (135 lb), SpO2 98 %, not currently breastfeeding.  General Appearance: healthy, alert, active and no distress  Head: Normocephalic. No masses, lesions, tenderness or abnormalities  Eyes: conjuctiva clear, PERRL, EOM intact  Ears: External ears normal. Canals clear. TM's normal.  Nose: Nares normal  Mouth: normal  Neck: Supple, no cervical adenopathy, no thyromegaly  Lungs: clear to auscultation  Cardiac: regular rate and rhythm, normal S1 and S2, no murmur       ASSESSMENT/PLAN:  Patient here for evaluation of palpitations.  Intermittent.  Frequency is once or twice a month.  Lasting less than a minute with no associated symptoms.  Never had dizziness or loss of consciousness with palpitations.  Patient's cardiac risk factor is hyperlipidemia.  She is intolerant to statin and according to patient her last lipid profile was normal.  Though the available result was from 2018 showing an LDL of 139.  Patient is very active with no cardiac symptoms during activity.  Overall her symptoms are not bothering her much.  It is not interfering with her lifestyle and has no dangerous symptoms.  Reviewed the echocardiogram done in 2019.  She exercised 125% predicted.  Completely normal echocardiogram.  EKG changes were nonspecific.  It was nondiagnostic for ischemia.  Her EKG reviewed " normal sinus rhythm.  Low voltage otherwise unremarkable.  Patient had an EKG today it was normal apart from low voltage.  As she is active and asymptomatic do not require additional evaluation at this time.  Due to complaint of palpitations suggested a 2-week Zio patch.  Patient is not too keen and she think it is a waste of money and time.  She will return to clinic if her symptoms worsen.  Per orders.   Return to Clinic as needed.  Total visit duration 30 minutes.  This includes face-to-face interview, physical exam, chart review, review of EKGs, stress echocardiogram and documentation.      Please do not hesitate to contact me if you have any questions/concerns.     Sincerely,     EUGENIA Kessler MD

## 2021-08-17 NOTE — TELEPHONE ENCOUNTER
Milan Alvarez MD Chitrakar, Barsha  Cc: Ellen Delong RN  Loy llamas can you add vit d re: history low vit D

## 2021-08-17 NOTE — PROGRESS NOTES
SUBJECTIVE:  Jennifer Steiner is a 71 year old female who presents for evaluation of palpitations.  Patient with hyperlipidemia and statin intolerance.  Normal lipids as per patient during last check.  Known left carotid artery stenosis with intermittent TIA symptoms.  She was evaluated with an exercise stress echo in 2019.  This reported some ECG abnormalities.  Subsequently patient had a cardiology consult and she was told that she is on the marginal case and may require further evaluation.  She is very active with no cardiac symptoms.  She has no significant cardiac risk factors apart from hyperlipidemia which according to her is well controlled.  No hypertension diabetes.  No premature coronary artery disease in family.  Patient is a non-smoker.  Patient feels palpitation mostly at rest.  Brief episodes.  No associated symptoms.  Never had dizziness or loss of consciousness.  No chest pain shortness of breath associated with palpitation.  Self-limited episodes which are known interfering with her lifestyle.  No symptoms during activity.  Nonprogressive symptoms.  Her major concern was that she was told that her EKG during stress test was abnormal.  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 Provider to review       Generalized pain 04/04/2014     Priority: Medium     Food intolerance 11/20/2013     Priority: Medium     Chronic fatigue syndrome 01/07/2013      Priority: Medium     Backache 12/16/2012     Priority: Medium     Vaginal atrophy 11/03/2012     Priority: Medium     High cholesterol 02/14/2012     Priority: Medium     Rash and other nonspecific skin eruption 07/22/2011     Priority: Medium     CARDIOVASCULAR SCREENING; LDL GOAL LESS THAN 130 06/11/2010     Priority: Medium     Transient cerebral ischemia 07/10/2009     Priority: Medium     Diagnosis updated by automated process. Provider to review and confirm.       Carotid artery dissection (H) 07/10/2009     Priority: Medium     IBS (irritable bowel syndrome) 07/10/2009     Priority: Medium     Screen for colon cancer 07/10/2009     Priority: Medium     Due.  Referral given.       Visit for screening mammogram 07/10/2009     Priority: Medium     Due.  Referral given.  (Problem list name updated by automated process. Provider to review and confirm.)       Encounter for routine gynecological examination 07/10/2009     Priority: Medium     Due.  Recommend return for physical and pap.  Problem list name updated by automated process. Provider to review       Hypothyroidism 07/10/2009     Priority: Medium     Hemorrhoids 05/02/2008     Priority: Medium     Major depressive disorder, recurrent episode (H) 05/02/2008     Priority: Medium     Overview:   With recurrent siuicidal ideation.  Has visited psychologist in past. Has never tried medication.  Brother has been on numerous medications with no relief.       Symptomatic menopausal or female climacteric states 05/02/2008     Priority: Medium    .  Current Outpatient Medications   Medication Sig     Ascorbic Acid (VITAMIN C) 100 MG CHEW      Charcoal Activated (CHARCOAL PO) Take 2 capsules by mouth daily as needed For indigestion     Cholecalciferol (VITAMIN D) 2000 UNIT tablet Take 1 tablet by mouth daily.     Digestive Enzymes TABS Use as directed     DiphenhydrAMINE HCl (BENADRYL PO) Take by mouth daily as needed     EPINEPHrine (EPIPEN) 0.3 MG/0.3ML  injection 2-pack Inject 0.3 mLs (0.3 mg) into the muscle once as needed for anaphylaxis     estradiol (ESTRACE) 0.1 MG/GM vaginal cream Place 0.5 g vaginally Use one to two times weekly.     HYDROCHLORIC ACID Take as directed     Hypromellose (ARTIFICIAL TEARS OP) Apply 1 drop to eye daily as needed     MAGNESIUM OXIDE PO Take 6-8 tablets by mouth daily      neomycin-polymyxin-dexamethasone (MAXITROL) 3.5-14617-5.1 SUSP ophthalmic susp Place 1 drop into both eyes 2 times daily as needed (as needed for worsening eye symptoms)     peppermint oil liquid 1 mL daily as needed for other Taking gel tabs not oil, not in EMR.     Probiotic Product (ALOE 49676 & PROBIOTICS) CAPS      progesterone (PROMETRIUM) 200 MG capsule      PROGESTERONE 50MG CAPSULES COMPOUND 100 mg At Bedtime Take 2 capsules at bedtime.     Quercetin 50 MG TABS      Testosterone Propionate (FIRST-TESTOSTERONE MC) 2 % CREA      thyroid (ARMOUR THYROID) 60 MG tablet Take 60 mg by mouth every morning      Omega-3 Fatty Acids (FISH OIL PO) Take by mouth as needed (Patient not taking: Reported on 8/17/2021)     No current facility-administered medications for this visit.     Past Medical History:   Diagnosis Date     Anemia      Asthma     since childhood     Blepharitis      Constipation, chronic     Chronic enema use     CVA (cerebral infarction) 2009    carotid intimal repair, left     Hyperlipidemia age 58    identified age 58     Hypothyroidism      Irritable bowel disease      Neck injuries      Pelvic floor dysfunction 2010     Postmenopausal hormone replacement therapy     From Dr. Link     Scoliosis      Past Surgical History:   Procedure Laterality Date     C STOMACH SURGERY PROCEDURE UNLISTED       CYSTOSCOPY N/A 3/25/2019    Procedure: CYSTOSCOPY;  Surgeon: Radha Ovalle MD;  Location: [facility]     DILATION AND CURETTAGE, OPERATIVE HYSTEROSCOPY WITH MORCELLATOR, COMBINED N/A 3/25/2019    Procedure: OPERATIVE HYSTEROSCOPY WITH  "MORCELLATOR;  Surgeon: Marybeth Huber MD;  Location: [facility]     HC BREATH HYDROGEN TEST  11/11/2013    Procedure: HYDROGEN BREATH TEST;  Surgeon: Esteban Short MD;  [facility]: [facility]     SURGICAL HISTORY OF -       tonsillectomy     SURGICAL HISTORY OF -   ~1997    cosmetic skin removal: breast reduction; skin removal from arms legs and tummy tuck     Allergies   Allergen Reactions     Aspirin GI Disturbance     GI upset     Aspirin GI Disturbance     Bee Venom Swelling     Birds [Feathers]      Cats      Dust Mites      Fluoride Other (See Comments) and Unknown     headaches     Liothyronine Unknown     Severe gut reactions, ears itching, throat closing     Mold      No Clinical Screening - See Comments Other (See Comments)     Some antibiotics but not Zpak-Can tolerate Azithromycin     Procaine Other (See Comments)     headache     Seasonal Allergies      Ragweed, pollen     Simvastatin Other (See Comments)     \"hot flash\", muscle weakness, dizziness     Simvastatin Unknown     Penicillins Unknown and Rash     As a child     Sulfa Drugs Itching, Other (See Comments), Rash and Hives     Social History     Socioeconomic History     Marital status: Single     Spouse name: Not on file     Number of children: Not on file     Years of education: Not on file     Highest education level: Not on file   Occupational History     Occupation: disability     Comment:    Tobacco Use     Smoking status: Never Smoker     Smokeless tobacco: Never Used   Substance and Sexual Activity     Alcohol use: No     Drug use: No     Sexual activity: Not Currently     Birth control/protection: Post-menopausal   Other Topics Concern     Parent/sibling w/ CABG, MI or angioplasty before 65F 55M? Not Asked   Social History Narrative     Not on file     Social Determinants of Health     Financial Resource Strain:      Difficulty of Paying Living Expenses:    Food Insecurity:      Worried About Running Out of Food in the " Last Year:      Ran Out of Food in the Last Year:    Transportation Needs:      Lack of Transportation (Medical):      Lack of Transportation (Non-Medical):    Physical Activity:      Days of Exercise per Week:      Minutes of Exercise per Session:    Stress:      Feeling of Stress :    Social Connections:      Frequency of Communication with Friends and Family:      Frequency of Social Gatherings with Friends and Family:      Attends Zoroastrianism Services:      Active Member of Clubs or Organizations:      Attends Club or Organization Meetings:      Marital Status:    Intimate Partner Violence:      Fear of Current or Ex-Partner:      Emotionally Abused:      Physically Abused:      Sexually Abused:      Family History   Problem Relation Age of Onset     Diabetes Father      Gastrointestinal Disease Father 82         after surgery for Bowel obstruction     Obesity Father      Alcohol/Drug Brother      Gastrointestinal Disease Brother         Hepatitis     Heart Disease Brother 62        hx of drug abuse,  age 62     Diabetes Brother      Skin Cancer Brother      Substance Abuse Brother      Allergies Brother      Anesthesia Reaction Brother      Melanoma Mother      Skin Cancer Mother      Osteoporosis Mother      Substance Abuse Sister      Asthma Brother      Diabetes Brother      Cancer Sister      Cancer Sister 56        bone cancer     Anxiety Disorder Sister      Obesity Brother      Alcoholism Sister      Alcoholism Brother      Bone Cancer Sister      Colon Cancer No family hx of      Crohn's Disease No family hx of      Ulcerative Colitis No family hx of      Colon Polyps No family hx of      Glaucoma No family hx of      Macular Degeneration No family hx of           REVIEW OF SYSTEMS:  General: negative, fever, chills, night sweats  Skin: negative, acne, rash and scaling  Eyes: negative, double vision, eye pain and photophobia  Ears/Nose/Throat: negative, nasal congestion and purulent  "rhinorrhea  Respiratory: No dyspnea on exertion, No cough, No hemoptysis and negative  Cardiovascular: negative, chest pain, exertional chest pain or pressure, paroxysmal nocturnal dyspnea, dyspnea on exertion and orthopnea       OBJECTIVE:  Blood pressure (!) 122/93, pulse 68, height 1.59 m (5' 2.6\"), weight 61.2 kg (135 lb), SpO2 98 %, not currently breastfeeding.  General Appearance: healthy, alert, active and no distress  Head: Normocephalic. No masses, lesions, tenderness or abnormalities  Eyes: conjuctiva clear, PERRL, EOM intact  Ears: External ears normal. Canals clear. TM's normal.  Nose: Nares normal  Mouth: normal  Neck: Supple, no cervical adenopathy, no thyromegaly  Lungs: clear to auscultation  Cardiac: regular rate and rhythm, normal S1 and S2, no murmur       ASSESSMENT/PLAN:  Patient here for evaluation of palpitations.  Intermittent.  Frequency is once or twice a month.  Lasting less than a minute with no associated symptoms.  Never had dizziness or loss of consciousness with palpitations.  Patient's cardiac risk factor is hyperlipidemia.  She is intolerant to statin and according to patient her last lipid profile was normal.  Though the available result was from 2018 showing an LDL of 139.  Patient is very active with no cardiac symptoms during activity.  Overall her symptoms are not bothering her much.  It is not interfering with her lifestyle and has no dangerous symptoms.  Reviewed the echocardiogram done in 2019.  She exercised 125% predicted.  Completely normal echocardiogram.  EKG changes were nonspecific.  It was nondiagnostic for ischemia.  Her EKG reviewed normal sinus rhythm.  Low voltage otherwise unremarkable.  Patient had an EKG today it was normal apart from low voltage.  As she is active and asymptomatic do not require additional evaluation at this time.  Due to complaint of palpitations suggested a 2-week Zio patch.  Patient is not too keen and she think it is a waste of money and " time.  She will return to clinic if her symptoms worsen.  Per orders.   Return to Clinic as needed.  Total visit duration 30 minutes.  This includes face-to-face interview, physical exam, chart review, review of EKGs, stress echocardiogram and documentation.

## 2021-08-17 NOTE — NURSING NOTE
Chief Complaint   Patient presents with     New Patient     referral from PCP Charles      Vitals were taken, medications reconciled, and EKG performed.    Rocco Denney  1:08 PM

## 2021-08-18 ENCOUNTER — TELEPHONE (OUTPATIENT)
Dept: INTERNAL MEDICINE | Facility: CLINIC | Age: 71
End: 2021-08-18

## 2021-08-18 LAB
ATRIAL RATE - MUSE: 65 BPM
DEPRECATED CALCIDIOL+CALCIFEROL SERPL-MC: 102 UG/L (ref 20–75)
DIASTOLIC BLOOD PRESSURE - MUSE: NORMAL MMHG
INTERPRETATION ECG - MUSE: NORMAL
P AXIS - MUSE: 60 DEGREES
PR INTERVAL - MUSE: 158 MS
QRS DURATION - MUSE: 72 MS
QT - MUSE: 420 MS
QTC - MUSE: 436 MS
R AXIS - MUSE: 52 DEGREES
SYSTOLIC BLOOD PRESSURE - MUSE: NORMAL MMHG
T AXIS - MUSE: 52 DEGREES
VENTRICULAR RATE- MUSE: 65 BPM

## 2021-08-18 NOTE — TELEPHONE ENCOUNTER
Spoke with pt.  Pt wants to see Dr. Alvarez before she schedules with endocrinology. She already know she has osteoporosis and wants to get the information regarding comparing with previous DEXA as she was taken off of thyroid medication for years, wants to know the comparison. She also mentioned that she has so many allergy reactions from the medication and she wants to be very careful to start the new med.  She has an upcoming appt with Dr. Alvarez on 8/30/21.

## 2021-08-18 NOTE — TELEPHONE ENCOUNTER
[facility] Call Center    Phone Message    May a detailed message be left on voicemail: yes     Reason for Call: Other: Patient called in returning call to nurse. Patient was transfered to her phone line but nurse did not answer. Please return call.      Action Taken: Message routed to:  [facility]    Travel Screening: Not Applicable

## 2021-08-18 NOTE — TELEPHONE ENCOUNTER
Left a message to the pt to call me back.    Soon-Mi  ----------------------------------------------------------------------------------        ----- Message from Milan Alvarez MD sent at 8/18/2021  8:13 AM CDT -----  Can you let her know dexa shows osteoporosis, I'd like her to see endocrine for to discuss med options, offer referral, if she'd prefer first to see me instead to review offer appt w/ me

## 2021-08-18 NOTE — TELEPHONE ENCOUNTER
[facility] Call Center    Phone Message    May a detailed message be left on voicemail: yes     Reason for Call: Other: please call patient back, she missed a call this morning at 9:19am thank you.     Action Taken: Message routed to:  [facility]    Travel Screening: Not Applicable

## 2021-09-20 ENCOUNTER — VIRTUAL VISIT (OUTPATIENT)
Dept: FAMILY MEDICINE | Facility: CLINIC | Age: 71
End: 2021-09-20
Payer: MEDICARE

## 2021-09-20 DIAGNOSIS — Z86.0100 HISTORY OF COLONIC POLYPS: Primary | ICD-10-CM

## 2021-09-20 DIAGNOSIS — M81.0 OSTEOPOROSIS, UNSPECIFIED OSTEOPOROSIS TYPE, UNSPECIFIED PATHOLOGICAL FRACTURE PRESENCE: ICD-10-CM

## 2021-09-20 DIAGNOSIS — E78.00 HIGH CHOLESTEROL: ICD-10-CM

## 2021-09-20 DIAGNOSIS — M20.41 HAMMER TOES OF BOTH FEET: ICD-10-CM

## 2021-09-20 DIAGNOSIS — E03.9 HYPOTHYROIDISM, UNSPECIFIED TYPE: ICD-10-CM

## 2021-09-20 DIAGNOSIS — M20.42 HAMMER TOES OF BOTH FEET: ICD-10-CM

## 2021-09-20 DIAGNOSIS — L60.8 NAIL DEFORMITY: ICD-10-CM

## 2021-09-20 PROCEDURE — 99443 PR PHYSICIAN TELEPHONE EVALUATION 21-30 MIN: CPT | Performed by: FAMILY MEDICINE

## 2021-09-20 NOTE — PROGRESS NOTES
Jennifer is a 71 year old who is being evaluated via a billable telephone visit.      What phone number would you like to be contacted at? In [facility]  How would you like to obtain your AVS? Mail a copy    Assessment & Plan     History of colonic polyps  Colonoscoy  - Adult Gastro Ref - Procedure Only; Future    Hammer toes of both feet  See pods  - Orthopedic  Referral; Future    Nail deformity  Sme  - Orthopedic  Referral; Future    Osteoporosis: see HPI    High chol: work on lifestly statin intol    Hypothyroid: sees outside MD for, rvwd          42 minutes spent on the date of the encounter doing chart review, history and exam, documentation and further activities per the note    Milan Alvarez MD  [facility] [place]    Subjective   Jennifer is a 71 year old who presents for the following health issues     HPI 1-dexa; discussed get calcium, D, walk. I advise see Endo to maximize chance no broken bones, she'll consider that but defers seeing them now.   Knows osteoporosis  2-hi chol; reviewed diet/exer, genetics, she has severe sttin side effects in past so no wishing to retry  3-works w/ other MD on thyroid dose, reviewed most recent show should cut dose down  4-she describes hammertoes and some toenail dystrophy and would like podiatry input  5-does colonoscopy every five years for h/o polyps. Has chronic GI sx, no current GI provider, will start w/ colonoscopy       Review of Systems         Objective           Vitals:  No vitals were obtained today due to virtual visit.    Physical Exam   healthy, alert and no distress  PSYCH: Alert and oriented times 3; coherent speech, normal   rate and volume, able to articulate logical thoughts, able   to abstract reason, no tangential thoughts, no hallucinations   or delusions  Her affect is normal  RESP: No cough, no audible wheezing, able to talk in full sentences  Remainder of exam unable to be completed due to telephone  visits        Phone call duration: 34 minutes

## 2021-10-05 DIAGNOSIS — H02.889 MEIBOMIAN GLAND DYSFUNCTION: ICD-10-CM

## 2021-10-05 RX ORDER — NEOMYCIN SULFATE, POLYMYXIN B SULFATE AND DEXAMETHASONE 3.5; 10000; 1 MG/ML; [USP'U]/ML; MG/ML
1 SUSPENSION/ DROPS OPHTHALMIC 2 TIMES DAILY PRN
Qty: 5 ML | Refills: 1 | Status: SHIPPED | OUTPATIENT
Start: 2021-10-05 | End: 2022-03-11

## 2021-10-05 NOTE — TELEPHONE ENCOUNTER
neomycin-polymyxin-dexamethasone (MAXITROL) 3.5-43344-1.1 SUSP ophthalmic susp   Last Written Prescription Date:  1/27/2021  Last Fill Quantity: 5,   # refills: 2  Last Office Visit : 3/23/2021  Future Office visit: None     Damir Cunningham MD    Ophthalmology       Assessment & Plan 3/23/2021        Jennifer Steiner is a 71 year old female with the following diagnoses:   1. Meibomian gland dysfunction (MGD) of upper and lower lids of both eyes    2. Dry eyes    3. Nuclear sclerosis of both eyes       Discussed that discomfort, fatigue, redness, increased tearing, a gavin/gritty or burning sensation, and/or fluctuating vision are all symptoms of surface dryness. The patient was educated on the chronicity of surface dryness and emphasized on the importance of consistent use of treatments to prevent symptoms.  - AT BID both eyes  - Warm compresses for 10-15 minutes 2-4x daily     Age-related cataracts, both eyes  -Minimally visually significant at this time  -Continue to monitor     Discussed that she can follow for annual eye exams and glasses prescription with Dr. Santos going forward.  He can refer back to me as needed.  She is ok with this plan     Tawny Finn MD  Ophthalmology Resident, PGY-2    Routing refill request to provider for review/approval because:  Refer to Provider for review and refills Per Providers orders.      Samaria Levine RN  Central Triage Red Flags/Med Refills

## 2021-10-11 ENCOUNTER — OFFICE VISIT (OUTPATIENT)
Dept: DERMATOLOGY | Facility: CLINIC | Age: 71
End: 2021-10-11
Attending: FAMILY MEDICINE
Payer: MEDICARE

## 2021-10-11 DIAGNOSIS — R21 RASH: ICD-10-CM

## 2021-10-11 DIAGNOSIS — Z12.83 SKIN CANCER SCREENING: Primary | ICD-10-CM

## 2021-10-11 DIAGNOSIS — B35.3 TINEA PEDIS OF BOTH FEET: ICD-10-CM

## 2021-10-11 PROCEDURE — 99203 OFFICE O/P NEW LOW 30 MIN: CPT | Mod: 25 | Performed by: PHYSICIAN ASSISTANT

## 2021-10-11 PROCEDURE — 88305 TISSUE EXAM BY PATHOLOGIST: CPT | Mod: TC | Performed by: PHYSICIAN ASSISTANT

## 2021-10-11 PROCEDURE — 88305 TISSUE EXAM BY PATHOLOGIST: CPT | Mod: 26 | Performed by: DERMATOLOGY

## 2021-10-11 PROCEDURE — 11104 PUNCH BX SKIN SINGLE LESION: CPT | Performed by: PHYSICIAN ASSISTANT

## 2021-10-11 RX ORDER — KETOCONAZOLE 20 MG/G
CREAM TOPICAL DAILY
Qty: 60 G | Refills: 3 | Status: SHIPPED | OUTPATIENT
Start: 2021-10-11 | End: 2022-06-06

## 2021-10-11 ASSESSMENT — PAIN SCALES - GENERAL: PAINLEVEL: NO PAIN (0)

## 2021-10-11 NOTE — PROGRESS NOTES
[facility] Dermatology Note  Encounter Date: Oct 11, 2021  Office Visit     Dermatology Problem List:  1. Seborrheic dermatitis, scalp  2. Tinea pedis  - Current rx: vinegar soaks, ketoconazole 2% cream     # NUB, left parietal scalp  - s/p punch biopsy 10/111/21    ____________________________________________    Assessment & Plan:  # Tinea pedis  - Start vinegar soaks: 50% water, 50% vinegar soaks once daily for 15 minutes  - Start ketoconazole 2% cream daily     # NUB, left parietal scalp. Ddx seborrheic dermatitis versus lichen planopilaris versus other .  Will biopsy today due to chronic symptoms of burning and redness.  - Punch biopsy performed today. See procedure note below.     #Skin cancer screening without any concerning findings including multiple benign nevi and seborrheic keratoses.  -ABCDs of melanoma were discussed and self skin checks were advised.   -Sun precaution was advised including the use of sun screens of SPF 30 or higher, sun protective clothing, and avoidance of tanning beds.    Procedures Performed:   - Punch biopsy procedure note, location(s): left parietal scalp. After discussion of benefits and risks including but not limited to bleeding, infection, scar, incomplete removal, recurrence, and non-diagnostic biopsy, written consent and photographs were obtained. The area was cleaned with isopropyl alcohol. 0.5mL of 1% lidocaine with epinephrine was injected to obtain adequate anesthesia and a 4 mm punch biopsy was performed at site(s).  3-0 Prolene sutures were utilized to approximate the epidermal edges. White petrolatum ointment and a bandage was applied to the wound. Explicit verbal and written wound care instructions were provided. The patient left the dermatology clinic in good condition.     Follow-up: pending path results and in 3 months.     Staff and Scribe:     Scribe Disclosure:  Michael CASTAÑEDA, am serving as a scribe to document services personally  performed by Dede Ghosh PA-C based on data collection and the provider's statements to me.     Provider Disclosure:   The documentation recorded by the scribe accurately reflects the services I personally performed and the decisions made by me.    All risks, benefits and alternatives were discussed with patient.  Patient is in agreement and understands the assessment and plan.  All questions were answered.  Sun Screen Education was given.   Return to Clinic 3 months or sooner as needed.   Dede Ghosh PA-C   [facility]   ____________________________________________    CC: Derm Problem (pt states she is here for a full body skin check.)    HPI:  Ms. Jennifer Steiner is a(n) 71 year old female who presents today as a new patient for a FBSE. The patient was last seen in dermatology on 6/27/16 by myself for evaluation of seborrheic dermatitis of the scalp. The patient was referred to dermatology on 6/28/21 by Dr. Alvarez for evaluation of a skin lesion.    Today, the patient endorses a family history of BCC to her mother. She states that when it is warm outside or when she gets warm, her scalp gets bright red and hot. She has tried to treat this with vinegar soaks and peppermint. Additionally, she states that after swimming, her feet itch and burn. She has been treating this with peroxide.     Patient is otherwise feeling well, without additional skin concerns.    Labs Reviewed:  N/A    Physical Exam:  Vitals: LMP  (LMP Unknown)   SKIN: Full skin, which includes the head/face, both arms, chest, back, abdomen,both legs, genitalia and/or groin buttocks, digits and/or nails, was examined.  - Mild perifollicular erythema throughout the parietal and vertex scalp with minimal scale   - Interdigital maceration to many web spaces of bilateral feet, some scaling noted to the toes    -Multiple regular brown pigmented macules and papules are identified on the trunk and extremities.    There are waxy stuck on tan to brown papules on the trunk and extremities.  - No other lesions of concern on areas examined.     Medications:  Current Outpatient Medications   Medication     Ascorbic Acid (VITAMIN C) 100 MG CHEW     Charcoal Activated (CHARCOAL PO)     Cholecalciferol (VITAMIN D) 2000 UNIT tablet     Digestive Enzymes TABS     DiphenhydrAMINE HCl (BENADRYL PO)     EPINEPHrine (EPIPEN) 0.3 MG/0.3ML injection 2-pack     estradiol (ESTRACE) 0.1 MG/GM vaginal cream     HYDROCHLORIC ACID     Hypromellose (ARTIFICIAL TEARS OP)     MAGNESIUM OXIDE PO     neomycin-polymyxin-dexamethasone (MAXITROL) 3.5-92688-9.1 SUSP ophthalmic susp     Omega-3 Fatty Acids (FISH OIL PO)     peppermint oil liquid     Probiotic Product (ALOE 88455 & PROBIOTICS) CAPS     progesterone (PROMETRIUM) 200 MG capsule     PROGESTERONE 50MG CAPSULES COMPOUND     Quercetin 50 MG TABS     Testosterone Propionate (FIRST-TESTOSTERONE MC) 2 % CREA     thyroid (ARMOUR THYROID) 60 MG tablet     No current facility-administered medications for this visit.      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     Pelvic floor dysfunction     Myalgia of pelvic floor     Other constipation     Abnormal defecation     Chronic abdominal pain     Backache     Chronic fatigue syndrome     Food intolerance     Generalized pain     Hemorrhoids     Major depressive disorder, recurrent episode (H)     Symptomatic menopausal or female climacteric states     History of hematuria      Past Medical History:   Diagnosis Date     Anemia      Asthma     since childhood     Blepharitis      Constipation, chronic     Chronic enema use     CVA (cerebral infarction) 2009    carotid intimal repair, left     Hyperlipidemia age 58    identified age 58     Hypothyroidism      Irritable bowel disease      Neck injuries      Pelvic floor dysfunction 2010     Postmenopausal hormone replacement therapy     From Dr. Link     Scoliosis         CC Milan Alvarez MD  [address] [place] [id number] on close of this encounter.

## 2021-10-11 NOTE — PATIENT INSTRUCTIONS
Wound Care After a Biopsy    What is a skin biopsy?  A skin biopsy allows the doctor to examine a very small piece of tissue under the microscope to determine the diagnosis and the best treatment for the skin condition. A local anesthetic (numbing medicine)  is injected with a very small needle into the skin area to be tested. A small piece of skin is taken from the area. Sometimes a suture (stitch) is used.     What are the risks of a skin biopsy?  I will experience scar, bleeding, swelling, pain, crusting and redness. I may experience incomplete removal or recurrence. Risks of this procedure are excessive bleeding, bruising, infection, nerve damage, numbness, thick (hypertrophic or keloidal) scar and non-diagnostic biopsy.    How should I care for my wound for the first 24 hours?    Keep the wound dry and covered for 24 hours    If it bleeds, hold direct pressure on the area for 15 minutes. If bleeding does not stop then go to the emergency room    Avoid strenuous exercise the first 1-2 days or as your doctor instructs you    How should I care for the wound after 24 hours?    After 24 hours, remove the bandage    You may bathe or shower as normal    If you had a scalp biopsy, you can shampoo as usual and can use shower water to clean the biopsy site daily    Clean the wound twice a day with gentle soap and water    Do not scrub, be gentle    Apply white petroleum/Vaseline after cleaning the wound with a cotton swab or a clean finger, and keep the site covered with a Bandaid /bandage. Bandages are not necessary with a scalp biopsy    If you are unable to cover the site with a Bandaid /bandage, re-apply ointment 2-3 times a day to keep the site moist. Moisture will help with healing    Avoid strenuous activity for first 1-2 days    Avoid lakes, rivers, pools, and oceans until the stitches are removed or the site is healed    How do I clean my wound?    Wash hands thoroughly with soap or use hand  before all  wound care    Clean the wound with gentle soap and water    Apply white petroleum/Vaseline  to wound after it is clean    Replace the Bandaid /bandage to keep the wound covered for the first few days or as instructed by your doctor    If you had a scalp biopsy, warm shower water to the area on a daily basis should suffice    What should I use to clean my wound?     Cotton-tipped applicators (Qtips )    White petroleum jelly (Vaseline ). Use a clean new container and use Q-tips to apply.    Bandaids   as needed    Gentle soap     How should I care for my wound long term?    Do not get your wound dirty    Keep up with wound care for one week or until the area is healed.    A small scab will form and fall off by itself when the area is completely healed. The area will be red and will become pink in color as it heals. Sun protection is very important for how your scar will turn out. Sunscreen with an SPF 30 or greater is recommended once the area is healed.    If you have stitches, stitches need to be removed in 7 days. You may return to our clinic for this or you may have it done locally at your doctor s office.    You should have some soreness but it should be mild and slowly go away over several days. Talk to your doctor about using tylenol for pain,    When should I call my doctor?  If you have increased:     Pain or swelling    Pus or drainage (clear or slightly yellow drainage is ok)    Temperature over 100F    Spreading redness or warmth around wound    When will I hear about my results?  The biopsy results can take 2 weeks to come back.  Your results will automatically release to [employer] before your provider has even reviewed them.  The clinic will call you with the results, send you a [employer] message, or have you schedule a follow-up clinic or phone time to discuss the results.  Contact our clinics if you do not hear from us in 2 weeks.    Who should I call with questions?    [facility],  Janie Fernandez: [phone]    [facility]: [phone]    For urgent needs outside of business hours call the [facility] at [phone] and ask for the dermatology resident on call

## 2021-10-11 NOTE — LETTER
10/11/2021       RE: Jennifer Steiner  [address]  [facility] [address]     Dear Colleague,    Thank you for referring your patient, Jennifer Steiner, to the [facility] [place] [facility]. Please see a copy of my visit note below.    [facility] D[facility]atology Note  Encounter Date: Oct 11, 2021  Office Visit     Dermatology Problem List:  1. Seborrheic dermatitis, scalp  2. Tinea pedis  - Current rx: vinegar soaks, ketoconazole 2% cream     # NUB, left parietal scalp  - s/p punch biopsy 10/111/21    ____________________________________________    Assessment & Plan:  # Tinea pedis  - Start vinegar soaks: 50% water, 50% vinegar soaks once daily for 15 minutes  - Start ketoconazole 2% cream daily     # NUB, left parietal scalp. Ddx seborrheic dermatitis versus lichen planopilaris versus other .  Will biopsy today due to chronic symptoms of burning and redness.  - Punch biopsy performed today. See procedure note below.     #Skin cancer screening without any concerning findings including multiple benign nevi and seborrheic keratoses.  -ABCDs of melanoma were discussed and self skin checks were advised.   -Sun precaution was advised including the use of sun screens of SPF 30 or higher, sun protective clothing, and avoidance of tanning beds.    Procedures Performed:   - Punch biopsy procedure note, location(s): left parietal scalp. After discussion of benefits and risks including but not limited to bleeding, infection, scar, incomplete removal, recurrence, and non-diagnostic biopsy, written consent and photographs were obtained. The area was cleaned with isopropyl alcohol. 0.5mL of 1% lidocaine with epinephrine was injected to obtain adequate anesthesia and a 4 mm punch biopsy was performed at site(s).  3-0 Prolene sutures were utilized to approximate the epidermal edges. White petrolatum ointment and a bandage was  applied to the wound. Explicit verbal and written wound care instructions were provided. The patient left the dermatology clinic in good condition.     Follow-up: pending path results and in 3 months.     Staff and Scribe:     Scribe Disclosure:  I, Michael Elliott, am serving as a scribe to document services personally performed by Dede Ghosh PA-C based on data collection and the provider's statements to me.     Provider Disclosure:   The documentation recorded by the scribe accurately reflects the services I personally performed and the decisions made by me.    All risks, benefits and alternatives were discussed with patient.  Patient is in agreement and understands the assessment and plan.  All questions were answered.  Sun Screen Education was given.   Return to Clinic 3 months or sooner as needed.   Dede Ghosh PA-C   [facility]   ____________________________________________    CC: Derm Problem (pt states she is here for a full body skin check.)    HPI:  Ms. Jennifer Steiner is a(n) 71 year old female who presents today as a new patient for a FBSE. The patient was last seen in dermatology on 6/27/16 by myself for evaluation of seborrheic dermatitis of the scalp. The patient was referred to dermatology on 6/28/21 by Dr. Alvarez for evaluation of a skin lesion.    Today, the patient endorses a family history of BCC to her mother. She states that when it is warm outside or when she gets warm, her scalp gets bright red and hot. She has tried to treat this with vinegar soaks and peppermint. Additionally, she states that after swimming, her feet itch and burn. She has been treating this with peroxide.     Patient is otherwise feeling well, without additional skin concerns.    Labs Reviewed:  N/A    Physical Exam:  Vitals: LMP  (LMP Unknown)   SKIN: Full skin, which includes the head/face, both arms, chest, back, abdomen,both legs, genitalia and/or groin buttocks, digits  and/or nails, was examined.  - Mild perifollicular erythema throughout the parietal and vertex scalp with minimal scale   - Interdigital maceration to many web spaces of bilateral feet, some scaling noted to the toes    -Multiple regular brown pigmented macules and papules are identified on the trunk and extremities.   There are waxy stuck on tan to brown papules on the trunk and extremities.  - No other lesions of concern on areas examined.     Medications:  Current Outpatient Medications   Medication     Ascorbic Acid (VITAMIN C) 100 MG CHEW     Charcoal Activated (CHARCOAL PO)     Cholecalciferol (VITAMIN D) 2000 UNIT tablet     Digestive Enzymes TABS     DiphenhydrAMINE HCl (BENADRYL PO)     EPINEPHrine (EPIPEN) 0.3 MG/0.3ML injection 2-pack     estradiol (ESTRACE) 0.1 MG/GM vaginal cream     HYDROCHLORIC ACID     Hypromellose (ARTIFICIAL TEARS OP)     MAGNESIUM OXIDE PO     neomycin-polymyxin-dexamethasone (MAXITROL) 3.5-17502-5.1 SUSP ophthalmic susp     Omega-3 Fatty Acids (FISH OIL PO)     peppermint oil liquid     Probiotic Product (ALOE 80569 & PROBIOTICS) CAPS     progesterone (PROMETRIUM) 200 MG capsule     PROGESTERONE 50MG CAPSULES COMPOUND     Quercetin 50 MG TABS     Testosterone Propionate (FIRST-TESTOSTERONE MC) 2 % CREA     thyroid (ARMOUR THYROID) 60 MG tablet     No current facility-administered medications for this visit.      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     Pelvic floor dysfunction     Myalgia of  pelvic floor     Other constipation     Abnormal defecation     Chronic abdominal pain     Backache     Chronic fatigue syndrome     Food intolerance     Generalized pain     Hemorrhoids     Major depressive disorder, recurrent episode (H)     Symptomatic menopausal or female climacteric states     History of hematuria     Past Medical History:   Diagnosis Date     Anemia      Asthma     since childhood     Blepharitis      Constipation, chronic     Chronic enema use     CVA (cerebral infarction) 2009    carotid intimal repair, left     Hyperlipidemia age 58    identified age 58     Hypothyroidism      Irritable bowel disease      Neck injuries      Pelvic floor dysfunction 2010     Postmenopausal hormone replacement therapy     From Dr. Link     Scoliosis         CC Milan Alvarez MD  [address] [place] [phone] on close of this encounter.

## 2021-10-13 LAB
PATH REPORT.COMMENTS IMP SPEC: NORMAL
PATH REPORT.FINAL DX SPEC: NORMAL
PATH REPORT.GROSS SPEC: NORMAL
PATH REPORT.MICROSCOPIC SPEC OTHER STN: NORMAL
PATH REPORT.RELEVANT HX SPEC: NORMAL

## 2021-10-18 ENCOUNTER — ALLIED HEALTH/NURSE VISIT (OUTPATIENT)
Dept: DERMATOLOGY | Facility: CLINIC | Age: 71
End: 2021-10-18
Payer: MEDICARE

## 2021-10-18 DIAGNOSIS — R21 RASH AND OTHER NONSPECIFIC SKIN ERUPTION: Primary | ICD-10-CM

## 2021-10-18 DIAGNOSIS — Z48.02 VISIT FOR SUTURE REMOVAL: ICD-10-CM

## 2021-10-18 PROCEDURE — 99024 POSTOP FOLLOW-UP VISIT: CPT

## 2021-10-18 NOTE — PROGRESS NOTES
Jennifer Steiner comes into clinic today at the request of Angelica Ghosh for suture removal. Incision was C/D/I. Instructed patient to keep the wound covered and moist for another couple of weeks since the wound was still open.       This service provided today was under the supervising provider of the day Dede Ghosh, who was available if needed.    Amaya Elizabeth RN

## 2021-10-21 ENCOUNTER — TELEPHONE (OUTPATIENT)
Dept: DERMATOLOGY | Facility: CLINIC | Age: 71
End: 2021-10-21

## 2021-10-21 NOTE — TELEPHONE ENCOUNTER
[facility]    Phone Message    May a detailed message be left on voicemail: yes     Reason for Call: Requesting Results   Name/type of test: Bx Results  Date of test: 10/11  Was test done at a location other than [facility] (Please fill in the location if not [facility])?: No  Please call Pt to discuss Results. Pt did say you can mail in results if she does not answer. Thank you      Action Taken: Message routed to:  [facility] Derm    Travel Screening: Not Applicable

## 2021-10-21 NOTE — LETTER
October 22, 2021      Jennifer Steiner  [address] [facility] [address]        Dear MsDenisa,    We are writing to inform you of your test results.    Test results indicate you may require additional follow up, see comment below.    The pathologist read your biopsy as a hypersensitivity reaction, either a hive like reaction or a medication intolerance. Think about your mediations and supplements and anything you could potentially stop (please review with your primary care!). I believe this has been going on for quite awhile and may be hard to narrow down. I would recommend you take Zyrtec 5-10 mg daily or Allegra 180 mg daily to see if this helps with your scalp symptoms. No signs of scalp disease or hair loss.          If you have any questions or concerns, please call the clinic at the number listed above.       Sincerely, Health Care Team

## 2021-11-08 ENCOUNTER — TELEPHONE (OUTPATIENT)
Dept: FAMILY MEDICINE | Facility: CLINIC | Age: 71
End: 2021-11-08
Payer: MEDICARE

## 2021-11-08 NOTE — TELEPHONE ENCOUNTER
[facility]    Phone Message    May a detailed message be left on voicemail: yes     Reason for Call: Other: Pt requesting call back to discuss getting and order for monoclonal antibodies. Pt is needing to know where she can go to get and if Dr. Alvarez would be willing to order it if she came down with COVID. Pt stated it is ok to leave a detailed message      Action Taken: Message routed to:  [facility] juanita

## 2021-11-09 NOTE — TELEPHONE ENCOUNTER
Patient returning outbound call. Patient can't do video visits due to eyesight and no computer or cell phone. Patient is wondering how to get the monoclonal antibodies. She does not have covid right now but wants to be prepared just in case. Patient needs to know where to go to get it would like orders put in incase she may need them. Please call to discuss.  Thank you.

## 2021-11-09 NOTE — TELEPHONE ENCOUNTER
Left a detailed message to the pt that :    1. I need to know whether she is covid positive or not  2. If she is covid positive and she wants to have monoclonal antibody treatment, she can apply it at [email or web address]      If she has questions, she may call me back.

## 2021-11-26 ENCOUNTER — LAB (OUTPATIENT)
Dept: LAB | Facility: CLINIC | Age: 71
End: 2021-11-26
Payer: MEDICARE

## 2021-11-26 ENCOUNTER — OFFICE VISIT (OUTPATIENT)
Dept: FAMILY MEDICINE | Facility: CLINIC | Age: 71
End: 2021-11-26
Payer: MEDICARE

## 2021-11-26 VITALS
BODY MASS INDEX: 24.3 KG/M2 | SYSTOLIC BLOOD PRESSURE: 118 MMHG | DIASTOLIC BLOOD PRESSURE: 65 MMHG | OXYGEN SATURATION: 98 % | HEART RATE: 76 BPM | HEIGHT: 63 IN

## 2021-11-26 DIAGNOSIS — Z86.39 HISTORY OF IRON DEFICIENCY: ICD-10-CM

## 2021-11-26 DIAGNOSIS — M81.0 OSTEOPOROSIS, UNSPECIFIED OSTEOPOROSIS TYPE, UNSPECIFIED PATHOLOGICAL FRACTURE PRESENCE: ICD-10-CM

## 2021-11-26 DIAGNOSIS — I77.71 CAROTID ARTERY DISSECTION (H): ICD-10-CM

## 2021-11-26 DIAGNOSIS — R21 RASH: Primary | ICD-10-CM

## 2021-11-26 LAB
BASOPHILS # BLD AUTO: 0.1 10E3/UL (ref 0–0.2)
BASOPHILS NFR BLD AUTO: 1 %
EOSINOPHIL # BLD AUTO: 0.2 10E3/UL (ref 0–0.7)
EOSINOPHIL NFR BLD AUTO: 3 %
ERYTHROCYTE [DISTWIDTH] IN BLOOD BY AUTOMATED COUNT: 13.2 % (ref 10–15)
FERRITIN SERPL-MCNC: 24 NG/ML (ref 8–252)
HCT VFR BLD AUTO: 40.6 % (ref 35–47)
HGB BLD-MCNC: 13.5 G/DL (ref 11.7–15.7)
IMM GRANULOCYTES # BLD: 0 10E3/UL
IMM GRANULOCYTES NFR BLD: 0 %
IRON SATN MFR SERPL: 22 % (ref 15–46)
IRON SERPL-MCNC: 81 UG/DL (ref 35–180)
LYMPHOCYTES # BLD AUTO: 1.9 10E3/UL (ref 0.8–5.3)
LYMPHOCYTES NFR BLD AUTO: 27 %
MCH RBC QN AUTO: 31 PG (ref 26.5–33)
MCHC RBC AUTO-ENTMCNC: 33.3 G/DL (ref 31.5–36.5)
MCV RBC AUTO: 93 FL (ref 78–100)
MONOCYTES # BLD AUTO: 0.5 10E3/UL (ref 0–1.3)
MONOCYTES NFR BLD AUTO: 7 %
NEUTROPHILS # BLD AUTO: 4.4 10E3/UL (ref 1.6–8.3)
NEUTROPHILS NFR BLD AUTO: 62 %
NRBC # BLD AUTO: 0 10E3/UL
NRBC BLD AUTO-RTO: 0 /100
PLATELET # BLD AUTO: 275 10E3/UL (ref 150–450)
RBC # BLD AUTO: 4.36 10E6/UL (ref 3.8–5.2)
TIBC SERPL-MCNC: 361 UG/DL (ref 240–430)
WBC # BLD AUTO: 7.1 10E3/UL (ref 4–11)

## 2021-11-26 PROCEDURE — 36415 COLL VENOUS BLD VENIPUNCTURE: CPT | Performed by: PATHOLOGY

## 2021-11-26 PROCEDURE — 83550 IRON BINDING TEST: CPT | Performed by: PATHOLOGY

## 2021-11-26 PROCEDURE — 99215 OFFICE O/P EST HI 40 MIN: CPT | Performed by: FAMILY MEDICINE

## 2021-11-26 PROCEDURE — 85025 COMPLETE CBC W/AUTO DIFF WBC: CPT | Performed by: PATHOLOGY

## 2021-11-26 PROCEDURE — 82728 ASSAY OF FERRITIN: CPT | Performed by: PATHOLOGY

## 2021-11-26 RX ORDER — PROGESTERONE 200 MG/1
200 CAPSULE ORAL EVERY EVENING
COMMUNITY
Start: 2021-09-23 | End: 2023-05-25

## 2021-11-26 ASSESSMENT — PAIN SCALES - GENERAL: PAINLEVEL: MODERATE PAIN (4)

## 2021-11-26 NOTE — PROGRESS NOTES
Assessment & Plan     Rash  Unalbe to find cause. She will talk to allergist.  - Adult Allergy/Asthma Referral    History of iron deficiency  Pt requests  - CBC with platelets differential; Future  - Iron and iron binding capacity; Future  - Ferritin; Future    Osteoporosis, unspecified osteoporosis type, unspecified pathological fracture presence  Discussed. She is very afraid fall on ice, break bone, declines see endocrine though    Carotid artery dissection (H)  Since, intermittent dizzy, we did [employer] form (scanned) w/ idea she'd get seasonally when ice/snow. Pencil was her notes, I asked her to erase before sends in.      50 minutes spent on the date of the encounter doing chart review, history and exam, documentation and further activities per the note    Milan Alvarez MD  [facility] [place]    Maral Rodriguez is a 71 year old who presents for the following health issues     HPI 1-requests metro mobility re: since carotid disseciton gets dizzy spells, has chronic overall fatigue, with effort can walk to bus  Stop if good weather, if ice/snow cannot her fall risk is too high, she has osteoporosis and is afraid of fall/break bones  I told her I'm not sure how metro will view seasonal needs  2-Endocrine consult offered for osteoporosis she defers for now; she sees outside provider for thyroid disease and gets armour thyroid  3-history low iron she is worried it is low. Normal cbc august. She requests check.  4-declines immunizations  5-rash: see derm notes. Discussed. No clear cause can be identified.  Past Medical History:   Diagnosis Date     Anemia      Asthma     since childhood     Blepharitis      Constipation, chronic     Chronic enema use     CVA (cerebral infarction) 2009    carotid intimal repair, left     Hyperlipidemia age 58    identified age 58     Hypothyroidism      Irritable bowel disease      Neck injuries      Pelvic floor dysfunction 2010      Postmenopausal hormone replacement therapy     From Dr. Link     Scoliosis      Past Surgical History:   Procedure Laterality Date     C STOMACH SURGERY PROCEDURE UNLISTED       CYSTOSCOPY N/A 3/25/2019    Procedure: CYSTOSCOPY;  Surgeon: Radha Ovalle MD;  Location: [facility] OR     DILATION AND CURETTAGE, OPERATIVE HYSTEROSCOPY WITH MORCELLATOR, COMBINED N/A 3/25/2019    Procedure: OPERATIVE HYSTEROSCOPY WITH MORCELLATOR;  Surgeon: Marybeth Huber MD;  Location: [facility] OR     HC BREATH HYDROGEN TEST  11/11/2013    Procedure: HYDROGEN BREATH TEST;  Surgeon: Esteban Short MD;  Location:  GI     SURGICAL HISTORY OF -       tonsillectomy     SURGICAL HISTORY OF -   ~1997    cosmetic skin removal: breast reduction; skin removal from arms legs and tummy tuck     Current Outpatient Medications   Medication     Ascorbic Acid (VITAMIN C) 100 MG CHEW     Charcoal Activated (CHARCOAL PO)     Cholecalciferol (VITAMIN D) 2000 UNIT tablet     Digestive Enzymes TABS     DiphenhydrAMINE HCl (BENADRYL PO)     EPINEPHrine (EPIPEN) 0.3 MG/0.3ML injection 2-pack     estradiol (ESTRACE) 0.1 MG/GM vaginal cream     HYDROCHLORIC ACID     Hypromellose (ARTIFICIAL TEARS OP)     ketoconazole (NIZORAL) 2 % external cream     MAGNESIUM OXIDE PO     neomycin-polymyxin-dexamethasone (MAXITROL) 3.5-51072-3.1 SUSP ophthalmic susp     Omega-3 Fatty Acids (FISH OIL PO)     peppermint oil liquid     Probiotic Product (ALOE 39721 & PROBIOTICS) CAPS     progesterone (PROMETRIUM) 200 MG capsule     progesterone (PROMETRIUM) 200 MG capsule     PROGESTERONE 50MG CAPSULES COMPOUND     Quercetin 50 MG TABS     Testosterone Propionate (FIRST-TESTOSTERONE MC) 2 % CREA     thyroid (ARMOUR THYROID) 60 MG tablet     No current facility-administered medications for this visit.     Allergies   Allergen Reactions     Aspirin GI Disturbance     GI upset     Aspirin GI Disturbance     Bee Venom Swelling     Birds [Feathers]      Cats      Dust Mites   "    Fluoride Other (See Comments) and Unknown     headaches     Liothyronine Unknown     Severe gut reactions, ears itching, throat closing     Mold      No Clinical Screening - See Comments Other (See Comments)     Some antibiotics but not Zpak-Can tolerate Azithromycin     Procaine Other (See Comments)     headache     Seasonal Allergies      Ragweed, pollen     Simvastatin Other (See Comments)     \"hot flash\", muscle weakness, dizziness     Simvastatin Unknown     Penicillins Unknown and Rash     As a child     Sulfa Drugs Itching, Other (See Comments), Rash and Hives           Review of Systems         Objective    /65 (BP Location: Right arm, Patient Position: Sitting, Cuff Size: Adult Regular)   Pulse 76   Ht 1.588 m (5' 2.5\")   LMP  (LMP Unknown)   SpO2 98%   BMI 24.30 kg/m    Body mass index is 24.3 kg/m .  Physical Exam   GENERAL: healthy, alert and no distress  MS: no gross musculoskeletal defects noted, no edema              "

## 2021-11-26 NOTE — NURSING NOTE
Chief Complaint   Patient presents with     Recheck Medication       LUBNA Hernandez at 2:49 PM on 11/26/2021.

## 2021-11-29 ENCOUNTER — TELEPHONE (OUTPATIENT)
Dept: ALLERGY | Facility: CLINIC | Age: 71
End: 2021-11-29
Payer: MEDICARE

## 2021-11-29 NOTE — TELEPHONE ENCOUNTER
[facility]    Phone Message    May a detailed message be left on voicemail: yes     Reason for Call: Appointment Intake    Referring Provider Name: Dr Milan Alvarez  Diagnosis and/or Symptoms: Rash- new referral needs review, thanks!    Action Taken: Message routed to:  [facility] Allergy    Travel Screening: Not Applicable

## 2021-11-30 ENCOUNTER — TELEPHONE (OUTPATIENT)
Dept: ALLERGY | Facility: CLINIC | Age: 71
End: 2021-11-30
Payer: MEDICARE

## 2021-11-30 NOTE — CONFIDENTIAL NOTE
Jennifer has hypersensitivity and hives but is uncertain what is causing it. Will look into scheduling her.    Remy Tineo, Paramedic

## 2021-12-01 NOTE — TELEPHONE ENCOUNTER
FUTURE VISIT INFORMATION      FUTURE VISIT INFORMATION:    Date: 2.9.22    Time: 2:00    Location: [facility]  REFERRAL INFORMATION:    Referring provider:  Dr. Milna Alvarez    Referring providers clinic:  [facility]    Reason for visit/diagnosis  Hypersentivity to unknown, looking for testing    RECORDS REQUESTED FROM:       Clinic name Comments Records Status Imaging Status   [facility] FM 11.26.21  Dr. Alvarez [facility] na   [facility] Derm 10.11.21  Dede Ghosh Epic na

## 2022-02-08 NOTE — PROGRESS NOTES
[facility] Dermato-allergology Note  Office visit  Encounter Date: Feb 9, 2022  ____________________________________________    CC: No chief complaint on file.      HPI:  (02/07/22)  Ms. Jennifer Steiner is a(n) 71 year old female who presents today as a new patient for allergy consultation  - patient has on and off a pruritic dermatitis on scalp (more in summer) and in histology from 10/11/21 sparse perivascular and perifollicular lymphohistiocytic infiltrate with rare eosinophils  - sometimes on neck and back rashes.  - GI issues with Aspirin. Pain medications are massage and PT    - no hair dye, uses for washing of hair apple cider and Jojoba oil    Physical exam:  General: In no acute distress, well-developed, well-nourished  Eyes: no conjunctivitis  ENT: no signs of rhinitis   Pulmonary: no wheezing or coughing  Skin: on the scalp slightly erythematous skin and some dandruffs      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     Pelvic floor dysfunction     Myalgia of pelvic floor     Other constipation     Abnormal defecation     Chronic abdominal pain     Backache     Chronic fatigue syndrome     Food intolerance     Generalized pain     Hemorrhoids     Major depressive disorder, recurrent episode (H)     Symptomatic menopausal or female climacteric states     History of hematuria     Past Medical History:   Diagnosis Date     Anemia      Asthma     since childhood     Blepharitis      Constipation, chronic      "Chronic enema use     CVA (cerebral infarction) 2009    carotid intimal repair, left     Hyperlipidemia age 58    identified age 58     Hypothyroidism      Irritable bowel disease      Neck injuries      Pelvic floor dysfunction 2010     Postmenopausal hormone replacement therapy     From Dr. Link     Scoliosis        Allergies:  Allergies   Allergen Reactions     Aspirin GI Disturbance     GI upset     Aspirin GI Disturbance     Bee Venom Swelling     Birds [Feathers]      Cats      Dust Mites      Fluoride Other (See Comments) and Unknown     headaches     Liothyronine Unknown     Severe gut reactions, ears itching, throat closing     Mold      No Clinical Screening - See Comments Other (See Comments)     Some antibiotics but not Zpak-Can tolerate Azithromycin     Procaine Other (See Comments)     headache     Seasonal Allergies      Ragweed, pollen     Simvastatin Other (See Comments)     \"hot flash\", muscle weakness, dizziness     Simvastatin Unknown     Penicillins Unknown and Rash     As a child     Sulfa Drugs Itching, Other (See Comments), Rash and Hives       Medications:  Current Outpatient Medications   Medication     Ascorbic Acid (VITAMIN C) 100 MG CHEW     Charcoal Activated (CHARCOAL PO)     Cholecalciferol (VITAMIN D) 2000 UNIT tablet     Digestive Enzymes TABS     DiphenhydrAMINE HCl (BENADRYL PO)     EPINEPHrine (EPIPEN) 0.3 MG/0.3ML injection 2-pack     estradiol (ESTRACE) 0.1 MG/GM vaginal cream     HYDROCHLORIC ACID     Hypromellose (ARTIFICIAL TEARS OP)     ketoconazole (NIZORAL) 2 % external cream     MAGNESIUM OXIDE PO     neomycin-polymyxin-dexamethasone (MAXITROL) 3.5-51094-1.1 SUSP ophthalmic susp     Omega-3 Fatty Acids (FISH OIL PO)     peppermint oil liquid     Probiotic Product (ALOE 05944 & PROBIOTICS) CAPS     progesterone (PROMETRIUM) 200 MG capsule     progesterone (PROMETRIUM) 200 MG capsule     PROGESTERONE 50MG CAPSULES COMPOUND     Quercetin 50 MG TABS     Testosterone " Propionate (FIRST-TESTOSTERONE MC) 2 % CREA     thyroid (ARMOUR THYROID) 60 MG tablet     No current facility-administered medications for this visit.       Social History:  The patient is retired. Patient has the following hobbies or non-occupational exposure nothing special    Family History:  Family History   Problem Relation Age of Onset     Diabetes Father      Gastrointestinal Disease Father 82         after surgery for Bowel obstruction     Obesity Father      Alcohol/Drug Brother      Gastrointestinal Disease Brother         Hepatitis     Heart Disease Brother 62        hx of drug abuse,  age 62     Diabetes Brother      Skin Cancer Brother      Substance Abuse Brother      Allergies Brother      Anesthesia Reaction Brother      Melanoma Mother      Skin Cancer Mother      Osteoporosis Mother      Substance Abuse Sister      Asthma Brother      Diabetes Brother      Cancer Sister      Cancer Sister 56        bone cancer     Anxiety Disorder Sister      Obesity Brother      Alcoholism Sister      Alcoholism Brother      Bone Cancer Sister      Colon Cancer No family hx of      Crohn's Disease No family hx of      Ulcerative Colitis No family hx of      Colon Polyps No family hx of      Glaucoma No family hx of      Macular Degeneration No family hx of        Previous Labs, Allergy Tests, Dermatopathology, Imaging:  none    Referred By: No referring provider defined for this encounter.     Allergy Tests:    Past Allergy Test    Order for Future Allergy Testing:    [] Outpatient  [] Inpatient: [facility]..../ Bed ....       Skin Atopy (atopic dermatitis) [x] Yes   [] No as child and then also dyshidrotic hand eczema  Contact allergies:   [x] Yes   [] No perfumes = bothers breathing and some skin bubbles  Hand eczema:   [x] Yes   [] No           Leading hand:   [] R   [] L       [] Ambidextrous         Drug allergies:        [x] Yes   [] No  Which?.Penicillin? Sulfoamides? Simwastatin (hot flash and muscle  weakness).    Urticaria/Angioedema  [x] Yes   [] No recurrent hives = no clear triggers  Food Allergy:  [x] Yes   [] No  Which?.avoids Gluten, corn, oatmeal, dairy (except butter), soy = as child mostly GI problems and sometimes hives  Pets :  [] Yes   [x] No  Which?..with cats wheezing.         [x]  Rhinitis   [x] Conjunctivitis   [x] Sinusitis   [] Polyposis   [] Otitis   [] Pharyngitis         []  none  Operations:   [] Tonsils   [] Septum   [] Sinus   [] Polyposis        [x] Asthma bronchiale   [] Coughing      []  On and off wheezing with fragrances and odors  Symptoms (mostly Rhinoconjunctivitis and Asthma) aggravated by:  Season   [] I   [] II   [] III   [] IV   []V   []VI   []VII   [x]VIII   [x]IX   []X   []XI   []XI     [x] perennial Sinusitis  Day time      [] morning   [] noon      [] evening        [] night    [] whole day........  []  none  Location/changes    [] inside        [x] outside   [] mountains    [] sea     [] others.............   []  none  Triggers, specific     [] animals     [] plants     [] dust              [] others ...........................    []  none  Triggers, others       [] work          [] psyche    [] sport            [] others .............................  []  none  Irritant                [] phys efforts [x] smoke    [] heat/cold     [x] odors  []others............... []  none  Hashimotos Thyroiditis  Order for PATCH TESTS  Reason for tests (suspected allergy): scalp dermatitis and neck/upper trunk  Known previous allergies: none  Standardized panels  [x] Standard panel (40 tests)  [x] Preservatives & Antimicrobials (31 tests)  [x] Emulsifiers & Additives (25 tests)   [x] Perfumes/Flavours & Plants (25 tests)  [] Hairdresser panel (12 tests)  [] Rubber Chemicals (22 tests)  [] Plastics (26 tests)  [x] Colorants/Dyes/Food additives (20 tests)  [] Metals (implants/dental) (24 tests)  [] Local anaesthetics/NSAIDs (13 tests)  [] Antibiotics & Antimycotics (14 tests)   []  Corticosteroids (15 tests)   [] Photopatch test (62 tests)   [] others: ...      [] Patient's own products: ...    DO NOT test if chemical or biological identity is unknown!     always ask from patient the product information and safety sheets (MSDS)       Order for PRICK TESTS    Reason for tests (suspected allergy): perennial RC and sometimes asthma and aggravation in fall  Known previous allergies: ragweed    Standardized prick panels  [x] Atopic panel (20 tests)  [] Pediatric Panel (12 tests)  [] Milk, Meat, Eggs, Grains (20 tests)   [] Dust, Epithelia, Feathers (10 tests)  [] Fish, Seafood, Shellfish (17 tests)  [] Nuts, Beans (14 tests)  [] Spice, Vegetable, Fruit (17 tests)  [] Pollen Panel = Tree, Grass, Weed (24 tests)  [] Others: ...      [] Patient's own products: ...    DO NOT test if chemical or biological identity is unknown!     always ask from patient the product information and safety sheets (MSDS)     Standardized intradermal tests  [x] Penicillium notatum [x] Aspergillus fumigatus [x] House dust mites D.far & D. pteron  [] Cat    [] dog  [] Others: ...  [] Bee venom   [] Wasp venom  !!Specific protocol with dilutions!!       Order for Drug allergy tests (prick & Intradermal & patch tests)    [] Penicillin G  [] Ampicillin [] Cefazolin   [] Ceftriaxone   [] Ceftazidime  [] Bactrim    [] Others: ...  Order for ... as test date    [] Patient needs consultation with Allergy team (changes of tests may apply)  [x] Tests discussed with Allergy team (can have direct appointment for allergy tests)     ________________________________    Assessment & Plan:    ==> Final Diagnosis:     # atopic predisposition with    Seasonal RC in fall to ragweed    Perennial RC and sinusitis    Asthma with cat and feather pillow and with non-specific irritants    Since childhood atopic dermatitis with dyshidrotic hand dermatitis    Irritable bowel syndrome  * chronic illness with exacerbation, progression, side effects from  treatment    # recurrent dermatits and itching on scalp and less neck and upper trunk   DDx atopic dermatitis and/or allergic contact dermatitis  * chronic illness with exacerbation, progression, side effects from treatment    # multiple drug allergies = non of them really specific  * stable chronic illness    These conclusions are made at the best of one's knowledge and belief based on the provided evidence such as patient's history and allergy test results and they can change over time or can be incomplete because of missing information's.    ==> Treatment Plan:  >> see later    Staff:  Provider    Follow-up in Derm-Allergy clinic if patient wants to do the allergy tests (patient will call if she wants the tests)    I spent a total of 37 minutes with Jennifer Steiner. This time was spent counseling the patient and/or coordinating care, explaining the allergy tests

## 2022-02-09 ENCOUNTER — PRE VISIT (OUTPATIENT)
Dept: ALLERGY | Facility: CLINIC | Age: 72
End: 2022-02-09
Payer: MEDICARE

## 2022-02-09 ENCOUNTER — OFFICE VISIT (OUTPATIENT)
Dept: ALLERGY | Facility: CLINIC | Age: 72
End: 2022-02-09
Payer: MEDICARE

## 2022-02-09 DIAGNOSIS — J30.89 NON-SEASONAL ALLERGIC RHINITIS DUE TO OTHER ALLERGIC TRIGGER: ICD-10-CM

## 2022-02-09 DIAGNOSIS — Z88.9 DRUG ALLERGY, MULTIPLE: ICD-10-CM

## 2022-02-09 DIAGNOSIS — Z88.9 ATOPY: Primary | ICD-10-CM

## 2022-02-09 DIAGNOSIS — L21.9 SEBORRHEIC DERMATITIS OF SCALP: ICD-10-CM

## 2022-02-09 DIAGNOSIS — L20.89 OTHER ATOPIC DERMATITIS: ICD-10-CM

## 2022-02-09 DIAGNOSIS — H10.10 SEASONAL AND PERENNIAL ALLERGIC RHINOCONJUNCTIVITIS: ICD-10-CM

## 2022-02-09 DIAGNOSIS — J30.2 SEASONAL AND PERENNIAL ALLERGIC RHINOCONJUNCTIVITIS: ICD-10-CM

## 2022-02-09 DIAGNOSIS — J30.89 SEASONAL AND PERENNIAL ALLERGIC RHINOCONJUNCTIVITIS: ICD-10-CM

## 2022-02-09 DIAGNOSIS — L23.89 ALLERGIC CONTACT DERMATITIS DUE TO OTHER AGENTS: ICD-10-CM

## 2022-02-09 PROCEDURE — 99214 OFFICE O/P EST MOD 30 MIN: CPT | Performed by: DERMATOLOGY

## 2022-02-09 NOTE — PATIENT INSTRUCTIONS
Order for PATCH TESTS  Reason for tests (suspected allergy): scalp dermatitis and neck/upper trunk  Known previous allergies: none  Standardized panels  [x] Standard panel (40 tests)  [x] Preservatives & Antimicrobials (31 tests)  [x] Emulsifiers & Additives (25 tests)   [x] Perfumes/Flavours & Plants (25 tests)  [] Hairdresser panel (12 tests)  [] Rubber Chemicals (22 tests)  [] Plastics (26 tests)  [x] Colorants/Dyes/Food additives (20 tests)  [] Metals (implants/dental) (24 tests)  [] Local anaesthetics/NSAIDs (13 tests)  [] Antibiotics & Antimycotics (14 tests)   [] Corticosteroids (15 tests)   [] Photopatch test (62 tests)   [] others: ...      [] Patient's own products: ...    DO NOT test if chemical or biological identity is unknown!     always ask from patient the product information and safety sheets (MSDS)       Order for PRICK TESTS    Reason for tests (suspected allergy): perennial RC and sometimes asthma and aggravation in fall  Known previous allergies: ragweed    Standardized prick panels  [x] Atopic panel (20 tests)  [] Pediatric Panel (12 tests)  [] Milk, Meat, Eggs, Grains (20 tests)   [] Dust, Epithelia, Feathers (10 tests)  [] Fish, Seafood, Shellfish (17 tests)  [] Nuts, Beans (14 tests)  [] Spice, Vegetable, Fruit (17 tests)  [] Pollen Panel = Tree, Grass, Weed (24 tests)  [] Others: ...      [] Patient's own products: ...    DO NOT test if chemical or biological identity is unknown!     always ask from patient the product information and safety sheets (MSDS)     Standardized intradermal tests  [x] Penicillium notatum [x] Aspergillus fumigatus [x] House dust mites D.far & D. pteron  [] Cat    [] dog  [] Others: ...  [] Bee venom   [] Wasp venom  !!Specific protocol with dilutions!!       Order for Drug allergy tests (prick & Intradermal & patch tests)    [] Penicillin G  [] Ampicillin [] Cefazolin   [] Ceftriaxone   [] Ceftazidime  [] Bactrim    [] Others: ...  Order for ... as test date    []  Patient needs consultation with Allergy team (changes of tests may apply)  [x] Tests discussed with Allergy team (can have direct appointment for allergy tests)     ________________________________    Assessment & Plan:    ==> Final Diagnosis:     # atopic predisposition with    Seasonal RC in fall to ragweed    Perennial RC and sinusitis    Asthma with cat and feather pillow and with non-specific irritants    Since childhood atopic dermatitis with dyshidrotic hand dermatitis    Irritable bowel syndrome  * chronic illness with exacerbation, progression, side effects from treatment    # recurrent dermatits and itching on scalp and less neck and upper trunk   DDx atopic dermatitis and/or allergic contact dermatitis  * chronic illness with exacerbation, progression, side effects from treatment    # multiple drug allergies = non of them really specific  * stable chronic illness    These conclusions are made at the best of one's knowledge and belief based on the provided evidence such as patient's history and allergy test results and they can change over time or can be incomplete because of missing information's.    ==> Treatment Plan:  >> see later    Staff:  Provider    Follow-up in Derm-Allergy clinic if patient wants to do the allergy tests (patient will call if she wants the tests)

## 2022-02-09 NOTE — LETTER
2/9/2022         RE: Jennifer Steiner  [address] [facility] [address]        Dear Colleague,    Thank you for referring your patient, Jennifer Steiner, to the [facility] [place]. Please see a copy of my visit note below.    [facility] Dermato-allergology Note  Office visit  Encounter Date: Feb 9, 2022  ____________________________________________    CC: No chief complaint on file.      HPI:  (02/07/22)  Ms. Jennifer Steiner is a(n) 71 year old female who presents today as a new patient for allergy consultation  - patient has on and off a pruritic dermatitis on scalp (more in summer) and in histology from 10/11/21 sparse perivascular and perifollicular lymphohistiocytic infiltrate with rare eosinophils  - sometimes on neck and back rashes.  - GI issues with Aspirin. Pain medications are massage and PT    - no hair dye, uses for washing of hair apple cider and Jojoba oil    Physical exam:  General: In no acute distress, well-developed, well-nourished  Eyes: no conjunctivitis  ENT: no signs of rhinitis   Pulmonary: no wheezing or coughing  Skin: on the scalp slightly erythematous skin and some dandruffs      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     Pelvic floor dysfunction     Myalgia of pelvic floor     Other constipation     Abnormal defecation     Chronic abdominal pain     Backache     Chronic fatigue syndrome     Food intolerance     Generalized pain     Hemorrhoids  "    Major depressive disorder, recurrent episode (H)     Symptomatic menopausal or female climacteric states     History of hematuria     Past Medical History:   Diagnosis Date     Anemia      Asthma     since childhood     Blepharitis      Constipation, chronic     Chronic enema use     CVA (cerebral infarction) 2009    carotid intimal repair, left     Hyperlipidemia age 58    identified age 58     Hypothyroidism      Irritable bowel disease      Neck injuries      Pelvic floor dysfunction 2010     Postmenopausal hormone replacement therapy     From Dr. Link     Scoliosis        Allergies:  Allergies   Allergen Reactions     Aspirin GI Disturbance     GI upset     Aspirin GI Disturbance     Bee Venom Swelling     Birds [Feathers]      Cats      Dust Mites      Fluoride Other (See Comments) and Unknown     headaches     Liothyronine Unknown     Severe gut reactions, ears itching, throat closing     Mold      No Clinical Screening - See Comments Other (See Comments)     Some antibiotics but not Zpak-Can tolerate Azithromycin     Procaine Other (See Comments)     headache     Seasonal Allergies      Ragweed, pollen     Simvastatin Other (See Comments)     \"hot flash\", muscle weakness, dizziness     Simvastatin Unknown     Penicillins Unknown and Rash     As a child     Sulfa Drugs Itching, Other (See Comments), Rash and Hives       Medications:  Current Outpatient Medications   Medication     Ascorbic Acid (VITAMIN C) 100 MG CHEW     Charcoal Activated (CHARCOAL PO)     Cholecalciferol (VITAMIN D) 2000 UNIT tablet     Digestive Enzymes TABS     DiphenhydrAMINE HCl (BENADRYL PO)     EPINEPHrine (EPIPEN) 0.3 MG/0.3ML injection 2-pack     estradiol (ESTRACE) 0.1 MG/GM vaginal cream     HYDROCHLORIC ACID     Hypromellose (ARTIFICIAL TEARS OP)     ketoconazole (NIZORAL) 2 % external cream     MAGNESIUM OXIDE PO     neomycin-polymyxin-dexamethasone (MAXITROL) 3.5-19292-5.1 SUSP ophthalmic susp     Omega-3 Fatty Acids " (FISH OIL PO)     peppermint oil liquid     Probiotic Product (ALOE 41898 & PROBIOTICS) CAPS     progesterone (PROMETRIUM) 200 MG capsule     progesterone (PROMETRIUM) 200 MG capsule     PROGESTERONE 50MG CAPSULES COMPOUND     Quercetin 50 MG TABS     Testosterone Propionate (FIRST-TESTOSTERONE MC) 2 % CREA     thyroid (ARMOUR THYROID) 60 MG tablet     No current facility-administered medications for this visit.       Social History:  The patient is retired. Patient has the following hobbies or non-occupational exposure nothing special    Family History:  Family History   Problem Relation Age of Onset     Diabetes Father      Gastrointestinal Disease Father 82         after surgery for Bowel obstruction     Obesity Father      Alcohol/Drug Brother      Gastrointestinal Disease Brother         Hepatitis     Heart Disease Brother 62        hx of drug abuse,  age 62     Diabetes Brother      Skin Cancer Brother      Substance Abuse Brother      Allergies Brother      Anesthesia Reaction Brother      Melanoma Mother      Skin Cancer Mother      Osteoporosis Mother      Substance Abuse Sister      Asthma Brother      Diabetes Brother      Cancer Sister      Cancer Sister 56        bone cancer     Anxiety Disorder Sister      Obesity Brother      Alcoholism Sister      Alcoholism Brother      Bone Cancer Sister      Colon Cancer No family hx of      Crohn's Disease No family hx of      Ulcerative Colitis No family hx of      Colon Polyps No family hx of      Glaucoma No family hx of      Macular Degeneration No family hx of        Previous Labs, Allergy Tests, Dermatopathology, Imaging:  none    Referred By: No referring provider defined for this encounter.     Allergy Tests:    Past Allergy Test    Order for Future Allergy Testing:    [] Outpatient  [] Inpatient: [facility]..../ Bed ....       Skin Atopy (atopic dermatitis) [x] Yes   [] No as child and then also dyshidrotic hand eczema  Contact allergies:   [x] Yes    [] No perfumes = bothers breathing and some skin bubbles  Hand eczema:   [x] Yes   [] No           Leading hand:   [] R   [] L       [] Ambidextrous         Drug allergies:        [x] Yes   [] No  Which?.Penicillin? Sulfoamides? Simwastatin (hot flash and muscle weakness).    Urticaria/Angioedema  [x] Yes   [] No recurrent hives = no clear triggers  Food Allergy:  [x] Yes   [] No  Which?.avoids Gluten, corn, oatmeal, dairy (except butter), soy = as child mostly GI problems and sometimes hives  Pets :  [] Yes   [x] No  Which?..with cats wheezing.         [x]  Rhinitis   [x] Conjunctivitis   [x] Sinusitis   [] Polyposis   [] Otitis   [] Pharyngitis         []  none  Operations:   [] Tonsils   [] Septum   [] Sinus   [] Polyposis        [x] Asthma bronchiale   [] Coughing      []  On and off wheezing with fragrances and odors  Symptoms (mostly Rhinoconjunctivitis and Asthma) aggravated by:  Season   [] I   [] II   [] III   [] IV   []V   []VI   []VII   [x]VIII   [x]IX   []X   []XI   []XI     [x] perennial Sinusitis  Day time      [] morning   [] noon      [] evening        [] night    [] whole day........  []  none  Location/changes    [] inside        [x] outside   [] mountains    [] sea     [] others.............   []  none  Triggers, specific     [] animals     [] plants     [] dust              [] others ...........................    []  none  Triggers, others       [] work          [] psyche    [] sport            [] others .............................  []  none  Irritant                [] phys efforts [x] smoke    [] heat/cold     [x] odors  []others............... []  none  Hashimotos Thyroiditis  Order for PATCH TESTS  Reason for tests (suspected allergy): scalp dermatitis and neck/upper trunk  Known previous allergies: none  Standardized panels  [x] Standard panel (40 tests)  [x] Preservatives & Antimicrobials (31 tests)  [x] Emulsifiers & Additives (25 tests)   [x] Perfumes/Flavours & Plants (25 tests)  []  Hairdresser panel (12 tests)  [] Rubber Chemicals (22 tests)  [] Plastics (26 tests)  [x] Colorants/Dyes/Food additives (20 tests)  [] Metals (implants/dental) (24 tests)  [] Local anaesthetics/NSAIDs (13 tests)  [] Antibiotics & Antimycotics (14 tests)   [] Corticosteroids (15 tests)   [] Photopatch test (62 tests)   [] others: ...      [] Patient's own products: ...    DO NOT test if chemical or biological identity is unknown!     always ask from patient the product information and safety sheets (MSDS)       Order for PRICK TESTS    Reason for tests (suspected allergy): perennial RC and sometimes asthma and aggravation in fall  Known previous allergies: ragweed    Standardized prick panels  [x] Atopic panel (20 tests)  [] Pediatric Panel (12 tests)  [] Milk, Meat, Eggs, Grains (20 tests)   [] Dust, Epithelia, Feathers (10 tests)  [] Fish, Seafood, Shellfish (17 tests)  [] Nuts, Beans (14 tests)  [] Spice, Vegetable, Fruit (17 tests)  [] Pollen Panel = Tree, Grass, Weed (24 tests)  [] Others: ...      [] Patient's own products: ...    DO NOT test if chemical or biological identity is unknown!     always ask from patient the product information and safety sheets (MSDS)     Standardized intradermal tests  [x] Penicillium notatum [x] Aspergillus fumigatus [x] House dust mites D.far & D. pteron  [] Cat    [] dog  [] Others: ...  [] Bee venom   [] Wasp venom  !!Specific protocol with dilutions!!       Order for Drug allergy tests (prick & Intradermal & patch tests)    [] Penicillin G  [] Ampicillin [] Cefazolin   [] Ceftriaxone   [] Ceftazidime  [] Bactrim    [] Others: ...  Order for ... as test date    [] Patient needs consultation with Allergy team (changes of tests may apply)  [x] Tests discussed with Allergy team (can have direct appointment for allergy tests)     ________________________________    Assessment & Plan:    ==> Final Diagnosis:     # atopic predisposition with    Seasonal RC in fall to  ragweed    Perennial RC and sinusitis    Asthma with cat and feather pillow and with non-specific irritants    Since childhood atopic dermatitis with dyshidrotic hand dermatitis    Irritable bowel syndrome  * chronic illness with exacerbation, progression, side effects from treatment    # recurrent dermatits and itching on scalp and less neck and upper trunk   DDx atopic dermatitis and/or allergic contact dermatitis  * chronic illness with exacerbation, progression, side effects from treatment    # multiple drug allergies = non of them really specific  * stable chronic illness    These conclusions are made at the best of one's knowledge and belief based on the provided evidence such as patient's history and allergy test results and they can change over time or can be incomplete because of missing information's.    ==> Treatment Plan:  >> see later    Staff:  Provider    Follow-up in Derm-Allergy clinic if patient wants to do the allergy tests (patient will call if she wants the tests)    I spent a total of 37 minutes with Jennifer Steiner. This time was spent counseling the patient and/or coordinating care, explaining the allergy tests       Again, thank you for allowing me to participate in the care of your patient.        Sincerely,        Lukas Branch MD

## 2022-03-11 ENCOUNTER — TELEPHONE (OUTPATIENT)
Dept: OPHTHALMOLOGY | Facility: CLINIC | Age: 72
End: 2022-03-11
Payer: MEDICARE

## 2022-03-11 DIAGNOSIS — H02.889 MEIBOMIAN GLAND DYSFUNCTION: ICD-10-CM

## 2022-03-11 RX ORDER — NEOMYCIN SULFATE, POLYMYXIN B SULFATE AND DEXAMETHASONE 3.5; 10000; 1 MG/ML; [USP'U]/ML; MG/ML
1 SUSPENSION/ DROPS OPHTHALMIC 2 TIMES DAILY PRN
Qty: 5 ML | Refills: 1 | Status: SHIPPED | OUTPATIENT
Start: 2022-03-11 | End: 2022-10-05

## 2022-03-11 NOTE — TELEPHONE ENCOUNTER
Pt scheduled in may with Dr. Cunningham for annual exam    Rx sent maxitrol sent for PRN use (pt been using long term PRN)    Left message with information at [phone]    --reviewed no maxitrol ointment per my review and would forward to refill provider for review    Thomas Benson RN 4:54 PM 03/11/22    --        Left message with direct number [phone]    Thomas Benson RN 11:53 AM 03/11/22             Health Call Center    Phone Message    May a detailed message be left on voicemail: yes     Reason for Call: Medication Refill Request    Has the patient contacted the pharmacy for the refill? Yes   Name of medication being requested: neomycin-polymyxin-dexamethasone (MAXITROL) 3.5-58608-9.1 SUSP ophthalmic susp  Provider who prescribed the medication: Dr. Cunningham  Pharmacy: [id number] [address]  Date medication is needed: ASAP    Pt is requesting both the drops and the ointment Rx's. Please call pt if any questions. Thank you.      Action Taken: Message routed to:  [facility] E[facility]    Travel Screening: Not Applicable

## 2022-04-30 NOTE — PROGRESS NOTES
Assessment & Plan    1/13 had episode of vertigo and left sided eye pressure and face pain. MRI/MRA negative for aneurysm, infarct or mass at that time. Pain/pressure was constant at the time, now intermittent. Exacerbated when bending over. Improved since initial onset. She is complaining of bilateral tinnitus    No current flashes, floaters, redness, tearing or vision change.       Jennifer Steiner is a 64 year old female with the following diagnoses:   1. Pain in or around eye, left    2. Vertigo    3. Bilateral dry eyes    4. 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 Cataract - Both Eyes       Reassurance given of stable eye exam  No ocular etiology/sequale of recent vertigo  Mild dryness and meibomian gland dysfunction persist both eyes   Encouraged warm compresses twice a day and lid hygiene   Continue artificial tears as needed      Stable enophthalmos right eye.  Monitor, reassurance given      Attending Physician Attestation:  I have seen and examined this patient.  I have confirmed and edited as necessary the chief complaint(s), history of present illness, review of systems, relevant history, and examination findings as documented by others.  I have personally reviewed the relevant tests, images, and reports as documented above.  I have confirmed and edited as necessary the assessment and plan and agree with this note.  - Damir Cunningham MD 9:32 AM 2/22/2017    no

## 2022-05-17 ENCOUNTER — OFFICE VISIT (OUTPATIENT)
Dept: OPHTHALMOLOGY | Facility: CLINIC | Age: 72
End: 2022-05-17
Attending: OPHTHALMOLOGY
Payer: MEDICARE

## 2022-05-17 DIAGNOSIS — H25.813 MIXED TYPE AGE-RELATED CATARACT, BOTH EYES: Primary | ICD-10-CM

## 2022-05-17 DIAGNOSIS — H02.88B MEIBOMIAN GLAND DYSFUNCTION (MGD) OF UPPER AND LOWER LIDS OF BOTH EYES: ICD-10-CM

## 2022-05-17 DIAGNOSIS — H04.123 DRY EYES: ICD-10-CM

## 2022-05-17 DIAGNOSIS — H02.88A MEIBOMIAN GLAND DYSFUNCTION (MGD) OF UPPER AND LOWER LIDS OF BOTH EYES: ICD-10-CM

## 2022-05-17 PROCEDURE — G0463 HOSPITAL OUTPT CLINIC VISIT: HCPCS

## 2022-05-17 PROCEDURE — 99214 OFFICE O/P EST MOD 30 MIN: CPT | Performed by: OPHTHALMOLOGY

## 2022-05-17 ASSESSMENT — VISUAL ACUITY
OD_CC: 20/30
OS_CC+: -2
CORRECTION_TYPE: GLASSES
OS_CC: 20/30
OD_CC+: +2
METHOD: SNELLEN - LINEAR

## 2022-05-17 ASSESSMENT — CONF VISUAL FIELD
OS_NORMAL: 1
OD_NORMAL: 1
METHOD: COUNTING FINGERS

## 2022-05-17 ASSESSMENT — REFRACTION_WEARINGRX
OS_CYLINDER: +0.50
OS_SPHERE: -5.25
OD_AXIS: 078
OD_CYLINDER: +0.75
OS_AXIS: 100
OD_SPHERE: -5.75

## 2022-05-17 ASSESSMENT — TONOMETRY
IOP_METHOD: TONOPEN
OD_IOP_MMHG: 19
OS_IOP_MMHG: 14

## 2022-05-17 ASSESSMENT — SLIT LAMP EXAM - LIDS
COMMENTS: 1+ MGD
COMMENTS: 1+ MGD

## 2022-05-17 ASSESSMENT — CUP TO DISC RATIO
OS_RATIO: 0.3
OD_RATIO: 0.3

## 2022-05-17 ASSESSMENT — EXTERNAL EXAM - LEFT EYE: OS_EXAM: NORMAL

## 2022-05-17 NOTE — PROGRESS NOTES
"Chief Complaint(s) and History of Present Illness(es)     Cataract Follow-Up     Laterality: both eyes    Associated symptoms: blurred vision.  Negative for glare and haloes    Onset: gradual    Duration: years    Context: watching TV    Activities affected: watching TV    Treatments tried: eye drops, artificial tears, warm compresses and   glasses    Comments: 1 year follow up for bilateral Nuclear sclerosis  MGD BUL/LL,   and bilateral Dry eyes.   \"I am ready for cataract surgery.\" Patient notes she has trouble reading   the print on the television screen. Patient does not drive. Patient notes   some floaters occasionally. Denies flashes. Patient notes constant dryness   each eye. Patient notes intermittent foreign body sensation. \"Drops give   relief.\"               Comments     Ocular meds:   - steroid drops BID each eye   - ointment, \"I am unable to get the ointment from the pharmacy.\"   - warm compresses PRN each eye   - PFAT QID each eye, \"I am not using them right now\"     Patient has a current back injury. She has another appointment across [facility]   later this afternoon. She would prefer to only see Dr. Cunningham today. She   does not want to see a resident.     Allyson Mcnulty, COT 1:15 PM 05/17/2022                Review of systems for the eyes was negative other than the pertinent positives/negatives listed in the HPI.      Assessment & Plan      Jennifer Steiner is a 72 year old female with the following diagnoses:   1. Mixed type age-related cataract, both eyes    2. Meibomian gland dysfunction (MGD) of upper and lower lids of both eyes    3. Dry eyes         Noting some decline in vision overall  Foreign body sensation left eye > right eye     Cataract progression, becoming visually significant   [place] be [place] out of [place] and would like to proceed with surgery prior to leaving  VISUALLY SIGNIFICANT both eyes     Risks, benefits and alternatives to cataract extraction/IOL implantation discussed; consent " obtained.  Will schedule surgery.  Unable to stay for calcs today due to back pain    Special equipment/needs:    Anesthesia:Topical  Dilation:Good  Iris expansion:Not needed  Pseudoexfoliation: No pseudoexfoliation  Trypan Blue: No   Left eye first  Right eye 2-3 weeks later  Recommend Eyehance with kota to minimono  Dex  Return for calcs and discussion 1-2 weeks prior to surgery  Increase warm compresses   ATs and maxitrol drops to continue        Attending Physician Attestation:  Complete documentation of historical and exam elements from today's encounter can be found in the full encounter summary report (not reduplicated in this progress note).  I personally obtained the chief complaint(s) and history of present illness.  I confirmed and edited as necessary the review of systems, past medical/surgical history, family history, social history, and examination findings as documented by others; and I examined the patient myself.  I personally reviewed the relevant tests, images, and reports as documented above.  I formulated and edited as necessary the assessment and plan and discussed the findings and management plan with the patient and family. Today with Jennifer Steiner, I reviewed the indications, risks, benefits, and alternatives of the proposed surgical procedure including, but not limited to, failure obtain the desired result  and need for additional surgery, bleeding, infection, loss of vision, loss of the eye, and the remote possibility of permanent damage to any organ system or death with the use of anesthesia.  I provided multiple opportunities for the questions, answered all questions to the best of my ability, and confirmed that my answers and my discussion were understood.      . - Damir Cunningham MD

## 2022-05-17 NOTE — NURSING NOTE
"Chief Complaints and History of Present Illnesses   Patient presents with     Cataract Follow-Up     1 year follow up for bilateral Nuclear sclerosis  MGD BUL/LL, and bilateral Dry eyes.   \"I am ready for cataract surgery.\" Patient notes she has trouble reading the print on the television screen. Patient does not drive. Patient notes some floaters occasionally. Denies flashes. Patient notes constant dryness each eye. Patient notes intermittent foreign body sensation. \"Drops give relief.\"      Chief Complaint(s) and History of Present Illness(es)     Cataract Follow-Up     Laterality: both eyes    Associated symptoms: blurred vision.  Negative for glare and haloes    Onset: gradual    Duration: years    Context: watching TV    Activities affected: watching TV    Treatments tried: eye drops, artificial tears, warm compresses and glasses    Comments: 1 year follow up for bilateral Nuclear sclerosis  MGD BUL/LL, and bilateral Dry eyes.   \"I am ready for cataract surgery.\" Patient notes she has trouble reading the print on the television screen. Patient does not drive. Patient notes some floaters occasionally. Denies flashes. Patient notes constant dryness each eye. Patient notes intermittent foreign body sensation. \"Drops give relief.\"               Comments     Ocular meds:   - steroid drops BID each eye   - ointment, \"I am unable to get the ointment from the pharmacy.\"   - warm compresses PRN each eye   - PFAT QID each eye, \"I am not using them right now\"     Patient has a current back injury. She has another appointment across [facility] later this afternoon. She would prefer to only see Dr. Cunningham today. She does not want to see a resident.     Allyson Mcnulty, COT 1:15 PM 05/17/2022                    "

## 2022-05-23 ENCOUNTER — TELEPHONE (OUTPATIENT)
Dept: OPHTHALMOLOGY | Facility: CLINIC | Age: 72
End: 2022-05-23
Payer: MEDICARE

## 2022-05-23 PROBLEM — H25.813 MIXED TYPE AGE-RELATED CATARACT, BOTH EYES: Status: ACTIVE | Noted: 2022-05-23

## 2022-05-23 NOTE — TELEPHONE ENCOUNTER
Spoke with patient to schedule left eye surgery with Dr. Cunningham    Surgery was scheduled on 7/25 at [facility]  Patient will have H&P at [facility]     Patient is aware a COVID-19 test is needed before their procedure. The test should be with-in 4 days of their procedure.   Test Details: Date 7/21 Location [facility]    Post-Op visit was scheduled on 7/26  Patient was advised a / is needed day of surgery. As well as, for 24 hours after their surgery procedure.  Surgery packet was mailed 5/23, patient has my direct contact information for any further questions [phone].

## 2022-05-23 NOTE — TELEPHONE ENCOUNTER
Called patient to schedule procedure with Dr. Cunningham, there was no answer.  Left message with my direct line [phone].

## 2022-05-23 NOTE — TELEPHONE ENCOUNTER
Spoke with patient to schedule right eye surgery with Dr. Cunningham    Surgery was scheduled on 8/15 at [facility]  Patient will have H&P at [facility] ON 7/21     Patient is aware a COVID-19 test is needed before their procedure. The test should be with-in 4 days of their procedure.   Test Details: Date 8/11 Location [facility]    Post-Op visit was scheduled on 8/16  Patient was advised a / is needed day of surgery. As well as, for 24 hours after their surgery procedure.  Surgery packet was mailed 5/23, patient has my direct contact information for any further questions [phone].

## 2022-05-24 NOTE — TELEPHONE ENCOUNTER
FUTURE VISIT INFORMATION      SURGERY INFORMATION:    Date: 8/15/22    Location:  or    Surgeon:  Damir Cunningham MD    Anesthesia Type:  Combined MAC with Topical    Procedure: RIGHT EYE PHACOEMULSIFICATION, CATARACT, WITH STANDARD INTRAOCULAR LENS IMPLANT INSERTION    Consult: ov     RECORDS REQUESTED FROM:        Primary Care Provider: Milan Alvarez MD  - [facility]    Pertinent Medical History: Transient cerebral ischemia    Most recent EKG+ Tracin21    Most recent Cardiac Stress Test: 19    Most recent PFT's: 21

## 2022-06-06 ENCOUNTER — OFFICE VISIT (OUTPATIENT)
Dept: DERMATOLOGY | Facility: CLINIC | Age: 72
End: 2022-06-06
Payer: MEDICARE

## 2022-06-06 DIAGNOSIS — B35.3 TINEA PEDIS OF BOTH FEET: ICD-10-CM

## 2022-06-06 DIAGNOSIS — L29.9 SCALP PRURITUS: Primary | ICD-10-CM

## 2022-06-06 PROCEDURE — 99213 OFFICE O/P EST LOW 20 MIN: CPT | Performed by: PHYSICIAN ASSISTANT

## 2022-06-06 RX ORDER — KETOCONAZOLE 20 MG/G
CREAM TOPICAL DAILY
Qty: 60 G | Refills: 3 | Status: SHIPPED | OUTPATIENT
Start: 2022-06-06 | End: 2023-08-02

## 2022-06-06 ASSESSMENT — PAIN SCALES - GENERAL: PAINLEVEL: NO PAIN (0)

## 2022-06-06 NOTE — PROGRESS NOTES
[facility] Dermatology Note  Encounter Date: Jun 6, 2022  Office Visit     Dermatology Problem List:  1. Seborrheic dermatitis, scalp  2. Tinea pedis  - Current rx: vinegar soaks, ketoconazole 2% cream    3. Scalp pruritus  - s/p punch biopsy 10/11/21 resulting as sparse perivascular and perifollicular lymphohistiocytic infiltrate with rare eosinophils  4. Hx of eczema  ____________________________________________    Assessment & Plan:    # Tinea pedis  - Continue vinegar soaks: 50% water, 50% vinegar soaks once daily for 15 minutes  - Continue ketoconazole 2% cream daily   - Refill provided.    # Scalp pruritus, dermal hypersensitivity reaction vs urticarial dermatitis.   - Patient notes how stress affects this condition as well as the fluctuating changes in her life. Currently has an injury to her back that is causing stress.  - Okay to rinse the scalp with water and apple cider vinegar.   - Continue using a mild soap without scent.  - Continue antihistamines as needed.  - Consider keeping a diary to record what is and is not working.  - Defers topical steroids.      Procedures Performed:   None.    Follow-up: 6 month(s) in-person, or earlier for new or changing lesions    Staff and Scribe:     Scribe Disclosure:  I, Kerri Colon, am serving as a scribe to document services personally performed by Dede Ghosh PA-C based on data collection and the provider's statements to me.     Provider Disclosure:   The documentation recorded by the scribe accurately reflects the services I personally performed and the decisions made by me.    All risks, benefits and alternatives were discussed with patient.  Patient is in agreement and understands the assessment and plan.  All questions were answered.  Sun Screen Education was given.   Return to Clinic in 6 months or sooner as needed.   Dede Ghosh PA-C   [facility]    ____________________________________________    CC: Skin Check (Jennifer is here to follow up for reaction on head and feet and spot on hand.)    HPI:  Ms. Jennifer Steiner is a(n) 72 year old female who presents today as a return patient for follow up. The patient was last seen in dermatology on 10/11/21 by myself at which time a punch biopsy was performed on the left parietal scalp. Results indicated a hypersensitivity reaction. The patient was encouraged to discuss medications and supplements with their primary care provider. Additionally, the patient was started on ketoconazole 2% cream daily and encouraged to start vinegar soaks for tinea pedis.     Today, the patient reports that her feet have cleared up significantly with the ketoconazole and vinegar soaks. She hasn't been going to the [facility] as she hurt her back but notes that she may have picked it up there. She notes that the tinea pedis has returned on her right foot. She is able to tell when the condition is recurring because they burn.     Patient is otherwise feeling well, without additional skin concerns.    Labs Reviewed:  N/A    Physical Exam:  Vitals: LMP  (LMP Unknown)   SKIN: Focused examination of the scalp, feet, and hands was performed.  - no sig erythema or scale to the scalp  - mild scaling to the right plantar foot  - No other lesions of concern on areas examined.     Medications:  Current Outpatient Medications   Medication     Ascorbic Acid (VITAMIN C) 100 MG CHEW     Charcoal Activated (CHARCOAL PO)     Cholecalciferol (VITAMIN D) 2000 UNIT tablet     Digestive Enzymes TABS     DiphenhydrAMINE HCl (BENADRYL PO)     EPINEPHrine (EPIPEN) 0.3 MG/0.3ML injection 2-pack     estradiol (ESTRACE) 0.1 MG/GM vaginal cream     HYDROCHLORIC ACID     Hypromellose (ARTIFICIAL TEARS OP)     ketoconazole (NIZORAL) 2 % external cream     MAGNESIUM OXIDE PO     neomycin-polymyxin-dexamethasone (MAXITROL) 3.5-70396-6.1 SUSP ophthalmic susp     peppermint oil  liquid     Probiotic Product (ALOE 58051 & PROBIOTICS) CAPS     progesterone (PROMETRIUM) 200 MG capsule     progesterone (PROMETRIUM) 200 MG capsule     PROGESTERONE 50MG CAPSULES COMPOUND     Quercetin 50 MG TABS     Testosterone Propionate (FIRST-TESTOSTERONE MC) 2 % CREA     thyroid (ARMOUR) 60 MG tablet     Omega-3 Fatty Acids (FISH OIL PO)     No current facility-administered medications for this visit.      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     Pelvic floor dysfunction     Myalgia of pelvic floor     Other constipation     Abnormal defecation     Chronic abdominal pain     Backache     Chronic fatigue syndrome     Food intolerance     Generalized pain     Hemorrhoids     Major depressive disorder, recurrent episode (H)     Symptomatic menopausal or female climacteric states     History of hematuria     Mixed type age-related cataract, both eyes     Past Medical History:   Diagnosis Date     Anemia      Asthma     since childhood     Blepharitis      Constipation, chronic     Chronic enema use     CVA (cerebral infarction) 2009    carotid intimal repair, left     Hyperlipidemia age 58    identified age 58     Hypothyroidism      Irritable bowel disease      Neck injuries      Pelvic floor dysfunction 2010     Postmenopausal hormone replacement therapy     From Dr. Link     Scoliosis         CC Referred Self, MD  No address on file on close of this encounter.

## 2022-06-06 NOTE — NURSING NOTE
Dermatology Rooming Note    Jennifer Steiner's goals for this visit include:   Chief Complaint   Patient presents with     Skin Check     Jennifer is here to follow up for reaction on head and feet and spot on hand.     Martha Fournier, Facilitator

## 2022-06-06 NOTE — LETTER
6/6/2022       RE: Jennifer Steiner  [address]  [place] [address]     Dear Colleague,    Thank you for referring your patient, Jennifer Steiner, to the [facility] [place] at [facility]. Please see a copy of my visit note below.    [facility] Dermatology Note  Encounter Date: Jun 6, 2022  Office Visit     Dermatology Problem List:  1. Seborrheic dermatitis, scalp  2. Tinea pedis  - Current rx: vinegar soaks, ketoconazole 2% cream    3. Scalp pruritus  - s/p punch biopsy 10/11/21 resulting as sparse perivascular and perifollicular lymphohistiocytic infiltrate with rare eosinophils  4. Hx of eczema  ____________________________________________    Assessment & Plan:    # Tinea pedis  - Continue vinegar soaks: 50% water, 50% vinegar soaks once daily for 15 minutes  - Continue ketoconazole 2% cream daily   - Refill provided.    # Scalp pruritus, dermal hypersensitivity reaction vs urticarial dermatitis.   - Patient notes how stress affects this condition as well as the fluctuating changes in her life. Currently has an injury to her back that is causing stress.  - Okay to rinse the scalp with water and apple cider vinegar.   - Continue using a mild soap without scent.  - Continue antihistamines as needed.  - Consider keeping a diary to record what is and is not working.  - Defers topical steroids.      Procedures Performed:   None.    Follow-up: 6 month(s) in-person, or earlier for new or changing lesions    Staff and Scribe:     Scribe Disclosure:  I, Kerri Colon, am serving as a scribe to document services personally performed by Dede Ghosh PA-C based on data collection and the provider's statements to me.     Provider Disclosure:   The documentation recorded by the scribe accurately reflects the services I personally performed and the decisions made by me.    All risks, benefits  and alternatives were discussed with patient.  Patient is in agreement and understands the assessment and plan.  All questions were answered.  Sun Screen Education was given.   Return to Clinic in 6 months or sooner as needed.   Dede Ghosh PA-C   [facility]   ____________________________________________    CC: Skin Check (Jennifer is here to follow up for reaction on head and feet and spot on hand.)    HPI:  Ms. Jennifer Steiner is a(n) 72 year old female who presents today as a return patient for follow up. The patient was last seen in dermatology on 10/11/21 by myself at which time a punch biopsy was performed on the left parietal scalp. Results indicated a hypersensitivity reaction. The patient was encouraged to discuss medications and supplements with their primary care provider. Additionally, the patient was started on ketoconazole 2% cream daily and encouraged to start vinegar soaks for tinea pedis.     Today, the patient reports that her feet have cleared up significantly with the ketoconazole and vinegar soaks. She hasn't been going to the [facility] as she hurt her back but notes that she may have picked it up there. She notes that the tinea pedis has returned on her right foot. She is able to tell when the condition is recurring because they burn.     Patient is otherwise feeling well, without additional skin concerns.    Labs Reviewed:  N/A    Physical Exam:  Vitals: LMP  (LMP Unknown)   SKIN: Focused examination of the scalp, feet, and hands was performed.  - no sig erythema or scale to the scalp  - mild scaling to the right plantar foot  - No other lesions of concern on areas examined.     Medications:  Current Outpatient Medications   Medication     Ascorbic Acid (VITAMIN C) 100 MG CHEW     Charcoal Activated (CHARCOAL PO)     Cholecalciferol (VITAMIN D) 2000 UNIT tablet     Digestive Enzymes TABS     DiphenhydrAMINE HCl (BENADRYL PO)     EPINEPHrine (EPIPEN) 0.3 MG/0.3ML  injection 2-pack     estradiol (ESTRACE) 0.1 MG/GM vaginal cream     HYDROCHLORIC ACID     Hypromellose (ARTIFICIAL TEARS OP)     ketoconazole (NIZORAL) 2 % external cream     MAGNESIUM OXIDE PO     neomycin-polymyxin-dexamethasone (MAXITROL) 3.5-43882-9.1 SUSP ophthalmic susp     peppermint oil liquid     Probiotic Product (ALOE 86786 & PROBIOTICS) CAPS     progesterone (PROMETRIUM) 200 MG capsule     progesterone (PROMETRIUM) 200 MG capsule     PROGESTERONE 50MG CAPSULES COMPOUND     Quercetin 50 MG TABS     Testosterone Propionate (FIRST-TESTOSTERONE MC) 2 % CREA     thyroid (ARMOUR) 60 MG tablet     Omega-3 Fatty Acids (FISH OIL PO)     No current facility-administered medications for this visit.      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     Pelvic floor dysfunction     Myalgia of pelvic floor     Other constipation     Abnormal defecation     Chronic abdominal pain     Backache     Chronic fatigue syndrome     Food intolerance     Generalized pain     Hemorrhoids     Major depressive disorder, recurrent episode (H)     Symptomatic menopausal or female climacteric states     History of hematuria     Mixed type age-related cataract, both eyes     Past Medical History:   Diagnosis Date     Anemia      Asthma     since childhood     Blepharitis      Constipation, chronic     Chronic enema use     CVA (cerebral infarction) 2009    carotid intimal repair, left     Hyperlipidemia age 58    identified age 58     Hypothyroidism       Irritable bowel disease      Neck injuries      Pelvic floor dysfunction 2010     Postmenopausal hormone replacement therapy     From Dr. Link     Scoliosis         CC Referred Self, MD  No address on file on close of this encounter.

## 2022-07-13 NOTE — TELEPHONE ENCOUNTER
FUTURE VISIT INFORMATION        SURGERY INFORMATION:    Date: 8/15/22    Location:  or    Surgeon:  Damir Cunningham MD    Anesthesia Type:  Combined MAC with Topical    Procedure: RIGHT EYE PHACOEMULSIFICATION, CATARACT, WITH STANDARD INTRAOCULAR LENS IMPLANT INSERTION    Consult: ov      RECORDS REQUESTED FROM:          Primary Care Provider: Milan Alvarez MD  - [facility]     Pertinent Medical History: Transient cerebral ischemia     Most recent EKG+ Tracin21     Most recent Cardiac Stress Test: 19     Most recent PFT's: 21

## 2022-07-15 ENCOUNTER — TELEPHONE (OUTPATIENT)
Dept: OPHTHALMOLOGY | Facility: CLINIC | Age: 72
End: 2022-07-15

## 2022-07-15 NOTE — TELEPHONE ENCOUNTER
FUTURE VISIT INFORMATION        SURGERY INFORMATION:    Date: 8/15/22    Location:  or    Surgeon:  Damir Cunningahm MD    Anesthesia Type:  Combined MAC with Topical    Procedure: RIGHT EYE PHACOEMULSIFICATION, CATARACT, WITH STANDARD INTRAOCULAR LENS IMPLANT INSERTION    Consult: ov      RECORDS REQUESTED FROM:          Primary Care Provider: Milan Alvarez MD  - [facility]     Pertinent Medical History: Transient cerebral ischemia     Most recent EKG+ Tracin21     Most recent Cardiac Stress Test: 19     Most recent PFT's: 21

## 2022-07-15 NOTE — TELEPHONE ENCOUNTER
FUTURE VISIT INFORMATION        SURGERY INFORMATION:    Date: 8/15/22    Location:  or    Surgeon:  Damir Cunningham MD    Anesthesia Type:  Combined MAC with Topical    Procedure: RIGHT EYE PHACOEMULSIFICATION, CATARACT, WITH STANDARD INTRAOCULAR LENS IMPLANT INSERTION    Consult: ov      RECORDS REQUESTED FROM:          Primary Care Provider: Milan Alvarez MD  - [facility]     Pertinent Medical History: Transient cerebral ischemia     Most recent EKG+ Tracin21     Most recent Cardiac Stress Test: 19     Most recent PFT's: 21

## 2022-07-21 ENCOUNTER — PRE VISIT (OUTPATIENT)
Dept: SURGERY | Facility: CLINIC | Age: 72
End: 2022-07-21
Payer: MEDICARE

## 2022-08-09 ENCOUNTER — OFFICE VISIT (OUTPATIENT)
Dept: OPHTHALMOLOGY | Facility: CLINIC | Age: 72
End: 2022-08-09
Attending: OPHTHALMOLOGY
Payer: MEDICARE

## 2022-08-09 DIAGNOSIS — H02.88A MEIBOMIAN GLAND DYSFUNCTION (MGD) OF UPPER AND LOWER LIDS OF BOTH EYES: ICD-10-CM

## 2022-08-09 DIAGNOSIS — H02.88B MEIBOMIAN GLAND DYSFUNCTION (MGD) OF UPPER AND LOWER LIDS OF BOTH EYES: ICD-10-CM

## 2022-08-09 DIAGNOSIS — H25.813 MIXED TYPE AGE-RELATED CATARACT, BOTH EYES: Primary | ICD-10-CM

## 2022-08-09 DIAGNOSIS — H04.123 DRY EYES: ICD-10-CM

## 2022-08-09 PROCEDURE — G0463 HOSPITAL OUTPT CLINIC VISIT: HCPCS | Mod: 25

## 2022-08-09 PROCEDURE — 99214 OFFICE O/P EST MOD 30 MIN: CPT | Mod: GC | Performed by: OPHTHALMOLOGY

## 2022-08-09 PROCEDURE — 76519 ECHO EXAM OF EYE: CPT | Performed by: OPHTHALMOLOGY

## 2022-08-09 PROCEDURE — 92025 CPTRIZED CORNEAL TOPOGRAPHY: CPT | Performed by: OPHTHALMOLOGY

## 2022-08-09 RX ORDER — ZINC GLUCONATE 50 MG
50 TABLET ORAL EVERY MORNING
COMMUNITY
End: 2023-07-28

## 2022-08-09 ASSESSMENT — VISUAL ACUITY
OS_CC: 20/25
OD_BAT_MED: 20/25
OS_CC+: -2
METHOD: SNELLEN - LINEAR
OS_BAT_MED: 20/30
OD_CC: 20/20
OD_CC+: -2
OS_BAT_LOW: 20/30
OD_BAT_HIGH: 20/40
OD_BAT_LOW: 20/20-2
CORRECTION_TYPE: GLASSES
OS_BAT_HIGH: 20/40

## 2022-08-09 ASSESSMENT — CONF VISUAL FIELD
OS_NORMAL: 1
METHOD: COUNTING FINGERS
OD_NORMAL: 1

## 2022-08-09 ASSESSMENT — TONOMETRY
IOP_METHOD: ICARE
OS_IOP_MMHG: 11
OD_IOP_MMHG: 14

## 2022-08-09 ASSESSMENT — EXTERNAL EXAM - LEFT EYE: OS_EXAM: NORMAL

## 2022-08-09 NOTE — PROGRESS NOTES
Chief Complaint(s) and History of Present Illness(es)     Follow Up     Laterality: both eyes    Context: reading    Course: gradually worsening    Associated symptoms: dryness, eye pain (left eye pressure) and discharge.    Negative for glare and haloes    Treatments tried: artificial tears    Pain scale: 3/10    Comments: Cataracts       Comments     She states that her vision has seemed to be gradually worsening over the   past 2 years.  She struggles with reading small print.    She uses Maxitrol drops daily in each eye.    Jennifer George, COT 1:41 PM  August 9, 2022      Patient is a 73 y/o F presenting for repeat IOL calcs - in the setting of blepharitis. Currently using AT's each eye and has not been using the maxitrol drops at night time. Also using Warm compresses. H/o CTL usage in 20's.  Denies h/o LASIK or other ocular surgeries.         Review of systems for the eyes was negative other than the pertinent positives/negatives listed in the HPI.      Assessment & Plan      Jennifer Steiner is a 72 year old female with the following diagnoses:   1. Mixed type age-related cataract, both eyes    2. Dry eyes    3. Meibomian gland dysfunction (MGD) of upper and lower lids of both eyes       Noting some decline in vision overall  Foreign body sensation left eye > right eye     Cataract progression, becoming visually significant   May be moving out of [place] and would like to proceed with surgery prior to leaving  VISUALLY SIGNIFICANT both eyes     Here for surface recheck and biometry today    Risks, benefits and alternatives to cataract extraction/IOL implantation discussed; consent obtained.       Special equipment/needs:    Anesthesia: Topical  Dilation:Good  Iris expansion:Not needed  Pseudoexfoliation: No pseudoexfoliation  Trypan Blue: No   Left eye first  Right eye 2-3 weeks later  Visually significant astigmatism   Discussed surgical corrective options  Reviewed elective nature of surgical correction and  patient responsible fee associated  The patient will consider toric Intraocular lens right eye    Recommend Eyehance with kota to minimono   Left eye first (emmetropia)  Ortega Davila MD - PGY4  Department of Ophthalmology  Pager: [phone]    Attending Physician Attestation:  Complete documentation of historical and exam elements from today's encounter can be found in the full encounter summary report (not reduplicated in this progress note).  I personally obtained the chief complaint(s) and history of present illness.  I confirmed and edited as necessary the review of systems, past medical/surgical history, family history, social history, and examination findings as documented by others; and I examined the patient myself.  I personally reviewed the relevant tests, images, and reports as documented above.  I formulated and edited as necessary the assessment and plan and discussed the findings and management plan with the patient and family. . Attending Physician Image/Tesing Attestation: I personally reviewed the ophthalmic test(s) associated with this encounter, agree with the interpretation(s) as documented by the resident/fellow, and have edited the corresponding report(s) as necessary.  Today with Jennifer Steiner , I reviewed the indications, risks, benefits, and alternatives of the proposed surgical procedure including, but not limited to, failure obtain the desired result  and need for additional surgery, bleeding, infection, loss of vision, loss of the eye, and the remote possibility of permanent damage to any organ system or death with the use of anesthesia.  I provided multiple opportunities for the questions, answered all questions to the best of my ability, and confirmed that my answers and my discussion were understood.      - Damir Cunningham MD

## 2022-08-09 NOTE — NURSING NOTE
Chief Complaints and History of Present Illnesses   Patient presents with     Follow Up     Cataracts     Chief Complaint(s) and History of Present Illness(es)     Follow Up     Laterality: both eyes    Context: reading    Course: gradually worsening    Associated symptoms: dryness, eye pain (left eye pressure) and discharge.  Negative for glare and haloes    Treatments tried: artificial tears    Pain scale: 3/10    Comments: Cataracts              Comments     She states that her vision has seemed to be gradually worsening over the past 2 years.  She struggles with reading small print.    She uses Maxitrol drops daily in each eye.    Jennifer George, COT 1:41 PM  August 9, 2022

## 2022-08-11 ENCOUNTER — PRE VISIT (OUTPATIENT)
Dept: SURGERY | Facility: CLINIC | Age: 72
End: 2022-08-11

## 2022-08-12 ENCOUNTER — PRE VISIT (OUTPATIENT)
Dept: SURGERY | Facility: CLINIC | Age: 72
End: 2022-08-12

## 2022-08-12 ENCOUNTER — TELEPHONE (OUTPATIENT)
Dept: OPHTHALMOLOGY | Facility: CLINIC | Age: 72
End: 2022-08-12

## 2022-08-12 ENCOUNTER — LAB (OUTPATIENT)
Dept: LAB | Facility: CLINIC | Age: 72
End: 2022-08-12

## 2022-08-12 ENCOUNTER — OFFICE VISIT (OUTPATIENT)
Dept: SURGERY | Facility: CLINIC | Age: 72
End: 2022-08-12
Payer: MEDICARE

## 2022-08-12 ENCOUNTER — ANESTHESIA EVENT (OUTPATIENT)
Dept: SURGERY | Facility: AMBULATORY SURGERY CENTER | Age: 72
End: 2022-08-12
Payer: MEDICARE

## 2022-08-12 VITALS
HEIGHT: 63 IN | HEART RATE: 67 BPM | SYSTOLIC BLOOD PRESSURE: 99 MMHG | TEMPERATURE: 97.6 F | DIASTOLIC BLOOD PRESSURE: 66 MMHG | OXYGEN SATURATION: 97 % | RESPIRATION RATE: 16 BRPM | BODY MASS INDEX: 23.16 KG/M2 | WEIGHT: 130.7 LBS

## 2022-08-12 DIAGNOSIS — Z20.822 ENCOUNTER FOR LABORATORY TESTING FOR COVID-19 VIRUS: ICD-10-CM

## 2022-08-12 DIAGNOSIS — Z01.818 PREOP EXAMINATION: Primary | ICD-10-CM

## 2022-08-12 DIAGNOSIS — H25.813 MIXED TYPE AGE-RELATED CATARACT, BOTH EYES: ICD-10-CM

## 2022-08-12 LAB — SARS-COV-2 RNA RESP QL NAA+PROBE: NEGATIVE

## 2022-08-12 PROCEDURE — 99205 OFFICE O/P NEW HI 60 MIN: CPT | Performed by: CLINICAL NURSE SPECIALIST

## 2022-08-12 PROCEDURE — U0003 INFECTIOUS AGENT DETECTION BY NUCLEIC ACID (DNA OR RNA); SEVERE ACUTE RESPIRATORY SYNDROME CORONAVIRUS 2 (SARS-COV-2) (CORONAVIRUS DISEASE [COVID-19]), AMPLIFIED PROBE TECHNIQUE, MAKING USE OF HIGH THROUGHPUT TECHNOLOGIES AS DESCRIBED BY CMS-2020-01-R: HCPCS | Performed by: FAMILY MEDICINE

## 2022-08-12 ASSESSMENT — PAIN SCALES - GENERAL: PAINLEVEL: MILD PAIN (3)

## 2022-08-12 ASSESSMENT — LIFESTYLE VARIABLES: TOBACCO_USE: 0

## 2022-08-12 NOTE — H&P
Pre-Operative H & P     CC:  Preoperative exam to assess for increased cardiopulmonary risk while undergoing surgery and anesthesia.    Date of Encounter: 8/12/2022  Primary Care Physician:  Milan Alvarez     Reason for visit:   Encounter Diagnoses   Name Primary?     Preop examination Yes     Mixed type age-related cataract, both eyes        HPI  Jennifer Steiner is a 72 year old female who presents for pre-operative H & P in preparation for  Procedure Information     Case: [id number] Date/Time: 08/15/22 0715    Procedure: LEFT EYE PHACOEMULSIFICATION, CATARACT, WITH STANDARD INTRAOCULAR LENS IMPLANT INSERTION (Left Eye)    Anesthesia type: Combined MAC with Topical    Diagnosis: Mixed type age-related cataract, both eyes [H25.813]    Pre-op diagnosis: Mixed type age-related cataract, both eyes [H25.813]    Location: [place] [address] / [facility]    Providers: Damir Cunningham MD        History is obtained from the patient and chart review    Patient who has been recently followed by Dr. Cunningham for gradually worsening vision over the past two years and more difficulty reading small print. Her exam revealed cataracts and she was counseled for above procedure.     Her history is otherwise significant for asthma, hypothyroidism, carotid artery dissection, IBS, slow transit constipation, and depression. She underwent evaluation by Cardiology on 8/17/21 for intermittent palpitations, with no associated symptoms. On stress test in 2019 she exercised 125% predicted, and echocardiogram was normal. EKG changes were nonspecific and nondiagnostic for ischemia. There were no specific findings, and because she was active and asymptomatic no further evaluation was recommended. A Ziopatch was offered but she declined at that time. She continues to have some occasional palpitations.     In 2011, she presented with difficulty speaking and word finding difficulty. She also had a  headache that was different from her usual headaches that was on the left side and it spread around to the occipital area. 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 CT perfusion study was done to evaluate deficit in case she needed a stent, but it was normal. She was placed on Plavix for a year and has not had any further events. She does have some residual headaches. She could not tolerate a statin.       Hx of abnormal bleeding or anti-platelet use: Denies.     Menstrual history: No LMP recorded (lmp unknown). Patient is postmenopausal.     Past Medical History  Past Medical History:   Diagnosis Date     Anemia      Asthma     since childhood     Blepharitis      Constipation, chronic     Chronic enema use     CVA (cerebral infarction) 2009    carotid intimal repair, left     Hyperlipidemia age 58    identified age 58     Hypothyroidism      Irritable bowel disease      Mixed type age-related cataract, both eyes      Neck injuries      Pelvic floor dysfunction 2010     Postmenopausal hormone replacement therapy     From Dr. Link     Scoliosis        Past Surgical History  Past Surgical History:   Procedure Laterality Date     CYSTOSCOPY N/A 3/25/2019    Procedure: CYSTOSCOPY;  Surgeon: Radha Ovalle MD;  Location: [facility] OR     DILATION AND CURETTAGE, OPERATIVE HYSTEROSCOPY WITH MORCELLATOR, COMBINED N/A 3/25/2019    Procedure: OPERATIVE HYSTEROSCOPY WITH MORCELLATOR;  Surgeon: Marybeth Huber MD;  Location: [facility] OR     HC BREATH HYDROGEN TEST  11/11/2013    Procedure: HYDROGEN BREATH TEST;  Surgeon: Esteban Short MD;  Location: [facility]     SURGICAL HISTORY OF -       tonsillectomy     SURGICAL HISTORY OF -   ~1997    cosmetic skin removal: breast reduction; [facility]         Prior to Admission Medications  Current Outpatient Medications    Medication Sig Dispense Refill     Charcoal Activated (CHARCOAL PO) Take 2 capsules by mouth daily as needed For indigestion       Cholecalciferol (VITAMIN D) 2000 UNIT tablet Take 1 tablet by mouth every morning       Digestive Enzymes TABS 3 times daily (with meals) Use as directed       DiphenhydrAMINE HCl (BENADRYL PO) Take by mouth daily as needed       EPINEPHrine (EPIPEN) 0.3 MG/0.3ML injection 2-pack Inject 0.3 mLs (0.3 mg) into the muscle once as needed for anaphylaxis 0.3 mL 1     estradiol (ESTRACE) 0.1 MG/GM vaginal cream Place 0.5 g vaginally Use one to two times weekly.       HYDROCHLORIC ACID 3 times daily (with meals) Take as directed       Hypromellose (ARTIFICIAL TEARS OP) Apply 1 drop to eye daily as needed       MAGNESIUM OXIDE PO Take 6-8 tablets by mouth every evening       neomycin-polymyxin-dexamethasone (MAXITROL) 3.5-38395-0.1 SUSP ophthalmic susp Place 1 drop into both eyes 2 times daily as needed (as needed for worsening eye symptoms) 5 mL 1     peppermint oil liquid 1 mL daily as needed for other Taking gel tabs not oil, not in EMR.       Probiotic Product (ALOE 01800 & PROBIOTICS) CAPS Take by mouth every morning       progesterone (PROMETRIUM) 200 MG capsule Take 200 mg by mouth every evening       progesterone (PROMETRIUM) 200 MG capsule Take 200 mg by mouth every evening       Quercetin 50 MG TABS Take by mouth every morning       thyroid (ARMOUR) 60 MG tablet Take 60 mg by mouth every morning        zinc gluconate 50 MG tablet Take 50 mg by mouth every morning       Ascorbic Acid (VITAMIN C) 100 MG CHEW  (Patient not taking: Reported on 8/12/2022)       ketoconazole (NIZORAL) 2 % external cream Apply topically daily To feet and in between the toes (Patient not taking: Reported on 8/12/2022) 60 g 3     Omega-3 Fatty Acids (FISH OIL PO) Take by mouth as needed  (Patient not taking: Reported on 6/6/2022)       PROGESTERONE 50MG CAPSULES COMPOUND 100 mg At Bedtime Take 2 capsules at  "bedtime.       Testosterone Propionate (FIRST-TESTOSTERONE MC) 2 % CREA  (Patient not taking: Reported on 8/12/2022)         Allergies  Allergies   Allergen Reactions     Aspirin GI Disturbance     GI upset     Bee Venom Swelling     Birds [Feathers]      Cats      Dust Mites      Fluoride Other (See Comments) and Unknown     headaches     Liothyronine Unknown     Severe gut reactions, ears itching, throat closing     Mold      No Clinical Screening - See Comments Other (See Comments)     Some antibiotics but not Zpak-Can tolerate Azithromycin     Procaine Other (See Comments)     headache     Seasonal Allergies      Ragweed, pollen     Simvastatin Other (See Comments)     \"hot flash\", muscle weakness, dizziness     Penicillins Unknown and Rash     As a child     Sulfa Drugs Itching, Other (See Comments), Rash and Hives       Social History  Social History     Socioeconomic History     Marital status: Single     Spouse name: Not on file     Number of children: Not on file     Years of education: Not on file     Highest education level: Not on file   Occupational History     Occupation: disability     Comment:    Tobacco Use     Smoking status: Never Smoker     Smokeless tobacco: Never Used   Substance and Sexual Activity     Alcohol use: No     Drug use: No     Sexual activity: Not Currently     Birth control/protection: Post-menopausal   Other Topics Concern     Parent/sibling w/ CABG, MI or angioplasty before 65F 55M? Not Asked   Social History Narrative     Not on file     Social Determinants of Health     Financial Resource Strain: Not on file   Food Insecurity: Not on file   Transportation Needs: Not on file   Physical Activity: Not on file   Stress: Not on file   Social Connections: Not on file   Intimate Partner Violence: Not on file   Housing Stability: Not on file       Family History  Family History   Problem Relation Age of Onset     Diabetes Father      Gastrointestinal Disease Father 82 "         after surgery for Bowel obstruction     Obesity Father      Alcohol/Drug Brother      Gastrointestinal Disease Brother         Hepatitis     Heart Disease Brother 62        hx of drug abuse,  age 62     Diabetes Brother      Skin Cancer Brother      Substance Abuse Brother      Allergies Brother      Anesthesia Reaction Brother      Melanoma Mother      Skin Cancer Mother      Osteoporosis Mother      Substance Abuse Sister      Asthma Brother      Diabetes Brother      Cancer Sister      Cancer Sister 56        bone cancer     Anxiety Disorder Sister      Obesity Brother      Alcoholism Sister      Alcoholism Brother      Bone Cancer Sister      Colon Cancer No family hx of      Crohn's Disease No family hx of      Ulcerative Colitis No family hx of      Colon Polyps No family hx of      Glaucoma No family hx of      Macular Degeneration No family hx of        Review of Systems  The complete review of systems is negative other than noted in the HPI or here.   Anesthesia Evaluation   Pt has had prior anesthetic. Type: General and MAC.    No history of anesthetic complications       ROS/MED HX  ENT/Pulmonary: Comment: Allergies and history of asthma. No inhaler use. Reports that if allergies are controlled and air quality is good she does not have symptoms.     (+) Intermittent, asthma  (-) tobacco use and recent URI   Neurologic: Comment: In , she presented with difficulty speaking. She also had a headache that was different from her usual headaches that was on the left side and it spread around to the occipital area. 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 CT perfusion study was done to evaluate deficit in case she needed a stent, but it was normal. She was placed on Plavix for a year. Could not tolerate statin. Residual headaches        Cardiovascular: Comment: BP 99/66  Palpitations with Cards work up.    "  (+) Dyslipidemia -----Irregular Heartbeat/Palpitations, Previous cardiac testing   Echo: Date: Results:    Stress Test: Date: 2019 Results:    ECG Reviewed: Date: 8/17/21 Results:  SR  Cath: Date: Results:   (-) taking anticoagulants/antiplatelets   METS/Exercise Tolerance: 4 - Raking leaves, gardening Comment: Able to walk distance. Uses bus for transportation.   Hematologic:     (+) anemia,     Musculoskeletal: Comment: Scoliosis  Neck pain      GI/Hepatic: Comment: Multiple GI issues, IBS, slow transit constipation  Denies GERD   (-) GERD   Renal/Genitourinary: Comment: Pelvic floor dysfunction       Endo:     (+) thyroid problem, hypothyroidism,     Psychiatric/Substance Use:     (+) psychiatric history depression     Infectious Disease:  - neg infectious disease ROS     Malignancy:  - neg malignancy ROS     Other:  - neg other ROS          BP 99/66 (BP Location: Right arm, Patient Position: Sitting, Cuff Size: Adult Regular)   Pulse 67   Temp 97.6  F (36.4  C) (Oral)   Resp 16   Ht 1.588 m (5' 2.5\")   Wt 59.3 kg (130 lb 11.2 oz)   LMP  (LMP Unknown)   SpO2 97%   Breastfeeding No   BMI 23.52 kg/m      Physical Exam   Constitutional: Awake, alert, cooperative, no apparent distress, and appears stated age.  Eyes: Pupils equal, round and reactive to light, extra ocular muscles intact, sclera clear, conjunctiva normal. Glasses on.  HENT: Normocephalic, oral pharynx with moist mucus membranes, good dentition. No goiter appreciated.   Respiratory: Clear to auscultation bilaterally, no crackles or wheezing. No cough or obvious dyspnea.  Cardiovascular: Regular rate and rhythm, normal S1 and S2, and no murmur noted.  Carotids +2, no bruits. No edema. Palpable pulses to radial  DP and PT arteries.   GI: Normal bowel sounds, soft, non-distended, non-tender, no masses palpated, no hepatosplenomegaly.    Lymph/Hematologic: No cervical lymphadenopathy and no supraclavicular lymphadenopathy.  Genitourinary: " Deferred.   Skin: Warm and dry.   Musculoskeletal: Mildly limited ROM of neck. There is no redness, warmth, or swelling of the joints. Gross motor strength is normal.    Neurologic: Awake, alert, oriented to name, place and time. Cranial nerves II-XII are grossly intact. Gait is normal.   Neuropsychiatric: Calm, cooperative. Normal affect.     Prior Labs/Diagnostic Studies   All labs and imaging personally reviewed   Lab Results   Component Value Date    WBC 7.1 11/26/2021    WBC 6.9 03/12/2019     Lab Results   Component Value Date    RBC 4.36 11/26/2021    RBC 4.30 03/12/2019     Lab Results   Component Value Date    HGB 13.5 11/26/2021    HGB 12.9 03/12/2019     Lab Results   Component Value Date    HCT 40.6 11/26/2021    HCT 40.7 03/12/2019     Lab Results   Component Value Date    MCV 93 11/26/2021    MCV 95 03/12/2019     Lab Results   Component Value Date    MCH 31.0 11/26/2021    MCH 30.0 03/12/2019     Lab Results   Component Value Date    MCHC 33.3 11/26/2021    MCHC 31.7 03/12/2019     Lab Results   Component Value Date    RDW 13.2 11/26/2021    RDW 13.8 03/12/2019     Lab Results   Component Value Date     11/26/2021     03/12/2019     Last Comprehensive Metabolic Panel:  Sodium   Date Value Ref Range Status   08/17/2021 143 133 - 144 mmol/L Final   03/12/2019 138 133 - 144 mmol/L Final     Potassium   Date Value Ref Range Status   08/17/2021 4.4 3.4 - 5.3 mmol/L Final   03/12/2019 3.9 3.4 - 5.3 mmol/L Final     Chloride   Date Value Ref Range Status   08/17/2021 110 (H) 94 - 109 mmol/L Final   03/12/2019 106 94 - 109 mmol/L Final     Carbon Dioxide   Date Value Ref Range Status   03/12/2019 27 20 - 32 mmol/L Final     Carbon Dioxide (CO2)   Date Value Ref Range Status   08/17/2021 26 20 - 32 mmol/L Final     Anion Gap   Date Value Ref Range Status   08/17/2021 7 3 - 14 mmol/L Final   03/12/2019 6 3 - 14 mmol/L Final     Glucose   Date Value Ref Range Status   08/17/2021 94 70 - 99 mg/dL  Final   2019 94 70 - 99 mg/dL Final     Urea Nitrogen   Date Value Ref Range Status   2021 5 (L) 7 - 30 mg/dL Final   2019 8 7 - 30 mg/dL Final     Creatinine   Date Value Ref Range Status   2021 0.70 0.52 - 1.04 mg/dL Final   2019 0.61 0.52 - 1.04 mg/dL Final     GFR Estimate   Date Value Ref Range Status   2021 88 >60 mL/min/1.73m2 Final     Comment:     As of 2021, eGFR is calculated by the CKD-EPI creatinine equation, without race adjustment. eGFR can be influenced by muscle mass, exercise, and diet. The reported eGFR is an estimation only and is only applicable if the renal function is stable.   2019 >90 >60 mL/min/[1.73_m2] Final     Comment:     Non  GFR Calc  Starting 2018, serum creatinine based estimated GFR (eGFR) will be   calculated using the Chronic Kidney Disease Epidemiology Collaboration   (CKD-EPI) equation.       Calcium   Date Value Ref Range Status   2021 9.3 8.5 - 10.1 mg/dL Final   2019 8.8 8.5 - 10.1 mg/dL Final       EK21 Sinus rhythm    Stress echo     Interpretation Summary    Conclusion: Exercise stress echocardiogram negative for ischemia at a    diagnostic level of peak stress (85% MPHR) however EKG findings demonstrate    minimal EKG changes V2-V4.    Patient developed no chest pain during exercise.    Test terminated due to target HR achieved.    Exercise capacity below average for age.    Normal HR and BP response to exercise.    Baseline echocardiogram with normal LVEF of 60-65%. No regional wall motion    abnormalities.    At peak stress, all walls recruit normally however lateral wall poorly    visualized. LV augments with stress. EF 75%.    EKG at baseline with no ischemic changes. At peak stress, there are horizontal    1-1.5 mm ST depressions v3-v6.    Screening 2D echocardiogram demonstrated no significant valve disease. Normal    sized proximal ascending aorta.      Coronary CTA  could be obtained if clinical suspicion for coronary artery    disease.      PFT Latest Ref Rng & Units 6/7/2021   FVC L 2.74   FEV1 L 2.13   FVC% % 99   FEV1% % 99         The patient's records and results personally reviewed by this provider.     Outside records reviewed from: Care Everywhere    Assessment      Jennifer Steiner is a 72 year old female seen as a PAC referral for risk assessment and optimization for anesthesia.    No history of problems with general anesthesia. Patient reports having sedation for colonoscopy but felt like it didn't do anything to her. She was aware but comfortable. She wants to make sure she is sedated for her cataract surgery.       Plan/Recommendations  Pt will be optimized for the proposed procedure.  See below for details on the assessment, risk, and preoperative recommendations    NEUROLOGY  History of left internal carotid artery dissection in 2011 with symptoms of severe headache and difficulty word finding. A stroke was not identified on imaging and no intervention was required. No further events. Patient does have residual headaches. Denies seizure history.     -Post Op delirium risk factors:  No risk identified    ENT  - No current airway concerns.  Will need to be reassessed day of surgery.  Mallampati: I  TM: > 3  Mildly limited neck extension     CARDIAC  No cardiac history or meds. Work up above for palpitations which continue to occur occasionally. Denies chest pain. Stays physically active with walking.   - METS (Metabolic Equivalents)~4    RCRI: 0.4-0.9% risk of serious cardiac events    PULMONARY  Lifelong asthma and allergies. No inhaler use. Reports that if she keeps her allergies controlled she does not have asthma symptoms. Has to avoid poor air quality.   Low risk for PRIMITIVO.   Able to lie flat without difficulty.    - Tobacco History      History   Smoking Status     Never Smoker   Smokeless Tobacco     Never Used       GI: Significant GI symptoms for years. Food  "sensitivities/allergies. IBS. Slow transit constipation. Requires enemas. Patient takes several supplements and digestive enzymes to manage.   PONV Medium Risk  Total Score: 2           1 AN PONV: Pt is Female    1 AN PONV: Patient is not a current smoker        /RENAL  - Baseline Creatinine 0.70  - Pelvic floor dysfunction     ENDOCRINE    - BMI: Estimated body mass index is 23.52 kg/m  as calculated from the following:    Height as of this encounter: 1.588 m (5' 2.5\").    Weight as of this encounter: 59.3 kg (130 lb 11.2 oz).  Healthy Weight (BMI 18.5-24.9)  - No history of Diabetes Mellitus   - Hypothyroidism. Will take [place] thyroid on DOS.     HEME  VTE Low Risk 0.26%            Total Score: 0      Denies history of blood clots  Denies history of blood transfusion.     MSK: Recent neck pain. Will require careful positioning.     PSYCH  - History of depression    The patient is optimized for their procedure. AVS with information on surgery time/arrival time, meds and NPO status given by nursing staff. No further diagnostic testing indicated.      On the day of service:     Prep time: 14 minutes  Visit time: 22 minutes  Documentation time: 25 minutes  ------------------------------------------  Total time: 61 minutes      CHIN Lopez CNS  Preoperative Assessment Center  [facility]  Cl[facility] and [facility] Center  Phone: [phone]  Fax: [phone]  "

## 2022-08-12 NOTE — PATIENT INSTRUCTIONS
Preparing for Your Surgery      Name:  Jennifer Steiner   MRN:  [id number]   :  1950   Today's Date:  2022         Arriving for surgery:  Surgery date:  8/15/22  Arrival time:  5:45am    Restrictions due to COVID 19:    Effective 2022 [facility] has the following visitor restriction guidelines   1 visitor may accompany each patient  That visitor may wait for patient in the Surgery Center Waiting room  All visitors must wear a mask and socially distance       [employer] parking is available for anyone with mobility limitations or disabilities. (Monday- Friday 7 am- 5 pm)    Please come to:    [facility]  [address] [place] [id number]    Check in on the 5th floor, Ambulatory Surgery Center.    What can I eat or drink?    -  You may eat and drink normally until 8 hours before surgery. (Until 11:00pm on 22)  -  You may have clear liquids up to 4 hours before surgery. (Until 3:15am on 8/15/22) ok to have sips of water with medications after this time.    Examples of clear liquids:  Water  Clear broth  Juices (apple, white grape, white cranberry  and cider) without pulp  Noncarbonated, powder based beverages  (lemonade and Thuan-Aid)  Sodas (Sprite, 7-Up, ginger ale and seltzer)  Coffee or tea (without milk or cream)  Gatorade    --No alcohol for at least 24 hours before surgery    Which medicines can I take?        Hold Supplements for 7 days before surgery. Quercetin-hold over the weekend    Hold Ibuprofen (Advil, Motrin) for 1 day before surgery--unless otherwise directed by surgeon.  Hold Naproxen (Aleve) for 4 days before surgery.    -  DO NOT take the following medications the day of surgery:   Charcoal activated   Vitamin D   Digestive enzymes    Peppermint oil   Probiotic -Aloe   Zinc gluconate    Hydrochloric acid    -  PLEASE TAKE the following medications the day of surgery    [place] Thyroid   Eye drops   Benadryl as needed       How do I prepare  myself?  - Please take 2 showers before surgery using Scrubcare or Hibiclens soap.    Use this soap only from the neck to your toes.     Leave the soap on your skin for one minute--then rinse thoroughly.      You may use your own shampoo and conditioner; no other hair products.   - Please remove all jewelry and body piercings.  - No lotions, deodorants or fragrance.  - No makeup or fingernail polish.   - Bring your ID and insurance card.    -If you have a Deep Brain Stimulator, a Spinal Cord Stimulator or any implanted Neuro device you must bring the remote to the Surgery Center          ALL PATIENTS ARE REQUIRED TO HAVE A RESPONSIBLE ADULT TO DRIVE AND BE IN ATTENDANCE WITH THEM FOR 24 HOURS FOLLOWING SURGERY        Questions or Concerns:    -For questions regarding the day of surgery please contact the Ambulatory Surgery Center at [phone].    -If you have health changes between today and your surgery please contact your surgeon.     For questions after surgery please call your surgeons office.

## 2022-08-12 NOTE — TELEPHONE ENCOUNTER
Spoke with Jennifer and apologized that she had been given a surgery time when scheduling. I explained that we do not give out surgery start times prior to 1-3 business days before the procedure due to the fact things get moved around between scheduling and the day of surgery.

## 2022-08-15 ENCOUNTER — TELEPHONE (OUTPATIENT)
Dept: OPHTHALMOLOGY | Facility: CLINIC | Age: 72
End: 2022-08-15

## 2022-08-15 ENCOUNTER — OFFICE VISIT (OUTPATIENT)
Dept: OPHTHALMOLOGY | Facility: CLINIC | Age: 72
End: 2022-08-15

## 2022-08-15 ENCOUNTER — ANESTHESIA (OUTPATIENT)
Dept: SURGERY | Facility: AMBULATORY SURGERY CENTER | Age: 72
End: 2022-08-15
Payer: MEDICARE

## 2022-08-15 ENCOUNTER — HOSPITAL ENCOUNTER (OUTPATIENT)
Facility: AMBULATORY SURGERY CENTER | Age: 72
Discharge: HOME | End: 2022-08-15
Attending: OPHTHALMOLOGY
Payer: MEDICARE

## 2022-08-15 VITALS
HEART RATE: 60 BPM | TEMPERATURE: 97.7 F | SYSTOLIC BLOOD PRESSURE: 120 MMHG | RESPIRATION RATE: 16 BRPM | OXYGEN SATURATION: 98 % | WEIGHT: 130 LBS | DIASTOLIC BLOOD PRESSURE: 51 MMHG | HEIGHT: 63 IN | BODY MASS INDEX: 23.04 KG/M2

## 2022-08-15 DIAGNOSIS — Z96.1 PSEUDOPHAKIA OF LEFT EYE: ICD-10-CM

## 2022-08-15 DIAGNOSIS — Z98.890 POSTOPERATIVE EYE STATE: Primary | ICD-10-CM

## 2022-08-15 DIAGNOSIS — H25.813 MIXED TYPE AGE-RELATED CATARACT, BOTH EYES: Primary | ICD-10-CM

## 2022-08-15 DIAGNOSIS — Z53.9 ERRONEOUS ENCOUNTER--DISREGARD: ICD-10-CM

## 2022-08-15 PROCEDURE — 66984 XCAPSL CTRC RMVL W/O ECP: CPT | Mod: LT

## 2022-08-15 PROCEDURE — 99207 PR NON-BILLABLE SERV PER CHARTING: CPT | Performed by: OPHTHALMOLOGY

## 2022-08-15 PROCEDURE — 66984 XCAPSL CTRC RMVL W/O ECP: CPT | Mod: LT | Performed by: OPHTHALMOLOGY

## 2022-08-15 RX ORDER — LIDOCAINE HYDROCHLORIDE 10 MG/ML
INJECTION, SOLUTION EPIDURAL; INFILTRATION; INTRACAUDAL; PERINEURAL PRN
Status: DISCONTINUED | OUTPATIENT
Start: 2022-08-15 | End: 2022-08-15 | Stop reason: HOSPADM

## 2022-08-15 RX ORDER — PROPARACAINE HYDROCHLORIDE 5 MG/ML
1 SOLUTION/ DROPS OPHTHALMIC ONCE
Status: COMPLETED | OUTPATIENT
Start: 2022-08-15 | End: 2022-08-15

## 2022-08-15 RX ORDER — SODIUM CHLORIDE, SODIUM LACTATE, POTASSIUM CHLORIDE, CALCIUM CHLORIDE 600; 310; 30; 20 MG/100ML; MG/100ML; MG/100ML; MG/100ML
INJECTION, SOLUTION INTRAVENOUS CONTINUOUS
Status: DISCONTINUED | OUTPATIENT
Start: 2022-08-15 | End: 2022-08-15 | Stop reason: HOSPADM

## 2022-08-15 RX ORDER — TETRACAINE HYDROCHLORIDE 5 MG/ML
SOLUTION OPHTHALMIC PRN
Status: DISCONTINUED | OUTPATIENT
Start: 2022-08-15 | End: 2022-08-15 | Stop reason: HOSPADM

## 2022-08-15 RX ORDER — SODIUM CHLORIDE, SODIUM LACTATE, POTASSIUM CHLORIDE, CALCIUM CHLORIDE 600; 310; 30; 20 MG/100ML; MG/100ML; MG/100ML; MG/100ML
INJECTION, SOLUTION INTRAVENOUS CONTINUOUS
Status: DISCONTINUED | OUTPATIENT
Start: 2022-08-15 | End: 2022-08-16 | Stop reason: HOSPADM

## 2022-08-15 RX ORDER — FENTANYL CITRATE 50 UG/ML
25 INJECTION, SOLUTION INTRAMUSCULAR; INTRAVENOUS
Status: DISCONTINUED | OUTPATIENT
Start: 2022-08-15 | End: 2022-08-16 | Stop reason: HOSPADM

## 2022-08-15 RX ORDER — ONDANSETRON 2 MG/ML
4 INJECTION INTRAMUSCULAR; INTRAVENOUS EVERY 30 MIN PRN
Status: DISCONTINUED | OUTPATIENT
Start: 2022-08-15 | End: 2022-08-16 | Stop reason: HOSPADM

## 2022-08-15 RX ORDER — MOXIFLOXACIN IN NACL,ISO-OS/PF 0.3MG/0.3
SYRINGE (ML) INTRAOCULAR PRN
Status: DISCONTINUED | OUTPATIENT
Start: 2022-08-15 | End: 2022-08-15 | Stop reason: HOSPADM

## 2022-08-15 RX ORDER — HYDROMORPHONE HYDROCHLORIDE 1 MG/ML
0.2 INJECTION, SOLUTION INTRAMUSCULAR; INTRAVENOUS; SUBCUTANEOUS EVERY 5 MIN PRN
Status: DISCONTINUED | OUTPATIENT
Start: 2022-08-15 | End: 2022-08-15 | Stop reason: HOSPADM

## 2022-08-15 RX ORDER — OXYCODONE HYDROCHLORIDE 5 MG/1
5 TABLET ORAL EVERY 4 HOURS PRN
Status: DISCONTINUED | OUTPATIENT
Start: 2022-08-15 | End: 2022-08-16 | Stop reason: HOSPADM

## 2022-08-15 RX ORDER — ONDANSETRON 4 MG/1
4 TABLET, ORALLY DISINTEGRATING ORAL EVERY 30 MIN PRN
Status: DISCONTINUED | OUTPATIENT
Start: 2022-08-15 | End: 2022-08-16 | Stop reason: HOSPADM

## 2022-08-15 RX ORDER — HYDRALAZINE HYDROCHLORIDE 20 MG/ML
2.5-5 INJECTION INTRAMUSCULAR; INTRAVENOUS EVERY 10 MIN PRN
Status: DISCONTINUED | OUTPATIENT
Start: 2022-08-15 | End: 2022-08-15 | Stop reason: HOSPADM

## 2022-08-15 RX ORDER — CYCLOPENTOLAT/TROPIC/PHENYLEPH 1%-1%-2.5%
1 DROPS (EA) OPHTHALMIC (EYE)
Status: COMPLETED | OUTPATIENT
Start: 2022-08-15 | End: 2022-08-15

## 2022-08-15 RX ORDER — ACETAMINOPHEN 325 MG/1
975 TABLET ORAL ONCE
Status: COMPLETED | OUTPATIENT
Start: 2022-08-15 | End: 2022-08-15

## 2022-08-15 RX ORDER — DEXAMETHASONE SODIUM PHOSPHATE 4 MG/ML
INJECTION, SOLUTION INTRA-ARTICULAR; INTRALESIONAL; INTRAMUSCULAR; INTRAVENOUS; SOFT TISSUE PRN
Status: DISCONTINUED | OUTPATIENT
Start: 2022-08-15 | End: 2022-08-15 | Stop reason: HOSPADM

## 2022-08-15 RX ORDER — BALANCED SALT SOLUTION 6.4; .75; .48; .3; 3.9; 1.7 MG/ML; MG/ML; MG/ML; MG/ML; MG/ML; MG/ML
SOLUTION OPHTHALMIC PRN
Status: DISCONTINUED | OUTPATIENT
Start: 2022-08-15 | End: 2022-08-15 | Stop reason: HOSPADM

## 2022-08-15 RX ORDER — TIMOLOL MALEATE 5 MG/ML
SOLUTION/ DROPS OPHTHALMIC PRN
Status: DISCONTINUED | OUTPATIENT
Start: 2022-08-15 | End: 2022-08-15 | Stop reason: HOSPADM

## 2022-08-15 RX ORDER — FENTANYL CITRATE 50 UG/ML
25 INJECTION, SOLUTION INTRAMUSCULAR; INTRAVENOUS EVERY 5 MIN PRN
Status: DISCONTINUED | OUTPATIENT
Start: 2022-08-15 | End: 2022-08-15 | Stop reason: HOSPADM

## 2022-08-15 RX ORDER — PREDNISOLONE ACETATE 10 MG/ML
1 SUSPENSION/ DROPS OPHTHALMIC 4 TIMES DAILY
Qty: 10 ML | Refills: 1 | Status: SHIPPED | OUTPATIENT
Start: 2022-08-15 | End: 2022-10-12

## 2022-08-15 RX ORDER — HALOPERIDOL 5 MG/ML
1 INJECTION INTRAMUSCULAR
Status: DISCONTINUED | OUTPATIENT
Start: 2022-08-15 | End: 2022-08-16 | Stop reason: HOSPADM

## 2022-08-15 RX ORDER — MOXIFLOXACIN 5 MG/ML
1 SOLUTION/ DROPS OPHTHALMIC 3 TIMES DAILY
Qty: 3 ML | Refills: 1 | Status: SHIPPED | OUTPATIENT
Start: 2022-08-15 | End: 2022-10-12

## 2022-08-15 RX ORDER — LIDOCAINE 40 MG/G
CREAM TOPICAL
Status: DISCONTINUED | OUTPATIENT
Start: 2022-08-15 | End: 2022-08-15 | Stop reason: HOSPADM

## 2022-08-15 RX ADMIN — SODIUM CHLORIDE, SODIUM LACTATE, POTASSIUM CHLORIDE, CALCIUM CHLORIDE: 600; 310; 30; 20 INJECTION, SOLUTION INTRAVENOUS at 07:14

## 2022-08-15 RX ADMIN — PROPARACAINE HYDROCHLORIDE 1 DROP: 5 SOLUTION/ DROPS OPHTHALMIC at 06:33

## 2022-08-15 RX ADMIN — Medication 1 DROP: at 06:35

## 2022-08-15 RX ADMIN — Medication 1 DROP: at 06:37

## 2022-08-15 RX ADMIN — Medication 1 DROP: at 06:42

## 2022-08-15 RX ADMIN — ACETAMINOPHEN 975 MG: 325 TABLET ORAL at 06:31

## 2022-08-15 ASSESSMENT — LIFESTYLE VARIABLES: TOBACCO_USE: 0

## 2022-08-15 ASSESSMENT — SLIT LAMP EXAM - LIDS: COMMENTS: NORMAL

## 2022-08-15 NOTE — ANESTHESIA PREPROCEDURE EVALUATION
Anesthesia Pre-Procedure Evaluation    Patient: Jennifer Steiner   MRN: [id number] : 1950        Procedure : Procedure(s):  LEFT EYE PHACOEMULSIFICATION, CATARACT, WITH STANDARD INTRAOCULAR LENS IMPLANT INSERTION          Past Medical History:   Diagnosis Date     Anemia      Asthma     since childhood     Blepharitis      Constipation, chronic     Chronic enema use     CVA (cerebral infarction) 2009    carotid intimal repair, left     Hyperlipidemia age 58    identified age 58     Hypothyroidism      Irritable bowel disease      Mixed type age-related cataract, both eyes      Neck injuries      Pelvic floor dysfunction      Postmenopausal hormone replacement therapy     From Dr. Link     Scoliosis       Past Surgical History:   Procedure Laterality Date     CYSTOSCOPY N/A 3/25/2019    Procedure: CYSTOSCOPY;  Surgeon: Radha Ovalle MD;  Location: [facility] OR     DILATION AND CURETTAGE, OPERATIVE HYSTEROSCOPY WITH MORCELLATOR, COMBINED N/A 3/25/2019    Procedure: OPERATIVE HYSTEROSCOPY WITH MORCELLATOR;  Surgeon: Marybeth Huber MD;  Location: [facility] OR     HC BREATH HYDROGEN TEST  2013    Procedure: HYDROGEN BREATH TEST;  Surgeon: Esteban Short MD;  Location:  GI     SURGICAL HISTORY OF -       tonsillectomy     SURGICAL HISTORY OF -   ~    cosmetic skin removal: breast reduction; skin removal from arms legs and tummy tuck     [facility] STOMACH SURGERY PROCEDURE UNLISTED        Allergies   Allergen Reactions     Aspirin GI Disturbance     GI upset     Bee Venom Swelling     Birds [Feathers]      Cats      Dust Mites      Fluoride Other (See Comments) and Unknown     headaches     Liothyronine Unknown     Severe gut reactions, ears itching, throat closing     Mold      No Clinical Screening - See Comments Other (See Comments)     Some antibiotics but not Zpak-Can tolerate Azithromycin     Procaine Other (See Comments)     headache     Seasonal Allergies      Ragweed, pollen      "Simvastatin Other (See Comments)     \"hot flash\", muscle weakness, dizziness     Penicillins Unknown and Rash     As a child     Sulfa Drugs Itching, Other (See Comments), Rash and Hives      Social History     Tobacco Use     Smoking status: Never Smoker     Smokeless tobacco: Never Used   Substance Use Topics     Alcohol use: No      Wt Readings from Last 1 Encounters:   08/15/22 59 kg (130 lb)        Anesthesia Evaluation   Pt has had prior anesthetic. Type: General and MAC.    No history of anesthetic complications       ROS/MED HX  ENT/Pulmonary: Comment: Allergies and history of asthma. No inhaler use. Reports that if allergies are controlled and air quality is good she does not have symptoms.     (+) Intermittent, asthma  (-) tobacco use and recent URI   Neurologic: Comment: In 2011, she presented with difficulty speaking. She also had a headache that was different from her usual headaches that was on the left side and it spread around to the occipital area. 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 CT perfusion study was done to evaluate deficit in case she needed a stent, but it was normal. She was placed on Plavix for a year. Could not tolerate statin. Residual headaches        Cardiovascular: Comment: BP 99/66  Palpitations with Cards work up.     (+) Dyslipidemia -----Irregular Heartbeat/Palpitations, Previous cardiac testing   Echo: Date: Results:    Stress Test: Date: 2019 Results:    ECG Reviewed: Date: 8/17/21 Results:  SR  Cath: Date: Results:   (-) taking anticoagulants/antiplatelets   METS/Exercise Tolerance: 4 - Raking leaves, gardening Comment: Able to walk distance. Uses bus for transportation.   Hematologic:     (+) anemia,     Musculoskeletal: Comment: Scoliosis  Neck pain      GI/Hepatic: Comment: Multiple GI issues, IBS, slow transit constipation  Denies GERD   (-) GERD   Renal/Genitourinary: Comment: Pelvic " floor dysfunction       Endo:     (+) thyroid problem, hypothyroidism,     Psychiatric/Substance Use:     (+) psychiatric history depression     Infectious Disease:  - neg infectious disease ROS     Malignancy:  - neg malignancy ROS     Other:  - neg other ROS          Physical Exam    Airway        Mallampati: II       Respiratory Devices and Support         Dental           Cardiovascular          Rhythm and rate: regular and normal     Pulmonary           breath sounds clear to auscultation           OUTSIDE LABS:  CBC:   Lab Results   Component Value Date    WBC 7.1 11/26/2021    WBC 5.0 08/17/2021    HGB 13.5 11/26/2021    HGB 12.8 08/17/2021    HCT 40.6 11/26/2021    HCT 39.6 08/17/2021     11/26/2021     08/17/2021     BMP:   Lab Results   Component Value Date     08/17/2021     03/12/2019    POTASSIUM 4.4 08/17/2021    POTASSIUM 3.9 03/12/2019    CHLORIDE 110 (H) 08/17/2021    CHLORIDE 106 03/12/2019    CO2 26 08/17/2021    CO2 27 03/12/2019    BUN 5 (L) 08/17/2021    BUN 8 03/12/2019    CR 0.70 08/17/2021    CR 0.61 03/12/2019    GLC 94 08/17/2021    GLC 94 03/25/2019     COAGS:   Lab Results   Component Value Date    PTT 28 04/29/2009    INR 0.94 04/29/2009     POC: No results found for: BGM, HCG, HCGS  HEPATIC:   Lab Results   Component Value Date    ALBUMIN 3.6 05/09/2018    PROTTOTAL 7.1 05/09/2018    ALT 13 05/09/2018    AST 15 05/09/2018    ALKPHOS 84 05/09/2018    BILITOTAL 0.4 05/09/2018     OTHER:   Lab Results   Component Value Date    A1C 5.2 04/29/2009    TED 9.3 08/17/2021    PHOS 4.0 01/03/2014    MAG 2.3 02/27/2015    TSH 0.02 (L) 08/17/2021    T4 0.91 08/17/2021    T3 186 (H) 12/27/2018    CRP 0.9 05/01/2015    SED 10 04/29/2009       Anesthesia Plan    ASA Status:  2   NPO Status:  NPO Appropriate    Anesthesia Type: MAC.              Consents    Anesthesia Plan(s) and associated risks, benefits, and realistic alternatives discussed. Questions answered and  patient/representative(s) expressed understanding.    - Discussed:     - Discussed with:  Patient         Postoperative Care            Comments:                Jonathan Helms MD

## 2022-08-15 NOTE — ANESTHESIA POSTPROCEDURE EVALUATION
Patient: Jennifer Steiner    Procedure: Procedure(s):  LEFT EYE PHACOEMULSIFICATION, CATARACT, WITH STANDARD INTRAOCULAR LENS IMPLANT INSERTION       Anesthesia Type:  MAC    Note:  Disposition: Outpatient   Postop Pain Control: Uneventful            Sign Out: Well controlled pain   PONV: No   Neuro/Psych: Uneventful            Sign Out: Acceptable/Baseline neuro status   Airway/Respiratory: Uneventful            Sign Out: Acceptable/Baseline resp. status   CV/Hemodynamics: Uneventful            Sign Out: Acceptable CV status; No obvious hypovolemia; No obvious fluid overload   Other NRE: NONE   DID A NON-ROUTINE EVENT OCCUR? No           Last vitals:  Vitals Value Taken Time   /51 08/15/22 0759   Temp 36.5  C (97.7  F) 08/15/22 0759   Pulse 60 08/15/22 0759   Resp 16 08/15/22 0759   SpO2 98 % 08/15/22 0759       Electronically Signed By: Jonathan Helms MD  August 15, 2022  10:52 AM

## 2022-08-15 NOTE — ANESTHESIA CARE TRANSFER NOTE
Patient: Jennifer Steiner    Procedure: Procedure(s):  LEFT EYE PHACOEMULSIFICATION, CATARACT, WITH STANDARD INTRAOCULAR LENS IMPLANT INSERTION       Diagnosis: Mixed type age-related cataract, both eyes [H25.813]  Diagnosis Additional Information: No value filed.    Anesthesia Type:   MAC     Note:      Level of Consciousness: awake  Oxygen Supplementation: room air    Independent Airway: airway patency satisfactory and stable  Dentition: dentition unchanged  Vital Signs Stable: post-procedure vital signs reviewed and stable  Report to RN Given: handoff report given  Patient transferred to: Phase II    Handoff Report: Identifed the Patient, Identified the Reponsible Provider, Reviewed the pertinent medical history, Discussed the surgical course, Reviewed Intra-OP anesthesia mangement and issues during anesthesia, Set expectations for post-procedure period and Allowed opportunity for questions and acknowledgement of understanding      Vitals:  Vitals Value Taken Time   BP     Temp 36.5  C (97.7  F) 08/15/22 0743   Pulse 58 08/15/22 0743   Resp 16 08/15/22 0743   SpO2 98 % 08/15/22 0743       Electronically Signed By: CHIN Chester CRNA  August 15, 2022  7:44 AM    [facility]    [address] [address]    Phone:  [phone]                                       Carolyn Mallory   MRN: [id number]    Department:  [facility]   Date of Visit:  11/20/2018           After Visit Summary Signature Page     I have received my discharge instructions, and my questions have been answered. I have discussed any challenges I see with this plan with the nurse or doctor.    ..........................................................................................................................................  Patient/Patient Representative Signature      ..........................................................................................................................................  Patient Representative Print Name and Relationship to Patient    ..................................................               ................................................  Date                                   Time    ..........................................................................................................................................  Reviewed by Signature/Title    ...................................................              ..............................................  Date                                               Time          22EPIC Rev 08/18

## 2022-08-15 NOTE — DISCHARGE INSTRUCTIONS
"[facility]  Home Care Following Anesthesia  For 24 hours after surgery:  Get plenty of rest.  A responsible adult must stay with you for at least 24 hours after you leave the surgery center.  Do not drive or use heavy equipment.  If you have weakness or tingling, don't drive or use heavy equipment until this feeling goes away.   Do not drink alcohol.   Avoid strenuous or risky activities.  Ask for help when climbing stairs.  You may feel lightheaded.  IF so, sit for a few minutes before standing.  Have someone help you get up.   If you have nausea (feel sick to your stomach): Drink only clear liquids such as apple juice, ginger ale, broth or 7-Up.  Rest may also help.  Be sure to drink enough fluids.  Move to a regular diet as you feel able.   You may have a slight fever.  Call the doctor if your fever is over 100 F (37.7 C) (taken under the tongue) or lasts longer than 24 hours.  You may have a dry mouth, a sore throat, muscle aches or trouble sleeping. These should go away after 24 hours.  Do not make important or legal decisions.   It is recommended to avoid smoking.        Today you received a Marcaine or bupivacaine block to numb the nerves near your surgery site.  This is a block using local anesthetic or \"numbing\" medication injected around the nerves to anesthetize or \"numb\" the area supplied by those nerves.  This block is injected into the muscle layer near your surgical site.  The medication may numb the location where you had surgery for 6-18 hours, but may last up to 24 hours.  If your surgical site is an arm or leg you should be careful with your affected limb, since it is possible to injure your limb without being aware of it due to the numbing.  Until full feeling returns, you should guard against bumping or hitting your limb, and avoid extreme hot or cold temperatures on the skin.  As the block wears off, the feeling will return as a tingling or prickly " sensation near your surgical site.  You will experience more discomfort from your incision as the feeling returns.  You may want to take a pain pill (a narcotic or Tylenol if this was prescribed by your surgeon) when you start to experience mild pain before the pain beccomes more severe.  If your pain medications do not control your pain you should notifiy your surgeon.    Tips for taking pain medications  To get the best pain relief possible, remember these points:  Take pain medications as directed, before pain becomes severe.  Pain medication can upset your stomach: taking it with food may help.  Constipation is a common side effect of pain medication. Drink plenty of  fluids.  Eat foods high in fiber. Take a stool softener if recommended by your doctor or pharmacist.  Do not drink alcohol, drive or operate machinery while taking pain medications.  Ask about other ways to control pain, such as with heat, ice or relaxation.    Tylenol/Acetaminophen Consumption  To help encourage the safe use of acetaminophen, the makers of TYLENOL  have lowered the maximum daily dose for single-ingredient Extra Strength TYLENOL  (acetaminophen) products sold in the [place] from 8 pills per day (4,000 mg) to 6 pills per day (3,000 mg). The dosing interval has also changed from 2 pills every 4-6 hours to 2 pills every 6 hours.  If you feel your pain relief is insufficient, you may take Tylenol/Acetaminophen in addition to your narcotic pain medication.   Be careful not to exceed 3,000 mg of Tylenol/Acetaminophen in a 24 hour period from all sources.  If you are taking extra strength Tylenol/acetaminophen (500 mg), the maximum dose is 6 tablets in 24 hours.  If you are taking regular strength acetaminophen (325 mg), the maximum dose is 9 tablets in 24 hours.    Call a doctor for any of the following:  Signs of infection (fever, growing tenderness at the surgery site, a large amount of drainage or bleeding, severe pain, foul-smelling  drainage, redness, swelling).  It has been over 8 to 10 hours since surgery and you are still not able to urinate (pass water).  Headache for over 24 hours.  Signs of Covid-19 infection (temperature over 100 degrees, shortness of breath, cough, loss of taste/smell, generalized body aches, persistent headache, chills, sore throat, nausea/vomiting/diarrhea)  Your doctor is:       Dr. Damir Cunningham, Ophthalmology: [phone]               Or dial [phone] and ask for the resident on call for:  Ophthalmology  For emergency care, call the:  [facility]:  [phone] (TTY for hearing impaired: [phone])

## 2022-08-15 NOTE — TELEPHONE ENCOUNTER
"The patient called wanting to know exactly how many drops to take today.  She notes there is an \"x\" on the chart for the morning dose.  I asked her to start at noon with her midday dose of the Prednisolone and the Moxifloxacin.  "

## 2022-08-15 NOTE — OP NOTE
PREOPERATIVE DIAGNOSIS:   1. Mixed type age-related cataract, both eyes    POSTOPERATIVE DIAGNOSIS: Same   PROCEDURES:   1. Cataract extraction with intraocular lens implant Left eye.  SURGEON: Damir Cunningham M.D.  INDICATIONS: The patient Jennifer Steiner presented to the eye clinic with decreased vision secondary to cataract in the Left eye. The risks, benefits and alternatives to cataract extraction were discussed. The patient elected to proceed. All questions were answered to the patient's satisfaction.   DESCRIPTION OF PROCEDURE:   Prior to the procedure, appropriate cardiac and respiratory monitors were applied to the patient.  In the pre-operative holding area, a drop of topical tetracaine followed by lidocaine gel followed by povidone iodine.  The patient was brought to the operating room where a surgical pause was carried out to identify with all members of the surgical team the correct surgical site.  With adequate anesthesia, the Left eye was prepped and draped in the usual sterile fashion. A lid speculum was placed, and the operating microscope was rotated into position. A paracentesis was created.  Through this limbal paracentesis, the anterior chamber was filled with preservative-free lidocaine followed by viscoelastic.  A temporal wound was created at the limbus using a 2.6 mm blade. A capsulorrhexis was initiated using a bent 25-gauge needle and was completed in continuous and circular fashion using the capsulorrhexis forceps. The lens nucleus was hydrodissected using balanced salt solution.  The lens nucleus was rotated and removed using phacoemulsification in a stop and chop technique.  Residual cortical material was removed using irrigation-aspiration.  The capsular bag was reinflated to its maximal extent with cohesive viscoelastic.  A 13.5 diopter DIBOO inserted into the capsular bag.  The lens power selected was reviewed using the intraocular lens power measurements that were obtained  preoperatively to confirm that the correct lens was selected for the desired post-operative refractive state. The residual viscoelastic was removed in its entirety, the wound were hydrated and found to be self-sealing.  Intracameral moxifloxacin was administered. Tactile pressure was confirmed to be in a normal range.  Subconjunctival Dexamethasone (2 mg) was injected..   The lid speculum was removed and a patch and shield were applied.  The patient tolerated the procedure well, and there were no complications.    PLAN: The patient will be discharged to home and will follow up tomorrow morning in the eye clinic.  EBL:  None  Complications:  None  Implant Name Type Inv. Item Serial No.  Lot No. LRB No. Used Action   Tecnis Enhance IOL DIB00 13.5D   [id number]   Left 1 Implanted          Attending Physician Procedure Attestation: I was present for the entire procedure       Damir Cunningham MD  , Comprehensive Ophthalmology  Department of Ophthalmology and Visual Neurosciences  [facility]

## 2022-08-16 ENCOUNTER — OFFICE VISIT (OUTPATIENT)
Dept: OPHTHALMOLOGY | Facility: CLINIC | Age: 72
End: 2022-08-16
Attending: OPHTHALMOLOGY
Payer: MEDICARE

## 2022-08-16 DIAGNOSIS — Z98.890 POSTOPERATIVE EYE STATE: ICD-10-CM

## 2022-08-16 DIAGNOSIS — Z96.1 PSEUDOPHAKIA OF LEFT EYE: Primary | ICD-10-CM

## 2022-08-16 PROCEDURE — G0463 HOSPITAL OUTPT CLINIC VISIT: HCPCS

## 2022-08-16 PROCEDURE — 99024 POSTOP FOLLOW-UP VISIT: CPT | Mod: GC | Performed by: OPHTHALMOLOGY

## 2022-08-16 ASSESSMENT — CONF VISUAL FIELD
OS_NORMAL: 1
OD_NORMAL: 1

## 2022-08-16 ASSESSMENT — VISUAL ACUITY
OD_CC: 20/20
OS_SC: 20/30
CORRECTION_TYPE: GLASSES
METHOD: SNELLEN - LINEAR
OD_CC+: -2

## 2022-08-16 ASSESSMENT — SLIT LAMP EXAM - LIDS: COMMENTS: NORMAL

## 2022-08-16 ASSESSMENT — TONOMETRY
OS_IOP_MMHG: 10
IOP_METHOD: TONOPEN

## 2022-08-16 NOTE — NURSING NOTE
Chief Complaints and History of Present Illnesses   Patient presents with     Post Op (Ophthalmology) Left Eye     Pt here for s/p 1 day PCIOL 06/15/2022.     Chief Complaint(s) and History of Present Illness(es)     Post Op (Ophthalmology) Left Eye     Laterality: left eye    Associated symptoms: Negative for eye pain, headache, flashes and floaters    Comments: Pt here for s/p 1 day PCIOL 06/15/2022.              Comments     Pt has questions about post op-appts. She would like to know if she needs a new COVID test for the second eye on September 12,2022. Pt does note pressure left eye. Pt has new floaters left eye. Pt would like recommendation on ATs. Pt seems frustrated and overwhelmed. Pt thinks she may be running low on drops.   Pt using:  Prednisolone  Moxi  Maxitrol  JERALD Loyd 2:17 PM August 16, 2022

## 2022-08-16 NOTE — PROGRESS NOTES
Chief Complaint(s) and History of Present Illness(es)     Post Op (Ophthalmology) Left Eye     Laterality: left eye    Associated symptoms: Negative for eye pain, headache, flashes and   floaters    Comments: Pt here for s/p 1 day PCIOL 06/15/2022.              Comments     Pt has questions about post op-appts. She would like to know if she needs   a new COVID test for the second eye on September 12,2022. Pt does note   pressure left eye. Pt has new floaters left eye. Pt would like   recommendation on ATs. Pt seems frustrated and overwhelmed. Pt thinks she   may be running low on drops.   Pt using:  Prednisolone  Moxi  Maxitrol  JERALD Loyd 2:17 PM August 16, 2022               Review of systems for the eyes was negative other than the pertinent positives/negatives listed in the HPI.      Assessment & Plan      Jennifer Steiner is a 72 year old female with the following diagnoses:   1. Pseudophakia of left eye    2. Postoperative eye state         PostOp, left eye, day 1    Doing well  Keep patch in place at night for 5 days  Start post-operative drops and taper according to instructions  Post-operative do's and don'ts reviewed, questions answered        Patient disposition:   Return in about 3 weeks (around 9/6/2022) for DFE, Refraction LEFT.           Attending Physician Attestation:  Complete documentation of historical and exam elements from today's encounter can be found in the full encounter summary report (not reduplicated in this progress note).  I personally obtained the chief complaint(s) and history of present illness.  I confirmed and edited as necessary the review of systems, past medical/surgical history, family history, social history, and examination findings as documented by others; and I examined the patient myself.  I personally reviewed the relevant tests, images, and reports as documented above.  I formulated and edited as necessary the assessment and plan and discussed the findings  and management plan with the patient and family. . - Damir Cunningham MD

## 2022-08-17 ENCOUNTER — TELEPHONE (OUTPATIENT)
Dept: OPHTHALMOLOGY | Facility: CLINIC | Age: 72
End: 2022-08-17

## 2022-08-17 NOTE — TELEPHONE ENCOUNTER
Returned Jennifer's call to schedule her pre-op and Covid test prior to her second procedure with Dr. Damir Cunningham. Jennifer and I also scheduled a 2-3 week post-op appointment following her procedure.

## 2022-09-06 ENCOUNTER — OFFICE VISIT (OUTPATIENT)
Dept: OPHTHALMOLOGY | Facility: CLINIC | Age: 72
End: 2022-09-06
Attending: OPHTHALMOLOGY
Payer: MEDICARE

## 2022-09-06 DIAGNOSIS — Z96.1 PSEUDOPHAKIA OF LEFT EYE: Primary | ICD-10-CM

## 2022-09-06 DIAGNOSIS — H04.123 DRY EYES: ICD-10-CM

## 2022-09-06 PROCEDURE — 99024 POSTOP FOLLOW-UP VISIT: CPT | Mod: GC | Performed by: OPHTHALMOLOGY

## 2022-09-06 PROCEDURE — G0463 HOSPITAL OUTPT CLINIC VISIT: HCPCS

## 2022-09-06 ASSESSMENT — REFRACTION_MANIFEST
OS_SPHERE: -0.25
OS_CYLINDER: +0.50
OS_AXIS: 110
OS_ADD: -

## 2022-09-06 ASSESSMENT — TONOMETRY
IOP_METHOD: TONOPEN
OD_IOP_MMHG: 13
OS_IOP_MMHG: 13

## 2022-09-06 ASSESSMENT — EXTERNAL EXAM - LEFT EYE: OS_EXAM: NORMAL

## 2022-09-06 ASSESSMENT — SLIT LAMP EXAM - LIDS
COMMENTS: MGD
COMMENTS: MGD

## 2022-09-06 ASSESSMENT — VISUAL ACUITY
METHOD_MR: DIAGNOSTIC MR
OS_SC: 20/30
OS_SC+: +2
METHOD: SNELLEN - LINEAR
OD_CC+: -2
OS_PH_SC+: +2
OS_PH_SC: 20/25
OD_CC: 20/20

## 2022-09-06 NOTE — NURSING NOTE
Chief Complaints and History of Present Illnesses   Patient presents with     Post Op (Ophthalmology) Left Eye     LEFT EYE PHACOEMULSIFICATION, CATARACT, WITH STANDARD INTRAOCULAR LENS IMPLANT INSERTION  8/15/22     Chief Complaint(s) and History of Present Illness(es)     Post Op (Ophthalmology) Left Eye     Comments: LEFT EYE PHACOEMULSIFICATION, CATARACT, WITH STANDARD INTRAOCULAR LENS IMPLANT INSERTION  8/15/22              Comments     Pt states no changes since last visit  Pt states no eye pain tearing or redness left eye    Fay Cunningham COT 1:00 PM September 6, 2022

## 2022-09-06 NOTE — PROGRESS NOTES
Chief Complaint(s) and History of Present Illness(es)     Post Op (Ophthalmology) Left Eye     Comments: LEFT EYE PHACOEMULSIFICATION, CATARACT, WITH STANDARD   INTRAOCULAR LENS IMPLANT INSERTION  8/15/22              Comments     Pt states no changes since last visit  Pt states no eye pain tearing or redness left eye    Fay Cunningham COT 1:00 PM September 6, 2022                 Review of systems for the eyes was negative other than the pertinent positives/negatives listed in the HPI.      Assessment & Plan      Jennifer Steiner is a 72 year old female with the following diagnoses:   1. Pseudophakia of left eye    2. Dry eyes         S/p cataract surgery left eye 8/15/22  Doing well, having some problems with allergy symptoms  Ok to resume normal activities  Taper Predforte as directed  Artificial tears as needed    Scheduled for right eye surgery 9/12/22    Visually significant astigmatism right eye   Discussed surgical corrective options  Reviewed elective nature of surgical correction and patient responsible fee associated  The patient does not wish to surgically correct their corneal astigmatism during cataract surgery         Sarita Pavon MD  Resident Physician, PGY-2  Department of Ophthalmology      Attending Physician Attestation:  Complete documentation of historical and exam elements from today's encounter can be found in the full encounter summary report (not reduplicated in this progress note).  I personally obtained the chief complaint(s) and history of present illness.  I confirmed and edited as necessary the review of systems, past medical/surgical history, family history, social history, and examination findings as documented by others; and I examined the patient myself.  I personally reviewed the relevant tests, images, and reports as documented above.  I formulated and edited as necessary the assessment and plan and discussed the findings and management plan with the patient and family. . Cinda MIRANDA  MD Octavio

## 2022-09-08 ENCOUNTER — OFFICE VISIT (OUTPATIENT)
Dept: SURGERY | Facility: CLINIC | Age: 72
End: 2022-09-08
Payer: MEDICARE

## 2022-09-08 ENCOUNTER — ANESTHESIA EVENT (OUTPATIENT)
Dept: SURGERY | Facility: AMBULATORY SURGERY CENTER | Age: 72
End: 2022-09-08
Payer: MEDICARE

## 2022-09-08 ENCOUNTER — LAB (OUTPATIENT)
Dept: LAB | Facility: CLINIC | Age: 72
End: 2022-09-08

## 2022-09-08 VITALS
WEIGHT: 129 LBS | TEMPERATURE: 98.2 F | BODY MASS INDEX: 22.86 KG/M2 | SYSTOLIC BLOOD PRESSURE: 102 MMHG | HEIGHT: 63 IN | DIASTOLIC BLOOD PRESSURE: 68 MMHG | HEART RATE: 64 BPM | RESPIRATION RATE: 20 BRPM | OXYGEN SATURATION: 98 %

## 2022-09-08 DIAGNOSIS — Z20.822 ENCOUNTER FOR LABORATORY TESTING FOR COVID-19 VIRUS: ICD-10-CM

## 2022-09-08 DIAGNOSIS — Z01.818 PRE-OP EVALUATION: Primary | ICD-10-CM

## 2022-09-08 PROCEDURE — 99215 OFFICE O/P EST HI 40 MIN: CPT | Mod: 24 | Performed by: PHYSICIAN ASSISTANT

## 2022-09-08 PROCEDURE — U0003 INFECTIOUS AGENT DETECTION BY NUCLEIC ACID (DNA OR RNA); SEVERE ACUTE RESPIRATORY SYNDROME CORONAVIRUS 2 (SARS-COV-2) (CORONAVIRUS DISEASE [COVID-19]), AMPLIFIED PROBE TECHNIQUE, MAKING USE OF HIGH THROUGHPUT TECHNOLOGIES AS DESCRIBED BY CMS-2020-01-R: HCPCS | Performed by: FAMILY MEDICINE

## 2022-09-08 RX ORDER — VIT C/B6/B5/MAGNESIUM/HERB 173 50-5-6-5MG
CAPSULE ORAL EVERY MORNING
COMMUNITY
End: 2023-07-28

## 2022-09-08 RX ORDER — ASCORBIC ACID 1000 MG
TABLET ORAL EVERY MORNING
COMMUNITY
End: 2023-07-28

## 2022-09-08 ASSESSMENT — PAIN SCALES - GENERAL: PAINLEVEL: MILD PAIN (2)

## 2022-09-08 NOTE — H&P
Pre-Operative H & P     CC:  Preoperative exam to assess for increased cardiopulmonary risk while undergoing surgery and anesthesia.    Date of Encounter: 9/8/2022  Primary Care Physician:  Milan Alvarez     Reason for visit:   Encounter Diagnosis   Name Primary?     Pre-op evaluation Yes       HPI  Jennifer Steiner is a 72 year old female who presents for pre-operative H & P in preparation for  Procedure Information     Case: [id number] Date/Time: 09/12/22 1115    Procedure: RIGHT EYE PHACOEMULSIFICATION, CATARACT, WITH STANDARD INTRAOCULAR LENS IMPLANT INSERTION (Right Eye)    Anesthesia type: Combined MAC with Topical    Diagnosis: Mixed type age-related cataract, both eyes [H25.813]    Pre-op diagnosis: Mixed type age-related cataract, both eyes [H25.813]    Location: [place] [address] / [facility]    Providers: Damir Cunningham MD          Patient is being evaluated for comorbid conditions of asthma, hypothyroidism, carotid artery dissection, IBS, slow transit constipation, and depression.    Ankit has a history of gradually worsening vision over the past two years. She was seen by Dr. Cunningham and is now s/p left PCIOL 8/15/22. She is now scheduled for right side cataract procedure as above.     History is obtained from the patient and chart review    Hx of abnormal bleeding or anti-platelet use: denies    Menstrual history: No LMP recorded (lmp unknown). Patient is postmenopausal.     Past Medical History  Past Medical History:   Diagnosis Date     Anemia      Asthma     since childhood     Blepharitis      Constipation, chronic     Chronic enema use     CVA (cerebral infarction) 2009    carotid intimal repair, left     Hyperlipidemia age 58    identified age 58     Hypothyroidism      Irritable bowel disease      Mixed type age-related cataract, both eyes      Neck injuries      Pelvic floor dysfunction 2010     Postmenopausal hormone replacement therapy      From Dr. Link     Scoliosis        Past Surgical History  Past Surgical History:   Procedure Laterality Date     CYSTOSCOPY N/A 3/25/2019    Procedure: CYSTOSCOPY;  Surgeon: Radha Ovalle MD;  Location: [facility] OR     DILATION AND CURETTAGE, OPERATIVE HYSTEROSCOPY WITH MORCELLATOR, COMBINED N/A 3/25/2019    Procedure: OPERATIVE HYSTEROSCOPY WITH MORCELLATOR;  Surgeon: Marybeth Huber MD;  Location: [facility] OR     HC BREATH HYDROGEN TEST  11/11/2013    Procedure: HYDROGEN BREATH TEST;  Surgeon: Esteban Short MD;  Location: [facility] GI     PHACOEMULSIFICATION WITH STANDARD INTRAOCULAR LENS IMPLANT Left 8/15/2022    Procedure: LEFT EYE PHACOEMULSIFICATION, CATARACT, WITH STANDARD INTRAOCULAR LENS IMPLANT INSERTION;  Surgeon: Damir Cunningham MD;  Location: [facility] OR     SURGICAL HISTORY OF -       tonsillectomy     SURGICAL HISTORY OF -   ~1997    cosmetic skin removal: breast reduction; skin removal from arms legs and tummy tuck     [facility] STOMACH SURGERY PROCEDURE UNLISTED         Prior to Admission Medications  Current Outpatient Medications   Medication Sig Dispense Refill     Ascorbic Acid (VITAMIN C) 100 MG CHEW Take by mouth every morning       Charcoal Activated (CHARCOAL PO) Take 2 capsules by mouth daily as needed For indigestion       Cholecalciferol (VITAMIN D) 2000 UNIT tablet Take 1 tablet by mouth every morning       Digestive Enzymes TABS 3 times daily (with meals) Use as directed       DiphenhydrAMINE HCl (BENADRYL PO) Take by mouth daily as needed       EPINEPHrine (EPIPEN) 0.3 MG/0.3ML injection 2-pack Inject 0.3 mLs (0.3 mg) into the muscle once as needed for anaphylaxis 0.3 mL 1     estradiol (ESTRACE) 0.1 MG/GM vaginal cream Place 0.5 g vaginally twice a week Use one to two times weekly.       HYDROCHLORIC ACID 3 times daily (with meals) Take as directed       MAGNESIUM OXIDE PO Take 6-8 tablets by mouth every evening       Nettle, Urtica Dioica, (NETTLE LEAF) 435 MG CAPS Take by mouth  every morning       peppermint oil liquid 1 mL daily as needed for other Taking gel tabs not oil, not in EMR.       Probiotic Product (ALOE 82641 & PROBIOTICS) CAPS Take by mouth every morning       progesterone (PROMETRIUM) 200 MG capsule Take 200 mg by mouth every evening       Quercetin 50 MG TABS Take 50 mg by mouth every morning       thyroid (ARMOUR) 60 MG tablet Take 60 mg by mouth every morning        Turmeric 500 MG CAPS Take by mouth every morning       zinc gluconate 50 MG tablet Take 50 mg by mouth every morning       Hypromellose (ARTIFICIAL TEARS OP) Apply 1 drop to eye daily as needed       ketoconazole (NIZORAL) 2 % external cream Apply topically daily To feet and in between the toes 60 g 3     moxifloxacin (VIGAMOX) 0.5 % ophthalmic solution Apply 1 drop to eye 3 times daily To operative eye 3 mL 1     neomycin-polymyxin-dexamethasone (MAXITROL) 3.5-93344-8.1 SUSP ophthalmic susp Place 1 drop into both eyes 2 times daily as needed (as needed for worsening eye symptoms) 5 mL 1     Omega-3 Fatty Acids (FISH OIL PO) Take by mouth as needed (Patient not taking: Reported on 9/8/2022)       prednisoLONE acetate (PRED FORTE) 1 % ophthalmic suspension Apply 1 drop to eye 4 times daily To operative eye 10 mL 1     progesterone (PROMETRIUM) 200 MG capsule Take 200 mg by mouth every evening       Testosterone Propionate (FIRST-TESTOSTERONE MC) 2 % CREA          Allergies  Allergies   Allergen Reactions     Aspirin GI Disturbance     GI upset     Bee Venom Swelling     Birds [Feathers]      Cats      Dust Mites      Fluoride Other (See Comments) and Unknown     headaches     Liothyronine Unknown     Severe gut reactions, ears itching, throat closing     Mold      No Clinical Screening - See Comments Other (See Comments)     Some antibiotics but not Zpak-Can tolerate Azithromycin     Procaine Other (See Comments)     headache     Seasonal Allergies      Ragweed, pollen     Simvastatin Other (See Comments)      "\"hot flash\", muscle weakness, dizziness     Penicillins Unknown and Rash     As a child     Sulfa Drugs Itching, Other (See Comments), Rash and Hives       Social History  Social History     Socioeconomic History     Marital status: Single     Spouse name: Not on file     Number of children: Not on file     Years of education: Not on file     Highest education level: Not on file   Occupational History     Occupation: disability     Comment:    Tobacco Use     Smoking status: Never Smoker     Smokeless tobacco: Never Used   Substance and Sexual Activity     Alcohol use: No     Drug use: No     Sexual activity: Not Currently     Birth control/protection: Post-menopausal   Other Topics Concern     Parent/sibling w/ CABG, MI or angioplasty before 65F 55M? Not Asked   Social History Narrative     Not on file     Social Determinants of Health     Financial Resource Strain: Not on file   Food Insecurity: Not on file   Transportation Needs: Not on file   Physical Activity: Not on file   Stress: Not on file   Social Connections: Not on file   Intimate Partner Violence: Not on file   Housing Stability: Not on file       Family History  Family History   Problem Relation Age of Onset     Melanoma Mother      Skin Cancer Mother      Osteoporosis Mother      Diabetes Father      Gastrointestinal Disease Father 82         after surgery for Bowel obstruction     Obesity Father      Substance Abuse Sister      Cancer Sister      Cancer Sister 56        bone cancer     Anxiety Disorder Sister      Alcoholism Sister      Bone Cancer Sister      Alcohol/Drug Brother      Gastrointestinal Disease Brother         Hepatitis     Heart Disease Brother 62        hx of drug abuse,  age 62     Diabetes Brother      Skin Cancer Brother      Substance Abuse Brother      Allergies Brother      Anesthesia Reaction Brother         severe PRIMITIVO, effected surgery     Asthma Brother      Diabetes Brother      Obesity Brother  "     Alcoholism Brother      Colon Cancer No family hx of      Crohn's Disease No family hx of      Ulcerative Colitis No family hx of      Colon Polyps No family hx of      Glaucoma No family hx of      Macular Degeneration No family hx of      Deep Vein Thrombosis (DVT) No family hx of        Review of Systems  The complete review of systems is negative other than noted in the HPI or here.   Anesthesia Evaluation   Pt has had prior anesthetic.     History of anesthetic complications  - PONV.      ROS/MED HX  ENT/Pulmonary:     (+) allergic rhinitis, Intermittent, asthma (allergy related, no recent inhaler use)     Neurologic: Comment: In 2011, she presented with difficulty speaking. She also had a headache that was different from her usual headaches that was on the left side and it spread around to the occipital area. 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 CT perfusion study was done to evaluate deficit in case she needed a stent, but it was normal. She was placed on Plavix for a year. Could not tolerate statin. Residual headaches      Cardiovascular: Comment: H/o palpitations, had cardiac workup in the past. See cardiac testing below    (+) Dyslipidemia -----Previous cardiac testing   Echo: Date: Results:    Stress Test: Date: 2018 Results:  Conclusion: Exercise stress echocardiogram negative for ischemia at a  diagnostic level of peak stress (85% MPHR) however EKG findings demonstrate  minimal EKG changes V2-V4.     Patient developed no chest pain during exercise.  Test terminated due to target HR achieved.  Exercise capacity below average for age.  Normal HR and BP response to exercise.  Baseline echocardiogram with normal LVEF of 60-65%. No regional wall motion  abnormalities.  At peak stress, all walls recruit normally however lateral wall poorly  visualized. LV augments with stress. EF 75%.  EKG at baseline with no ischemic  "changes. At peak stress, there are horizontal  1-1.5 mm ST depressions v3-v6.  Screening 2D echocardiogram demonstrated no significant valve disease. Normal  sized proximal ascending aorta.     Coronary CTA could be obtained if clinical suspicion for coronary artery  disease.  ECG Reviewed: Date: 8/2021 Results:  SR  Cath: Date: Results:   (-) taking anticoagulants/antiplatelets   METS/Exercise Tolerance: >4 METS Comment: Pool for exercises, walking.    Hematologic:     (+) anemia,  (-) history of blood clots and history of blood transfusion   Musculoskeletal:  - neg musculoskeletal ROS     GI/Hepatic: Comment: IBS   (-) GERD   Renal/Genitourinary: Comment: Pelvic floor dysfunction      Endo:     (+) thyroid problem, hypothyroidism,     Psychiatric/Substance Use:     (+) psychiatric history depression     Infectious Disease:  - neg infectious disease ROS     Malignancy:  - neg malignancy ROS     Other:            /68 (BP Location: Right arm, Patient Position: Chair, Cuff Size: Adult Regular)   Pulse 64   Temp 98.2  F (36.8  C) (Oral)   Resp 20   Ht 1.6 m (5' 3\")   Wt 58.5 kg (129 lb)   LMP  (LMP Unknown)   SpO2 98%   Breastfeeding No   BMI 22.85 kg/m      Physical Exam   Constitutional: Awake, alert, cooperative, no apparent distress, and appears stated age.  Eyes: Pupils equal, round and reactive to light, extra ocular muscles intact, sclera clear, conjunctiva normal.  HENT: Normocephalic, oral pharynx with moist mucus membranes, fair dentition   Respiratory: Clear to auscultation bilaterally, no crackles or wheezing.  Cardiovascular: Regular rate and rhythm, normal S1 and S2, and no murmur noted.  Carotids no bruits. No edema. Palpable pulses to radial arteries.   GI: Normal bowel sounds, soft, non-distended, non-tender  Genitourinary:  deferred  Skin: Warm and dry.  No rashes at anticipated surgical site.   Musculoskeletal: Full ROM of neck. There is no redness, warmth, or swelling of the exposed " joints. Gross motor strength is normal.    Neurologic: Awake, alert, oriented to name, place and time. Cranial nerves II-XII are grossly intact. Gait is normal.   Neuropsychiatric: Calm, cooperative. Normal affect.     Prior Labs/Diagnostic Studies   All labs and imaging personally reviewed     EKG/ stress test - if available please see in ROS above   No results found.  PFT Latest Ref Rng & Units 6/7/2021   FVC L 2.74   FEV1 L 2.13   FVC% % 99   FEV1% % 99         The patient's records and results personally reviewed by this provider.     Outside records reviewed from: Care Everywhere      Assessment      Jennifer Steiner is a 72 year old female seen as a PAC referral for risk assessment and optimization for anesthesia.    Plan/Recommendations  Pt will be optimized for the proposed procedure.  See below for details on the assessment, risk, and preoperative recommendations    NEUROLOGY  -In 2011, she presented with difficulty speaking. She also had a headache that was different from her usual headaches that was on the left side and it spread around to the occipital area. 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 CT perfusion study was done to evaluate deficit in case she needed a stent, but it was normal. She was placed on Plavix for a year. Could not tolerate statin. Residual headaches  -Post Op delirium risk factors:  No risk identified    ENT  - No current airway concerns.  Will need to be reassessed day of surgery.  Mallampati: I  TM: > 3    CARDIAC  - No history of CAD, Hypertension and Afib   -h/o palpitations, previously evaluated by cardiology. See previous cardiac testing above. Continues to have occasional symptoms.   - METS (Metabolic Equivalents) swimming and walking without exertional symptoms. She endorses some SOB during allergy season with her asthma, but otherwise negative for SAAVEDRA  Patient performs 4 or more METS  "exercise without symptoms            Total Score: 0      RCRI-Low risk: Class 2 0.9% complication rate            Total Score: 1    RCRI: Cerebrovascular Disease         PULMONARY  PRIMITIVO Low Risk            Total Score: 1    PRIMITIVO: Over 50 ys old      - Asthma  intermittent, related to seasonal allergies. Lungs clear on exam. No inhaler use  - Tobacco History      History   Smoking Status     Never Smoker   Smokeless Tobacco     Never Used       GI  -IBS and food sensitivities.   PONV High Risk  Total Score: 3           1 AN PONV: Pt is Female    1 AN PONV: Patient is not a current smoker    1 AN PONV: Patient has history of PONV        /RENAL  - Baseline Creatinine  WNL    ENDOCRINE    - BMI: Estimated body mass index is 22.85 kg/m  as calculated from the following:    Height as of this encounter: 1.6 m (5' 3\").    Weight as of this encounter: 58.5 kg (129 lb).  Healthy Weight (BMI 18.5-24.9)  - No history of Diabetes Mellitus   -hypothyroid using armour thyroid    HEME  VTE Low Risk 0.26%            Total Score: 1    VTE: Greater than 59 yrs old      - No history of abnormal bleeding or antiplatelet use.    The patient is optimized for their procedure. AVS with information on surgery time/arrival time, meds and NPO status given by nursing staff. No further diagnostic testing indicated.      On the day of service:     Prep time: 10 minutes  Visit time: 23 minutes  Documentation time: 8 minutes  ------------------------------------------  Total time: 41 minutes      Ashwini Perez PA-C  Preoperative Assessment Center  [facility]  Clinic and Surgery Center  Phone: [phone]  Fax: [phone]  "

## 2022-09-08 NOTE — PATIENT INSTRUCTIONS
Preparing for Your Surgery      Name:  Jennifer Steiner   MRN:  [id number]   :  1950   Today's Date:  2022         Arriving for surgery:  Surgery date:  22  Arrival time:  9:45 am    Restrictions due to COVID 19:    Effective 2022 [facility] has the following visitor restriction guidelines   2 visitors may accompany each patient  Visitors may wait for patient in the Surgery Center Waiting room  All visitors must wear a mask and socially distance       [employer] parking is available for anyone with mobility limitations or disabilities. (Monday- Friday 7 am- 5 pm)    Please come to:    [facility]  [address] [place] [id number]    Check in on the 5th floor, Ambulatory Surgery Center.    What can I eat or drink?    -  You may eat and drink normally until 8 hours before surgery. (Until 3:15 am)  -  You may have clear liquids up to 4 hours before surgery. (Until 7:15 am)    Examples of clear liquids:  Water  Clear broth  Juices (apple, white grape, white cranberry  and cider) without pulp  Noncarbonated, powder based beverages  (lemonade and Thuan-Aid)  Sodas (Sprite, 7-Up, ginger ale and seltzer)  Coffee or tea (without milk or cream)  Gatorade    --No alcohol for at least 24 hours before surgery    Which medicines can I take?    Hold Aspirin for 7 days before surgery.   Hold Multivitamins for 7 days before surgery.  Hold Supplements for 7 days before surgery.  Hold Ibuprofen (Advil, Motrin) for 1 day before surgery--unless otherwise directed by surgeon.  Hold Naproxen (Aleve) for 4 days before surgery.    -  DO NOT take the following medications the day of surgery:  Vitamin C  Activated charcoal  Vitamin D  Digestive enzymes  Hydrochloric acid  Magnesium  Nettle - see above  Fish oil - see above  Peppermint oil  Probiotic (Aloe)  Quercetin - see above  Turmeric - see above  Zinc    -  PLEASE TAKE the following medications the day of surgery   Benadryl if  needed  Eye drops per surgeon's instructions  Ketoconazole cream for feet if needed  Progesterone may be taken at the usual time  Thyroid ([place])    How do I prepare myself?  - Please take 2 showers before surgery using Scrubcare or Hibiclens soap.    Use this soap only from the neck to your toes.     Leave the soap on your skin for one minute--then rinse thoroughly.      You may use your own shampoo and conditioner; no other hair products.   - Please remove all jewelry and body piercings.  - No lotions, deodorants or fragrance.  - No makeup or fingernail polish.   - Bring your ID and insurance card.        ALL PATIENTS ARE REQUIRED TO HAVE A RESPONSIBLE ADULT TO DRIVE AND BE IN ATTENDANCE WITH THEM FOR 24 HOURS FOLLOWING SURGERY       Questions or Concerns:    -For questions regarding the day of surgery please contact the Ambulatory Surgery Center at [phone].    -If you have health changes between today and your surgery please contact your surgeon.     For questions after surgery please call your surgeons office.

## 2022-09-09 ENCOUNTER — TELEPHONE (OUTPATIENT)
Dept: OPHTHALMOLOGY | Facility: CLINIC | Age: 72
End: 2022-09-09

## 2022-09-09 LAB — SARS-COV-2 RNA RESP QL NAA+PROBE: NEGATIVE

## 2022-09-09 NOTE — TELEPHONE ENCOUNTER
Spoke to pt at 1420 and reviewed Dr. Cunningham may not be performing office duties and able to respond this Friday    Reviewed will be able to see Dr. Cunningham on Monday before cataract surgery and be able to review lenses/vision questions before surgery    Pt seemed comfortable with information    Thomas Benson RN 2:24 PM 09/09/22    ----        Spoke to pt at 0922    Pt was offered toric lens? for upcoming cataract surgery     Pt states not interested in toric lens yet    Pt had some confusion on lens implant    Left eye vision following cataract surgery good at distance and blurred up close     Reviewed nature with age and cataract surgery when lens set for distance. Would need addition power/reading glasses for upclose to accommodate    Reviewed when intraocular lens set for distance vision power the toric lens can help with the astigmatism and can help with clearer vision for distance-- reviewed toric lens would not help with near vision/accomodation up close    Pt aware can change mind day of surgery regarding toric lens.    I will forward to Dr. Cunningham to review and call pt if able this Friday to review/confirm plan for intraocular lens for upcoming surgery this Monday    Thomas Benson RN 9:39 AM 09/09/22

## 2022-09-11 ENCOUNTER — TELEPHONE (OUTPATIENT)
Dept: OPHTHALMOLOGY | Facility: CLINIC | Age: 72
End: 2022-09-11

## 2022-09-11 NOTE — TELEPHONE ENCOUNTER
Telephone Encounter:    Spoke with patient regarding the lens selection options for surgery upcoming tomorrow in the right eye. Discussed the nature of the Toric lens to optimize the refractive correction of the right eye (patient was aware of the cost difference between resident / faculty case) and felt happy with prior surgery and plan for resident performance. She was worried about how the left eye was doing at near target and we discussed the nature of the lens selection and goals of the left eye. Discussed the need for glasses for up-close reading following surgery in both eyes as well.     Jesse Davila MD - PGY4  Department of Ophthalmology  Pager: [phone]

## 2022-09-12 ENCOUNTER — ANESTHESIA (OUTPATIENT)
Dept: SURGERY | Facility: AMBULATORY SURGERY CENTER | Age: 72
End: 2022-09-12
Payer: MEDICARE

## 2022-09-12 ENCOUNTER — HOSPITAL ENCOUNTER (OUTPATIENT)
Facility: AMBULATORY SURGERY CENTER | Age: 72
Discharge: HOME | End: 2022-09-12
Attending: OPHTHALMOLOGY
Payer: MEDICARE

## 2022-09-12 ENCOUNTER — OFFICE VISIT (OUTPATIENT)
Dept: OPHTHALMOLOGY | Facility: CLINIC | Age: 72
End: 2022-09-12

## 2022-09-12 VITALS
RESPIRATION RATE: 16 BRPM | DIASTOLIC BLOOD PRESSURE: 58 MMHG | HEART RATE: 65 BPM | WEIGHT: 129 LBS | TEMPERATURE: 97 F | HEIGHT: 63 IN | SYSTOLIC BLOOD PRESSURE: 117 MMHG | OXYGEN SATURATION: 100 % | BODY MASS INDEX: 22.86 KG/M2

## 2022-09-12 DIAGNOSIS — H25.813 MIXED TYPE AGE-RELATED CATARACT, BOTH EYES: ICD-10-CM

## 2022-09-12 DIAGNOSIS — Z53.9 ERRONEOUS ENCOUNTER--DISREGARD: Primary | ICD-10-CM

## 2022-09-12 PROCEDURE — V2599 CONTACT LENS/ES OTHER TYPE: HCPCS | Mod: DBP,RT

## 2022-09-12 PROCEDURE — 99207 PR NON-BILLABLE SERV PER CHARTING: CPT | Performed by: OPHTHALMOLOGY

## 2022-09-12 PROCEDURE — 66984 XCAPSL CTRC RMVL W/O ECP: CPT | Mod: RT

## 2022-09-12 PROCEDURE — 66984 XCAPSL CTRC RMVL W/O ECP: CPT | Mod: 79 | Performed by: OPHTHALMOLOGY

## 2022-09-12 DEVICE — TECNIS TORIC II 1-PC 13.5D CYL2.25
Type: IMPLANTABLE DEVICE | Site: EYE | Status: FUNCTIONAL
Brand: TECNIS IOL

## 2022-09-12 RX ORDER — MOXIFLOXACIN 5 MG/ML
1 SOLUTION/ DROPS OPHTHALMIC 3 TIMES DAILY
Qty: 3 ML | Refills: 1 | Status: SHIPPED | OUTPATIENT
Start: 2022-09-12 | End: 2022-10-12

## 2022-09-12 RX ORDER — TIMOLOL MALEATE 5 MG/ML
SOLUTION/ DROPS OPHTHALMIC PRN
Status: DISCONTINUED | OUTPATIENT
Start: 2022-09-12 | End: 2022-09-12 | Stop reason: HOSPADM

## 2022-09-12 RX ORDER — FENTANYL CITRATE 50 UG/ML
25 INJECTION, SOLUTION INTRAMUSCULAR; INTRAVENOUS
Status: DISCONTINUED | OUTPATIENT
Start: 2022-09-12 | End: 2022-09-13 | Stop reason: HOSPADM

## 2022-09-12 RX ORDER — MEPERIDINE HYDROCHLORIDE 25 MG/ML
12.5 INJECTION INTRAMUSCULAR; INTRAVENOUS; SUBCUTANEOUS
Status: DISCONTINUED | OUTPATIENT
Start: 2022-09-12 | End: 2022-09-13 | Stop reason: HOSPADM

## 2022-09-12 RX ORDER — ALBUTEROL SULFATE 0.83 MG/ML
2.5 SOLUTION RESPIRATORY (INHALATION) EVERY 4 HOURS PRN
Status: DISCONTINUED | OUTPATIENT
Start: 2022-09-12 | End: 2022-09-12 | Stop reason: HOSPADM

## 2022-09-12 RX ORDER — TETRACAINE HYDROCHLORIDE 5 MG/ML
SOLUTION OPHTHALMIC PRN
Status: DISCONTINUED | OUTPATIENT
Start: 2022-09-12 | End: 2022-09-12 | Stop reason: HOSPADM

## 2022-09-12 RX ORDER — PROPARACAINE HYDROCHLORIDE 5 MG/ML
1 SOLUTION/ DROPS OPHTHALMIC ONCE
Status: COMPLETED | OUTPATIENT
Start: 2022-09-12 | End: 2022-09-12

## 2022-09-12 RX ORDER — BALANCED SALT SOLUTION 6.4; .75; .48; .3; 3.9; 1.7 MG/ML; MG/ML; MG/ML; MG/ML; MG/ML; MG/ML
SOLUTION OPHTHALMIC PRN
Status: DISCONTINUED | OUTPATIENT
Start: 2022-09-12 | End: 2022-09-12 | Stop reason: HOSPADM

## 2022-09-12 RX ORDER — ONDANSETRON 2 MG/ML
4 INJECTION INTRAMUSCULAR; INTRAVENOUS EVERY 30 MIN PRN
Status: DISCONTINUED | OUTPATIENT
Start: 2022-09-12 | End: 2022-09-13 | Stop reason: HOSPADM

## 2022-09-12 RX ORDER — SODIUM CHLORIDE, SODIUM LACTATE, POTASSIUM CHLORIDE, CALCIUM CHLORIDE 600; 310; 30; 20 MG/100ML; MG/100ML; MG/100ML; MG/100ML
INJECTION, SOLUTION INTRAVENOUS CONTINUOUS
Status: DISCONTINUED | OUTPATIENT
Start: 2022-09-12 | End: 2022-09-13 | Stop reason: HOSPADM

## 2022-09-12 RX ORDER — FENTANYL CITRATE 50 UG/ML
INJECTION, SOLUTION INTRAMUSCULAR; INTRAVENOUS PRN
Status: DISCONTINUED | OUTPATIENT
Start: 2022-09-12 | End: 2022-09-12

## 2022-09-12 RX ORDER — SODIUM CHLORIDE, SODIUM LACTATE, POTASSIUM CHLORIDE, CALCIUM CHLORIDE 600; 310; 30; 20 MG/100ML; MG/100ML; MG/100ML; MG/100ML
INJECTION, SOLUTION INTRAVENOUS CONTINUOUS
Status: DISCONTINUED | OUTPATIENT
Start: 2022-09-12 | End: 2022-09-12 | Stop reason: HOSPADM

## 2022-09-12 RX ORDER — CYCLOPENTOLAT/TROPIC/PHENYLEPH 1%-1%-2.5%
1 DROPS (EA) OPHTHALMIC (EYE)
Status: COMPLETED | OUTPATIENT
Start: 2022-09-12 | End: 2022-09-12

## 2022-09-12 RX ORDER — KETOROLAC TROMETHAMINE 30 MG/ML
15 INJECTION, SOLUTION INTRAMUSCULAR; INTRAVENOUS EVERY 6 HOURS PRN
Status: DISCONTINUED | OUTPATIENT
Start: 2022-09-12 | End: 2022-09-13 | Stop reason: HOSPADM

## 2022-09-12 RX ORDER — LORAZEPAM 2 MG/ML
.5-1 INJECTION INTRAMUSCULAR
Status: DISCONTINUED | OUTPATIENT
Start: 2022-09-12 | End: 2022-09-12 | Stop reason: HOSPADM

## 2022-09-12 RX ORDER — MOXIFLOXACIN IN NACL,ISO-OS/PF 0.3MG/0.3
SYRINGE (ML) INTRAOCULAR PRN
Status: DISCONTINUED | OUTPATIENT
Start: 2022-09-12 | End: 2022-09-12 | Stop reason: HOSPADM

## 2022-09-12 RX ORDER — LIDOCAINE HYDROCHLORIDE 10 MG/ML
INJECTION, SOLUTION EPIDURAL; INFILTRATION; INTRACAUDAL; PERINEURAL PRN
Status: DISCONTINUED | OUTPATIENT
Start: 2022-09-12 | End: 2022-09-12 | Stop reason: HOSPADM

## 2022-09-12 RX ORDER — ONDANSETRON 4 MG/1
4 TABLET, ORALLY DISINTEGRATING ORAL EVERY 30 MIN PRN
Status: DISCONTINUED | OUTPATIENT
Start: 2022-09-12 | End: 2022-09-13 | Stop reason: HOSPADM

## 2022-09-12 RX ORDER — HYDROMORPHONE HYDROCHLORIDE 1 MG/ML
0.2 INJECTION, SOLUTION INTRAMUSCULAR; INTRAVENOUS; SUBCUTANEOUS EVERY 5 MIN PRN
Status: DISCONTINUED | OUTPATIENT
Start: 2022-09-12 | End: 2022-09-12 | Stop reason: HOSPADM

## 2022-09-12 RX ORDER — HYDRALAZINE HYDROCHLORIDE 20 MG/ML
2.5-5 INJECTION INTRAMUSCULAR; INTRAVENOUS EVERY 10 MIN PRN
Status: DISCONTINUED | OUTPATIENT
Start: 2022-09-12 | End: 2022-09-12 | Stop reason: HOSPADM

## 2022-09-12 RX ORDER — OXYCODONE HYDROCHLORIDE 5 MG/1
5 TABLET ORAL EVERY 4 HOURS PRN
Status: DISCONTINUED | OUTPATIENT
Start: 2022-09-12 | End: 2022-09-13 | Stop reason: HOSPADM

## 2022-09-12 RX ORDER — FENTANYL CITRATE 50 UG/ML
25 INJECTION, SOLUTION INTRAMUSCULAR; INTRAVENOUS EVERY 5 MIN PRN
Status: DISCONTINUED | OUTPATIENT
Start: 2022-09-12 | End: 2022-09-12 | Stop reason: HOSPADM

## 2022-09-12 RX ORDER — DEXAMETHASONE SODIUM PHOSPHATE 4 MG/ML
INJECTION, SOLUTION INTRA-ARTICULAR; INTRALESIONAL; INTRAMUSCULAR; INTRAVENOUS; SOFT TISSUE PRN
Status: DISCONTINUED | OUTPATIENT
Start: 2022-09-12 | End: 2022-09-12 | Stop reason: HOSPADM

## 2022-09-12 RX ORDER — LIDOCAINE 40 MG/G
CREAM TOPICAL
Status: DISCONTINUED | OUTPATIENT
Start: 2022-09-12 | End: 2022-09-12 | Stop reason: HOSPADM

## 2022-09-12 RX ORDER — ACETAMINOPHEN 325 MG/1
975 TABLET ORAL ONCE
Status: DISCONTINUED | OUTPATIENT
Start: 2022-09-12 | End: 2022-09-12 | Stop reason: HOSPADM

## 2022-09-12 RX ORDER — PREDNISOLONE ACETATE 10 MG/ML
1 SUSPENSION/ DROPS OPHTHALMIC 4 TIMES DAILY
Qty: 10 ML | Refills: 1 | Status: SHIPPED | OUTPATIENT
Start: 2022-09-12 | End: 2022-10-12

## 2022-09-12 RX ADMIN — PROPARACAINE HYDROCHLORIDE 1 DROP: 5 SOLUTION/ DROPS OPHTHALMIC at 10:15

## 2022-09-12 RX ADMIN — Medication 1 DROP: at 10:28

## 2022-09-12 RX ADMIN — Medication 650 MG: at 12:22

## 2022-09-12 RX ADMIN — FENTANYL CITRATE 50 MCG: 50 INJECTION, SOLUTION INTRAMUSCULAR; INTRAVENOUS at 11:09

## 2022-09-12 RX ADMIN — Medication 1 DROP: at 10:15

## 2022-09-12 RX ADMIN — Medication 1 DROP: at 10:24

## 2022-09-12 RX ADMIN — SODIUM CHLORIDE, SODIUM LACTATE, POTASSIUM CHLORIDE, CALCIUM CHLORIDE: 600; 310; 30; 20 INJECTION, SOLUTION INTRAVENOUS at 10:25

## 2022-09-12 RX ADMIN — FENTANYL CITRATE 50 MCG: 50 INJECTION, SOLUTION INTRAMUSCULAR; INTRAVENOUS at 11:15

## 2022-09-12 ASSESSMENT — SLIT LAMP EXAM - LIDS: COMMENTS: NORMAL

## 2022-09-12 NOTE — OP NOTE
PREOPERATIVE DIAGNOSIS: Nuclear cataract, Right eye   POSTOPERATIVE DIAGNOSIS: Same   PROCEDURES:   1. Cataract extraction with toric intraocular lens implant Right eye.  SURGEON: Damir Cunningham M.D.  Assistant: Jesse Davila MD   INDICATIONS: The patient Jennifer Steiner presented to the eye clinic with decreased vision secondary to cataract in the Right eye. The risks, benefits and alternatives to cataract extraction were discussed. The patient elected to proceed. All questions were answered to the patient's satisfaction.   DESCRIPTION OF PROCEDURE:   Prior to the procedure, appropriate cardiac and respiratory monitors were applied to the patient.  In the pre-operative holding area, a drop of topical tetracaine followed by lidocaine gel followed by povidone iodine.  Pre-operative toric markings were placed at the 90-degree axis using a gentian violet marker while the patient was seated upright.  The patient was brought to the operating room where a surgical pause was carried out to identify with all members of the surgical team the correct surgical site.  With adequate anesthesia, the Right eye was prepped and draped in the usual sterile fashion. A lid speculum was placed, and the operating microscope was rotated into position.  Toric axis was reviewed and marked according to pre-operative calculations and the 90-degree marking that had previously been placed.  A paracentesis was created.  Through this limbal paracentesis, the anterior chamber was filled with preservative-free lidocaine followed by viscoelastic.  A temporal wound was created at the limbus using a 2.6 mm blade. A capsulorrhexis was initiated using a bent 25-gauge needle and was completed in continuous and circular fashion using the capsulorrhexis forceps. The lens nucleus was hydrodissected using balanced salt solution.  The lens nucleus was rotated and removed using phacoemulsification in a stop and chop technique.  Residual cortical material  was removed using irrigation-aspiration.  The capsular bag was reinflated to its maximal extent with cohesive viscoelastic.  A 13.5 diopter [id number] inserted into the capsular bag.  The lens power selected was reviewed using the intraocular lens power measurements that were obtained preoperatively to confirm that the correct lens was selected for the desired post-operative refractive state. The residual viscoelastic was removed in its entirety, the wound were hydrated and found to be self-sealing.  Intracameral moxifloxacin was administered. Tactile pressure was confirmed to be in a normal range.  Subconjunctival Dexamethasone (2 mg) was injected.. The lid speculum was removed and a patch and shield were applied.   The patient tolerated the procedure well, and there were no complications.    PLAN: The patient will be discharged to home and will follow up tomorrow morning in the eye clinic.  EBL:  None  Complications:  None  Implant Name Type Inv. Item Serial No.  Lot No. LRB No. Used Action   [id number] 13.5D tecnis toric 11 lens   [id number]   Right 1 Implanted         Attending Physician Procedure Attestation: I was present for the entire procedure       Damir Cunningham MD  , Comprehensive Ophthalmology  Department of Ophthalmology and Visual Neurosciences  [facility]

## 2022-09-12 NOTE — DISCHARGE INSTRUCTIONS
[facility]  Home Care Following Anesthesia  For 24 hours after surgery:  Get plenty of rest.  A responsible adult must stay with you for at least 24 hours after you leave the surgery center.  Do not drive or use heavy equipment.  If you have weakness or tingling, don't drive or use heavy equipment until this feeling goes away.   Do not drink alcohol.   Avoid strenuous or risky activities.  Ask for help when climbing stairs.  You may feel lightheaded.  IF so, sit for a few minutes before standing.  Have someone help you get up.   If you have nausea (feel sick to your stomach): Drink only clear liquids such as apple juice, ginger ale, broth or 7-Up.  Rest may also help.  Be sure to drink enough fluids.  Move to a regular diet as you feel able.   You may have a slight fever.  Call the doctor if your fever is over 100 F (37.7 C) (taken under the tongue) or lasts longer than 24 hours.  You may have a dry mouth, a sore throat, muscle aches or trouble sleeping. These should go away after 24 hours.  Do not make important or legal decisions.   It is recommended to avoid smoking.               Tips for taking pain medications  To get the best pain relief possible, remember these points:  Take pain medications as directed, before pain becomes severe.  Pain medication can upset your stomach: taking it with food may help.  Constipation is a common side effect of pain medication. Drink plenty of  fluids.  Eat foods high in fiber. Take a stool softener if recommended by your doctor or pharmacist.  Do not drink alcohol, drive or operate machinery while taking pain medications.  Ask about other ways to control pain, such as with heat, ice or relaxation.    Tylenol/Acetaminophen Consumption  To help encourage the safe use of acetaminophen, the makers of TYLENOL  have lowered the maximum daily dose for single-ingredient Extra Strength TYLENOL  (acetaminophen) products sold in the [place] from 8 pills  per day (4,000 mg) to 6 pills per day (3,000 mg). The dosing interval has also changed from 2 pills every 4-6 hours to 2 pills every 6 hours.  If you feel your pain relief is insufficient, you may take Tylenol/Acetaminophen in addition to your narcotic pain medication.   Be careful not to exceed 3,000 mg of Tylenol/Acetaminophen in a 24 hour period from all sources.  If you are taking extra strength Tylenol/acetaminophen (500 mg), the maximum dose is 6 tablets in 24 hours.  If you are taking regular strength acetaminophen (325 mg), the maximum dose is 9 tablets in 24 hours.    Call a doctor for any of the following:  Signs of infection (fever, growing tenderness at the surgery site, a large amount of drainage or bleeding, severe pain, foul-smelling drainage, redness, swelling).  It has been over 8 to 10 hours since surgery and you are still not able to urinate (pass water).  Headache for over 24 hours.  Numbness, tingling or weakness the day after surgery (if you had spinal anesthesia).  Signs of Covid-19 infection (temperature over 100 degrees, shortness of breath, cough, loss of taste/smell, generalized body aches, persistent headache, chills, sore throat, nausea/vomiting/diarrhea)  Your doctor is:       Dr. Damir Cunningham, Ophthalmology: [phone]               Or dial [phone] and ask for the resident on call for:  Ophthalmology  For emergency care, call the:  [place] [facility]:  [phone] (TTY for hearing impaired: [phone])

## 2022-09-12 NOTE — ANESTHESIA POSTPROCEDURE EVALUATION
Patient: Jennifer Steiner    Procedure: Procedure(s):  RIGHT EYE PHACOEMULSIFICATION, CATARACT, WITH TORIC  INTRAOCULAR LENS IMPLANT INSERTION       Anesthesia Type:  MAC    Note:  Disposition: Outpatient   Postop Pain Control: Uneventful            Sign Out: Well controlled pain   PONV: No   Neuro/Psych: Uneventful            Sign Out: Acceptable/Baseline neuro status   Airway/Respiratory: Uneventful            Sign Out: Acceptable/Baseline resp. status   CV/Hemodynamics: Uneventful            Sign Out: Acceptable CV status; No obvious hypovolemia; No obvious fluid overload   Other NRE: NONE   DID A NON-ROUTINE EVENT OCCUR? No           Last vitals:  Vitals Value Taken Time   /58 09/12/22 1230   Temp 36.1  C (97  F) 09/12/22 1230   Pulse     Resp 16 09/12/22 1230   SpO2 100 % 09/12/22 1230       Electronically Signed By: Renée Moran MD  September 12, 2022  3:54 PM

## 2022-09-12 NOTE — ANESTHESIA PREPROCEDURE EVALUATION
Anesthesia Pre-Procedure Evaluation    Patient: Jennifer Steiner   MRN: [id number] : 1950        Procedure : Procedure(s):  RIGHT EYE PHACOEMULSIFICATION, CATARACT, WITH STANDARD INTRAOCULAR LENS IMPLANT INSERTION          Past Medical History:   Diagnosis Date     Anemia      Asthma     since childhood     Blepharitis      Constipation, chronic     Chronic enema use     CVA (cerebral infarction) 2009    carotid intimal repair, left     Hyperlipidemia age 58    identified age 58     Hypothyroidism      Irritable bowel disease      Mixed type age-related cataract, both eyes      Neck injuries      Pelvic floor dysfunction      Postmenopausal hormone replacement therapy     From Dr. Link     Scoliosis       Past Surgical History:   Procedure Laterality Date     CYSTOSCOPY N/A 3/25/2019    Procedure: CYSTOSCOPY;  Surgeon: Radha Ovalle MD;  Location: [facility] OR     DILATION AND CURETTAGE, OPERATIVE HYSTEROSCOPY WITH MORCELLATOR, COMBINED N/A 3/25/2019    Procedure: OPERATIVE HYSTEROSCOPY WITH MORCELLATOR;  Surgeon: Marybeth Huber MD;  Location: [facility] OR     HC BREATH HYDROGEN TEST  2013    Procedure: HYDROGEN BREATH TEST;  Surgeon: Esteban Short MD;  Location: [facility] GI     PHACOEMULSIFICATION WITH STANDARD INTRAOCULAR LENS IMPLANT Left 8/15/2022    Procedure: LEFT EYE PHACOEMULSIFICATION, CATARACT, WITH STANDARD INTRAOCULAR LENS IMPLANT INSERTION;  Surgeon: Damir Cunningham MD;  Location[place] [facility] [place]     SURGICAL HISTORY OF -       tonsillectomy     SURGICAL HISTORY OF -   ~    cosmetic skin removal: breast reduction; skin removal from arms legs and tummy tuck     C STOMACH SURGERY PROCEDURE UNLISTED        Allergies   Allergen Reactions     Aspirin GI Disturbance     GI upset     Bee Venom Swelling     Birds [Feathers]      Cats      Dust Mites      Fluoride Other (See Comments) and Unknown     headaches     Liothyronine Unknown     Severe gut reactions, ears itching, throat  "closing     Mold      No Clinical Screening - See Comments Other (See Comments)     Some antibiotics but not Zpak-Can tolerate Azithromycin     Procaine Other (See Comments)     headache     Seasonal Allergies      Ragweed, pollen     Simvastatin Other (See Comments)     \"hot flash\", muscle weakness, dizziness     Penicillins Unknown and Rash     As a child     Sulfa Drugs Itching, Other (See Comments), Rash and Hives      Social History     Tobacco Use     Smoking status: Never Smoker     Smokeless tobacco: Never Used   Substance Use Topics     Alcohol use: No      Wt Readings from Last 1 Encounters:   09/12/22 58.5 kg (129 lb)        Anesthesia Evaluation   Pt has had prior anesthetic.         ROS/MED HX  ENT/Pulmonary:     (+) asthma     Neurologic:     (+) CVA,     Cardiovascular:       METS/Exercise Tolerance:     Hematologic:       Musculoskeletal:       GI/Hepatic:       Renal/Genitourinary:       Endo:     (+) thyroid problem, hypothyroidism,     Psychiatric/Substance Use:       Infectious Disease:       Malignancy:       Other:               OUTSIDE LABS:  CBC:   Lab Results   Component Value Date    WBC 7.1 11/26/2021    WBC 5.0 08/17/2021    HGB 13.5 11/26/2021    HGB 12.8 08/17/2021    HCT 40.6 11/26/2021    HCT 39.6 08/17/2021     11/26/2021     08/17/2021     BMP:   Lab Results   Component Value Date     08/17/2021     03/12/2019    POTASSIUM 4.4 08/17/2021    POTASSIUM 3.9 03/12/2019    CHLORIDE 110 (H) 08/17/2021    CHLORIDE 106 03/12/2019    CO2 26 08/17/2021    CO2 27 03/12/2019    BUN 5 (L) 08/17/2021    BUN 8 03/12/2019    CR 0.70 08/17/2021    CR 0.61 03/12/2019    GLC 94 08/17/2021    GLC 94 03/25/2019     COAGS:   Lab Results   Component Value Date    PTT 28 04/29/2009    INR 0.94 04/29/2009     POC: No results found for: BGM, HCG, HCGS  HEPATIC:   Lab Results   Component Value Date    ALBUMIN 3.6 05/09/2018    PROTTOTAL 7.1 05/09/2018    ALT 13 05/09/2018    AST 15 " 05/09/2018    ALKPHOS 84 05/09/2018    BILITOTAL 0.4 05/09/2018     OTHER:   Lab Results   Component Value Date    A1C 5.2 04/29/2009    TED 9.3 08/17/2021    PHOS 4.0 01/03/2014    MAG 2.3 02/27/2015    TSH 0.02 (L) 08/17/2021    T4 0.91 08/17/2021    T3 186 (H) 12/27/2018    CRP 0.9 05/01/2015    SED 10 04/29/2009       Anesthesia Plan    ASA Status:  3      Anesthesia Type: MAC.     - Reason for MAC: straight local not clinically adequate   Induction: Intravenous.   Maintenance: TIVA.        Consents            Postoperative Care    Pain management: Multi-modal analgesia.   PONV prophylaxis: Background Propofol Infusion, Ondansetron (or other 5HT-3)     Comments:                Renée Moran MD

## 2022-09-13 ENCOUNTER — OFFICE VISIT (OUTPATIENT)
Dept: OPHTHALMOLOGY | Facility: CLINIC | Age: 72
End: 2022-09-13
Attending: OPHTHALMOLOGY
Payer: MEDICARE

## 2022-09-13 DIAGNOSIS — Z96.1 PSEUDOPHAKIA OF BOTH EYES: ICD-10-CM

## 2022-09-13 DIAGNOSIS — Z98.890 POSTOPERATIVE EYE STATE: Primary | ICD-10-CM

## 2022-09-13 PROCEDURE — G0463 HOSPITAL OUTPT CLINIC VISIT: HCPCS

## 2022-09-13 PROCEDURE — 99024 POSTOP FOLLOW-UP VISIT: CPT | Mod: GC | Performed by: OPHTHALMOLOGY

## 2022-09-13 ASSESSMENT — VISUAL ACUITY
OD_PH_SC: 20/20
OS_SC+: -2
OS_SC: 20/30
OD_PH_SC+: -1
METHOD: SNELLEN - LINEAR
OD_SC: 20/30
OD_SC+: -2
OS_PH_SC+: -2
OS_PH_SC: 20/20

## 2022-09-13 ASSESSMENT — TONOMETRY
OS_IOP_MMHG: 13
OD_IOP_MMHG: 13
IOP_METHOD: TONOPEN

## 2022-09-13 ASSESSMENT — SLIT LAMP EXAM - LIDS
COMMENTS: NORMAL
COMMENTS: NORMAL

## 2022-09-13 NOTE — PROGRESS NOTES
Chief Complaint(s) and History of Present Illness(es)     Post Op (Ophthalmology) Right Eye     Comments: RIGHT EYE PHACOEMULSIFICATION, CATARACT, WITH TORIC    INTRAOCULAR LENS IMPLANT INSERTION  9/12/22            Comments    Day 1 PO   Pt doing well    OS - 8/15/22 - DIB00 --     OD - 9/12/22 - DIB00 with Toric     Fay Cunningham COT 1:53 PM September 13, 2022     She reports that the left eye has been causing some problems in the past. She is still having some irritation in the left eye. She has finished all the drops in the left eye.   She is doing well with the eye drops in the right eye now POD#1       Review of systems for the eyes was negative other than the pertinent positives/negatives listed in the HPI.      Assessment & Plan      Jennifer Steiner is a 72 year old female with the following diagnoses:   1. Postoperative eye state    2. Pseudophakia of both eyes         # POW#4 s/p CE IOL, left eye   # POD#1 s/p CE IOL, right eye     Doing well  Keep patch in place at night for 5 days  Start post-operative drops and taper according to instructions  Post-operative do's and don'ts reviewed, questions answered  Early PCO formation present left eye   Try near reading glasses   AT's PRN each eye    Recheck 2-3 weeks with refraction - each eye               Patient disposition:   No follow-ups on file.    Jesse Davila MD  Department of Ophthalmology  Pager: [phone]    Attending Physician Attestation:  Complete documentation of historical and exam elements from today's encounter can be found in the full encounter summary report (not reduplicated in this progress note).  I personally obtained the chief complaint(s) and history of present illness.  I confirmed and edited as necessary the review of systems, past medical/surgical history, family history, social history, and examination findings as documented by others; and I examined the patient myself.  I personally reviewed the relevant tests, images, and  reports as documented above.  I formulated and edited as necessary the assessment and plan and discussed the findings and management plan with the patient and family. . - Damir Cunningham MD

## 2022-09-13 NOTE — NURSING NOTE
Chief Complaints and History of Present Illnesses   Patient presents with     Post Op (Ophthalmology) Right Eye     RIGHT EYE PHACOEMULSIFICATION, CATARACT, WITH TORIC  INTRAOCULAR LENS IMPLANT INSERTION  9/12/22     Chief Complaint(s) and History of Present Illness(es)     Post Op (Ophthalmology) Right Eye     Comments: RIGHT EYE PHACOEMULSIFICATION, CATARACT, WITH TORIC  INTRAOCULAR LENS IMPLANT INSERTION  9/12/22              Comments     Day 1 PO   Pt doing well    Fay Cunningham COT 1:53 PM September 13, 2022

## 2022-09-25 NOTE — PATIENT INSTRUCTIONS
Pleaase call patient and advise recheck this week if symptoms persist and home BP monitor on Amlodipine   [facility] Medication Refill Request Information:  * Please contact your pharmacy regarding ANY request for medication refills.  ** [facility] Prescription Fax = [phone]  * Please allow 3 business days for routine medication refills.  * Please allow 5 business days for controlled substance medication refills.     [facility] Test Result notification information:  *You will be notified with in 7-10 days of your appointment day regarding the results of your test.  If you are on MyChart you will be notified as soon as the provider has reviewed the results and signed off on them.  MRI Screening Tool    Does the patient have any metals in their body? no  Is the patient claustrophobic? no  Does the patient need sedation? no  Is the patient able to transport themself to a table? yes

## 2022-09-26 ENCOUNTER — TELEPHONE (OUTPATIENT)
Dept: OPHTHALMOLOGY | Facility: CLINIC | Age: 72
End: 2022-09-26

## 2022-09-26 NOTE — TELEPHONE ENCOUNTER
Called and spoke to Jennifer Cunningham     No laser yet.  Will need to wait 2-3 months before considering    Arely Hedrick Communication Facilitator on 9/26/2022 at 2:51 PM

## 2022-09-26 NOTE — TELEPHONE ENCOUNTER
[facility] Call Center    Phone Message    May a detailed message be left on voicemail: yes     Reason for Call: Other: Pt has a post-op Appt scheduled with Dr. Cunningham tomorrow. He had mentioned that some laser procedure would need to be done and she is wondering if this will be done tomorrow. She takes public transportation and needs to plan for this. Please call pt to discuss. Thank you.      Action Taken: Message routed to:  [facility] EYE    Travel Screening: Not Applicable

## 2022-09-27 ENCOUNTER — OFFICE VISIT (OUTPATIENT)
Dept: OPHTHALMOLOGY | Facility: CLINIC | Age: 72
End: 2022-09-27
Attending: OPHTHALMOLOGY
Payer: MEDICARE

## 2022-09-27 DIAGNOSIS — H26.492 POSTERIOR CAPSULAR OPACIFICATION NON VISUALLY SIGNIFICANT OF LEFT EYE: ICD-10-CM

## 2022-09-27 DIAGNOSIS — Z96.1 PSEUDOPHAKIA OF BOTH EYES: Primary | ICD-10-CM

## 2022-09-27 PROCEDURE — 92015 DETERMINE REFRACTIVE STATE: CPT | Mod: GY

## 2022-09-27 PROCEDURE — G0463 HOSPITAL OUTPT CLINIC VISIT: HCPCS | Mod: 25

## 2022-09-27 PROCEDURE — 99024 POSTOP FOLLOW-UP VISIT: CPT | Mod: GC | Performed by: OPHTHALMOLOGY

## 2022-09-27 ASSESSMENT — CUP TO DISC RATIO
OD_RATIO: 0.3
OS_RATIO: 0.3

## 2022-09-27 ASSESSMENT — VISUAL ACUITY
OD_SC: 20/25
OD_SC+: +3
OS_SC: 20/20
METHOD: SNELLEN - LINEAR
OS_SC+: -2

## 2022-09-27 ASSESSMENT — SLIT LAMP EXAM - LIDS
COMMENTS: NORMAL
COMMENTS: NORMAL

## 2022-09-27 ASSESSMENT — REFRACTION_MANIFEST
OS_SPHERE: -0.50
OD_ADD: +2.50
OD_AXIS: 147
OD_SPHERE: -0.75
OS_AXIS: 090
OS_CYLINDER: +0.75
OS_ADD: +2.50
OD_CYLINDER: +0.75

## 2022-09-27 ASSESSMENT — CONF VISUAL FIELD
OD_NORMAL: 1
OS_NORMAL: 1
METHOD: COUNTING FINGERS

## 2022-09-27 ASSESSMENT — TONOMETRY
OS_IOP_MMHG: 11
OD_IOP_MMHG: 9
IOP_METHOD: ICARE

## 2022-09-27 ASSESSMENT — EXTERNAL EXAM - LEFT EYE: OS_EXAM: NORMAL

## 2022-09-27 NOTE — NURSING NOTE
Chief Complaints and History of Present Illnesses   Patient presents with     Post Op (Ophthalmology) Right Eye     Post op CEIOL (Toric) right eye 09/12/2022.  S/p CEIOL left eye 08/15/2022.       Chief Complaint(s) and History of Present Illness(es)     Post Op (Ophthalmology) Right Eye     Laterality: right eye    Associated symptoms: eye pain.  Negative for flashes and floaters    Treatments tried: no treatments    Pain scale: 0/10    Comments: Post op CEIOL (Toric) right eye 09/12/2022.  S/p CEIOL left eye 08/15/2022.               Comments     Eye meds: pred BID right eye   Stressful using magnification glass to read.  Sometimes pain and pressure left eye > right eye.  Some headaches.  MEDARDO Funk 9/27/2022 3:02 PM

## 2022-09-27 NOTE — PROGRESS NOTES
Chief Complaint(s) and History of Present Illness(es)     Post Op (Ophthalmology) Right Eye     Laterality: right eye    Associated symptoms: eye pain.  Negative for flashes and floaters    Treatments tried: no treatments    Pain scale: 0/10    Comments: Post op CEIOL (Toric) right eye 09/12/2022.  S/p CEIOL left eye 08/15/2022.               Comments     Eye meds: pred BID right eye   Stressful using magnification glass to read.  Sometimes pain and pressure left eye > right eye.  Some headaches.  MEDARDO Funk 9/27/2022 3:02 PM                Review of systems for the eyes was negative other than the pertinent positives/negatives listed in the HPI.      Assessment & Plan      Jennifer Steiner is a 72 year old female with the following diagnoses:   1. Pseudophakia of both eyes    2. Posterior capsular opacification non visually significant of left eye         s/p CE IOL, left eye 8/15/22  s/p CE IOL, right eye 9/12/22  Continue postop drops right eye   Restart preoperative dry eye routine  Monitor PCO RTC to re-evaluate in 3 months YAG if visually significant  Provided single vision distance and near  Mrx today    Patient disposition:   Return in about 3 months (around 12/27/2022) for DFE.      Uyen Perales MD  Ophthalmology, PGY4      Attending Physician Attestation:  Complete documentation of historical and exam elements from today's encounter can be found in the full encounter summary report (not reduplicated in this progress note).  I personally obtained the chief complaint(s) and history of present illness.  I confirmed and edited as necessary the review of systems, past medical/surgical history, family history, social history, and examination findings as documented by others; and I examined the patient myself.  I personally reviewed the relevant tests, images, and reports as documented above.  I formulated and edited as necessary the assessment and plan and discussed the findings and management  plan with the patient and family. . - Damir Cunningham MD

## 2022-10-04 DIAGNOSIS — H02.889 MEIBOMIAN GLAND DYSFUNCTION: ICD-10-CM

## 2022-10-05 ENCOUNTER — TELEPHONE (OUTPATIENT)
Dept: OPHTHALMOLOGY | Facility: CLINIC | Age: 72
End: 2022-10-05

## 2022-10-05 NOTE — TELEPHONE ENCOUNTER
neomycin-polymyxin-dexamethasone (MAXITROL) 3.5-25653-9.1 SUSP ophthalmic susp  Last Written Prescription Date:   3/11/2022  Last Fill Quantity: 5,   # refills: 1  Last Office Visit :  9/27/2022  Future Office visit:  None     Damir Cunningham MD    Ophthalmology     Assessment & Plan         Jennifer Steiner is a 72 year old female with the following diagnoses:   1. Pseudophakia of both eyes    2. Posterior capsular opacification non visually significant of left eye          s/p CE IOL, left eye 8/15/22  s/p CE IOL, right eye 9/12/22  Continue postop drops right eye   Restart preoperative dry eye routine  Monitor PCO RTC to re-evaluate in 3 months YAG if visually significant  Provided single vision distance and near  Mrx today     Patient disposition:   Return in about 3 months (around 12/27/2022) for DFE.        Uyen Perales MD  Ophthalmology, PGY4         Routing refill request to provider for review/approval because:  Nothing mentioned to continue this med  Refer to clinic for review and refills per Providers orders.       Samaria Levine RN  Central Triage Red Flags/Med Refills

## 2022-10-05 NOTE — TELEPHONE ENCOUNTER
Returned Jennifer's call to let her know she will need to call the call center to schedule a return general eye appointment with Dr. Cunningham. Per the notes from last clinic visit on 09/27 it says to Return in about 3 months (around 12/27/2022) for DFE. YAG laser will be reviewed at that time as this is an in clinic procedure and scheduled by surgery schedulers. Left callback number [phone].

## 2022-10-06 RX ORDER — NEOMYCIN SULFATE, POLYMYXIN B SULFATE AND DEXAMETHASONE 3.5; 10000; 1 MG/ML; [USP'U]/ML; MG/ML
1 SUSPENSION/ DROPS OPHTHALMIC 2 TIMES DAILY PRN
Qty: 5 ML | Refills: 1 | Status: SHIPPED | OUTPATIENT
Start: 2022-10-06 | End: 2023-05-03

## 2022-10-12 ENCOUNTER — OFFICE VISIT (OUTPATIENT)
Dept: OPHTHALMOLOGY | Facility: CLINIC | Age: 72
End: 2022-10-12
Attending: OPHTHALMOLOGY
Payer: MEDICARE

## 2022-10-12 ENCOUNTER — TELEPHONE (OUTPATIENT)
Dept: OPHTHALMOLOGY | Facility: CLINIC | Age: 72
End: 2022-10-12

## 2022-10-12 DIAGNOSIS — H04.123 BILATERAL DRY EYES: ICD-10-CM

## 2022-10-12 DIAGNOSIS — H01.02A SQUAMOUS BLEPHARITIS OF UPPER AND LOWER EYELIDS OF BOTH EYES: ICD-10-CM

## 2022-10-12 DIAGNOSIS — Z77.098 CHEMICAL EXPOSURE OF EYE: Primary | ICD-10-CM

## 2022-10-12 DIAGNOSIS — H01.02B SQUAMOUS BLEPHARITIS OF UPPER AND LOWER EYELIDS OF BOTH EYES: ICD-10-CM

## 2022-10-12 PROCEDURE — 99213 OFFICE O/P EST LOW 20 MIN: CPT | Mod: 24 | Performed by: OPHTHALMOLOGY

## 2022-10-12 PROCEDURE — G0463 HOSPITAL OUTPT CLINIC VISIT: HCPCS

## 2022-10-12 RX ORDER — NEOMYCIN SULFATE, POLYMYXIN B SULFATE AND DEXAMETHASONE 3.5; 10000; 1 MG/ML; [USP'U]/ML; MG/ML
1-2 SUSPENSION/ DROPS OPHTHALMIC AT BEDTIME
Qty: 5 ML | Refills: 3 | Status: SHIPPED | OUTPATIENT
Start: 2022-10-12 | End: 2023-05-03

## 2022-10-12 ASSESSMENT — TONOMETRY
OD_IOP_MMHG: 9
IOP_METHOD: ICARE
OS_IOP_MMHG: 9

## 2022-10-12 ASSESSMENT — CONF VISUAL FIELD
OD_SUPERIOR_NASAL_RESTRICTION: 0
OS_INFERIOR_NASAL_RESTRICTION: 0
METHOD: COUNTING FINGERS
OS_SUPERIOR_TEMPORAL_RESTRICTION: 0
OD_INFERIOR_TEMPORAL_RESTRICTION: 0
OS_NORMAL: 1
OD_NORMAL: 1
OS_SUPERIOR_NASAL_RESTRICTION: 0
OD_INFERIOR_NASAL_RESTRICTION: 0
OS_INFERIOR_TEMPORAL_RESTRICTION: 0
OD_SUPERIOR_TEMPORAL_RESTRICTION: 0

## 2022-10-12 ASSESSMENT — VISUAL ACUITY
OD_SC: 20/20
OS_SC+: +2
METHOD: SNELLEN - LINEAR
OS_PH_SC: 20/20
OS_SC: 20/30

## 2022-10-12 ASSESSMENT — SLIT LAMP EXAM - LIDS
COMMENTS: NORMAL
COMMENTS: NORMAL

## 2022-10-12 ASSESSMENT — EXTERNAL EXAM - LEFT EYE: OS_EXAM: NORMAL

## 2022-10-12 NOTE — TELEPHONE ENCOUNTER
Spoke to pt 1230    Rubbing alcohol splashed in Left eye last night    Pt states rinsed eye and keep eye shut/patched overnight.    Today some irritation and some increase in redness    No noticed vision changes/pt unable to tell.    Pt has not been using lubricating eye drop/preservative free tears on person.    Pt did use a drop of prednisolone today    Reviewed does not need steroid eye drop at this time and highly recommended using the preservative free tears at least every hour.    Reviewed rubbing alcohol more of an eye irriatant vs strong base or acid    Pt has trouble with transportation and reviewed may increase use of PF tears and monitor for improvement vs arranging transportation for today    Pt states will arrange transportation    Post-op period with Dr. Cunningham and scheduled with Dr. Octavio Benson RN 12:55 PM 10/12/22            [facility] Call Center    Phone Message    May a detailed message be left on voicemail: yes     Reason for Call: Other: Pt splashed rubbing alcohol in the left eye and states the eye seems yellow. Pt is requesting a call back from someone on 's team for some adivse on what to do.    Please reach out to pt for some advise on how to handle the situation.         Action Taken: Message routed to:  [facility] ([facility] thank you!    Travel Screening: Not Applicable

## 2022-10-12 NOTE — PROGRESS NOTES
"Chief Complaint(s) and History of Present Illness(es)     Foreign Body           Comments    Pt got essential oil with rubbing alcohol and spearamint in my left eye.  \"The bottle fell and splashed into my left eye last night around 10pm.\"  Pts eye feels better keeping it shut. Pt put a white cap bottle drop in   left eye this morning.   Pt has not been using lubricating eyedrops recently.  LE Pain is only a 1, more irritated.   No flashes or floaters.   No DM.    LUPIS RESENDEZ COA October 12, 2022 1:57 PM                  Review of systems for the eyes was negative other than the pertinent positives/negatives listed in the HPI.      Assessment & Plan      Jennifer Steiner is a 72 year old female with the following diagnoses:   1. Chemical exposure of eye - Left Eye    2. Bilateral dry eyes    3. Squamous blepharitis of upper and lower eyelids of both eyes         Splash of rubbing alcohol to the left eye last night.  Flushed  Starting using artificial tears today after calling triage  Used Predforte left eye x 1   Feeling better, but still irritated.  Vision doing ok    Surface looks good today  Reassurance given  Continue preservative free artificial tears as needed   Ok to resume maxitrol gtts at bedtime for chronic blepharitis  Return precautions reviewed       Patient disposition:   As scheduled in Dec, sooner as needed          Attending Physician Attestation:  Complete documentation of historical and exam elements from today's encounter can be found in the full encounter summary report (not reduplicated in this progress note).  I personally obtained the chief complaint(s) and history of present illness.  I confirmed and edited as necessary the review of systems, past medical/surgical history, family history, social history, and examination findings as documented by others; and I examined the patient myself.  I personally reviewed the relevant tests, images, and reports as documented above.  I formulated and " edited as necessary the assessment and plan and discussed the findings and management plan with the patient and family. . - Damir Cunningham MD

## 2022-10-12 NOTE — NURSING NOTE
"Chief Complaints and History of Present Illnesses   Patient presents with     Foreign Body     Chief Complaint(s) and History of Present Illness(es)     Foreign Body           Comments    Pt got essential oil with rubbing alcohol and spearamint in my left eye.  \"The bottle fell and splashed into my left eye last night around 10pm.\"  Pts eye feels better keeping it shut. Pt put a white cap bottle drop in left eye this morning.   LE Pain is only a 1, more irritated.   No flashes or floaters.   No DM.    LUPIS RESEDNEZ COA October 12, 2022 1:57 PM                     "

## 2022-10-20 ENCOUNTER — ANCILLARY PROCEDURE (OUTPATIENT)
Dept: GENERAL RADIOLOGY | Facility: CLINIC | Age: 72
End: 2022-10-20
Attending: FAMILY MEDICINE
Payer: MEDICARE

## 2022-10-20 ENCOUNTER — OFFICE VISIT (OUTPATIENT)
Dept: FAMILY MEDICINE | Facility: CLINIC | Age: 72
End: 2022-10-20
Payer: MEDICARE

## 2022-10-20 ENCOUNTER — LAB (OUTPATIENT)
Dept: LAB | Facility: CLINIC | Age: 72
End: 2022-10-20
Payer: MEDICARE

## 2022-10-20 VITALS
BODY MASS INDEX: 23.28 KG/M2 | SYSTOLIC BLOOD PRESSURE: 93 MMHG | TEMPERATURE: 98.5 F | HEART RATE: 86 BPM | WEIGHT: 131.4 LBS | HEIGHT: 63 IN | DIASTOLIC BLOOD PRESSURE: 50 MMHG | OXYGEN SATURATION: 96 %

## 2022-10-20 DIAGNOSIS — M53.3 SACROILIAC JOINT DYSFUNCTION: ICD-10-CM

## 2022-10-20 DIAGNOSIS — M54.50 CHRONIC LEFT-SIDED LOW BACK PAIN WITHOUT SCIATICA: ICD-10-CM

## 2022-10-20 DIAGNOSIS — L60.8 TOENAIL DEFORMITY: ICD-10-CM

## 2022-10-20 DIAGNOSIS — G89.29 CHRONIC LEFT-SIDED LOW BACK PAIN WITHOUT SCIATICA: ICD-10-CM

## 2022-10-20 DIAGNOSIS — R07.9 CHEST PAIN, UNSPECIFIED TYPE: ICD-10-CM

## 2022-10-20 DIAGNOSIS — R00.2 PALPITATIONS: ICD-10-CM

## 2022-10-20 DIAGNOSIS — R09.81 NASAL CONGESTION: ICD-10-CM

## 2022-10-20 DIAGNOSIS — M25.552 HIP PAIN, LEFT: Primary | ICD-10-CM

## 2022-10-20 DIAGNOSIS — M25.552 HIP PAIN, LEFT: ICD-10-CM

## 2022-10-20 DIAGNOSIS — E78.00 HIGH CHOLESTEROL: ICD-10-CM

## 2022-10-20 LAB
ALBUMIN SERPL BCG-MCNC: 4.2 G/DL (ref 3.5–5.2)
ALP SERPL-CCNC: 77 U/L (ref 35–104)
ALT SERPL W P-5'-P-CCNC: 9 U/L (ref 10–35)
ANION GAP SERPL CALCULATED.3IONS-SCNC: 8 MMOL/L (ref 7–15)
AST SERPL W P-5'-P-CCNC: 15 U/L (ref 10–35)
ATRIAL RATE - MUSE: 58 BPM
BASOPHILS # BLD AUTO: 0.1 10E3/UL (ref 0–0.2)
BASOPHILS NFR BLD AUTO: 1 %
BILIRUB SERPL-MCNC: 0.3 MG/DL
BUN SERPL-MCNC: 9.3 MG/DL (ref 8–23)
CALCIUM SERPL-MCNC: 9.7 MG/DL (ref 8.8–10.2)
CHLORIDE SERPL-SCNC: 107 MMOL/L (ref 98–107)
CREAT SERPL-MCNC: 0.65 MG/DL (ref 0.51–0.95)
DEPRECATED HCO3 PLAS-SCNC: 25 MMOL/L (ref 22–29)
DIASTOLIC BLOOD PRESSURE - MUSE: NORMAL MMHG
EOSINOPHIL # BLD AUTO: 0.2 10E3/UL (ref 0–0.7)
EOSINOPHIL NFR BLD AUTO: 3 %
ERYTHROCYTE [DISTWIDTH] IN BLOOD BY AUTOMATED COUNT: 14.1 % (ref 10–15)
GFR SERPL CREATININE-BSD FRML MDRD: >90 ML/MIN/1.73M2
GLUCOSE SERPL-MCNC: 97 MG/DL (ref 70–99)
HCT VFR BLD AUTO: 38.5 % (ref 35–47)
HGB BLD-MCNC: 12.4 G/DL (ref 11.7–15.7)
IMM GRANULOCYTES # BLD: 0 10E3/UL
IMM GRANULOCYTES NFR BLD: 0 %
INTERPRETATION ECG - MUSE: NORMAL
LYMPHOCYTES # BLD AUTO: 1.9 10E3/UL (ref 0.8–5.3)
LYMPHOCYTES NFR BLD AUTO: 24 %
MCH RBC QN AUTO: 30.7 PG (ref 26.5–33)
MCHC RBC AUTO-ENTMCNC: 32.2 G/DL (ref 31.5–36.5)
MCV RBC AUTO: 95 FL (ref 78–100)
MONOCYTES # BLD AUTO: 0.5 10E3/UL (ref 0–1.3)
MONOCYTES NFR BLD AUTO: 7 %
NEUTROPHILS # BLD AUTO: 5 10E3/UL (ref 1.6–8.3)
NEUTROPHILS NFR BLD AUTO: 65 %
NRBC # BLD AUTO: 0 10E3/UL
NRBC BLD AUTO-RTO: 0 /100
P AXIS - MUSE: 47 DEGREES
PLATELET # BLD AUTO: 256 10E3/UL (ref 150–450)
POTASSIUM SERPL-SCNC: 4.6 MMOL/L (ref 3.4–5.3)
PR INTERVAL - MUSE: 160 MS
PROT SERPL-MCNC: 7 G/DL (ref 6.4–8.3)
QRS DURATION - MUSE: 74 MS
QT - MUSE: 432 MS
QTC - MUSE: 424 MS
R AXIS - MUSE: 41 DEGREES
RBC # BLD AUTO: 4.04 10E6/UL (ref 3.8–5.2)
SODIUM SERPL-SCNC: 140 MMOL/L (ref 136–145)
SYSTOLIC BLOOD PRESSURE - MUSE: NORMAL MMHG
T AXIS - MUSE: 43 DEGREES
T4 FREE SERPL-MCNC: 0.97 NG/DL (ref 0.9–1.7)
TSH SERPL DL<=0.005 MIU/L-ACNC: 0.1 UIU/ML (ref 0.3–4.2)
VENTRICULAR RATE- MUSE: 58 BPM
WBC # BLD AUTO: 7.7 10E3/UL (ref 4–11)

## 2022-10-20 PROCEDURE — 84439 ASSAY OF FREE THYROXINE: CPT | Performed by: FAMILY MEDICINE

## 2022-10-20 PROCEDURE — 93000 ELECTROCARDIOGRAM COMPLETE: CPT | Performed by: FAMILY MEDICINE

## 2022-10-20 PROCEDURE — 72202 X-RAY EXAM SI JOINTS 3/> VWS: CPT | Performed by: RADIOLOGY

## 2022-10-20 PROCEDURE — 99215 OFFICE O/P EST HI 40 MIN: CPT | Performed by: FAMILY MEDICINE

## 2022-10-20 PROCEDURE — 36415 COLL VENOUS BLD VENIPUNCTURE: CPT | Performed by: PATHOLOGY

## 2022-10-20 PROCEDURE — 84443 ASSAY THYROID STIM HORMONE: CPT | Performed by: FAMILY MEDICINE

## 2022-10-20 PROCEDURE — 80050 GENERAL HEALTH PANEL: CPT | Performed by: PATHOLOGY

## 2022-10-20 PROCEDURE — 72100 X-RAY EXAM L-S SPINE 2/3 VWS: CPT | Performed by: STUDENT IN AN ORGANIZED HEALTH CARE EDUCATION/TRAINING PROGRAM

## 2022-10-20 PROCEDURE — 73502 X-RAY EXAM HIP UNI 2-3 VIEWS: CPT | Mod: LT | Performed by: RADIOLOGY

## 2022-10-20 NOTE — NURSING NOTE
"Jennifer Steiner is a 72 year old female patient that presents today in clinic for the following:    Chief Complaint   Patient presents with     RECHECK     Discuss ENT, Podiatrist referral.  Discuss follow up.  Hurting back after fall.  Discuss physical therapy.  Discuss recent heart doctor visit.  ENT issues.  Podiatrist referral.     The patient's allergies and medications were reviewed as noted. A set of vitals were recorded as noted without incident: BP 93/50 (BP Location: Right arm, Patient Position: Sitting, Cuff Size: Adult Regular)   Pulse 86   Temp 98.5  F (36.9  C) (Oral)   Ht 1.6 m (5' 3\")   Wt 59.6 kg (131 lb 6.4 oz)   LMP  (LMP Unknown)   SpO2 96%   BMI 23.28 kg/m  . The patient does not have any other questions for the provider.    Barrington Villagran, EMT at 2:02 PM on 10/20/2022.  Primary care clinic: [phone]  "

## 2022-10-20 NOTE — PATIENT INSTRUCTIONS
To schedule your Ziopatch and stress echocardiogram, please call [phone].     Please schedule an appointment with Dr Alvarez after 2 weeks of wearing the ziopatch by calling the following number: [phone]

## 2022-10-20 NOTE — PROGRESS NOTES
Assessment & Plan     Hip pain, left    - XR Hip Left 2-3 Views; Future  - Physical Therapy Referral; Future    Chronic left-sided low back pain without sciatica    - XR Lumbar Spine 2-3 Views; Future  - Physical Therapy Referral; Future    Sacroiliac joint dysfunction    - XR Sacroiliac Joint G/E 3 Views; Future  - Physical Therapy Referral; Future    Palpitations    - Adult Leadless EKG Monitor 8 to 14 Days; Future  - CBC with platelets differential; Future  - Comprehensive metabolic panel; Future  - TSH with free T4 reflex; Future    Chest pain, unspecified type    - EKG Performed in Clinic w/ Provider Reading Fee  - Echocardiogram Exercise Stress; Future  - XR Chest 2 Views; Future    Toenail deformity  Discussed fungus, she declines lamisil pills, wants pods input  - Orthopedic  Referral; Future    Nasal congestion  Pt request  - Adult ENT  Referral; Future    High cholesterol  Due  - Lipid panel reflex to direct LDL Fasting; Future    Review of external notes as documented elsewhere in noteRvwd interval integrative health notes/labs  65 minutes spent on the date of the encounter doing chart review, history and exam, documentation and further activities per the note separate from EKG read time    Milan Alvarez MD  [facility] [place]    Maral Rodriguez is a 72 year old, presenting for the following health issues:  RECHECK (Discuss ENT, Podiatrist referral./Discuss follow up./Hurting back after fall./Discuss physical therapy./Discuss recent heart doctor visit./ENT issues./Podiatrist referral.)      HPI Here for several issues  One, last May moving boxes (mom passed away at nearly 100 years old, was sorting her stuff), felt a pop low back and pain wax/wane since. Pain is in very low back, points to low lumbar/L SI, also some L hip pain. No falls. If does PT she'd like to go to [facility]. Not numb tingly weak.   Two, declines shots, mammogram,  discussed  Three, she had to move since last seen, still not settled partly from the pain  Four, h/o palpitations, see [facility] cardio note, did not do zio, still palpitations that come and go unpredictably, also notes occasionally a L anterior chest pain that might radiate into L arm, not clear as has some pain pre existing in the arm, not clear also if linked to nausea as has chronic GI symptoms including nausea that make it hard to know if associated w/ chest pain. No clear dyspnea LH or sweats. Lasts up to an hour, can occur at rest, no clear triggers or alleviating factor. H/o hi chol, is in her seventies.  Five, chronic nasal congestion, no luck w/ Neti pot, she'd like ENT eval  Six, toenails getting thicker and harder to cut  Past Medical History:   Diagnosis Date     Anemia      Asthma     since childhood     Blepharitis      Constipation, chronic     Chronic enema use     CVA (cerebral infarction) 2009    carotid intimal repair, left     Hyperlipidemia age 58    identified age 58     Hypothyroidism      Irritable bowel disease      Mixed type age-related cataract, both eyes      Neck injuries      Pelvic floor dysfunction 2010     Postmenopausal hormone replacement therapy     From Dr. Link     Scoliosis      Past Surgical History:   Procedure Laterality Date     CYSTOSCOPY N/A 3/25/2019    Procedure: CYSTOSCOPY;  Surgeon: Radha Ovalle MD;  Location: [facility] OR     DILATION AND CURETTAGE, OPERATIVE HYSTEROSCOPY WITH MORCELLATOR, COMBINED N/A 3/25/2019    Procedure: OPERATIVE HYSTEROSCOPY WITH MORCELLATOR;  Surgeon: Marybeth Huber MD;  Location: [facility] OR     HC BREATH HYDROGEN TEST  11/11/2013    Procedure: HYDROGEN BREATH TEST;  Surgeon: Esteban Short MD;  Location: [facility]     PHACOEMULSIFICATION WITH STANDARD INTRAOCULAR LENS IMPLANT Left 8/15/2022    Procedure: LEFT EYE PHACOEMULSIFICATION, CATARACT, WITH STANDARD INTRAOCULAR LENS IMPLANT INSERTION;  Surgeon: Damir Cunningham MD;  Location:  [facility] OR     PHACOEMULSIFICATION WITH STANDARD INTRAOCULAR LENS IMPLANT Right 9/12/2022    Procedure: RIGHT EYE PHACOEMULSIFICATION, CATARACT, WITH TORIC  INTRAOCULAR LENS IMPLANT INSERTION;  Surgeon: Damir Cunningham MD;  [place] [facility] [place]     SURGICAL HISTORY OF -       tonsillectomy     SURGICAL HISTORY OF -   ~1997    cosmetic skin removal: breast reduction; skin removal from arms legs and tummy tuck     [facility] STOMACH SURGERY PROCEDURE UNLISTED       Current Outpatient Medications   Medication     Ascorbic Acid (VITAMIN C) 100 MG CHEW     Charcoal Activated (CHARCOAL PO)     Cholecalciferol (VITAMIN D) 2000 UNIT tablet     Digestive Enzymes TABS     DiphenhydrAMINE HCl (BENADRYL PO)     EPINEPHrine (EPIPEN) 0.3 MG/0.3ML injection 2-pack     estradiol (ESTRACE) 0.1 MG/GM vaginal cream     HYDROCHLORIC ACID     Hypromellose (ARTIFICIAL TEARS OP)     ketoconazole (NIZORAL) 2 % external cream     MAGNESIUM OXIDE PO     neomycin-polymyxin-dexamethasone (MAXITROL) 3.5-78203-5.1 SUSP ophthalmic susp     neomycin-polymyxin-dexamethasone (MAXITROL) 3.5-02445-9.1 SUSP ophthalmic susp     Nettle (Urtica Dioica) (NETTLE LEAF) 435 MG CAPS     peppermint oil liquid     Probiotic Product (ALOE 75467 & PROBIOTICS) CAPS     progesterone (PROMETRIUM) 200 MG capsule     progesterone (PROMETRIUM) 200 MG capsule     Quercetin 50 MG TABS     Testosterone Propionate (FIRST-TESTOSTERONE MC) 2 % CREA     thyroid (ARMOUR) 60 MG tablet     Turmeric 500 MG CAPS     zinc gluconate 50 MG tablet     Omega-3 Fatty Acids (FISH OIL PO)     No current facility-administered medications for this visit.     Allergies   Allergen Reactions     Aspirin GI Disturbance     GI upset     Bee Venom Swelling     Birds [Feathers]      Cats      Dust Mites      Fluoride Other (See Comments) and Unknown     headaches     Liothyronine Unknown     Severe gut reactions, ears itching, throat closing     Mold      No Clinical Screening - See Comments Other (See  "Comments)     Some antibiotics but not Zpak-Can tolerate Azithromycin  z-pack, antibiotics in general- per patient.      Procaine Other (See Comments)     headache     Seasonal Allergies      Ragweed, pollen     Simvastatin Other (See Comments)     \"hot flash\", muscle weakness, dizziness     Penicillins Unknown and Rash     As a child     Sulfa Drugs Itching, Other (See Comments), Rash and Hives           Review of Systems         Objective    BP 93/50 (BP Location: Right arm, Patient Position: Sitting, Cuff Size: Adult Regular)   Pulse 86   Temp 98.5  F (36.9  C) (Oral)   Ht 1.6 m (5' 3\")   Wt 59.6 kg (131 lb 6.4 oz)   LMP  (LMP Unknown)   SpO2 96%   BMI 23.28 kg/m    Body mass index is 23.28 kg/m .  Physical Exam   GENERAL: healthy, alert and no distress  RESP: lungs clear to auscultation - no rales, rhonchi or wheezes  CV: regular rate and rhythm, normal S1 S2, no S3 or S4, no murmur, click or rub, no peripheral edema and peripheral pulses strong  MS: no gross musculoskeletal defects noted, no edema, does have thick flaky toenails    EKG: normal except borderine sinus conrad            "

## 2022-10-21 NOTE — TELEPHONE ENCOUNTER
FUTURE VISIT INFORMATION      FUTURE VISIT INFORMATION:    Date: 1/18/23    Time: 2pm    Location: [facility]  REFERRAL INFORMATION:    Referring provider:  Dr Alvarez    Referring providers clinic: [facility]    Reason for visit/diagnosis  Ref by: Dr Alvarez , Dx: Nasal congestion // Sched per pt // [facility] location verified    RECORDS REQUESTED FROM:       Clinic name Comments Records Status Imaging Status   [facility]  10/20/22- note from Dr Alvarez Epic     imaging 1/8/19- mr brain   12/27/18- Xr chest Epic  pacs

## 2022-10-28 ENCOUNTER — ANCILLARY PROCEDURE (OUTPATIENT)
Dept: GENERAL RADIOLOGY | Facility: CLINIC | Age: 72
End: 2022-10-28
Attending: FAMILY MEDICINE
Payer: MEDICARE

## 2022-10-28 DIAGNOSIS — R07.9 CHEST PAIN, UNSPECIFIED TYPE: ICD-10-CM

## 2022-10-28 PROCEDURE — 71046 X-RAY EXAM CHEST 2 VIEWS: CPT | Mod: GC | Performed by: RADIOLOGY

## 2022-11-01 ENCOUNTER — HOSPITAL ENCOUNTER (OUTPATIENT)
Dept: CARDIOLOGY | Facility: CLINIC | Age: 72
Discharge: HOME | End: 2022-11-01
Attending: FAMILY MEDICINE
Payer: MEDICARE

## 2022-11-01 DIAGNOSIS — R07.9 CHEST PAIN, UNSPECIFIED TYPE: ICD-10-CM

## 2022-11-01 DIAGNOSIS — R00.2 PALPITATIONS: ICD-10-CM

## 2022-11-01 PROCEDURE — 93325 DOPPLER ECHO COLOR FLOW MAPG: CPT | Mod: 26 | Performed by: INTERNAL MEDICINE

## 2022-11-01 PROCEDURE — 93246 EXT ECG>7D<15D RECORDING: CPT

## 2022-11-01 PROCEDURE — 93016 CV STRESS TEST SUPVJ ONLY: CPT | Mod: GC | Performed by: INTERNAL MEDICINE

## 2022-11-01 PROCEDURE — 93321 DOPPLER ECHO F-UP/LMTD STD: CPT | Mod: TC

## 2022-11-01 PROCEDURE — 93248 EXT ECG>7D<15D REV&INTERPJ: CPT | Performed by: INTERNAL MEDICINE

## 2022-11-01 PROCEDURE — 255N000002 HC RX 255 OP 636: Performed by: INTERNAL MEDICINE

## 2022-11-01 PROCEDURE — 93350 STRESS TTE ONLY: CPT | Mod: 26 | Performed by: INTERNAL MEDICINE

## 2022-11-01 PROCEDURE — 93018 CV STRESS TEST I&R ONLY: CPT | Mod: GC | Performed by: INTERNAL MEDICINE

## 2022-11-01 PROCEDURE — 93321 DOPPLER ECHO F-UP/LMTD STD: CPT | Mod: 26 | Performed by: INTERNAL MEDICINE

## 2022-11-01 RX ADMIN — PERFLUTREN 5 ML: 6.52 INJECTION, SUSPENSION INTRAVENOUS at 13:54

## 2022-11-04 NOTE — TELEPHONE ENCOUNTER
DIAGNOSIS: Toenail Deformity    APPOINTMENT DATE: 12/05/2022   NOTES STATUS DETAILS   OFFICE NOTE from referring provider Internal 10/20/2022 - Milan Alvarez MD - [facility]    MEDICATION LIST Internal    LABS     CBC/DIFF Internal 10/20/2022

## 2022-11-30 ENCOUNTER — TELEPHONE (OUTPATIENT)
Dept: OPHTHALMOLOGY | Facility: CLINIC | Age: 72
End: 2022-11-30

## 2022-11-30 NOTE — TELEPHONE ENCOUNTER
Spoke to pt at 0900    Worsening right eye symptoms more recently    Increase yellowish discharge and eye pressure pain lid    No eye pain  No noted vision changes    Not able to coordinate appt with Dr. Octavio kern next Tuesday in acute clinic    Scheduled at 1330 with Dr. Del Rosario/Dr. Cunningham next Tuesday    Recommended increase use of preservative free tears, warm compresses 2/day for 10 minutes and may use lubricating eye ointment at night    Pt aware of date/time/location at [facility] and aware to reach out for any worsening symptoms/vision changes    Thomas Benson RN 9:15 AM 12/01/22          [phone]    Left message with direct number at [phone]    Thomas Benson RN 5:14 PM 11/30/22            [facility]    Phone Message    May a detailed message be left on voicemail: yes     Reason for Call: Other:   Pt has concerns about her right eye. Pt states that there is pressure under the eye lid and pt is unsure if it's seasonal changes but would like to get it checked out. Pt has an appt on 12/20 already but inquiring if she can be seen sooner.     Action Taken: Other:  eye    Travel Screening: Not Applicable

## 2022-12-01 ENCOUNTER — DOCUMENTATION ONLY (OUTPATIENT)
Dept: FAMILY MEDICINE | Facility: CLINIC | Age: 72
End: 2022-12-01

## 2022-12-01 NOTE — PROGRESS NOTES
Type of Form Received: [facility] PT note    Form Received (Date) 12.1.22   Form Filled out No   Placed in provider folder Yes

## 2022-12-05 ENCOUNTER — OFFICE VISIT (OUTPATIENT)
Dept: DERMATOLOGY | Facility: CLINIC | Age: 72
End: 2022-12-05
Payer: MEDICARE

## 2022-12-05 ENCOUNTER — OFFICE VISIT (OUTPATIENT)
Dept: ORTHOPEDICS | Facility: CLINIC | Age: 72
End: 2022-12-05
Payer: MEDICARE

## 2022-12-05 ENCOUNTER — PRE VISIT (OUTPATIENT)
Dept: ORTHOPEDICS | Facility: CLINIC | Age: 72
End: 2022-12-05

## 2022-12-05 DIAGNOSIS — L29.9 SCALP PRURITUS: Primary | ICD-10-CM

## 2022-12-05 DIAGNOSIS — L60.8 TOENAIL DEFORMITY: ICD-10-CM

## 2022-12-05 DIAGNOSIS — Z12.83 SKIN CANCER SCREENING: ICD-10-CM

## 2022-12-05 DIAGNOSIS — L82.1 SEBORRHEIC KERATOSES: ICD-10-CM

## 2022-12-05 DIAGNOSIS — D22.9 MULTIPLE PIGMENTED NEVI: ICD-10-CM

## 2022-12-05 PROCEDURE — 99203 OFFICE O/P NEW LOW 30 MIN: CPT | Performed by: PODIATRIST

## 2022-12-05 PROCEDURE — 99214 OFFICE O/P EST MOD 30 MIN: CPT | Performed by: PHYSICIAN ASSISTANT

## 2022-12-05 RX ORDER — FLUOCINOLONE ACETONIDE 0.1 MG/ML
SOLUTION TOPICAL 2 TIMES DAILY
Qty: 60 ML | Refills: 3 | Status: SHIPPED | OUTPATIENT
Start: 2022-12-05 | End: 2023-07-28

## 2022-12-05 ASSESSMENT — PAIN SCALES - GENERAL: PAINLEVEL: NO PAIN (0)

## 2022-12-05 NOTE — PROGRESS NOTES
[facility] Dermatology Note  Encounter Date: Dec 5, 2022  Office Visit     Dermatology Problem List:  1. Seborrheic dermatitis, scalp  2. Tinea pedis  - Current rx: vinegar soaks, ketoconazole 2% cream    3. Scalp pruritus  - s/p punch biopsy 10/11/21 resulting as sparse perivascular and perifollicular lymphohistiocytic infiltrate with rare eosinophils  - Start fluocinolone 0.01% BID  4. Hx of eczema    ____________________________________________    Assessment & Plan:    # Tinea pedis with mild onychomycosis  - Continue vinegar soaks: 50% water, 50% vinegar soaks once daily for 15 minutes  -Discussed using otc tea tree oil        # Scalp pruritus, dermal hypersensitivity reaction vs urticarial dermatitis, not controlled.  - Patient notes how stress affects this condition as well as the fluctuating changes in her life. Currently has an injury to her back that is causing stress.  - Start fluocinolone 0.01% solution BID to scalp  - Okay to rinse the scalp with water and apple cider vinegar.   - Continue using a mild soap without scent.  - Continue antihistamines as needed.  - Consider keeping a diary to record what is and is not working.   - Defers topical steroids.    # Skin cancer screening with multiple benign nevi and seborrheic keratosis  ABCDs of melanoma were discussed and self skin checks were advised.   Sun precaution was advised including the use of sun screens of SPF 30 or higher, sun protective clothing, and avoidance of tanning beds.    Procedures Performed:   None    Follow-up: 1 year(s) in-person, or earlier for new or changing lesions    Staff and Scribe:     Scribe Disclosure:  I, Clemente Phillips, am serving as a scribe to document services personally performed by Dede Ghosh PA-C based on data collection and the provider's statements to me.     Provider Disclosure:   The documentation recorded by the scribe accurately reflects the services I personally performed and the  decisions made by me.    All risks, benefits and alternatives were discussed with patient.  Patient is in agreement and understands the assessment and plan.  All questions were answered.  Sun Screen Education was given.   Return to Clinic annually or sooner as needed.   Dede Ghosh PA-C   [facility]   ____________________________________________    CC: Skin Check (FBSE and follow up on scalp.  A little irritated.  Increase in allergies. )    HPI:  Ms. Jennifer Steiner is a(n) 72 year old female who presents today as a return patient for follow-up. She was less seen by myself on 6/622 and at that time she reported improvement in her feet after ketoconazole and vinegar soaks.     Her scalp continues to flare and be bothersome. It is associated with seasonal allergens and has a sensation of deep itchiness. When it flares there can be hives present along the nape of the neck. She has been rinsing the scalp with vinegar and water. She will often brush the scalp with peroxide, water, occasional avocado/coconut oil, and peppermint oil. Peppermint oil alleviates the pain by providing cooling sensation.     Her allergies and asthma have been increasingly bothersome with the recent cold weather. She reports ongoing GI symptoms of diarrhea daily as well as numerous food related allergies, especially gluten. She has had two GI doctors leave the [facility] and currently does not have a GI doctor.     Her insurance denied coverage of allergy panel testing therefore she did not undergo this.     Patient is otherwise feeling well, without additional skin concerns.    Labs Reviewed:  N/A    Physical Exam:  Vitals: LMP  (LMP Unknown)   SKIN: Full skin, which includes the head/face, both arms, chest, back, abdomen,both legs, genitalia and/or groin buttocks, digits and/or nails, was examined  - one papule on the right upper eyebrow   - very minimal erythema to the scalp without scale  - Multiple  regular brown pigmented macules and papules are identified on the trunk and extremities.   Waxy white, brown and tan, stuck on- appearing papules and plaques to face, trunk and extremities.  - No other lesions of concern on areas examined.     Medications:  Current Outpatient Medications   Medication     Ascorbic Acid (VITAMIN C) 100 MG CHEW     Charcoal Activated (CHARCOAL PO)     Cholecalciferol (VITAMIN D) 2000 UNIT tablet     Digestive Enzymes TABS     DiphenhydrAMINE HCl (BENADRYL PO)     EPINEPHrine (EPIPEN) 0.3 MG/0.3ML injection 2-pack     estradiol (ESTRACE) 0.1 MG/GM vaginal cream     HYDROCHLORIC ACID     Hypromellose (ARTIFICIAL TEARS OP)     ketoconazole (NIZORAL) 2 % external cream     MAGNESIUM OXIDE PO     neomycin-polymyxin-dexamethasone (MAXITROL) 3.5-63518-1.1 SUSP ophthalmic susp     neomycin-polymyxin-dexamethasone (MAXITROL) 3.5-21963-0.1 SUSP ophthalmic susp     Nettle (Urtica Dioica) (NETTLE LEAF) 435 MG CAPS     Omega-3 Fatty Acids (FISH OIL PO)     peppermint oil liquid     Probiotic Product (ALOE 32256 & PROBIOTICS) CAPS     progesterone (PROMETRIUM) 200 MG capsule     progesterone (PROMETRIUM) 200 MG capsule     Quercetin 50 MG TABS     Testosterone Propionate (FIRST-TESTOSTERONE MC) 2 % CREA     thyroid (ARMOUR) 60 MG tablet     Turmeric 500 MG CAPS     zinc gluconate 50 MG tablet     No current facility-administered medications for this visit.      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     Pelvic floor  dysfunction     Myalgia of pelvic floor     Other constipation     Abnormal defecation     Chronic abdominal pain     Backache     Chronic fatigue syndrome     Food intolerance     Generalized pain     Hemorrhoids     Major depressive disorder, recurrent episode (H)     Symptomatic menopausal or female climacteric states     History of hematuria     Mixed type age-related cataract, both eyes     Past Medical History:   Diagnosis Date     Anemia      Asthma     since childhood     Blepharitis      Constipation, chronic     Chronic enema use     CVA (cerebral infarction) 2009    carotid intimal repair, left     Hyperlipidemia age 58    identified age 58     Hypothyroidism      Irritable bowel disease      Mixed type age-related cataract, both eyes      Neck injuries      Pelvic floor dysfunction 2010     Postmenopausal hormone replacement therapy     From Dr. Link     Scoliosis         CC Milan Alvarez MD  [address] [place] [address] on close of this encounter.

## 2022-12-05 NOTE — NURSING NOTE
Dermatology Rooming Note    Jennifer Steiner's goals for this visit include:   Chief Complaint   Patient presents with     Skin Check     FBSE and follow up on scalp.  A little irritated.  Increase in allergies.      Trena Theodore, CMA

## 2022-12-05 NOTE — PROGRESS NOTES
Received Completed forms Yes   Faxed Forms Faxed To: [facility]  Fax Number: [phone]   Sent to HIM (Date) 12.5.22

## 2022-12-05 NOTE — LETTER
12/5/2022       RE: Jennifer Steiner  [address]  [facility] [address]     Dear Colleague,    Thank you for referring your patient, Jennifer Steiner, to the [facility] [place] at [facility]. Please see a copy of my visit note below.    [facility] Dermatology Note  Encounter Date: Dec 5, 2022  Office Visit     Dermatology Problem List:  1. Seborrheic dermatitis, scalp  2. Tinea pedis  - Current rx: vinegar soaks, ketoconazole 2% cream    3. Scalp pruritus  - s/p punch biopsy 10/11/21 resulting as sparse perivascular and perifollicular lymphohistiocytic infiltrate with rare eosinophils  - Start fluocinolone 0.01% BID  4. Hx of eczema    ____________________________________________    Assessment & Plan:    # Tinea pedis with mild onychomycosis  - Continue vinegar soaks: 50% water, 50% vinegar soaks once daily for 15 minutes  -Discussed using otc tea tree oil        # Scalp pruritus, dermal hypersensitivity reaction vs urticarial dermatitis, not controlled.  - Patient notes how stress affects this condition as well as the fluctuating changes in her life. Currently has an injury to her back that is causing stress.  - Start fluocinolone 0.01% solution BID to scalp  - Okay to rinse the scalp with water and apple cider vinegar.   - Continue using a mild soap without scent.  - Continue antihistamines as needed.  - Consider keeping a diary to record what is and is not working.   - Defers topical steroids.    # Skin cancer screening with multiple benign nevi and seborrheic keratosis  ABCDs of melanoma were discussed and self skin checks were advised.   Sun precaution was advised including the use of sun screens of SPF 30 or higher, sun protective clothing, and avoidance of tanning beds.    Procedures Performed:   None    Follow-up: 1 year(s) in-person, or earlier for new or changing lesions    Staff and Scribe:      Scribe Disclosure:  I, Clemente Phillips, am serving as a scribe to document services personally performed by Dede Ghosh PA-C based on data collection and the provider's statements to me.     Provider Disclosure:   The documentation recorded by the scribe accurately reflects the services I personally performed and the decisions made by me.    All risks, benefits and alternatives were discussed with patient.  Patient is in agreement and understands the assessment and plan.  All questions were answered.  Sun Screen Education was given.   Return to Clinic annually or sooner as needed.   Dede Ghosh PA-C   [facility]   ____________________________________________    CC: Skin Check (FBSE and follow up on scalp.  A little irritated.  Increase in allergies. )    HPI:  Ms. Jennifer Steiner is a(n) 72 year old female who presents today as a return patient for follow-up. She was less seen by myself on 6/622 and at that time she reported improvement in her feet after ketoconazole and vinegar soaks.     Her scalp continues to flare and be bothersome. It is associated with seasonal allergens and has a sensation of deep itchiness. When it flares there can be hives present along the nape of the neck. She has been rinsing the scalp with vinegar and water. She will often brush the scalp with peroxide, water, occasional avocado/coconut oil, and peppermint oil. Peppermint oil alleviates the pain by providing cooling sensation.     Her allergies and asthma have been increasingly bothersome with the recent cold weather. She reports ongoing GI symptoms of diarrhea daily as well as numerous food related allergies, especially gluten. She has had two GI doctors leave the [facility] and currently does not have a GI doctor.     Her insurance denied coverage of allergy panel testing therefore she did not undergo this.     Patient is otherwise feeling well, without additional skin  concerns.    Labs Reviewed:  N/A    Physical Exam:  Vitals: LMP  (LMP Unknown)   SKIN: Full skin, which includes the head/face, both arms, chest, back, abdomen,both legs, genitalia and/or groin buttocks, digits and/or nails, was examined  - one papule on the right upper eyebrow   - very minimal erythema to the scalp without scale  - Multiple regular brown pigmented macules and papules are identified on the trunk and extremities.   Waxy white, brown and tan, stuck on- appearing papules and plaques to face, trunk and extremities.  - No other lesions of concern on areas examined.     Medications:  Current Outpatient Medications   Medication     Ascorbic Acid (VITAMIN C) 100 MG CHEW     Charcoal Activated (CHARCOAL PO)     Cholecalciferol (VITAMIN D) 2000 UNIT tablet     Digestive Enzymes TABS     DiphenhydrAMINE HCl (BENADRYL PO)     EPINEPHrine (EPIPEN) 0.3 MG/0.3ML injection 2-pack     estradiol (ESTRACE) 0.1 MG/GM vaginal cream     HYDROCHLORIC ACID     Hypromellose (ARTIFICIAL TEARS OP)     ketoconazole (NIZORAL) 2 % external cream     MAGNESIUM OXIDE PO     neomycin-polymyxin-dexamethasone (MAXITROL) 3.5-60692-5.1 SUSP ophthalmic susp     neomycin-polymyxin-dexamethasone (MAXITROL) 3.5-88158-0.1 SUSP ophthalmic susp     Nettle (Urtica Dioica) (NETTLE LEAF) 435 MG CAPS     Omega-3 Fatty Acids (FISH OIL PO)     peppermint oil liquid     Probiotic Product (ALOE 12950 & PROBIOTICS) CAPS     progesterone (PROMETRIUM) 200 MG capsule     progesterone (PROMETRIUM) 200 MG capsule     Quercetin 50 MG TABS     Testosterone Propionate (FIRST-TESTOSTERONE MC) 2 % CREA     thyroid (ARMOUR) 60 MG tablet     Turmeric 500 MG CAPS     zinc gluconate 50 MG tablet     No current facility-administered medications for this visit.      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     Pelvic floor dysfunction     Myalgia of pelvic floor     Other constipation     Abnormal defecation     Chronic abdominal pain     Backache     Chronic fatigue syndrome     Food intolerance     Generalized pain     Hemorrhoids     Major depressive disorder, recurrent episode (H)     Symptomatic menopausal or female climacteric states     History of hematuria     Mixed type age-related cataract, both eyes     Past Medical History:   Diagnosis Date     Anemia      Asthma     since childhood     Blepharitis      Constipation, chronic     Chronic enema use     CVA (cerebral infarction) 2009    carotid intimal repair, left     Hyperlipidemia age 58    identified age 58     Hypothyroidism      Irritable bowel disease      Mixed type age-related cataract, both eyes      Neck injuries      Pelvic floor dysfunction 2010     Postmenopausal hormone replacement therapy     From Dr. Link     Scoliosis         CC Milan Alvarez MD  [address] [place] [id number] on close of this encounter.

## 2022-12-05 NOTE — LETTER
12/5/2022         RE: Jennifer Steiner  [address]  [facility] [address]        Dear Colleague,    Thank you for referring your patient, Jennifer Steiner, to the [facility] [place]. Please see a copy of my visit note below.    Date of Service: 12/5/2022    Chief Complaint:   Chief Complaint   Patient presents with     Consult     Toenail deformity. Patient relates that she has trouble taking care of her feet and her nails are very thick.         HPI: Jennifer is a 72 year old female who presents today for further evaluation of bilateral thickness.  She relates that the nails been thick for quite a few years.  She does sometimes get tinea pedis that she uses Creon.  She relates that this does make it better.  She does use on this, however the nails remain thickened.  She relates that she cannot see her nails very well to trim them.  She is not diabetic or vasculopath.    Review of Systems: No nausea, vomiting, diarrhea, fever, chills, night sweats, shortness of breath, chest pain.    PMH:   Past Medical History:   Diagnosis Date     Anemia      Asthma     since childhood     Blepharitis      Constipation, chronic     Chronic enema use     CVA (cerebral infarction) 2009    carotid intimal repair, left     Hyperlipidemia age 58    identified age 58     Hypothyroidism      Irritable bowel disease      Mixed type age-related cataract, both eyes      Neck injuries      Pelvic floor dysfunction 2010     Postmenopausal hormone replacement therapy     From Dr. Link     Scoliosis        PSxH:   Past Surgical History:   Procedure Laterality Date     CYSTOSCOPY N/A 3/25/2019    Procedure: CYSTOSCOPY;  Surgeon: Radha Ovalle MD;  [facility]     DILATION AND CURETTAGE, OPERATIVE HYSTEROSCOPY WITH MORCELLATOR, COMBINED N/A 3/25/2019    Procedure: OPERATIVE HYSTEROSCOPY WITH MORCELLATOR;  Surgeon: Marybeth Huber MD;  [facility] OR     HC BREATH HYDROGEN  TEST  11/11/2013    Procedure: HYDROGEN BREATH TEST;  Surgeon: Esteban Short MD;  [facility]: [facility]     PHACOEMULSIFICATION WITH STANDARD INTRAOCULAR LENS IMPLANT Left 8/15/2022    Procedure: LEFT EYE PHACOEMULSIFICATION, CATARACT, WITH STANDARD INTRAOCULAR LENS IMPLANT INSERTION;  Surgeon: Damir Cunningham MD;  Location: [facility] OR     PHACOEMULSIFICATION WITH STANDARD INTRAOCULAR LENS IMPLANT Right 9/12/2022    Procedure: RIGHT EYE PHACOEMULSIFICATION, CATARACT, WITH TORIC  INTRAOCULAR LENS IMPLANT INSERTION;  Surgeon: Damir Cunningham MD;  Location: [facility] OR     SURGICAL HISTORY OF -       tonsillectomy     SURGICAL HISTORY OF -   ~1997    cosmetic skin removal: breast reduction; skin removal from arms legs and tummy tuck     Z STOMACH SURGERY PROCEDURE UNLISTED         Allergies: Aspirin, Bee venom, Birds [feathers], Cats, Dust mites, Fluoride, Liothyronine, Mold, No clinical screening - see comments, Procaine, Seasonal allergies, Simvastatin, Penicillins, and Sulfa drugs    SH:   Social History     Socioeconomic History     Marital status: Single     Spouse name: Not on file     Number of children: Not on file     Years of education: Not on file     Highest education level: Not on file   Occupational History     Occupation: disability     Comment:    Tobacco Use     Smoking status: Never     Smokeless tobacco: Never   Substance and Sexual Activity     Alcohol use: No     Drug use: No     Sexual activity: Not Currently     Birth control/protection: Post-menopausal   Other Topics Concern     Parent/sibling w/ CABG, MI or angioplasty before 65F 55M? Not Asked   Social History Narrative     Not on file     Social Determinants of Health     Financial Resource Strain: Not on file   Food Insecurity: Not on file   Transportation Needs: Not on file   Physical Activity: Not on file   Stress: Not on file   Social Connections: Not on file   Intimate Partner Violence: Not on file   Housing  Stability: Not on file       FH:   Family History   Problem Relation Age of Onset     Melanoma Mother      Skin Cancer Mother      Osteoporosis Mother      Diabetes Father      Gastrointestinal Disease Father 82         after surgery for Bowel obstruction     Obesity Father      Substance Abuse Sister      Cancer Sister      Cancer Sister 56        bone cancer     Anxiety Disorder Sister      Alcoholism Sister      Bone Cancer Sister      Alcohol/Drug Brother      Gastrointestinal Disease Brother         Hepatitis     Heart Disease Brother 62        hx of drug abuse,  age 62     Diabetes Brother      Skin Cancer Brother      Substance Abuse Brother      Allergies Brother      Anesthesia Reaction Brother         severe PRIMITIVO, effected surgery     Asthma Brother      Diabetes Brother      Obesity Brother      Alcoholism Brother      Colon Cancer No family hx of      Crohn's Disease No family hx of      Ulcerative Colitis No family hx of      Colon Polyps No family hx of      Glaucoma No family hx of      Macular Degeneration No family hx of      Deep Vein Thrombosis (DVT) No family hx of        Objective:  Data Unavailable Data Unavailable Data Unavailable Data Unavailable Data Unavailable 0 lbs 0 oz    PT and DP pulses are 2/4 bilaterally. CRT is instant.  Positive pedal hair.   Gross sensation is intact bilaterally.   Equinus is noted bilaterally. No pain with active or passive ROM of the ankle, MTJ, 1st ray, or halluces bilaterally,.   Nails are thickened to varying degrees on bilateral second and fifth digits.  The second digits are likely dystrophic from hammertoes.  The fifth nails appear to be mycotic.  No open lesions are noted.     Assessment: Jennifer is a 72-year-old with both onychomycosis and nail dystrophy.  Unfortunately, because she is nondiabetic or vasculopath, Medicare does not cover her to get her nails trimmed here.  I [facility] that [facility]  I did give her the  information for this.    Plan:  - Pt seen and evaluated.  -Information was given to her for [facility].  I did trim back the second and fifth toenails today.  -We did discuss treatment options for onychomycosis. Topical medications do not do very well to treat nail fungus.  She can continue to use it on her tinea pedis as needed.  I have recommended that she defer terbinafine treatment for now.  She will do this.  She continue to watch nails and go [facility].  -See again As needed.     Sincerely,        Pop Muller, AYESHA

## 2022-12-05 NOTE — PROGRESS NOTES
Date of Service: 12/5/2022    Chief Complaint:   Chief Complaint   Patient presents with     Consult     Toenail deformity. Patient relates that she has trouble taking care of her feet and her nails are very thick.         HPI: Jennifer is a 72 year old female who presents today for further evaluation of bilateral thickness.  She relates that the nails been thick for quite a few years.  She does sometimes get tinea pedis that she uses Creon.  She relates that this does make it better.  She does use on this, however the nails remain thickened.  She relates that she cannot see her nails very well to trim them.  She is not diabetic or vasculopath.    Review of Systems: No nausea, vomiting, diarrhea, fever, chills, night sweats, shortness of breath, chest pain.    PMH:   Past Medical History:   Diagnosis Date     Anemia      Asthma     since childhood     Blepharitis      Constipation, chronic     Chronic enema use     CVA (cerebral infarction) 2009    carotid intimal repair, left     Hyperlipidemia age 58    identified age 58     Hypothyroidism      Irritable bowel disease      Mixed type age-related cataract, both eyes      Neck injuries      Pelvic floor dysfunction 2010     Postmenopausal hormone replacement therapy     From Dr. Link     Scoliosis        PSxH:   Past Surgical History:   Procedure Laterality Date     CYSTOSCOPY N/A 3/25/2019    Procedure: CYSTOSCOPY;  Surgeon: Radha Ovalle MD;  [facility]     DILATION AND CURETTAGE, OPERATIVE HYSTEROSCOPY WITH MORCELLATOR, COMBINED N/A 3/25/2019    Procedure: OPERATIVE HYSTEROSCOPY WITH MORCELLATOR;  Surgeon: Marybeth Huber MD;  [facility]     HC BREATH HYDROGEN TEST  11/11/2013    Procedure: HYDROGEN BREATH TEST;  Surgeon: Esteban Short MD;  [facility]     PHACOEMULSIFICATION WITH STANDARD INTRAOCULAR LENS IMPLANT Left 8/15/2022    Procedure: LEFT EYE PHACOEMULSIFICATION, CATARACT, WITH STANDARD INTRAOCULAR LENS IMPLANT  INSERTION;  Surgeon: Damir Cunningham MD;  Location: [facility] OR     PHACOEMULSIFICATION WITH STANDARD INTRAOCULAR LENS IMPLANT Right 2022    Procedure: RIGHT EYE PHACOEMULSIFICATION, CATARACT, WITH TORIC  INTRAOCULAR LENS IMPLANT INSERTION;  Surgeon: Damir Cunningham MD;  Location: [facility] [place] [place]     SURGICAL HISTORY OF -       tonsillectomy     SURGICAL HISTORY OF -   ~    cosmetic skin removal: breast reduction; skin removal from arms legs and tummy tuck     [facility] STOMACH SURGERY PROCEDURE UNLISTED         Allergies: Aspirin, Bee venom, Birds [feathers], Cats, Dust mites, Fluoride, Liothyronine, Mold, No clinical screening - see comments, Procaine, Seasonal allergies, Simvastatin, Penicillins, and Sulfa drugs    SH:   Social History     Socioeconomic History     Marital status: Single     Spouse name: Not on file     Number of children: Not on file     Years of education: Not on file     Highest education level: Not on file   Occupational History     Occupation: disability     Comment:    Tobacco Use     Smoking status: Never     Smokeless tobacco: Never   Substance and Sexual Activity     Alcohol use: No     Drug use: No     Sexual activity: Not Currently     Birth control/protection: Post-menopausal   Other Topics Concern     Parent/sibling w/ CABG, MI or angioplasty before 65F 55M? Not Asked   Social History Narrative     Not on file     Social Determinants of Health     Financial Resource Strain: Not on file   Food Insecurity: Not on file   Transportation Needs: Not on file   Physical Activity: Not on file   Stress: Not on file   Social Connections: Not on file   Intimate Partner Violence: Not on file   Housing Stability: Not on file       FH:   Family History   Problem Relation Age of Onset     Melanoma Mother      Skin Cancer Mother      Osteoporosis Mother      Diabetes Father      Gastrointestinal Disease Father 82         after surgery for Bowel obstruction     Obesity Father       Substance Abuse Sister      Cancer Sister      Cancer Sister 56        bone cancer     Anxiety Disorder Sister      Alcoholism Sister      Bone Cancer Sister      Alcohol/Drug Brother      Gastrointestinal Disease Brother         Hepatitis     Heart Disease Brother 62        hx of drug abuse,  age 62     Diabetes Brother      Skin Cancer Brother      Substance Abuse Brother      Allergies Brother      Anesthesia Reaction Brother         severe PRIMITIVO, effected surgery     Asthma Brother      Diabetes Brother      Obesity Brother      Alcoholism Brother      Colon Cancer No family hx of      Crohn's Disease No family hx of      Ulcerative Colitis No family hx of      Colon Polyps No family hx of      Glaucoma No family hx of      Macular Degeneration No family hx of      Deep Vein Thrombosis (DVT) No family hx of        Objective:  Data Unavailable Data Unavailable Data Unavailable Data Unavailable Data Unavailable 0 lbs 0 oz    PT and DP pulses are 2/4 bilaterally. CRT is instant.  Positive pedal hair.   Gross sensation is intact bilaterally.   Equinus is noted bilaterally. No pain with active or passive ROM of the ankle, MTJ, 1st ray, or halluces bilaterally,.   Nails are thickened to varying degrees on bilateral second and fifth digits.  The second digits are likely dystrophic from hammertoes.  The fifth nails appear to be mycotic.  No open lesions are noted.     Assessment: Jennifer is a 72-year-old with both onychomycosis and nail dystrophy.  Unfortunately, because she is nondiabetic or vasculopath, Medicare does not cover her to get her nails trimmed here.  I recommended that [facility].  I did give her the information for this.    Plan:  - Pt seen and evaluated.  -[facility] was given to [facility] for [facility].  I did trim back the second and fifth toenails today.  -We did discuss treatment options for onychomycosis. Topical medications do not do very well to treat nail fungus.  She  can continue to use it on her tinea pedis as needed.  I have recommended that she defer terbinafine treatment for now.  She will do this.  She continue to watch nails and go [facility].  -See again As needed.

## 2022-12-05 NOTE — PATIENT INSTRUCTIONS
Patient Education     Checking for Skin Cancer  You can find cancer early by checking your skin each month. There are 3 kinds of skin cancer. They are melanoma, basal cell carcinoma, and squamous cell carcinoma. Doing monthly skin checks is the best way to find new marks or skin changes. Follow the instructions below for checking your skin.   The ABCDEs of checking moles for melanoma   Check your moles or growths for signs of melanoma using ABCDE:   Asymmetry: the sides of the mole or growth don t match  Border: the edges are ragged, notched, or blurred  Color: the color within the mole or growth varies  Diameter: the mole or growth is larger than 6 mm (size of a pencil eraser)  Evolving: the size, shape, or color of the mole or growth is changing (evolving is not shown in the images below)    Checking for other types of skin cancer  Basal cell carcinoma or squamous cell carcinoma have symptoms such as:     A spot or mole that looks different from all other marks on your skin  Changes in how an area feels, such as itching, tenderness, or pain  Changes in the skin's surface, such as oozing, bleeding, or scaliness  A sore that does not heal  New swelling or redness beyond the border of a mole    Who s at risk?  Anyone can get skin cancer. But you are at greater risk if you have:   Fair skin, light-colored hair, or light-colored eyes  Many moles or abnormal moles on your skin  A history of sunburns from sunlight or tanning beds  A family history of skin cancer  A history of exposure to radiation or chemicals  A weakened immune system  If you have had skin cancer in the past, you are at risk for recurring skin cancer.   How to check your skin  Do your monthly skin checkups in front of a full-length mirror. Check all parts of your body, including your:   Head (ears, face, neck, and scalp)  Torso (front, back, and sides)  Arms (tops, undersides, upper, and lower armpits)  Hands (palms, backs, and fingers, including  under the nails)  Buttocks and genitals  Legs (front, back, and sides)  Feet (tops, soles, toes, including under the nails, and between toes)  If you have a lot of moles, take digital photos of them each month. Make sure to take photos both up close and from a distance. These can help you see if any moles change over time.   Most skin changes are not cancer. But if you see any changes in your skin, call your doctor right away. Only he or she can diagnose a problem. If you have skin cancer, seeing your doctor can be the first step toward getting the treatment that could save your life.   [employer] last reviewed this educational content on 4/1/2019 2000-2020 [employer]. [address] [place] [address], [place] [address]. All rights reserved. This information is not intended as a substitute for professional medical care. Always follow your healthcare professional's instructions.       When should I call my doctor?  If you are worsening or not improving, please, contact us or seek urgent care as noted below.     Who should I call with questions (adults)?  [facility] (adult and pediatric): [phone]  [facility] (adult): [phone]  For urgent needs outside of business hours call the [facility] at [phone] and ask for the dermatology resident on call to be paged  If this is a medical emergency and you are unable to reach an ER, Call [phone]    Who should I call with questions (pediatric)?  [facility]- Pediatric Dermatology  Dr. Carri Min, Dr. Terri Belcher, Dr. Justyna Morris, ASA Ayala, Dr. Tish Lira, Dr. Cindy Dickson & Dr. Elie Nathan  Non-urgent nurse triage line; [phone]- Shelly and Dianne AMOR Care Coordinatorrosy Mcgee (/Complex ) [phone]    If you need a prescription refill, please contact your pharmacy. Refills are approved or denied by our  Physicians during normal business hours, Monday through Fridays  Per office policy, refills will not be granted if you have not been seen within the past year (or sooner depending on your child's condition)    Scheduling Information:  Pediatric Appointment Scheduling and Call Center [phone]  Radiology Scheduling- [phone]  Sedation Unit Scheduling- [phone]  [facility] Scheduling- General [phone]; Pediatric Dermatology [phone]  Main  Services: [phone]  [place]: [phone]  Slovak: [phone]  Hmong/Mauritanian/Yi: [phone]  Preadmission Nursing Department Fax Number: [phone] (Fax all pre-operative paperwork to this number)    For urgent matters arising during evenings, weekends, or holidays that cannot wait for normal business hours please call [phone] and ask for the dermatology resident on call to be paged.

## 2022-12-06 ENCOUNTER — OFFICE VISIT (OUTPATIENT)
Dept: OPHTHALMOLOGY | Facility: CLINIC | Age: 72
End: 2022-12-06
Attending: OPHTHALMOLOGY
Payer: MEDICARE

## 2022-12-06 DIAGNOSIS — H04.123 BILATERAL DRY EYES: Primary | ICD-10-CM

## 2022-12-06 DIAGNOSIS — H01.02A SQUAMOUS BLEPHARITIS OF UPPER AND LOWER EYELIDS OF BOTH EYES: ICD-10-CM

## 2022-12-06 DIAGNOSIS — H01.02B SQUAMOUS BLEPHARITIS OF UPPER AND LOWER EYELIDS OF BOTH EYES: ICD-10-CM

## 2022-12-06 DIAGNOSIS — H02.889 MEIBOMIAN GLAND DYSFUNCTION: ICD-10-CM

## 2022-12-06 PROCEDURE — G0463 HOSPITAL OUTPT CLINIC VISIT: HCPCS

## 2022-12-06 PROCEDURE — 99213 OFFICE O/P EST LOW 20 MIN: CPT | Mod: 24 | Performed by: OPHTHALMOLOGY

## 2022-12-06 ASSESSMENT — CONF VISUAL FIELD
OD_NORMAL: 1
OS_SUPERIOR_NASAL_RESTRICTION: 0
OS_INFERIOR_TEMPORAL_RESTRICTION: 0
METHOD: COUNTING FINGERS
OD_INFERIOR_TEMPORAL_RESTRICTION: 0
OD_INFERIOR_NASAL_RESTRICTION: 0
OS_INFERIOR_NASAL_RESTRICTION: 0
OS_NORMAL: 1
OD_SUPERIOR_TEMPORAL_RESTRICTION: 0
OS_SUPERIOR_TEMPORAL_RESTRICTION: 0
OD_SUPERIOR_NASAL_RESTRICTION: 0

## 2022-12-06 ASSESSMENT — TONOMETRY
OD_IOP_MMHG: 9
IOP_METHOD: ICARE
OS_IOP_MMHG: 8

## 2022-12-06 ASSESSMENT — VISUAL ACUITY
OD_SC+: +2
OS_SC+: -2
OD_PH_SC: 20/25
OS_SC: 20/25
OD_PH_SC+: +1
METHOD: SNELLEN - LINEAR
OD_SC: 20/40

## 2022-12-06 ASSESSMENT — EXTERNAL EXAM - LEFT EYE: OS_EXAM: NORMAL

## 2022-12-06 ASSESSMENT — SLIT LAMP EXAM - LIDS: COMMENTS: MGD, SCURF

## 2022-12-06 NOTE — PATIENT INSTRUCTIONS
"-Continue artificial tears as needed  -Continue warm compress twice a day  -Stop castor oil and start using \"artificial tear ointment\" you can find the without a prescription  "

## 2022-12-06 NOTE — PROGRESS NOTES
Ophthalmology Acute Clinic     Chief Complaint(s) and History of Present Illness(es)     Follow Up    In left eye. Additional comments: Pressure in RE and yellow discharge.            Comments    Pt has the most trouble with her LE usually.   She has had a lot of pressure and yellow drainage in her RE recently.   Pt has been having problems with allergies and asthma recently.   Pts RE has gotten better over the past few days.  She also has been getting headaches; which is uncommon for her.     Ocular meds:   neomycin-polymyxin-dexamethasone (MAXITROL) 1 drop, Both Eyes, 2 TIMES DAILY PRN   Otc drops prn    LUPIS MARQUES December 6, 2022 1:50 PM                      HPI:   Jennifer Steiner is a 72 year old female who presents for 1 week of right eye pressure and pain. She has also noticed yellow drainage and crusting. This is improving with allergy treatment.    She also has allergies and asthma which are recently flaring.    ROS    ENT: Normal  Cardiac: Normal  Derm: Normal  Respiratory: Normal  Muscle/Skel: Normal  Allergic/Immunology: Normal  Neuro: Normal  Psych: Normal  Endocrine: Normal  Heme: Normal  Rheumatologic: Normal  : Normal  GI: Normal  Constitutional: Normal         Lab Results   Component Value Date    A1C 5.2 04/29/2009       Past Ocular history:   - Glasses: Yes  - Contact lens wear: none  - Ocular Surgical History: CEIOL  - Current Eye drops: castor oil in the right eye, AT 6x daily    PMH:   Past Medical History:   Diagnosis Date     Anemia      Asthma     since childhood     Blepharitis      Constipation, chronic     Chronic enema use     CVA (cerebral infarction) 2009    carotid intimal repair, left     Hyperlipidemia age 58    identified age 58     Hypothyroidism      Irritable bowel disease      Mixed type age-related cataract, both eyes      Neck injuries      Pelvic floor dysfunction 2010     Postmenopausal hormone replacement therapy     From Dr. Link     Scoliosis          FH:  No glaucoma or AMD.     Review of systems for the eyes was negative other than the pertinent positives/negatives listed in the HPI.      Assessment & Plan      Jennifer Steiner is a 72 year old female with the following diagnoses:   1. Bilateral dry eyes    2. Meibomian gland dysfunction    3. Squamous blepharitis of upper and lower eyelids of both eyes       Symptoms have now resolved. No concerns on exam today.        Discussed contributing factors   -Continue artificial tears PRN  -Continue warm compress PRN  -Stop castor oil and start using artificial tear ointment    Patient disposition:   Has scheduled appointment    Patient seen with Dr. Cunningham    Thank you for entrusting us with your care  Rosa Del Rosario MD  Resident Physician, PGY-2  Department of Ophthalmology  12/06/22 1:53 PM    Attending Physician Attestation:  Complete documentation of historical and exam elements from today's encounter can be found in the full encounter summary report (not reduplicated in this progress note).  I personally obtained the chief complaint(s) and history of present illness.  I confirmed and edited as necessary the review of systems, past medical/surgical history, family history, social history, and examination findings as documented by others; and I examined the patient myself.  I personally reviewed the relevant tests, images, and reports as documented above.  I formulated and edited as necessary the assessment and plan and discussed the findings and management plan with the patient and family. . - Damir Cunningham MD

## 2022-12-06 NOTE — NURSING NOTE
Chief Complaints and History of Present Illnesses   Patient presents with     Follow Up     Pressure in RE and yellow discharge.      Chief Complaint(s) and History of Present Illness(es)     Follow Up            Laterality: left eye    Comments: Pressure in RE and yellow discharge.           Comments    Pt has the most trouble with her LE usually.   She has had a lot of pressure and yellow drainage in her RE recently.   Pt has been having problems with allergies and asthma recently.   Pts RE has gotten better over the past few days.  She also has been getting headaches; which is uncommon for her.     Ocular meds:   neomycin-polymyxin-dexamethasone (MAXITROL) 1 drop, Both Eyes, 2 TIMES DAILY PRN   Otc drops prn    LUPIS MARQUES December 6, 2022 1:50 PM

## 2022-12-07 ENCOUNTER — TELEPHONE (OUTPATIENT)
Dept: FAMILY MEDICINE | Facility: CLINIC | Age: 72
End: 2022-12-07

## 2022-12-07 NOTE — TELEPHONE ENCOUNTER
Called patient to relay results. Patient states verbal understanding and will keep upcoming appointment. Salome Hendricks LPN 12/7/2022 11:05 AM

## 2022-12-07 NOTE — TELEPHONE ENCOUNTER
[facility] Call Center    Phone Message    May a detailed message be left on voicemail: yes     Reason for Call: Other: patient mailed the heart monitor 2-3 weeks ago and she wants to know if Dr Alvarez has received this inforrmation? please call her back to ariana further.     Action Taken: Message routed to:  [facility]    Travel Screening: Not Applicable

## 2022-12-16 ENCOUNTER — OFFICE VISIT (OUTPATIENT)
Dept: FAMILY MEDICINE | Facility: CLINIC | Age: 72
End: 2022-12-16
Payer: MEDICARE

## 2022-12-16 ENCOUNTER — DOCUMENTATION ONLY (OUTPATIENT)
Dept: FAMILY MEDICINE | Facility: CLINIC | Age: 72
End: 2022-12-16

## 2022-12-16 VITALS
SYSTOLIC BLOOD PRESSURE: 109 MMHG | TEMPERATURE: 98.2 F | WEIGHT: 130 LBS | BODY MASS INDEX: 23.04 KG/M2 | HEART RATE: 67 BPM | HEIGHT: 63 IN | DIASTOLIC BLOOD PRESSURE: 53 MMHG | OXYGEN SATURATION: 96 %

## 2022-12-16 DIAGNOSIS — M54.42 CHRONIC BILATERAL LOW BACK PAIN WITH LEFT-SIDED SCIATICA: Primary | ICD-10-CM

## 2022-12-16 DIAGNOSIS — R19.4 CHANGE IN BOWEL HABIT: ICD-10-CM

## 2022-12-16 DIAGNOSIS — G89.29 CHRONIC BILATERAL LOW BACK PAIN WITH LEFT-SIDED SCIATICA: Primary | ICD-10-CM

## 2022-12-16 DIAGNOSIS — R00.2 PALPITATIONS: ICD-10-CM

## 2022-12-16 DIAGNOSIS — Z86.0100 HISTORY OF COLONIC POLYPS: ICD-10-CM

## 2022-12-16 DIAGNOSIS — B35.1 TOENAIL FUNGUS: ICD-10-CM

## 2022-12-16 PROCEDURE — 99215 OFFICE O/P EST HI 40 MIN: CPT | Performed by: FAMILY MEDICINE

## 2022-12-16 RX ORDER — HYDROCORTISONE 10 MG/1
TABLET ORAL
COMMUNITY
Start: 2022-12-09 | End: 2023-06-15

## 2022-12-16 NOTE — NURSING NOTE
"Jennifer Steiner is a 72 year old female patient that presents today in clinic for the following:    Chief Complaint   Patient presents with     RECHECK     Follow up.  Discuss imaging.  Discuss physical therapy.  Referral.       The patient's allergies and medications were reviewed as noted. A set of vitals were recorded as noted without incident: /53 (BP Location: Right arm, Patient Position: Sitting, Cuff Size: Adult Regular)   Pulse 67   Temp 98.2  F (36.8  C) (Oral)   Ht 1.6 m (5' 3\")   Wt 59 kg (130 lb)   LMP  (LMP Unknown)   SpO2 96%   BMI 23.03 kg/m  . The patient does not have any other questions for the provider.    Barrington Villagran, EMT at 1:34 PM on 12/16/2022.  Primary care clinic: [phone]  "

## 2022-12-16 NOTE — PATIENT INSTRUCTIONS
To schedule your MRI appointment with imaging, please call [phone].     Do the MR and the colonoscopy then see me a week after colonoscopy

## 2022-12-16 NOTE — PROGRESS NOTES
Type of Form Received: rehab services    Form Received (Date) 12/16/22   Form Filled out Yes 12/16/22   Placed in provider folder Yes

## 2022-12-16 NOTE — PROGRESS NOTES
Assessment & Plan     Chronic bilateral low back pain with left-sided sciatica  Discussed at length, follow-up pending MR findings  - MRI Lumbar spine w/o contrast; Future  I do and return PT forms for start tens in visit    History of colonic polyps  Possible lymphocytic colitis. Discussed adding EGD for chronic dyspepsia and possible biopsy for celiac, she'll consider.  - Adult GI  Referral - Procedure Only; Future    Change in bowel habit  Same.  - Adult GI  Referral - Procedure Only; Future    Palpitations  Monitor, no further eval needed now, discussed    Toenail fungus  Try derm rec's      44 minutes spent on the date of the encounter doing chart review, history and exam, documentation and further activities per the note, pt w/ many excellent questions, discussed each topic morgan back/GI at length    No follow-ups on file.    Milan Alvarez MD  [facility] Accident    Maral Rodriguez is a 72 year old, presenting for the following health issues:  RECHECK (Follow up./Discuss imaging./Discuss physical therapy./Referral./)      HPI   Ongoing low back pain, radiates into R leg, not better w/ time or PT, see my last note, plain xrays only show age related changes. Ongoing for months. Limits daily comfort and function. Reviewed course of pain (summarized in last note), imaging, modalities tried, options for further eval/treat, all at length, we opt for MR next next.  GI: chronic GI symptoms but new and different this fall, diarrhea and spasms. No blood or melena. Due for colonoscopy anyway, she requests lighter anesthesia and pediatric scope (last colonoscoy note mentions anal stenosis). H/o SIBO, rvwd care everywhere tests and notes. Some bloating, chronically. H/o pelvic floor dysfunction, she is looking for new clinic for this (was seen at [facility] clinic in past). We discuss should she do EGD/celiac tests; she eats very little gluten discussed would have to  consume gluten routinely in advance if trying to test for celiac, she would like to consider this and not decide right now.   Declines immunizations, discussed/offered  Saw podiatry and derm, for toe fungus they trimmed nails and derm suggested topical treatments she will try, notes rvwd.  SInus congestion persists, sees ENT next month.  Rvwd stress test and ziopatch results, no significant findings, some palpitations without other cardio symptoms    Past Medical History:   Diagnosis Date     Anemia      Asthma     since childhood     Blepharitis      Constipation, chronic     Chronic enema use     CVA (cerebral infarction) 2009    carotid intimal repair, left     Hyperlipidemia age 58    identified age 58     Hypothyroidism      Irritable bowel disease      Mixed type age-related cataract, both eyes      Neck injuries      Pelvic floor dysfunction 2010     Postmenopausal hormone replacement therapy     From Dr. Link     Scoliosis      Past Surgical History:   Procedure Laterality Date     CYSTOSCOPY N/A 3/25/2019    Procedure: CYSTOSCOPY;  Surgeon: Radha Ovalle MD;  [facility]     DILATION AND CURETTAGE, OPERATIVE HYSTEROSCOPY WITH MORCELLATOR, COMBINED N/A 3/25/2019    Procedure: OPERATIVE HYSTEROSCOPY WITH MORCELLATOR;  Surgeon: Marybeth Huber MD;  [facility]     HC BREATH HYDROGEN TEST  11/11/2013    Procedure: HYDROGEN BREATH TEST;  Surgeon: Esteban Short MD;  [facility]     PHACOEMULSIFICATION WITH STANDARD INTRAOCULAR LENS IMPLANT Left 8/15/2022    Procedure: LEFT EYE PHACOEMULSIFICATION, CATARACT, WITH STANDARD INTRAOCULAR LENS IMPLANT INSERTION;  Surgeon: Damir Cunningham MD;  Location: [facility] OR     PHACOEMULSIFICATION WITH STANDARD INTRAOCULAR LENS IMPLANT Right 9/12/2022    Procedure: RIGHT EYE PHACOEMULSIFICATION, CATARACT, WITH TORIC  INTRAOCULAR LENS IMPLANT INSERTION;  Surgeon: Damir Cunningham MD;  Location: [facility] OR     SURGICAL HISTORY OF -        tonsillectomy     SURGICAL HISTORY OF -   ~1997    cosmetic skin removal: breast reduction; skin removal from arms legs and tummy tuck     [facility] STOMACH SURGERY PROCEDURE UNLISTED       Current Outpatient Medications   Medication     artificial tears OINT ophthalmic ointment     Ascorbic Acid (VITAMIN C) 100 MG CHEW     Charcoal Activated (CHARCOAL PO)     Cholecalciferol (VITAMIN D) 2000 UNIT tablet     Digestive Enzymes TABS     DiphenhydrAMINE HCl (BENADRYL PO)     EPINEPHrine (EPIPEN) 0.3 MG/0.3ML injection 2-pack     estradiol (ESTRACE) 0.1 MG/GM vaginal cream     fluocinolone (SYNALAR) 0.01 % solution     HYDROCHLORIC ACID     hydrocortisone (CORTEF) 10 MG tablet     Hypromellose (ARTIFICIAL TEARS OP)     ketoconazole (NIZORAL) 2 % external cream     MAGNESIUM OXIDE PO     neomycin-polymyxin-dexamethasone (MAXITROL) 3.5-69672-7.1 SUSP ophthalmic susp     neomycin-polymyxin-dexamethasone (MAXITROL) 3.5-50506-9.1 SUSP ophthalmic susp     Nettle (Urtica Dioica) (NETTLE LEAF) 435 MG CAPS     peppermint oil liquid     Probiotic Product (ALOE 39766 & PROBIOTICS) CAPS     progesterone (PROMETRIUM) 200 MG capsule     progesterone (PROMETRIUM) 200 MG capsule     Quercetin 50 MG TABS     Testosterone Propionate (FIRST-TESTOSTERONE MC) 2 % CREA     thyroid (ARMOUR) 60 MG tablet     Turmeric 500 MG CAPS     zinc gluconate 50 MG tablet     Omega-3 Fatty Acids (FISH OIL PO)     No current facility-administered medications for this visit.     Allergies   Allergen Reactions     Aspirin GI Disturbance     GI upset     Bee Venom Swelling     Birds [Feathers]      Cats      Dust Mites      Fluoride Other (See Comments) and Unknown     headaches     Liothyronine Unknown     Severe gut reactions, ears itching, throat closing     Mold      No Clinical Screening - See Comments Other (See Comments)     Some antibiotics but not Zpak-Can tolerate Azithromycin  z-pack, antibiotics in general- per patient.      Procaine Other (See  "Comments)     headache     Seasonal Allergies      Ragweed, pollen     Simvastatin Other (See Comments)     \"hot flash\", muscle weakness, dizziness     Penicillins Unknown and Rash     As a child     Sulfa Drugs Itching, Other (See Comments), Rash and Hives               Review of Systems         Objective    /53 (BP Location: Right arm, Patient Position: Sitting, Cuff Size: Adult Regular)   Pulse 67   Temp 98.2  F (36.8  C) (Oral)   Ht 1.6 m (5' 3\")   Wt 59 kg (130 lb)   LMP  (LMP Unknown)   SpO2 96%   BMI 23.03 kg/m    Body mass index is 23.03 kg/m .  Physical Exam   GENERAL: healthy, alert and no distress  MS: no gross musculoskeletal defects noted, no edema              "

## 2022-12-20 ENCOUNTER — OFFICE VISIT (OUTPATIENT)
Dept: OPHTHALMOLOGY | Facility: CLINIC | Age: 72
End: 2022-12-20
Attending: OPHTHALMOLOGY
Payer: MEDICARE

## 2022-12-20 DIAGNOSIS — Z96.1 PSEUDOPHAKIA OF BOTH EYES: Primary | ICD-10-CM

## 2022-12-20 DIAGNOSIS — H01.02A SQUAMOUS BLEPHARITIS OF UPPER AND LOWER EYELIDS OF BOTH EYES: ICD-10-CM

## 2022-12-20 DIAGNOSIS — H01.02B SQUAMOUS BLEPHARITIS OF UPPER AND LOWER EYELIDS OF BOTH EYES: ICD-10-CM

## 2022-12-20 DIAGNOSIS — H04.123 DRY EYES: ICD-10-CM

## 2022-12-20 PROCEDURE — G0463 HOSPITAL OUTPT CLINIC VISIT: HCPCS

## 2022-12-20 PROCEDURE — 92014 COMPRE OPH EXAM EST PT 1/>: CPT | Performed by: OPHTHALMOLOGY

## 2022-12-20 ASSESSMENT — CONF VISUAL FIELD
OS_NORMAL: 1
OD_INFERIOR_TEMPORAL_RESTRICTION: 0
OS_SUPERIOR_TEMPORAL_RESTRICTION: 0
OD_SUPERIOR_NASAL_RESTRICTION: 0
OD_SUPERIOR_TEMPORAL_RESTRICTION: 0
OS_INFERIOR_TEMPORAL_RESTRICTION: 0
OD_INFERIOR_NASAL_RESTRICTION: 0
OS_SUPERIOR_NASAL_RESTRICTION: 0
OD_NORMAL: 1
METHOD: COUNTING FINGERS
OS_INFERIOR_NASAL_RESTRICTION: 0

## 2022-12-20 ASSESSMENT — EXTERNAL EXAM - LEFT EYE: OS_EXAM: NORMAL

## 2022-12-20 ASSESSMENT — VISUAL ACUITY
OD_SC: 20/25
OD_SC+: +2
OS_SC: 20/30
METHOD: SNELLEN - LINEAR
OS_PH_SC: 20/20
OS_SC+: +1

## 2022-12-20 ASSESSMENT — TONOMETRY
IOP_METHOD: ICARE
OS_IOP_MMHG: 10
OD_IOP_MMHG: 9

## 2022-12-20 ASSESSMENT — SLIT LAMP EXAM - LIDS: COMMENTS: MGD, SCURF

## 2022-12-20 NOTE — NURSING NOTE
Chief Complaints and History of Present Illnesses   Patient presents with     Follow Up     Chemical exposure of eye - Left Eye      Chief Complaint(s) and History of Present Illness(es)     Follow Up            Laterality: left eye    Onset: 2 months ago    Associated symptoms: dryness    Treatments tried: eye drops    Pain scale: 3/10    Comments: Chemical exposure of eye - Left Eye           Comments    Pt had cataract surgery in Aug and Sept.   Pt states her vision has been stable over the past couple months.   Her RE seems to be her good eye she states.    Ocular meds:  neomycin-polymyxin-dexamethasone (MAXITROL) 1 drop into both eyes 2 times daily as needed    neomycin-polymyxin-dexamethasone (MAXITROL) 1-2 drops into both eyes At Bedtime        artificial tears OINT ophthalmic ointment -pt has been unable to find.             LUPIS RESENDEZ COA December 20, 2022 1:29 PM

## 2022-12-20 NOTE — PROGRESS NOTES
Chief Complaint(s) and History of Present Illness(es)     Follow Up            Laterality: left eye    Onset: 2 months ago    Associated symptoms: dryness    Treatments tried: eye drops    Pain scale: 3/10    Comments: Chemical exposure of eye - Left Eye           Comments    Pt had cataract surgery in Aug and Sept.   Pt states her vision has been stable over the past couple months.   Her RE seems to be her good eye she states.  Pt wants to know if she should expect any more treatment such as laser.     Ocular meds:  neomycin-polymyxin-dexamethasone (MAXITROL) 1 drop into both eyes 2 times   daily as needed    neomycin-polymyxin-dexamethasone (MAXITROL) 1-2 drops into both eyes At   Bedtime        artificial tears OINT ophthalmic ointment -pt has been unable to find.               LUPIS RESENDEZ COA December 20, 2022 1:29 PM                Review of systems for the eyes was negative other than the pertinent positives/negatives listed in the HPI.      Assessment & Plan      Jennifer Steiner is a 72 year old female with the following diagnoses:   1. Pseudophakia of both eyes    2. Dry eyes    3. Squamous blepharitis of upper and lower eyelids of both eyes         s/p CE IOL, left eye 8/15/22  s/p CE IOL, right eye 9/12/22  Minimal posterior capsular opacity (PCO), not visually significant   Monitor       Recent blepharitis/dry eye flare  Using maxitrol gtts twice a day and at bedtime   Artificial tears and warm compresses helping  Doing better today  Continue as above  Return precautions reviewed         Patient disposition:   Return in about 6 months (around 6/20/2023) for VT only.           Attending Physician Attestation:  Complete documentation of historical and exam elements from today's encounter can be found in the full encounter summary report (not reduplicated in this progress note).  I personally obtained the chief complaint(s) and history of present illness.  I confirmed and edited as necessary the review  of systems, past medical/surgical history, family history, social history, and examination findings as documented by others; and I examined the patient myself.  I personally reviewed the relevant tests, images, and reports as documented above.  I formulated and edited as necessary the assessment and plan and discussed the findings and management plan with the patient and family. . - Damir Cunningham MD

## 2023-01-01 NOTE — TELEPHONE ENCOUNTER
Returned patients call, fixed surgery dates that were discussed with patient with Gena, patient wanted to reschedule pre op and covid test, was able to accommodate. They will review packet and call with any questions. Packet mailed out upon request from patient with all appts printed out.    Anna C. Schoenecker on 7/15/2022 at 11:09 AM     
<<-----Click here for Discharge Medication Review

## 2023-01-10 ENCOUNTER — ANCILLARY PROCEDURE (OUTPATIENT)
Dept: MRI IMAGING | Facility: CLINIC | Age: 73
End: 2023-01-10
Attending: FAMILY MEDICINE
Payer: MEDICARE

## 2023-01-10 DIAGNOSIS — G89.29 CHRONIC BILATERAL LOW BACK PAIN WITH LEFT-SIDED SCIATICA: ICD-10-CM

## 2023-01-10 DIAGNOSIS — M54.42 CHRONIC BILATERAL LOW BACK PAIN WITH LEFT-SIDED SCIATICA: ICD-10-CM

## 2023-01-10 PROCEDURE — 72148 MRI LUMBAR SPINE W/O DYE: CPT | Mod: ME | Performed by: RADIOLOGY

## 2023-01-10 PROCEDURE — G1010 CDSM STANSON: HCPCS | Performed by: RADIOLOGY

## 2023-01-18 ENCOUNTER — PRE VISIT (OUTPATIENT)
Dept: OTOLARYNGOLOGY | Facility: CLINIC | Age: 73
End: 2023-01-18

## 2023-01-18 ENCOUNTER — DOCUMENTATION ONLY (OUTPATIENT)
Dept: FAMILY MEDICINE | Facility: CLINIC | Age: 73
End: 2023-01-18

## 2023-01-18 ENCOUNTER — OFFICE VISIT (OUTPATIENT)
Dept: OTOLARYNGOLOGY | Facility: CLINIC | Age: 73
End: 2023-01-18
Attending: FAMILY MEDICINE
Payer: MEDICARE

## 2023-01-18 VITALS
OXYGEN SATURATION: 98 % | HEART RATE: 65 BPM | DIASTOLIC BLOOD PRESSURE: 73 MMHG | TEMPERATURE: 97.2 F | SYSTOLIC BLOOD PRESSURE: 117 MMHG | HEIGHT: 64 IN | WEIGHT: 130 LBS | BODY MASS INDEX: 22.2 KG/M2

## 2023-01-18 DIAGNOSIS — R09.81 NASAL CONGESTION: Primary | ICD-10-CM

## 2023-01-18 DIAGNOSIS — J34.3 HYPERTROPHY OF INFERIOR NASAL TURBINATE: ICD-10-CM

## 2023-01-18 DIAGNOSIS — J34.2 DEVIATED NASAL SEPTUM: ICD-10-CM

## 2023-01-18 DIAGNOSIS — J30.1 SEASONAL ALLERGIC RHINITIS DUE TO POLLEN: ICD-10-CM

## 2023-01-18 PROCEDURE — 31231 NASAL ENDOSCOPY DX: CPT | Performed by: OTOLARYNGOLOGY

## 2023-01-18 PROCEDURE — 99203 OFFICE O/P NEW LOW 30 MIN: CPT | Mod: 25 | Performed by: OTOLARYNGOLOGY

## 2023-01-18 ASSESSMENT — PAIN SCALES - GENERAL: PAINLEVEL: NO PAIN (1)

## 2023-01-18 NOTE — PROGRESS NOTES
[facility]  New Patient Visit      Encounter date:   January 18, 2023    Referring Provider:   Milan Alvarez MD  [address] [place] [address]    Chief Complaint: nasal congestion    History of Present Illness:   Jennifer Steiner is a 72 year old female who presents for consultation regarding nasal congestion. She presents at the request of Dr. Milan Alvarez. Patient reported chronic nasal congestion that did not improve with Neti pot.    Today, she reports that she has asthma and seasonal allergies. Normally, she gets improvement in her congestion in the winter. She reports that when she had nasal swab prior to COVID surgery, her nose was painful for 2 months, but it has improved now. She also reports occasional nosebleeds.    Sino-Nasal Outcome Test (SNOT - 22)  [facility] Operative History:  The patient denies any sinonasal surgeries.    Review of systems: A 14-point review of systems has been conducted and was negative for any notable symptoms, except as dictated in the history of present illness.     Medical History:  Past Medical History:   Diagnosis Date     Anemia      Asthma     since childhood     Blepharitis      Constipation, chronic     Chronic enema use     CVA (cerebral infarction) 2009    carotid intimal repair, left     Hyperlipidemia age 58    identified age 58     Hypothyroidism      Irritable bowel disease      Mixed type age-related cataract, both eyes      Neck injuries      Pelvic floor dysfunction 2010     Postmenopausal hormone replacement therapy     From Dr. Link     Scoliosis         Surgical History:   Past Surgical History:   Procedure Laterality Date     CYSTOSCOPY N/A 3/25/2019    Procedure: CYSTOSCOPY;  Surgeon: Radha Ovalle MD;  [facility]     DILATION AND CURETTAGE, OPERATIVE HYSTEROSCOPY WITH MORCELLATOR, COMBINED N/A 3/25/2019    Procedure: OPERATIVE HYSTEROSCOPY WITH MORCELLATOR;  Surgeon: Marybeth Huber MD;  [facility]     HC BREATH HYDROGEN TEST  2013    Procedure: HYDROGEN BREATH TEST;  Surgeon: Esteban Short MD;  [facility]     PHACOEMULSIFICATION WITH STANDARD INTRAOCULAR LENS IMPLANT Left 8/15/2022    Procedure: LEFT EYE PHACOEMULSIFICATION, CATARACT, WITH STANDARD INTRAOCULAR LENS IMPLANT INSERTION;  Surgeon: Damir Cunningham MD;  Location: [facility] OR     PHACOEMULSIFICATION WITH STANDARD INTRAOCULAR LENS IMPLANT Right 2022    Procedure: RIGHT EYE PHACOEMULSIFICATION, CATARACT, WITH TORIC  INTRAOCULAR LENS IMPLANT INSERTION;  Surgeon: Damir Cunningham MD;  Location: [facility] OR     SURGICAL HISTORY OF -       tonsillectomy     SURGICAL HISTORY OF -   ~    cosmetic skin removal: breast reduction; skin removal from arms legs and tummy tuck     [facility] STOMACH SURGERY PROCEDURE UNLISTED          Family History:  Family History   Problem Relation Age of Onset     Melanoma Mother      Skin Cancer Mother      Osteoporosis Mother      Diabetes Father      Gastrointestinal Disease Father 82         after surgery for Bowel obstruction     Obesity Father      Substance Abuse Sister      Cancer Sister      Cancer Sister 56        bone cancer     Anxiety Disorder Sister      Alcoholism Sister      Bone Cancer Sister      Alcohol/Drug Brother      Gastrointestinal Disease Brother         Hepatitis     Heart Disease Brother 62        hx of drug abuse,  age 62     Diabetes Brother      Skin Cancer Brother      Substance Abuse Brother      Allergies Brother      Anesthesia Reaction Brother         severe PRIMITIVO, effected surgery     Asthma Brother      Diabetes Brother      Obesity Brother      Alcoholism Brother      Colon Cancer No family hx of      Crohn's Disease No family hx of      Ulcerative Colitis No family hx of      Colon Polyps No family hx of      Glaucoma No family hx of      Macular Degeneration No family hx of      Deep Vein Thrombosis (DVT) No family hx of         Social History:  "  Social History     Socioeconomic History     Marital status: Single     Spouse name: None     Number of children: None     Years of education: None     Highest education level: None   Occupational History     Occupation: disability     Comment:    Tobacco Use     Smoking status: Never     Smokeless tobacco: Never   Substance and Sexual Activity     Alcohol use: No     Drug use: No     Sexual activity: Not Currently     Birth control/protection: Post-menopausal        Physical Exam:  Vital signs: /73 (BP Location: Left arm, Patient Position: Sitting)   Pulse 65   Temp 97.2  F (36.2  C)   Ht 1.613 m (5' 3.5\")   Wt 59 kg (130 lb)   LMP  (LMP Unknown)   SpO2 98%   BMI 22.67 kg/m     General Appearance: No acute distress, appropriate demeanor, conversant  Eyes: moist conjunctivae; EOMI; pupils symmetric; visual acuity grossly intact; no proptosis  Head: normocephalic; overall symmetric appearance without deformity  Face: overall symmetric without deformity; HB I/VI  Ears: Normal appearance of external ear; external meatus normal in appearance; TMs intact without perforation bilaterally;   Nose: No external deformity; septum deviated to right; inferior turbinate hypertrophy  Oral Cavity/oropharynx: Normal appearance of mucosa; tongue midline; no mass or lesions; oropharynx without obvious mucosal abnormality  Neck: no palpable lymphadenopathy; thyroid without palpable nodules  Lungs: symmetric chest rise; no wheezing  CV: Good distal perfusion; normal hear rate  Extremities: No deformity  Neurologic Exam: Cranial nerves II-XII are grossly intact; no focal deficit    Procedure Note  Procedure performed: Rigid nasal endoscopy  Indication: To evaluate for sinonasal pathology not visualized on routine anterior rhinoscopy  Anesthesia: 4% topical lidocaine with 0.05% oxymetazoline  Description of procedure: A 30 degree, 3 mm rigid endoscope was inserted into bilateral nasal cavities and the nasal " valves, nasal cavity, middle meatus, sphenoethmoid recess, and nasopharynx were thoroughly evaluated for evidence of obstruction, edema, purulence, polyps and/or mass/lesion.     Thad-Sarmad Endoscopic Scoring System  Endoscopic observation Right Left   Polyps in middle meatus (0 = absent, 1 = restricted to middle meatus, 2 = Beyond middle meatus) 0 0   Discharge (0 = absent, 1 = thin and clear, 2 = thick, purulent) 0 0   Edema (0 = absent, 1 = mild-moderate, 2 = moderate-severe) 1 1   Crusting (0 = absent, 1 = mild-moderate, 2 = moderate-severe) 0 0   Scarring (0= absent, 1 = mild-moderate, 2 = moderate-severe) 0 0   Total 1 1     Findings  RT: Edema of middle turbinate, rightward septal deviation causing limited evaluation of SER.  LT: Central compartment inflammation of middle and superior turbinates    The patient tolerated the procedure well without complication.     Laboratory Review:  NA    Imaging Review:  MRI brain 1/8/2019  MRI soft tissue neck 1/8/2019  Impression:  1. No evidence of acute infarction or intracranial hemorrhage.  2. No abnormal enhancing lesions intracranially.  3. Head MRA demonstrates no definite aneurysm or stenosis of the major  intracranial arteries.  4. Neck MRA demonstrates patent major cervical arteries.  5. No evidence for arterial dissection.    Pathology Review:  NA    Assessment/Medical Decision Making:  Allergic rhinitis  Rightward septal deviation  Nasal congestion  Inferior turbinate hypertrophy    Plan:  I performed bilateral endoscopy today. I recommended treating her allergies with oral antihistamine and a topical nasal steroid such as Flonase. I see no untoward injury of the nasal cavity that would have resulted from her prior COVID swab. She can follow up with me as needed.    Remy Guillaume MD    [facility]  Rhinology  Endoscopic Skull Base Surgery  [facility]  Department of Otolaryngology - Head & Neck  Surgery    Scribe Disclosure:  I, Miguelito Crawford, am serving as a scribe to document services personally performed by Remy Guillaume MD at this visit, based upon the provider's statements to me. All documentation has been reviewed by the aforementioned provider prior to being entered into the official medical record.

## 2023-01-18 NOTE — PATIENT INSTRUCTIONS
You were seen in the ENT Clinic today by Dr. Guillaume. If you have any questions or concerns after your appointment, please contact us (see below)      2.   If symptoms worsen or fail to improve, please return to the ENT clinic.             How to Contact Us:  Send a [employer] message to your provider. Our team will respond to you via [employer]. Occasionally, we will need to call you to get further information.  For urgent matters (Monday-Friday), call the ENT Clinic: [phone] and speak with a call center team member - they will route your call appropriately.   If you'd like to speak directly with a nurse, please find our contact information below. We do our best to check voicemail frequently throughout the day, and will work to call you back within 1-2 days. For urgent matters, please use the general clinic phone numbers listed above.        Ellen BERNAL RN  ENT RN Care Coordinator  Direct: [phone]  Dominique HUGHES LPN  Direct: [phone]         [facility]  Department of Otolaryngology

## 2023-01-18 NOTE — PROGRESS NOTES
Type of Form Received: RX    Form Received (Date) 1/18/23   Form Filled out Yes 1/20/23   Placed in provider folder Yes

## 2023-01-18 NOTE — LETTER
1/18/2023       RE: Jennifer Steiner  [address]  [facility] [address]     Dear Colleague,    Thank you for referring your patient, Jennifer Steiner, to the [facility] [place] at [facility]. Please see a copy of my visit note below.      [facility]  New Patient Visit      Encounter date:   January 18, 2023    Referring Provider:   Milan Alvarez MD  [address] [place] [id number]    Chief Complaint: nasal congestion    History of Present Illness:   Jennifer Steiner is a 72 year old female who presents for consultation regarding nasal congestion. She presents at the request of Dr. Milan Alvarez. Patient reported chronic nasal congestion that did not improve with Neti pot.    Today, she reports that she has asthma and seasonal allergies. Normally, she gets improvement in her congestion in the winter. She reports that when she had nasal swab prior to COVID surgery, her nose was painful for 2 months, but it has improved now. She also reports occasional nosebleeds.    Sino-Nasal Outcome Test (SNOT - 22)  [facility] Operative History:  The patient denies any sinonasal surgeries.    Review of systems: A 14-point review of systems has been conducted and was negative for any notable symptoms, except as dictated in the history of present illness.     Medical History:  Past Medical History:   Diagnosis Date     Anemia      Asthma     since childhood     Blepharitis      Constipation, chronic     Chronic enema use     CVA (cerebral infarction) 2009    carotid intimal repair, left     Hyperlipidemia age 58    identified age 58     Hypothyroidism      Irritable bowel disease      Mixed type age-related cataract, both eyes      Neck injuries      Pelvic floor dysfunction 2010     Postmenopausal hormone replacement therapy     From Dr. Link     Scoliosis         Surgical History:   Past  Surgical History:   Procedure Laterality Date     CYSTOSCOPY N/A 3/25/2019    Procedure: CYSTOSCOPY;  Surgeon: Radha Ovalle MD;  [facility]     DILATION AND CURETTAGE, OPERATIVE HYSTEROSCOPY WITH MORCELLATOR, COMBINED N/A 3/25/2019    Procedure: OPERATIVE HYSTEROSCOPY WITH MORCELLATOR;  Surgeon: Marybeth Huber MD;  [facility]     HC BREATH HYDROGEN TEST  2013    Procedure: HYDROGEN BREATH TEST;  Surgeon: Esteban Short MD;  [facility]     PHACOEMULSIFICATION WITH STANDARD INTRAOCULAR LENS IMPLANT Left 8/15/2022    Procedure: LEFT EYE PHACOEMULSIFICATION, CATARACT, WITH STANDARD INTRAOCULAR LENS IMPLANT INSERTION;  Surgeon: Damir Cunningham MD;  Location: [facility] OR     PHACOEMULSIFICATION WITH STANDARD INTRAOCULAR LENS IMPLANT Right 2022    Procedure: RIGHT EYE PHACOEMULSIFICATION, CATARACT, WITH TORIC  INTRAOCULAR LENS IMPLANT INSERTION;  Surgeon: Damir Cunningham MD;  Location: [facility] OR     SURGICAL HISTORY OF -       tonsillectomy     SURGICAL HISTORY OF -   ~    cosmetic skin removal: breast reduction; skin removal from arms legs and tummy tuck     ZZC STOMACH SURGERY PROCEDURE UNLISTED          Family History:  Family History   Problem Relation Age of Onset     Melanoma Mother      Skin Cancer Mother      Osteoporosis Mother      Diabetes Father      Gastrointestinal Disease Father 82         after surgery for Bowel obstruction     Obesity Father      Substance Abuse Sister      Cancer Sister      Cancer Sister 56        bone cancer     Anxiety Disorder Sister      Alcoholism Sister      Bone Cancer Sister      Alcohol/Drug Brother      Gastrointestinal Disease Brother         Hepatitis     Heart Disease Brother 62        hx of drug abuse,  age 62     Diabetes Brother      Skin Cancer Brother      Substance Abuse Brother      Allergies Brother      Anesthesia Reaction Brother         severe PRIMITIVO, effected surgery     Asthma Brother      Diabetes  "Brother      Obesity Brother      Alcoholism Brother      Colon Cancer No family hx of      Crohn's Disease No family hx of      Ulcerative Colitis No family hx of      Colon Polyps No family hx of      Glaucoma No family hx of      Macular Degeneration No family hx of      Deep Vein Thrombosis (DVT) No family hx of         Social History:   Social History     Socioeconomic History     Marital status: Single     Spouse name: None     Number of children: None     Years of education: None     Highest education level: None   Occupational History     Occupation: disability     Comment:    Tobacco Use     Smoking status: Never     Smokeless tobacco: Never   Substance and Sexual Activity     Alcohol use: No     Drug use: No     Sexual activity: Not Currently     Birth control/protection: Post-menopausal        Physical Exam:  Vital signs: /73 (BP Location: Left arm, Patient Position: Sitting)   Pulse 65   Temp 97.2  F (36.2  C)   Ht 1.613 m (5' 3.5\")   Wt 59 kg (130 lb)   LMP  (LMP Unknown)   SpO2 98%   BMI 22.67 kg/m     General Appearance: No acute distress, appropriate demeanor, conversant  Eyes: moist conjunctivae; EOMI; pupils symmetric; visual acuity grossly intact; no proptosis  Head: normocephalic; overall symmetric appearance without deformity  Face: overall symmetric without deformity; HB I/VI  Ears: Normal appearance of external ear; external meatus normal in appearance; TMs intact without perforation bilaterally;   Nose: No external deformity; septum deviated to right; inferior turbinate hypertrophy  Oral Cavity/oropharynx: Normal appearance of mucosa; tongue midline; no mass or lesions; oropharynx without obvious mucosal abnormality  Neck: no palpable lymphadenopathy; thyroid without palpable nodules  Lungs: symmetric chest rise; no wheezing  CV: Good distal perfusion; normal hear rate  Extremities: No deformity  Neurologic Exam: Cranial nerves II-XII are grossly intact; no focal " deficit    Procedure Note  Procedure performed: Rigid nasal endoscopy  Indication: To evaluate for sinonasal pathology not visualized on routine anterior rhinoscopy  Anesthesia: 4% topical lidocaine with 0.05% oxymetazoline  Description of procedure: A 30 degree, 3 mm rigid endoscope was inserted into bilateral nasal cavities and the nasal valves, nasal cavity, middle meatus, sphenoethmoid recess, and nasopharynx were thoroughly evaluated for evidence of obstruction, edema, purulence, polyps and/or mass/lesion.     [place] Endoscopic Scoring System  Endoscopic observation Right Left   Polyps in middle meatus (0 = absent, 1 = restricted to middle meatus, 2 = Beyond middle meatus) 0 0   Discharge (0 = absent, 1 = thin and clear, 2 = thick, purulent) 0 0   Edema (0 = absent, 1 = mild-moderate, 2 = moderate-severe) 1 1   Crusting (0 = absent, 1 = mild-moderate, 2 = moderate-severe) 0 0   Scarring (0= absent, 1 = mild-moderate, 2 = moderate-severe) 0 0   Total 1 1     Findings  RT: Edema of middle turbinate, rightward septal deviation causing limited evaluation of SER.  LT: Central compartment inflammation of middle and superior turbinates    The patient tolerated the procedure well without complication.     Laboratory Review:  NA    Imaging Review:  MRI brain 1/8/2019  MRI soft tissue neck 1/8/2019  Impression:  1. No evidence of acute infarction or intracranial hemorrhage.  2. No abnormal enhancing lesions intracranially.  3. Head MRA demonstrates no definite aneurysm or stenosis of the major  intracranial arteries.  4. Neck MRA demonstrates patent major cervical arteries.  5. No evidence for arterial dissection.    Pathology Review:  NA    Assessment/Medical Decision Making:  Allergic rhinitis  Rightward septal deviation  Nasal congestion  Inferior turbinate hypertrophy    Plan:  I performed bilateral endoscopy today. I recommended treating her allergies with oral antihistamine and a topical nasal steroid such as  Flonase. I see no untoward injury of the nasal cavity that would have resulted from her prior COVID swab. She can follow up with me as needed.    Remy Guillaume MD    [facility]  Rhinology  Endoscopic Skull Base Surgery  [facility]  Department of Otolaryngology - Head & Neck Surgery    Scribe Disclosure:  I, Miguelito Ty, am serving as a scribe to document services personally performed by Remy Guillaume MD at this visit, based upon the provider's statements to me. All documentation has been reviewed by the aforementioned provider prior to being entered into the official medical record.       Again, thank you for allowing me to participate in the care of your patient.      Sincerely,    Remy Guillaume MD

## 2023-02-02 ENCOUNTER — DOCUMENTATION ONLY (OUTPATIENT)
Dept: FAMILY MEDICINE | Facility: CLINIC | Age: 73
End: 2023-02-02
Payer: MEDICARE

## 2023-02-02 NOTE — PROGRESS NOTES
Type of Form Received: notes    Form Received (Date) 2/2/23   Form Filled out Yes 2/7/23   Placed in provider folder Yes

## 2023-02-24 ENCOUNTER — TELEPHONE (OUTPATIENT)
Dept: GASTROENTEROLOGY | Facility: CLINIC | Age: 73
End: 2023-02-24

## 2023-02-24 ENCOUNTER — OFFICE VISIT (OUTPATIENT)
Dept: FAMILY MEDICINE | Facility: CLINIC | Age: 73
End: 2023-02-24
Payer: MEDICARE

## 2023-02-24 VITALS
HEIGHT: 64 IN | WEIGHT: 130 LBS | OXYGEN SATURATION: 99 % | SYSTOLIC BLOOD PRESSURE: 129 MMHG | BODY MASS INDEX: 22.2 KG/M2 | DIASTOLIC BLOOD PRESSURE: 80 MMHG | HEART RATE: 72 BPM

## 2023-02-24 DIAGNOSIS — M54.42 CHRONIC BILATERAL LOW BACK PAIN WITH LEFT-SIDED SCIATICA: Primary | ICD-10-CM

## 2023-02-24 DIAGNOSIS — G89.29 CHRONIC BILATERAL LOW BACK PAIN WITH LEFT-SIDED SCIATICA: Primary | ICD-10-CM

## 2023-02-24 DIAGNOSIS — R39.9 LOWER URINARY TRACT SYMPTOMS (LUTS): ICD-10-CM

## 2023-02-24 DIAGNOSIS — M48.062 SPINAL STENOSIS OF LUMBAR REGION WITH NEUROGENIC CLAUDICATION: ICD-10-CM

## 2023-02-24 DIAGNOSIS — R19.4 CHANGE IN BOWEL HABIT: ICD-10-CM

## 2023-02-24 PROCEDURE — 99215 OFFICE O/P EST HI 40 MIN: CPT | Performed by: FAMILY MEDICINE

## 2023-02-24 ASSESSMENT — ANXIETY QUESTIONNAIRES
2. NOT BEING ABLE TO STOP OR CONTROL WORRYING: NOT AT ALL
6. BECOMING EASILY ANNOYED OR IRRITABLE: NOT AT ALL
1. FEELING NERVOUS, ANXIOUS, OR ON EDGE: MORE THAN HALF THE DAYS
3. WORRYING TOO MUCH ABOUT DIFFERENT THINGS: NOT AT ALL
GAD7 TOTAL SCORE: 2
GAD7 TOTAL SCORE: 2
7. FEELING AFRAID AS IF SOMETHING AWFUL MIGHT HAPPEN: NOT AT ALL
5. BEING SO RESTLESS THAT IT IS HARD TO SIT STILL: NOT AT ALL

## 2023-02-24 ASSESSMENT — ASTHMA QUESTIONNAIRES: ACT_TOTALSCORE: 15

## 2023-02-24 ASSESSMENT — PATIENT HEALTH QUESTIONNAIRE - PHQ9
5. POOR APPETITE OR OVEREATING: NOT AT ALL
SUM OF ALL RESPONSES TO PHQ QUESTIONS 1-9: 3

## 2023-02-24 NOTE — NURSING NOTE
Jennifer Steiner is a 72 year old female patient that presents today in clinic for the following:    Chief Complaint   Patient presents with     Follow Up     Pt would like to follow up on back injury     The patient's allergies and medications were reviewed as noted. A set of vitals were recorded as noted without incident. The patient does not have any other questions for the provider.    Jennifer Ly, EMT at 12:27 PM on 2/24/2023

## 2023-02-24 NOTE — PROGRESS NOTES
Assessment & Plan     Chronic bilateral low back pain with left-sided sciatica  Long talk about limiting slowly worse symptoms,  eval/treat options. Spine MD, non surg. Pain Clinic, we select this location as it has a therapy pool in a part of the [facility] she can get to on the bus  - Spine  Referral; Future  - Pain Management  Referral; Future    Spinal stenosis of lumbar region with neurogenic claudication  Same  - Spine  Referral; Future  - Pain Management  Referral; Future    Change in bowel habit  She prefers see GI, hard to do studies ordered  - Adult GI  Referral - Consult Only; Future    Lower urinary tract symptoms (LUTS)  Requests Dr Vasquez leon  - Adult Urology  Referral; Future      44 minutes spent on the date of the encounter doing chart review, history and exam, documentation and further activities per the note    No follow-ups on file.    Milan Alvarez MD  [facility] [place]    Maral Rodriguez is a 72 year old, presenting for the following health issues:  Follow Up (Pt would like to follow up on back injury)      HPI   Discussed spine pain at length  Eval treat options  Will refer to spine clinic here, and [facility] in hopes she can use their warm saltwater therapy pool for pool PT, fairly handy for her to get to  Pain is limiting comfort and function, reviewed she likely has sciatica and stenosis sx     GI: chronic symptoms cannot do egd/colonoscopy as no / to wait for her, she'd like instead to see GI provider   Per pt no weight change   Ongoing loose stools all winter  She'd like f/u Dr Ovalle/[facility], chronic mild LUTS symptosm  Past Medical History:   Diagnosis Date     Anemia      Asthma     since childhood     Blepharitis      Constipation, chronic     Chronic enema use     CVA (cerebral infarction) 2009    carotid intimal repair, left     Hyperlipidemia age 58    identified age 58      Hypothyroidism      Irritable bowel disease      Mixed type age-related cataract, both eyes      Neck injuries      Pelvic floor dysfunction 2010     Postmenopausal hormone replacement therapy     From Dr. Link     Scoliosis      Past Surgical History:   Procedure Laterality Date     CYSTOSCOPY N/A 3/25/2019    Procedure: CYSTOSCOPY;  Surgeon: Radha Ovalle MD;  [facility]     DILATION AND CURETTAGE, OPERATIVE HYSTEROSCOPY WITH MORCELLATOR, COMBINED N/A 3/25/2019    Procedure: OPERATIVE HYSTEROSCOPY WITH MORCELLATOR;  Surgeon: Marybeth Huber MD;  [facility]     HC BREATH HYDROGEN TEST  11/11/2013    Procedure: HYDROGEN BREATH TEST;  Surgeon: Esteban Short MD;  [facility]     PHACOEMULSIFICATION WITH STANDARD INTRAOCULAR LENS IMPLANT Left 8/15/2022    Procedure: LEFT EYE PHACOEMULSIFICATION, CATARACT, WITH STANDARD INTRAOCULAR LENS IMPLANT INSERTION;  Surgeon: Damir Cunningham MD;  Location: [facility] OR     PHACOEMULSIFICATION WITH STANDARD INTRAOCULAR LENS IMPLANT Right 9/12/2022    Procedure: RIGHT EYE PHACOEMULSIFICATION, CATARACT, WITH TORIC  INTRAOCULAR LENS IMPLANT INSERTION;  Surgeon: Damir Cunningham MD;  Location: [facility] OR     SURGICAL HISTORY OF -       tonsillectomy     SURGICAL HISTORY OF -   ~1997    cosmetic skin removal: breast reduction; skin removal from arms legs and tummy [facility] STOMACH SURGERY PROCEDURE UNLISTED       Current Outpatient Medications   Medication     artificial tears OINT ophthalmic ointment     Ascorbic Acid (VITAMIN C) 100 MG CHEW     Charcoal Activated (CHARCOAL PO)     Cholecalciferol (VITAMIN D) 2000 UNIT tablet     Digestive Enzymes TABS     DiphenhydrAMINE HCl (BENADRYL PO)     EPINEPHrine (EPIPEN) 0.3 MG/0.3ML injection 2-pack     estradiol (ESTRACE) 0.1 MG/GM vaginal cream     fluocinolone (SYNALAR) 0.01 % solution     HYDROCHLORIC ACID     hydrocortisone (CORTEF) 10 MG tablet     Hypromellose (ARTIFICIAL TEARS OP)      "ketoconazole (NIZORAL) 2 % external cream     MAGNESIUM OXIDE PO     neomycin-polymyxin-dexamethasone (MAXITROL) 3.5-66011-2.1 SUSP ophthalmic susp     neomycin-polymyxin-dexamethasone (MAXITROL) 3.5-72423-9.1 SUSP ophthalmic susp     Nettle (Urtica Dioica) (NETTLE LEAF) 435 MG CAPS     peppermint oil liquid     Probiotic Product (ALOE 86745 & PROBIOTICS) CAPS     progesterone (PROMETRIUM) 200 MG capsule     progesterone (PROMETRIUM) 200 MG capsule     Quercetin 50 MG TABS     Testosterone Propionate (FIRST-TESTOSTERONE MC) 2 % CREA     thyroid (ARMOUR) 60 MG tablet     Turmeric 500 MG CAPS     zinc gluconate 50 MG tablet     Omega-3 Fatty Acids (FISH OIL PO)     No current facility-administered medications for this visit.     Allergies   Allergen Reactions     Aspirin GI Disturbance     GI upset     Bee Venom Swelling     Birds [Feathers]      Cats      Dust Mites      Fluoride Other (See Comments) and Unknown     headaches     Liothyronine Unknown     Severe gut reactions, ears itching, throat closing     Mold      No Clinical Screening - See Comments Other (See Comments)     Some antibiotics but not Zpak-Can tolerate Azithromycin  z-pack, antibiotics in general- per patient.      Procaine Other (See Comments)     headache     Seasonal Allergies      Ragweed, pollen     Simvastatin Other (See Comments)     \"hot flash\", muscle weakness, dizziness     Penicillins Unknown and Rash     As a child     Sulfa Drugs Itching, Other (See Comments), Rash and Hives             Review of Systems         Objective    /80 (BP Location: Right arm, Patient Position: Sitting, Cuff Size: Adult Regular)   Pulse 72   Ht 1.613 m (5' 3.5\")   Wt 59 kg (130 lb)   LMP  (LMP Unknown)   SpO2 99%   BMI 22.67 kg/m    Body mass index is 22.67 kg/m .  Physical Exam   GENERAL: healthy, alert and no distress  MS: no gross musculoskeletal defects noted, no edema              "
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
## 2023-02-27 NOTE — TELEPHONE ENCOUNTER
Called and talked to Pt about scheduling for a sooner GI appointment. An appointment with Doretha Soriano PA-C on 3/14/2023, in person, was offered but Pt declined. Pt would prefer to be scheduled with an MD.     Writer will connect with Pt is there are any cancellations that comes up. Pt agreed with the plan.

## 2023-03-02 NOTE — TELEPHONE ENCOUNTER
Called and left message for Pt. Writer offered Pt an appointment in 3/8/2023 at 8am with Dr. Katerina Arvizu for either in person or virtual.    Writer informed Pt that the appointment time will be held of the Pt until Monday 3/6/2023 at 4pm. After that, the appointment may or may not be available anymore.

## 2023-03-04 NOTE — TELEPHONE ENCOUNTER
MEDICAL RECORDS REQUEST   [facility]  Urology Clinic  [address] [place] [address]  PHONE: [phone]  Fax: [phone]        FUTURE VISIT INFORMATION                                                   Jennifer Steiner, : 1950 scheduled for future visit at [facility]    APPOINTMENT INFORMATION:    Date: 2023    Provider:  Radha Ovalle MD    Reason for Visit/Diagnosis: Lower urinary tract symptoms    REFERRAL INFORMATION:    Referring provider:  Milan Alvarez MD in [facility]    RECORDS REQUESTED FOR VISIT                                                     NOTES  STATUS/DETAILS   OFFICE NOTE from referring provider  yes, 2023 -- Milan Alvarez MD in [facility]   MEDICATION LIST  yes   LABS     URINALYSIS (UA)  no     PRE-VISIT CHECKLIST      Record collection complete Yes   Appointment appropriately scheduled           (right time/right provider) Yes   Joint diagnostic appointment coordinated correctly          (ensure right order & amount of time) Yes   MyChart activation If no, please explain pending   Questionnaire complete If no, please explain pending

## 2023-03-13 ENCOUNTER — OFFICE VISIT (OUTPATIENT)
Dept: ANESTHESIOLOGY | Facility: CLINIC | Age: 73
End: 2023-03-13
Attending: FAMILY MEDICINE
Payer: MEDICARE

## 2023-03-13 VITALS
WEIGHT: 133 LBS | HEIGHT: 64 IN | HEART RATE: 74 BPM | SYSTOLIC BLOOD PRESSURE: 99 MMHG | DIASTOLIC BLOOD PRESSURE: 69 MMHG | OXYGEN SATURATION: 98 % | BODY MASS INDEX: 22.71 KG/M2

## 2023-03-13 DIAGNOSIS — M54.16 LUMBAR RADICULOPATHY: Primary | ICD-10-CM

## 2023-03-13 DIAGNOSIS — G89.29 CHRONIC BILATERAL LOW BACK PAIN WITH LEFT-SIDED SCIATICA: ICD-10-CM

## 2023-03-13 DIAGNOSIS — M54.42 CHRONIC BILATERAL LOW BACK PAIN WITH LEFT-SIDED SCIATICA: ICD-10-CM

## 2023-03-13 DIAGNOSIS — M48.062 SPINAL STENOSIS OF LUMBAR REGION WITH NEUROGENIC CLAUDICATION: ICD-10-CM

## 2023-03-13 PROCEDURE — 99204 OFFICE O/P NEW MOD 45 MIN: CPT | Mod: GC | Performed by: ANESTHESIOLOGY

## 2023-03-13 ASSESSMENT — ENCOUNTER SYMPTOMS
HYPERTENSION: 0
BOWEL INCONTINENCE: 0
RECTAL PAIN: 1
STIFFNESS: 1
EYE PAIN: 1
NECK PAIN: 1
DYSURIA: 0
WEAKNESS: 1
DOUBLE VISION: 0
BLOOD IN STOOL: 0
NUMBNESS: 1
SYNCOPE: 0
ORTHOPNEA: 1
LEG PAIN: 1
DIARRHEA: 1
COUGH DISTURBING SLEEP: 1
POSTURAL DYSPNEA: 1
BLOATING: 1
SORE THROAT: 0
TASTE DISTURBANCE: 0
TROUBLE SWALLOWING: 0
PARALYSIS: 0
PALPITATIONS: 0
SHORTNESS OF BREATH: 1
EYE REDNESS: 1
EXERCISE INTOLERANCE: 0
HEMATURIA: 0
HOARSE VOICE: 0
SINUS CONGESTION: 1
DIFFICULTY URINATING: 0
LIGHT-HEADEDNESS: 1
SLEEP DISTURBANCES DUE TO BREATHING: 0
HYPOTENSION: 1
MYALGIAS: 1
MUSCLE CRAMPS: 1
COUGH: 1
FLANK PAIN: 1
SNORES LOUDLY: 0
TINGLING: 0
NECK MASS: 0
NAUSEA: 1
SEIZURES: 0
HEADACHES: 1
DISTURBANCES IN COORDINATION: 1
MUSCLE WEAKNESS: 1
CONSTIPATION: 1
JOINT SWELLING: 1
SPEECH CHANGE: 0
SWOLLEN GLANDS: 0
BACK PAIN: 1
VOMITING: 0
HEMOPTYSIS: 0
EYE IRRITATION: 1
DIZZINESS: 1
TREMORS: 0
MEMORY LOSS: 1
ABDOMINAL PAIN: 1
SPUTUM PRODUCTION: 1
JAUNDICE: 0
SINUS PAIN: 1
DYSPNEA ON EXERTION: 1
HEARTBURN: 1
ARTHRALGIAS: 1
SMELL DISTURBANCE: 0
BRUISES/BLEEDS EASILY: 1
WHEEZING: 1
EYE WATERING: 1

## 2023-03-13 ASSESSMENT — PAIN SCALES - GENERAL: PAINLEVEL: SEVERE PAIN (7)

## 2023-03-13 NOTE — NURSING NOTE
EMT reviewed AVS with patient. Patient to contact clinic with any questions/concerns.  Patient verbalized understanding.    Andrews Martinez, EMT

## 2023-03-13 NOTE — PROGRESS NOTES
Pain Clinic New Patient Consult Note:    Referring Provider: Antonio   Primary care provider: Milan Alvarez    Jennifer Steiner is a 72 year old y.o. old female who presents to the pain clinic with low back/left hip pain. She fell on her back in her 30's which was the first time she hurt it. Recently she moved and had to take care of her mother (who recently passed at 99). She had been moving a heavy box in May 2022 when she heard a pop in her left hip resulting in horrible pain and it shot down her left leg. She currently feels only about 40% of the way back to how she did before injuring her back last year. She believes the pain is coming from her hip or at least the snapping/popping sound is. Given her severe GI issues, she is unable to take an oral medications.    She denies changes in B/B, or acute weakness.    HPI:  Patient Supplied Answers To the [facility] Pain Questionnaire  [facility] -  Patient Entered Questionnaire/Answers 3/13/2023   What number best describes your pain right now:  0 = No pain  to  10 = Worst pain imaginable 7   How would you describe the pain? burning, cramping, sharp, numbness, dull, aching, throbbing, pressure   Which of the following worsen your pain? standing, sitting, walking, exercise   Which of the following improve or reduce your pain? lying down   What number best describes your average pain for the past week:  0 = No pain  to  10 = Worst pain imaginable 7   What number best describes your LOWEST pain in past 24 hours:  0 = No pain  to  10 = Worst pain imaginable 3   What number best describes your WORST pain in past 24 hours:  0 = No pain  to  10 = Worst pain imaginable 7   When is your pain worst? Constant   What non-medicine treatments have you already had for your pain? physical therapy, acupuncture, chiropractic care     Pain treatments:  Herbal medicines: Yes  Physical therapy: Yes  Chiropractor: Yes  Pain physician: No  Surgery: No  Biofeedback: No  Acupuncture: Yes    No  tests/Imaging reviewed with the patient      Significant Medical History:   Past Medical History:   Diagnosis Date     Anemia      Asthma     since childhood     Blepharitis      Constipation, chronic     Chronic enema use     CVA (cerebral infarction) 2009    carotid intimal repair, left     Hyperlipidemia age 58    identified age 58     Hypothyroidism      Irritable bowel disease      Mixed type age-related cataract, both eyes      Neck injuries      Pelvic floor dysfunction 2010     Postmenopausal hormone replacement therapy     From Dr. Link     Scoliosis           Past Surgical History:  Past Surgical History:   Procedure Laterality Date     CYSTOSCOPY N/A 3/25/2019    Procedure: CYSTOSCOPY;  Surgeon: Radha Ovalle MD;  Location: [facility]     DILATION AND CURETTAGE, OPERATIVE HYSTEROSCOPY WITH MORCELLATOR, COMBINED N/A 3/25/2019    Procedure: OPERATIVE HYSTEROSCOPY WITH MORCELLATOR;  Surgeon: Marybeth Huber MD;  Location: [facility]     HC BREATH HYDROGEN TEST  11/11/2013    Procedure: HYDROGEN BREATH TEST;  Surgeon: Esteban Short MD;  Location: [facility]     PHACOEMULSIFICATION WITH STANDARD INTRAOCULAR LENS IMPLANT Left 8/15/2022    Procedure: LEFT EYE PHACOEMULSIFICATION, CATARACT, WITH STANDARD INTRAOCULAR LENS IMPLANT INSERTION;  Surgeon: Damir Cunningham MD;  Location: [facility] OR     PHACOEMULSIFICATION WITH STANDARD INTRAOCULAR LENS IMPLANT Right 9/12/2022    Procedure: RIGHT EYE PHACOEMULSIFICATION, CATARACT, WITH TORIC  INTRAOCULAR LENS IMPLANT INSERTION;  Surgeon: Damir Cunningham MD;  Location: [facility] OR     SURGICAL HISTORY OF -       tonsillectomy     SURGICAL HISTORY OF -   ~1997    cosmetic skin removal: breast reduction; skin removal from arms legs and tummy tuck     [facility] STOMACH SURGERY PROCEDURE UNLISTED            Family History:  Family History   Problem Relation Age of Onset     Melanoma Mother      Skin Cancer Mother      Osteoporosis Mother      Diabetes Father       Gastrointestinal Disease Father 82         after surgery for Bowel obstruction     Obesity Father      Substance Abuse Sister      Cancer Sister      Cancer Sister 56        bone cancer     Anxiety Disorder Sister      Alcoholism Sister      Bone Cancer Sister      Alcohol/Drug Brother      Gastrointestinal Disease Brother         Hepatitis     Heart Disease Brother 62        hx of drug abuse,  age 62     Diabetes Brother      Skin Cancer Brother      Substance Abuse Brother      Allergies Brother      Anesthesia Reaction Brother         severe PRIMITIVO, effected surgery     Asthma Brother      Diabetes Brother      Obesity Brother      Alcoholism Brother      Colon Cancer No family hx of      Crohn's Disease No family hx of      Ulcerative Colitis No family hx of      Colon Polyps No family hx of      Glaucoma No family hx of      Macular Degeneration No family hx of      Deep Vein Thrombosis (DVT) No family hx of           Social History:  Social History     Socioeconomic History     Marital status: Single     Spouse name: Not on file     Number of children: Not on file     Years of education: Not on file     Highest education level: Not on file   Occupational History     Occupation: disability     Comment:    Tobacco Use     Smoking status: Never     Smokeless tobacco: Never   Substance and Sexual Activity     Alcohol use: No     Drug use: No     Sexual activity: Not Currently     Birth control/protection: Post-menopausal   Other Topics Concern     Parent/sibling w/ CABG, MI or angioplasty before 65F 55M? Not Asked   Social History Narrative     Not on file     Social Determinants of Health     Financial Resource Strain: Not on file   Food Insecurity: Not on file   Transportation Needs: Not on file   Physical Activity: Not on file   Stress: Not on file   Social Connections: Not on file   Intimate Partner Violence: Not on file   Housing Stability: Not on file     Social History     Social  "History Narrative     Not on file          Allergies:  Allergies   Allergen Reactions     Aspirin GI Disturbance     GI upset     Bee Venom Swelling     Birds [Feathers]      Cats      Dust Mites      Fluoride Other (See Comments) and Unknown     headaches     Liothyronine Unknown     Severe gut reactions, ears itching, throat closing     Mold      No Clinical Screening - See Comments Other (See Comments)     Some antibiotics but not Zpak-Can tolerate Azithromycin  z-pack, antibiotics in general- per patient.      Procaine Other (See Comments)     headache     Seasonal Allergies      Ragweed, pollen     Simvastatin Other (See Comments)     \"hot flash\", muscle weakness, dizziness     Penicillins Unknown and Rash     As a child     Sulfa Drugs Itching, Other (See Comments), Rash and Hives       Current Medications:   Current Outpatient Medications   Medication Sig Dispense Refill     artificial tears OINT ophthalmic ointment Use in both eyes at night 14 g 11     Ascorbic Acid (VITAMIN C) 100 MG CHEW Take by mouth every morning       Charcoal Activated (CHARCOAL PO) Take 2 capsules by mouth daily as needed For indigestion       Cholecalciferol (VITAMIN D) 2000 UNIT tablet Take 1 tablet by mouth every morning       Digestive Enzymes TABS 3 times daily (with meals) Use as directed       DiphenhydrAMINE HCl (BENADRYL PO) Take by mouth daily as needed       EPINEPHrine (EPIPEN) 0.3 MG/0.3ML injection 2-pack Inject 0.3 mLs (0.3 mg) into the muscle once as needed for anaphylaxis 0.3 mL 1     estradiol (ESTRACE) 0.1 MG/GM vaginal cream Place 0.5 g vaginally twice a week Use one to two times weekly.       fluocinolone (SYNALAR) 0.01 % solution Apply topically 2 times daily To the scalp as needed for itching. 60 mL 3     HYDROCHLORIC ACID 3 times daily (with meals) Take as directed       hydrocortisone (CORTEF) 10 MG tablet        Hypromellose (ARTIFICIAL TEARS OP) Apply 1 drop to eye daily as needed       ketoconazole " (NIZORAL) 2 % external cream Apply topically daily To feet and in between the toes 60 g 3     MAGNESIUM OXIDE PO Take 6-8 tablets by mouth every evening       neomycin-polymyxin-dexamethasone (MAXITROL) 3.5-82489-8.1 SUSP ophthalmic susp Place 1-2 drops into both eyes At Bedtime 5 mL 3     neomycin-polymyxin-dexamethasone (MAXITROL) 3.5-34189-7.1 SUSP ophthalmic susp Place 1 drop into both eyes 2 times daily as needed (as needed for worsening eye symptoms) 5 mL 1     Nettle (Urtica Dioica) (NETTLE LEAF) 435 MG CAPS Take by mouth every morning       Omega-3 Fatty Acids (FISH OIL PO) Take by mouth as needed       peppermint oil liquid 1 mL daily as needed for other Taking gel tabs not oil, not in EMR.       Probiotic Product (ALOE 21218 & PROBIOTICS) CAPS Take by mouth every morning       progesterone (PROMETRIUM) 200 MG capsule Take 200 mg by mouth every evening       progesterone (PROMETRIUM) 200 MG capsule Take 200 mg by mouth every evening       Quercetin 50 MG TABS Take 50 mg by mouth every morning       Testosterone Propionate (FIRST-TESTOSTERONE MC) 2 % CREA        thyroid (ARMOUR) 60 MG tablet Take 60 mg by mouth every morning        Turmeric 500 MG CAPS Take by mouth every morning       zinc gluconate 50 MG tablet Take 50 mg by mouth every morning            Current Pain Medications:  Medications related to Pain Management (From now, onward)    None           Past Pain Medications:  No    Blood thinner:  No    Current work status: Retired    Review of Systems:  Review of Systems   HENT: Positive for ear discharge, ear pain, sinus pain and tinnitus. Negative for hearing loss, nosebleeds and sore throat.    Eyes: Positive for pain and redness. Negative for double vision.   Respiratory: Positive for cough, sputum production, shortness of breath and wheezing. Negative for hemoptysis.    Cardiovascular: Positive for orthopnea. Negative for chest pain and palpitations.   Gastrointestinal: Positive for abdominal  "pain, constipation, diarrhea, heartburn and nausea. Negative for blood in stool, melena and vomiting.   Genitourinary: Positive for flank pain. Negative for dysuria, hematuria and urgency.   Musculoskeletal: Positive for back pain, myalgias and neck pain.   Neurological: Positive for dizziness, weakness and headaches. Negative for tingling, tremors, speech change and seizures.   Endo/Heme/Allergies: Bruises/bleeds easily.   Psychiatric/Behavioral: Positive for memory loss.         Physical Exam:     Vitals:    03/13/23 1317   BP: 99/69   BP Location: Right arm   Patient Position: Chair   Cuff Size: Adult Large   Pulse: 74   SpO2: 98%   Weight: 60.3 kg (133 lb)   Height: 1.613 m (5' 3.5\")       General Appearance: No distress, seated comfortably  Mood: Euthymic  HE ENT: Non constricted pupils  Respiratory: Non labored breathing  CVS: Regular rate and rhythm  GI: Soft, non distended, no TTP  Skin: No rashes over exposed skin  Neuromuscular: BL LE 5/5, Sensation diminished on LLE in no specific myotome, DTR 1/4 BL LE  Gait: non-antalgic, ambulates without assistance    Pain specific exam:  SLR positive on left  Seated slump positive on left  KAY, distraction, compression, thigh thrust negative BL    Laboratory results:  Recent Labs   Lab Test 10/20/22  1549 08/17/21  1350    143   POTASSIUM 4.6 4.4   CHLORIDE 107 110*   CO2 25 26   ANIONGAP 8 7   GLC 97 94   BUN 9.3 5*   CR 0.65 0.70   TED 9.7 9.3       CBC RESULTS:   Recent Labs   Lab Test 10/20/22  1549   WBC 7.7   RBC 4.04   HGB 12.4   HCT 38.5   MCV 95   MCH 30.7   MCHC 32.2   RDW 14.1            Imaging:       ASSESSMENT AND PLAN:     Encounter Diagnosis:  Lumbar radiculopathy  Spinal stenosis  Lumbar spondylosis    Jennifer Steiner is a 72 year old y.o. old female who presents to the pain clinic with low back/left hip pain.    I have summarized the patient s past medical history, discussed their clinical findings and the potential differential " diagnosis with the patient. Significant past medical history pertinent to the patient s current condition includes back pain.  The clinical findings reveal left sided lumbar radiculopathy. The differential diagnosis discussed with the patient are listed above. I have discussed anatomy and possible sources of the pain using models and/or pictures (diagrams). I have discussed multi- disciplinary pain management options withthe patient as pertaining to their case as detailed above. The pain management options we discussed included, but were not limited to the recommendations below.  I also discussed with patient the risks, benefits and alternatives to each pain management option.  All of the patient s questions and concerns were answered to the best of my ability.    RECOMMENDATIONS:     1. Medications:  - No medications at this time    2. Procedure:   - We discussed and recommended interlaminar JONATHAN however she is not interested at this time. She will proceed with physical therapy and if she is failing to make adequate progress she will return for evaluation of LESI.    3. Physical therapy: I have refered the patient for outpatient physical therapy for stretching, strengthening and home exercise program for lumbar radiculopathy. The patient will also discuss spine care and posture. Pool therapy and stretches can be considered if available.    4. No new imaging     Follow up: As needed in future if pain worsens or changes in character      Milan Pfeiffer M.D.  [facility] Pain Fellow        Answers for HPI/ROS submitted by the patient on 3/13/2023  General Symptoms: No  Skin Symptoms: No  HENT Symptoms: Yes  EYE SYMPTOMS: Yes  HEART SYMPTOMS: Yes  LUNG SYMPTOMS: Yes  INTESTINAL SYMPTOMS: Yes  URINARY SYMPTOMS: Yes  GYNECOLOGIC SYMPTOMS: No  BREAST SYMPTOMS: No  SKELETAL SYMPTOMS: Yes  BLOOD SYMPTOMS: Yes  NERVOUS SYSTEM SYMPTOMS: Yes  MENTAL HEALTH SYMPTOMS: No  Congestion: Yes  Trouble swallowing: No   Voice hoarseness:  No  Mouth sores: No  Tooth pain: No  Gum tenderness: Yes  Bleeding gums: Yes  Change in taste: No  Change in sense of smell: No  Dry mouth: Yes  Hearing aid used: No  Neck lump: No  Vision loss: No  Dry eyes: Yes  Watery eyes: Yes  Eye bulging: No  Flashing of lights: No  Spots: No  Floaters: No  Crossed eyes: No  Tunnel Vision: No  Yellowing of eyes: No  Eye irritation: Yes  Pain in legs with walking: Yes  Fingers or toes appear blue: No  High blood pressure: No  Low blood pressure: Yes  Fainting: No  Murmurs: No  Pacemaker: No  Varicose veins: No  Wake up at night with shortness of breath: No  Light-headedness: Yes  Exercise intolerance: No  Snoring: No  Difficulty breathing on exertion: Yes  Nighttime Cough: Yes  Difficulty breathing when lying flat: Yes  Bloating: Yes  Rectal or Anal pain: Yes  Fecal incontinence: No  Yellowing of skin or eyes: No  Vomit with blood: No  Change in stools: Yes  Trouble holding urine or incontinence: No  Increased frequency of urination: Yes  Decreased frequency of urination: No  Frequent nighttime urination: No  Difficulty emptying bladder: No  Swollen joints: Yes  Joint pain: Yes  Bone pain: Yes  Muscle cramps: Yes  Muscle weakness: Yes  Joint stiffness: Yes  Bone fracture: No  Anemia: Yes  Swollen glands: No  Trouble with coordination: Yes  Difficulty walking: Yes  Paralysis: No  Numbness: Yes    Conflicting answers have been found for some questions. Please document the patient's answers manually.     ATTENDING ATTESTATION  I saw the patient along with the pain medicine fellow Dr. Milan Bar. Please see his note above for full details. I was involved in gathering history, physical examination and development of the plan of care.

## 2023-03-13 NOTE — NURSING NOTE
Patient presents with:  Consult: Consult Lower Back, left hip Pain       Severe Pain (7)     Ask patient to put onw a gown    What medications are you using for pain? Heating pad    (New patients only) Have you been seen by another pain clinic/ provider? no    (Return Patients only) What refills are you needing today? No    Expectations  Want to have her MRI explained to her and like some input on the hip or if she needs more x-rays or another MRI - some options

## 2023-03-13 NOTE — LETTER
3/13/2023       RE: Jennifer Steiner  [address]  [facility] [address]     Dear Colleague,    Thank you for referring your patient, Jennifer Steiner, to the [facility] [place] [facility]. Please see a copy of my visit note below.      Pain Clinic New Patient Consult Note:    Referring Provider: Antonio   Primary care provider: Milan Alvarez    Jennifer Steiner is a 72 year old y.o. old female who presents to the pain clinic with low back/left hip pain. She fell on her back in her 30's which was the first time she hurt it. Recently she moved and had to take care of her mother (who recently passed at 99). She had been moving a heavy box in May 2022 when she heard a pop in her left hip resulting in horrible pain and it shot down her left leg. She currently feels only about 40% of the way back to how she did before injuring her back last year. She believes the pain is coming from her hip or at least the snapping/popping sound is. Given her severe GI issues, she is unable to take an oral medications.    She denies changes in B/B, or acute weakness.    HPI:  Patient Supplied Answers To the [facility] Pain Questionnaire  [facility] -  Patient Entered Questionnaire/Answers 3/13/2023   What number best describes your pain right now:  0 = No pain  to  10 = Worst pain imaginable 7   How would you describe the pain? burning, cramping, sharp, numbness, dull, aching, throbbing, pressure   Which of the following worsen your pain? standing, sitting, walking, exercise   Which of the following improve or reduce your pain? lying down   What number best describes your average pain for the past week:  0 = No pain  to  10 = Worst pain imaginable 7   What number best describes your LOWEST pain in past 24 hours:  0 = No pain  to  10 = Worst pain imaginable 3   What number best describes your WORST pain in past 24 hours:  0  = No pain  to  10 = Worst pain imaginable 7   When is your pain worst? Constant   What non-medicine treatments have you already had for your pain? physical therapy, acupuncture, chiropractic care     Pain treatments:  Herbal medicines: Yes  Physical therapy: Yes  Chiropractor: Yes  Pain physician: No  Surgery: No  Biofeedback: No  Acupuncture: Yes    No tests/Imaging reviewed with the patient      Significant Medical History:   Past Medical History:   Diagnosis Date     Anemia      Asthma     since childhood     Blepharitis      Constipation, chronic     Chronic enema use     CVA (cerebral infarction) 2009    carotid intimal repair, left     Hyperlipidemia age 58    identified age 58     Hypothyroidism      Irritable bowel disease      Mixed type age-related cataract, both eyes      Neck injuries      Pelvic floor dysfunction 2010     Postmenopausal hormone replacement therapy     From Dr. Link     Scoliosis           Past Surgical History:  Past Surgical History:   Procedure Laterality Date     CYSTOSCOPY N/A 3/25/2019    Procedure: CYSTOSCOPY;  Surgeon: Radha Ovalle MD;  Location: [facility]     DILATION AND CURETTAGE, OPERATIVE HYSTEROSCOPY WITH MORCELLATOR, COMBINED N/A 3/25/2019    Procedure: OPERATIVE HYSTEROSCOPY WITH MORCELLATOR;  Surgeon: Marybeth Huber MD;  Location: [facility] OR     HC BREATH HYDROGEN TEST  11/11/2013    Procedure: HYDROGEN BREATH TEST;  Surgeon: Esteban Short MD;  Location: [facility]     PHACOEMULSIFICATION WITH STANDARD INTRAOCULAR LENS IMPLANT Left 8/15/2022    Procedure: LEFT EYE PHACOEMULSIFICATION, CATARACT, WITH STANDARD INTRAOCULAR LENS IMPLANT INSERTION;  Surgeon: Damir Cunningham MD;  Location: [facility] OR     PHACOEMULSIFICATION WITH STANDARD INTRAOCULAR LENS IMPLANT Right 9/12/2022    Procedure: RIGHT EYE PHACOEMULSIFICATION, CATARACT, WITH TORIC  INTRAOCULAR LENS IMPLANT INSERTION;  Surgeon: Damir Cunningham MD;  Location: [facility] OR     SURGICAL HISTORY OF -        tonsillectomy     SURGICAL HISTORY OF -   ~    cosmetic skin removal: breast reduction; skin removal from arms legs and tummy tuck     [facility] STOMACH SURGERY PROCEDURE UNLISTED            Family History:  Family History   Problem Relation Age of Onset     Melanoma Mother      Skin Cancer Mother      Osteoporosis Mother      Diabetes Father      Gastrointestinal Disease Father 82         after surgery for Bowel obstruction     Obesity Father      Substance Abuse Sister      Cancer Sister      Cancer Sister 56        bone cancer     Anxiety Disorder Sister      Alcoholism Sister      Bone Cancer Sister      Alcohol/Drug Brother      Gastrointestinal Disease Brother         Hepatitis     Heart Disease Brother 62        hx of drug abuse,  age 62     Diabetes Brother      Skin Cancer Brother      Substance Abuse Brother      Allergies Brother      Anesthesia Reaction Brother         severe PRIMITIVO, effected surgery     Asthma Brother      Diabetes Brother      Obesity Brother      Alcoholism Brother      Colon Cancer No family hx of      Crohn's Disease No family hx of      Ulcerative Colitis No family hx of      Colon Polyps No family hx of      Glaucoma No family hx of      Macular Degeneration No family hx of      Deep Vein Thrombosis (DVT) No family hx of           Social History:  Social History     Socioeconomic History     Marital status: Single     Spouse name: Not on file     Number of children: Not on file     Years of education: Not on file     Highest education level: Not on file   Occupational History     Occupation: disability     Comment:    Tobacco Use     Smoking status: Never     Smokeless tobacco: Never   Substance and Sexual Activity     Alcohol use: No     Drug use: No     Sexual activity: Not Currently     Birth control/protection: Post-menopausal   Other Topics Concern     Parent/sibling w/ CABG, MI or angioplasty before 65F 55M? Not Asked   Social History Narrative     Not  "on file     Social Determinants of Health     Financial Resource Strain: Not on file   Food Insecurity: Not on file   Transportation Needs: Not on file   Physical Activity: Not on file   Stress: Not on file   Social Connections: Not on file   Intimate Partner Violence: Not on file   Housing Stability: Not on file     Social History     Social History Narrative     Not on file          Allergies:  Allergies   Allergen Reactions     Aspirin GI Disturbance     GI upset     Bee Venom Swelling     Birds [Feathers]      Cats      Dust Mites      Fluoride Other (See Comments) and Unknown     headaches     Liothyronine Unknown     Severe gut reactions, ears itching, throat closing     Mold      No Clinical Screening - See Comments Other (See Comments)     Some antibiotics but not Zpak-Can tolerate Azithromycin  z-pack, antibiotics in general- per patient.      Procaine Other (See Comments)     headache     Seasonal Allergies      Ragweed, pollen     Simvastatin Other (See Comments)     \"hot flash\", muscle weakness, dizziness     Penicillins Unknown and Rash     As a child     Sulfa Drugs Itching, Other (See Comments), Rash and Hives       Current Medications:   Current Outpatient Medications   Medication Sig Dispense Refill     artificial tears OINT ophthalmic ointment Use in both eyes at night 14 g 11     Ascorbic Acid (VITAMIN C) 100 MG CHEW Take by mouth every morning       Charcoal Activated (CHARCOAL PO) Take 2 capsules by mouth daily as needed For indigestion       Cholecalciferol (VITAMIN D) 2000 UNIT tablet Take 1 tablet by mouth every morning       Digestive Enzymes TABS 3 times daily (with meals) Use as directed       DiphenhydrAMINE HCl (BENADRYL PO) Take by mouth daily as needed       EPINEPHrine (EPIPEN) 0.3 MG/0.3ML injection 2-pack Inject 0.3 mLs (0.3 mg) into the muscle once as needed for anaphylaxis 0.3 mL 1     estradiol (ESTRACE) 0.1 MG/GM vaginal cream Place 0.5 g vaginally twice a week Use one to two " times weekly.       fluocinolone (SYNALAR) 0.01 % solution Apply topically 2 times daily To the scalp as needed for itching. 60 mL 3     HYDROCHLORIC ACID 3 times daily (with meals) Take as directed       hydrocortisone (CORTEF) 10 MG tablet        Hypromellose (ARTIFICIAL TEARS OP) Apply 1 drop to eye daily as needed       ketoconazole (NIZORAL) 2 % external cream Apply topically daily To feet and in between the toes 60 g 3     MAGNESIUM OXIDE PO Take 6-8 tablets by mouth every evening       neomycin-polymyxin-dexamethasone (MAXITROL) 3.5-12604-1.1 SUSP ophthalmic susp Place 1-2 drops into both eyes At Bedtime 5 mL 3     neomycin-polymyxin-dexamethasone (MAXITROL) 3.5-92458-4.1 SUSP ophthalmic susp Place 1 drop into both eyes 2 times daily as needed (as needed for worsening eye symptoms) 5 mL 1     Nettle (Urtica Dioica) (NETTLE LEAF) 435 MG CAPS Take by mouth every morning       Omega-3 Fatty Acids (FISH OIL PO) Take by mouth as needed       peppermint oil liquid 1 mL daily as needed for other Taking gel tabs not oil, not in EMR.       Probiotic Product (ALOE 71035 & PROBIOTICS) CAPS Take by mouth every morning       progesterone (PROMETRIUM) 200 MG capsule Take 200 mg by mouth every evening       progesterone (PROMETRIUM) 200 MG capsule Take 200 mg by mouth every evening       Quercetin 50 MG TABS Take 50 mg by mouth every morning       Testosterone Propionate (FIRST-TESTOSTERONE MC) 2 % CREA        thyroid (ARMOUR) 60 MG tablet Take 60 mg by mouth every morning        Turmeric 500 MG CAPS Take by mouth every morning       zinc gluconate 50 MG tablet Take 50 mg by mouth every morning            Current Pain Medications:  Medications related to Pain Management (From now, onward)    None           Past Pain Medications:  No    Blood thinner:  No    Current work status: Retired    Review of Systems:  Review of Systems   HENT: Positive for ear discharge, ear pain, sinus pain and tinnitus. Negative for hearing loss,  "nosebleeds and sore throat.    Eyes: Positive for pain and redness. Negative for double vision.   Respiratory: Positive for cough, sputum production, shortness of breath and wheezing. Negative for hemoptysis.    Cardiovascular: Positive for orthopnea. Negative for chest pain and palpitations.   Gastrointestinal: Positive for abdominal pain, constipation, diarrhea, heartburn and nausea. Negative for blood in stool, melena and vomiting.   Genitourinary: Positive for flank pain. Negative for dysuria, hematuria and urgency.   Musculoskeletal: Positive for back pain, myalgias and neck pain.   Neurological: Positive for dizziness, weakness and headaches. Negative for tingling, tremors, speech change and seizures.   Endo/Heme/Allergies: Bruises/bleeds easily.   Psychiatric/Behavioral: Positive for memory loss.         Physical Exam:     Vitals:    03/13/23 1317   BP: 99/69   BP Location: Right arm   Patient Position: Chair   Cuff Size: Adult Large   Pulse: 74   SpO2: 98%   Weight: 60.3 kg (133 lb)   Height: 1.613 m (5' 3.5\")       General Appearance: No distress, seated comfortably  Mood: Euthymic  HE ENT: Non constricted pupils  Respiratory: Non labored breathing  CVS: Regular rate and rhythm  GI: Soft, non distended, no TTP  Skin: No rashes over exposed skin  Neuromuscular: BL LE 5/5, Sensation diminished on LLE in no specific myotome, DTR 1/4 BL LE  Gait: non-antalgic, ambulates without assistance    Pain specific exam:  SLR positive on left  Seated slump positive on left  KAY, distraction, compression, thigh thrust negative BL    Laboratory results:  Recent Labs   Lab Test 10/20/22  1549 08/17/21  1350    143   POTASSIUM 4.6 4.4   CHLORIDE 107 110*   CO2 25 26   ANIONGAP 8 7   GLC 97 94   BUN 9.3 5*   CR 0.65 0.70   TED 9.7 9.3       CBC RESULTS:   Recent Labs   Lab Test 10/20/22  1549   WBC 7.7   RBC 4.04   HGB 12.4   HCT 38.5   MCV 95   MCH 30.7   MCHC 32.2   RDW 14.1            Imaging:   "     ASSESSMENT AND PLAN:     Encounter Diagnosis:  Lumbar radiculopathy  Spinal stenosis  Lumbar spondylosis    Jennifer Steiner is a 72 year old y.o. old female who presents to the pain clinic with low back/left hip pain.    I have summarized the patient s past medical history, discussed their clinical findings and the potential differential diagnosis with the patient. Significant past medical history pertinent to the patient s current condition includes back pain.  The clinical findings reveal left sided lumbar radiculopathy. The differential diagnosis discussed with the patient are listed above. I have discussed anatomy and possible sources of the pain using models and/or pictures (diagrams). I have discussed multi- disciplinary pain management options withthe patient as pertaining to their case as detailed above. The pain management options we discussed included, but were not limited to the recommendations below.  I also discussed with patient the risks, benefits and alternatives to each pain management option.  All of the patient s questions and concerns were answered to the best of my ability.    RECOMMENDATIONS:     1. Medications:  - No medications at this time    2. Procedure:   - We discussed and recommended interlaminar JONATHAN however she is not interested at this time. She will proceed with physical therapy and if she is failing to make adequate progress she will return for evaluation of LESI.    3. Physical therapy: I have refered the patient for outpatient physical therapy for stretching, strengthening and home exercise program for lumbar radiculopathy. The patient will also discuss spine care and posture. Pool therapy and stretches can be considered if available.    4. No new imaging     Follow up: As needed in future if pain worsens or changes in character      Milan Pfeiffer M.D.  N Pain Fellow        Answers for HPI/ROS submitted by the patient on 3/13/2023  General Symptoms: No  Skin Symptoms:  No  HENT Symptoms: Yes  EYE SYMPTOMS: Yes  HEART SYMPTOMS: Yes  LUNG SYMPTOMS: Yes  INTESTINAL SYMPTOMS: Yes  URINARY SYMPTOMS: Yes  GYNECOLOGIC SYMPTOMS: No  BREAST SYMPTOMS: No  SKELETAL SYMPTOMS: Yes  BLOOD SYMPTOMS: Yes  NERVOUS SYSTEM SYMPTOMS: Yes  MENTAL HEALTH SYMPTOMS: No  Congestion: Yes  Trouble swallowing: No   Voice hoarseness: No  Mouth sores: No  Tooth pain: No  Gum tenderness: Yes  Bleeding gums: Yes  Change in taste: No  Change in sense of smell: No  Dry mouth: Yes  Hearing aid used: No  Neck lump: No  Vision loss: No  Dry eyes: Yes  Watery eyes: Yes  Eye bulging: No  Flashing of lights: No  Spots: No  Floaters: No  Crossed eyes: No  Tunnel Vision: No  Yellowing of eyes: No  Eye irritation: Yes  Pain in legs with walking: Yes  Fingers or toes appear blue: No  High blood pressure: No  Low blood pressure: Yes  Fainting: No  Murmurs: No  Pacemaker: No  Varicose veins: No  Wake up at night with shortness of breath: No  Light-headedness: Yes  Exercise intolerance: No  Snoring: No  Difficulty breathing on exertion: Yes  Nighttime Cough: Yes  Difficulty breathing when lying flat: Yes  Bloating: Yes  Rectal or Anal pain: Yes  Fecal incontinence: No  Yellowing of skin or eyes: No  Vomit with blood: No  Change in stools: Yes  Trouble holding urine or incontinence: No  Increased frequency of urination: Yes  Decreased frequency of urination: No  Frequent nighttime urination: No  Difficulty emptying bladder: No  Swollen joints: Yes  Joint pain: Yes  Bone pain: Yes  Muscle cramps: Yes  Muscle weakness: Yes  Joint stiffness: Yes  Bone fracture: No  Anemia: Yes  Swollen glands: No  Trouble with coordination: Yes  Difficulty walking: Yes  Paralysis: No  Numbness: Yes    Conflicting answers have been found for some questions. Please document the patient's answers manually.     ATTENDING ATTESTATION  I saw the patient along with the pain medicine fellow Dr. Milan Bar. Please see his note above for full details. I  was involved in gathering history, physical examination and development of the plan of care.         Sincerely,    Deanna Taylor MD

## 2023-03-13 NOTE — PATIENT INSTRUCTIONS
Referrals:    Physical Therapy Referral placed for lumbar radiculopathy  If you have not heard from the scheduling office within 2 business days, please call [phone] for all locations.    Recommended Follow up:      Follow up as needed.      To speak with a nurse, schedule/reschedule/cancel a clinic appointment, or request a medication refill call: [phone].    You can also reach us by [employer]: [email or web address]

## 2023-03-16 NOTE — TELEPHONE ENCOUNTER
Called and spoked with Pt. Writer offered an appointment with Dr. Annette Kerns on 3/22/2023 at 8am for a video visit. Pt is unable to do video visits due to lack of technology accessibility (no computer, Pt has a flip phone). Pt also said she can't do morning appointments due to lack of transportation.    Writer and Pt agreed to touch base if anything comes up sooner with Pt's preference. Until then, Pt's original appointment on 5/23/2023 will be kept as is.

## 2023-03-24 ENCOUNTER — TRANSFERRED RECORDS (OUTPATIENT)
Dept: HEALTH INFORMATION MANAGEMENT | Facility: CLINIC | Age: 73
End: 2023-03-24
Payer: MEDICARE

## 2023-03-28 ENCOUNTER — PATIENT OUTREACH (OUTPATIENT)
Dept: DERMATOLOGY | Facility: CLINIC | Age: 73
End: 2023-03-28
Payer: MEDICARE

## 2023-03-28 NOTE — LETTER
March 28, 2023          Jennifer Steiner  [address]  [place] [address]        Dear Jennifer Steiner:    We recently reviewed your medical records from [facility] and found that you are due for an appointment with Dede Ghosh PA-C in December.  Our office has attempted to reach you via telephone and left a message.    At your earliest convenience, please call the clinic at [phone] to arrange the recommended exam and possible testing.  Please kindly mention this letter when calling so that your appointment(s) can be accurately scheduled.      Sincerely,       The Clinic Staff        Medical Record Number:  [id number]

## 2023-03-28 NOTE — TELEPHONE ENCOUNTER
Attempted to reach patient to schedule follow up in the Dermatology Clinic.  No answer,  LM on VM to call office and Letter mailed..    Schedule with Dede Ghosh PA-C 12/2023.

## 2023-04-17 ENCOUNTER — TRANSFERRED RECORDS (OUTPATIENT)
Dept: HEALTH INFORMATION MANAGEMENT | Facility: CLINIC | Age: 73
End: 2023-04-17
Payer: MEDICARE

## 2023-04-20 ENCOUNTER — OFFICE VISIT (OUTPATIENT)
Dept: UROLOGY | Facility: CLINIC | Age: 73
End: 2023-04-20
Attending: FAMILY MEDICINE
Payer: MEDICARE

## 2023-04-20 ENCOUNTER — PRE VISIT (OUTPATIENT)
Dept: UROLOGY | Facility: CLINIC | Age: 73
End: 2023-04-20

## 2023-04-20 VITALS
HEART RATE: 76 BPM | BODY MASS INDEX: 23.05 KG/M2 | DIASTOLIC BLOOD PRESSURE: 75 MMHG | SYSTOLIC BLOOD PRESSURE: 123 MMHG | HEIGHT: 64 IN | WEIGHT: 135 LBS

## 2023-04-20 DIAGNOSIS — K58.1 IRRITABLE BOWEL SYNDROME WITH CONSTIPATION: Primary | ICD-10-CM

## 2023-04-20 DIAGNOSIS — R39.9 LOWER URINARY TRACT SYMPTOMS (LUTS): ICD-10-CM

## 2023-04-20 DIAGNOSIS — M79.18 MYALGIA OF PELVIC FLOOR: ICD-10-CM

## 2023-04-20 DIAGNOSIS — R19.8 ABNORMAL DEFECATION: ICD-10-CM

## 2023-04-20 DIAGNOSIS — M62.89 HIGH-TONE PELVIC FLOOR DYSFUNCTION IN FEMALE: ICD-10-CM

## 2023-04-20 PROCEDURE — 51798 US URINE CAPACITY MEASURE: CPT | Performed by: UROLOGY

## 2023-04-20 PROCEDURE — 99203 OFFICE O/P NEW LOW 30 MIN: CPT | Mod: 25 | Performed by: UROLOGY

## 2023-04-20 ASSESSMENT — PAIN SCALES - GENERAL: PAINLEVEL: MILD PAIN (3)

## 2023-04-20 NOTE — PROGRESS NOTES
April 20, 2023    Referring Provider: Milan Alvarez MD  [address] [place] [address]    Primary Care Provider: Milan Alvarez    Assessment & Plan     Lower urinary tract symptoms (LUTS)  Stable at this time.  Recommend PT  - Adult Urology  Referral  - POST-VOID RESIDUAL BLADDER SCAN  - Physical Therapy Referral; Future  - Physical Therapy Referral; Future    Irritable bowel syndrome with constipation  Needs to see GI  - Physical Therapy Referral; Future  - Physical Therapy Referral; Future    Abnormal defecation  Given the exam and symptoms recommend to first have her rectal bleeding assessed  - Physical Therapy Referral; Future  - Physical Therapy Referral; Future    Myalgia of pelvic floor/High-tone pelvic floor dysfunction in female  We discussed how her pelvic floor symptoms are related to the physical exam findings and her pelvic floor myofascial dysfunction.  We discussed how the recommended treatment is dedicated pelvic floor therapy.  We discussed how the pelvic floor physical therapy works and patient is agreeable.  Referral was placed.      - Physical Therapy Referral; Future  - Physical Therapy Referral; Future    Return in about 6 months (around 10/20/2023).    30 minutes were spent on this day of the encounter in reviewing the EMR including Dr Alvarez's notes, direct patient care, coordination of care and documentation    Radha Ovalle MD MPH  (she/her/hers)   of Urology  [facility]      HPI:  Jennifer Steiner is a 73 year old female who presents for evaluation of her pelvic floor symptoms.  She is here today for the very same symptoms as when she was last here 4/2019.  She wants to know if she can have repeat pelvic floor testing.  Her urinary symptoms are stable but her bigger complaints are more the continued issues with BMs and rectal bleeding.  She has an appointment with gastroenterology next month.  Denies gross hematuria, UTI    Past  Medical History:   Diagnosis Date     Anemia      Asthma     since childhood     Blepharitis      Constipation, chronic     Chronic enema use     CVA (cerebral infarction) 2009    carotid intimal repair, left     Hyperlipidemia age 58    identified age 58     Hypothyroidism      Irritable bowel disease      Mixed type age-related cataract, both eyes      Neck injuries      Pelvic floor dysfunction 2010     Postmenopausal hormone replacement therapy     From Dr. Link     Scoliosis      Past Surgical History:   Procedure Laterality Date     CYSTOSCOPY N/A 3/25/2019    Procedure: CYSTOSCOPY;  Surgeon: Radha Ovalle MD;  [facility]     DILATION AND CURETTAGE, OPERATIVE HYSTEROSCOPY WITH MORCELLATOR, COMBINED N/A 3/25/2019    Procedure: OPERATIVE HYSTEROSCOPY WITH MORCELLATOR;  Surgeon: Marybeth Huber MD;  [facility]     HC BREATH HYDROGEN TEST  11/11/2013    Procedure: HYDROGEN BREATH TEST;  Surgeon: Esteban Short MD;  [facility]     PHACOEMULSIFICATION WITH STANDARD INTRAOCULAR LENS IMPLANT Left 8/15/2022    Procedure: LEFT EYE PHACOEMULSIFICATION, CATARACT, WITH STANDARD INTRAOCULAR LENS IMPLANT INSERTION;  Surgeon: Damir Cunningham MD;  Location: [facility] OR     PHACOEMULSIFICATION WITH STANDARD INTRAOCULAR LENS IMPLANT Right 9/12/2022    Procedure: RIGHT EYE PHACOEMULSIFICATION, CATARACT, WITH TORIC  INTRAOCULAR LENS IMPLANT INSERTION;  Surgeon: Damir Cunningham MD;  Location: [facility] OR     SURGICAL HISTORY OF -       tonsillectomy     SURGICAL HISTORY OF -   ~1997    cosmetic skin removal: breast reduction; skin removal from arms legs and tummy tuck     ZZC STOMACH SURGERY PROCEDURE UNLISTED       Social History     Socioeconomic History     Marital status: Single     Spouse name: Not on file     Number of children: Not on file     Years of education: Not on file     Highest education level: Not on file   Occupational History     Occupation: disability     Comment:     Tobacco Use     Smoking status: Never     Smokeless tobacco: Never   Vaping Use     Vaping status: Not on file   Substance and Sexual Activity     Alcohol use: No     Drug use: No     Sexual activity: Not Currently     Birth control/protection: Post-menopausal   Other Topics Concern     Parent/sibling w/ CABG, MI or angioplasty before 65F 55M? Not Asked   Social History Narrative     Not on file     Social Determinants of Health     Financial Resource Strain: Not on file   Food Insecurity: Not on file   Transportation Needs: Not on file   Physical Activity: Not on file   Stress: Not on file   Social Connections: Not on file   Intimate Partner Violence: Not on file   Housing Stability: Not on file     Family History   Problem Relation Age of Onset     Melanoma Mother      Skin Cancer Mother      Osteoporosis Mother      Diabetes Father      Gastrointestinal Disease Father 82         after surgery for Bowel obstruction     Obesity Father      Substance Abuse Sister      Cancer Sister      Cancer Sister 56        bone cancer     Anxiety Disorder Sister      Alcoholism Sister      Bone Cancer Sister      Alcohol/Drug Brother      Gastrointestinal Disease Brother         Hepatitis     Heart Disease Brother 62        hx of drug abuse,  age 62     Diabetes Brother      Skin Cancer Brother      Substance Abuse Brother      Allergies Brother      Anesthesia Reaction Brother         severe PRIMITIVO, effected surgery     Asthma Brother      Diabetes Brother      Obesity Brother      Alcoholism Brother      Colon Cancer No family hx of      Crohn's Disease No family hx of      Ulcerative Colitis No family hx of      Colon Polyps No family hx of      Glaucoma No family hx of      Macular Degeneration No family hx of      Deep Vein Thrombosis (DVT) No family hx of      ROS    Allergies   Allergen Reactions     Aspirin GI Disturbance     GI upset     Bee Venom Swelling     Birds [Feathers]      Cats       "Dust Mites      Fluoride Other (See Comments) and Unknown     headaches     Liothyronine Unknown     Severe gut reactions, ears itching, throat closing     Mold      No Clinical Screening - See Comments Other (See Comments)     Some antibiotics but not Zpak-Can tolerate Azithromycin  z-pack, antibiotics in general- per patient.      Procaine Other (See Comments)     headache     Seasonal Allergies      Ragweed, pollen     Simvastatin Other (See Comments)     \"hot flash\", muscle weakness, dizziness     Penicillins Unknown and Rash     As a child     Sulfa Drugs Itching, Other (See Comments), Rash and Hives     Current Outpatient Medications   Medication     artificial tears OINT ophthalmic ointment     Ascorbic Acid (VITAMIN C) 100 MG CHEW     Charcoal Activated (CHARCOAL PO)     Cholecalciferol (VITAMIN D) 2000 UNIT tablet     Digestive Enzymes TABS     DiphenhydrAMINE HCl (BENADRYL PO)     EPINEPHrine (EPIPEN) 0.3 MG/0.3ML injection 2-pack     estradiol (ESTRACE) 0.1 MG/GM vaginal cream     fluocinolone (SYNALAR) 0.01 % solution     HYDROCHLORIC ACID     hydrocortisone (CORTEF) 10 MG tablet     Hypromellose (ARTIFICIAL TEARS OP)     ketoconazole (NIZORAL) 2 % external cream     MAGNESIUM OXIDE PO     neomycin-polymyxin-dexamethasone (MAXITROL) 3.5-88456-2.1 SUSP ophthalmic susp     neomycin-polymyxin-dexamethasone (MAXITROL) 3.5-72670-2.1 SUSP ophthalmic susp     Nettle (Urtica Dioica) (NETTLE LEAF) 435 MG CAPS     Omega-3 Fatty Acids (FISH OIL PO)     peppermint oil liquid     Probiotic Product (ALOE 48312 & PROBIOTICS) CAPS     progesterone (PROMETRIUM) 200 MG capsule     progesterone (PROMETRIUM) 200 MG capsule     Quercetin 50 MG TABS     Testosterone Propionate (FIRST-TESTOSTERONE MC) 2 % CREA     thyroid (ARMOUR) 60 MG tablet     Turmeric 500 MG CAPS     zinc gluconate 50 MG tablet     No current facility-administered medications for this visit.     Ht 1.613 m (5' 3.5\")   Wt 61.2 kg (135 lb)   LMP  (LMP " Unknown)   BMI 23.54 kg/m    GENERAL: healthy, alert and no distress  EYES: Eyes grossly normal to inspection, conjunctivae and sclerae normal  HENT: normal cephalic/atraumatic.  External ears, nose and mouth without ulcers or lesions.  RESP: no audible wheeze, cough, or visible cyanosis.  No visible retractions or increased work of breathing.  Able to speak fully in complete sentences.  NEURO: Cranial nerves grossly intact, mentation intact and speech normal  PSYCH: mentation appears normal, affect normal/bright, judgement and insight intact, normal speech and appearance well-groomed  : Single digit pelvic exam performed remarkable for very high tone pelvic floor with myofascial tenderness of the pelvic floor    PVR 1 mL by bladder scan    CC  Patient Care Team:  Milan Alvarez MD as PCP - General (Family Medicine)  Ladonna Reaves MD as MD (Specialist)  Samantha Figueroa APRN CNP as Nurse Practitioner (Nurse Practitioner)  Calderon La MD as MD (Cardiology)  Damir Cunningham MD as MD (Ophthalmology)  Thelma Arvizu MD as MD (Internal Medicine)  Kristan Kaba, RN as Registered Nurse  Radha Ovalle MD as MD (Urology)  Gricelda Cabezas, RN as Registered Nurse (Urology)  Pancho Santos OD as MD (Optometry)  Milan Alvarez MD as Referring Physician (Family Practice)  Kim Herrmann APRN CNP as Nurse Practitioner (Nurse Practitioner)  Milan Alvarez MD as Assigned PCP  Dede Ghosh PA-C as Physician Assistant (Dermatology)  Damir Cunningham MD as Assigned Surgical Provider  Remy Guillaume MD as MD (Otolaryngology)  oPp Muller DPM as Assigned Musculoskeletal Provider  Armando Jaramillo MD as Fellow (Internal Medicine)  MILAN ALVAREZ

## 2023-04-20 NOTE — PATIENT INSTRUCTIONS
Websites with free information:    [employer] patient website: www.[email or web address]    Total Control Program: www.totalcontrolprogram.com    Please see one of the dedicated pelvic floor physical therapist. If you have not heard from the scheduling office within 2 business days, please call [phone] for sab[facility]    Please return to see me in 6 months, sooner if needed    It was a pleasure meeting with you today.  Thank you for allowing me and my team the privilege of caring for you today.  YOU are the reason we are here, and I truly hope we provided you with the excellent service you deserve.  Please let us know if there is anything else we can do for you so that we can be sure you are leaving completely satisfied with your care experience.

## 2023-04-20 NOTE — LETTER
4/20/2023       RE: Jennifer Steiner  [address]  [facility] [address]     Dear Colleague,    Thank you for referring your patient, Jennifer Steiner, to the [facility] [place] [facility]. Please see a copy of my visit note below.    April 20, 2023    Referring Provider: Milan Alvarez MD  [address] [place] [address]    Primary Care Provider: Milan Alvarez    Assessment & Plan     Lower urinary tract symptoms (LUTS)  Stable at this time.  Recommend PT  - Adult Urology  Referral  - POST-VOID RESIDUAL BLADDER SCAN  - Physical Therapy Referral; Future  - Physical Therapy Referral; Future    Irritable bowel syndrome with constipation  Needs to see GI  - Physical Therapy Referral; Future  - Physical Therapy Referral; Future    Abnormal defecation  Given the exam and symptoms recommend to first have her rectal bleeding assessed  - Physical Therapy Referral; Future  - Physical Therapy Referral; Future    Myalgia of pelvic floor/High-tone pelvic floor dysfunction in female  We discussed how her pelvic floor symptoms are related to the physical exam findings and her pelvic floor myofascial dysfunction.  We discussed how the recommended treatment is dedicated pelvic floor therapy.  We discussed how the pelvic floor physical therapy works and patient is agreeable.  Referral was placed.      - Physical Therapy Referral; Future  - Physical Therapy Referral; Future    Return in about 6 months (around 10/20/2023).    30 minutes were spent on this day of the encounter in reviewing the EMR including Dr Alvarez's notes, direct patient care, coordination of care and documentation    Radha Ovalle MD MPH  (she/her/hers)   of Urology  [facility]      HPI:  Jennifer Steiner is a 73 year old female who presents for evaluation of her pelvic floor symptoms.  She is here today for the  very same symptoms as when she was last here 4/2019.  She wants to know if she can have repeat pelvic floor testing.  Her urinary symptoms are stable but her bigger complaints are more the continued issues with BMs and rectal bleeding.  She has an appointment with gastroenterology next month.  Denies gross hematuria, UTI    Past Medical History:   Diagnosis Date    Anemia     Asthma     since childhood    Blepharitis     Constipation, chronic     Chronic enema use    CVA (cerebral infarction) 2009    carotid intimal repair, left    Hyperlipidemia age 58    identified age 58    Hypothyroidism     Irritable bowel disease     Mixed type age-related cataract, both eyes     Neck injuries     Pelvic floor dysfunction 2010    Postmenopausal hormone replacement therapy     From Dr. Link    Scoliosis      Past Surgical History:   Procedure Laterality Date    CYSTOSCOPY N/A 3/25/2019    Procedure: CYSTOSCOPY;  Surgeon: Radha Ovalle MD;  [facility]    DILATION AND CURETTAGE, OPERATIVE HYSTEROSCOPY WITH MORCELLATOR, COMBINED N/A 3/25/2019    Procedure: OPERATIVE HYSTEROSCOPY WITH MORCELLATOR;  Surgeon: Marybeth Huber MD;  [facility]    HC BREATH HYDROGEN TEST  11/11/2013    Procedure: HYDROGEN BREATH TEST;  Surgeon: Esteban Short MD;  [facility]    PHACOEMULSIFICATION WITH STANDARD INTRAOCULAR LENS IMPLANT Left 8/15/2022    Procedure: LEFT EYE PHACOEMULSIFICATION, CATARACT, WITH STANDARD INTRAOCULAR LENS IMPLANT INSERTION;  Surgeon: Damir Cunningham MD;  Location: [facility] OR    PHACOEMULSIFICATION WITH STANDARD INTRAOCULAR LENS IMPLANT Right 9/12/2022    Procedure: RIGHT EYE PHACOEMULSIFICATION, CATARACT, WITH TORIC  INTRAOCULAR LENS IMPLANT INSERTION;  Surgeon: Damir Cunningham MD;  Location: [facility] OR    SURGICAL HISTORY OF -       tonsillectomy    SURGICAL HISTORY OF -   ~1997    cosmetic skin removal: breast reduction; skin removal from arms legs and tummy tuck    [facility] STOMACH  SURGERY PROCEDURE UNLISTED       Social History     Socioeconomic History    Marital status: Single     Spouse name: Not on file    Number of children: Not on file    Years of education: Not on file    Highest education level: Not on file   Occupational History    Occupation: disability     Comment:    Tobacco Use    Smoking status: Never    Smokeless tobacco: Never   Vaping Use    Vaping status: Not on file   Substance and Sexual Activity    Alcohol use: No    Drug use: No    Sexual activity: Not Currently     Birth control/protection: Post-menopausal   Other Topics Concern    Parent/sibling w/ CABG, MI or angioplasty before 65F 55M? Not Asked   Social History Narrative    Not on file     Social Determinants of Health     Financial Resource Strain: Not on file   Food Insecurity: Not on file   Transportation Needs: Not on file   Physical Activity: Not on file   Stress: Not on file   Social Connections: Not on file   Intimate Partner Violence: Not on file   Housing Stability: Not on file     Family History   Problem Relation Age of Onset    Melanoma Mother     Skin Cancer Mother     Osteoporosis Mother     Diabetes Father     Gastrointestinal Disease Father 82         after surgery for Bowel obstruction    Obesity Father     Substance Abuse Sister     Cancer Sister     Cancer Sister 56        bone cancer    Anxiety Disorder Sister     Alcoholism Sister     Bone Cancer Sister     Alcohol/Drug Brother     Gastrointestinal Disease Brother         Hepatitis    Heart Disease Brother 62        hx of drug abuse,  age 62    Diabetes Brother     Skin Cancer Brother     Substance Abuse Brother     Allergies Brother     Anesthesia Reaction Brother         severe PRIMITIVO, effected surgery    Asthma Brother     Diabetes Brother     Obesity Brother     Alcoholism Brother     Colon Cancer No family hx of     Crohn's Disease No family hx of     Ulcerative Colitis No family hx of     Colon Polyps No family hx of   "   Glaucoma No family hx of     Macular Degeneration No family hx of     Deep Vein Thrombosis (DVT) No family hx of      ROS    Allergies   Allergen Reactions    Aspirin GI Disturbance     GI upset    Bee Venom Swelling    Birds [Feathers]     Cats     Dust Mites     Fluoride Other (See Comments) and Unknown     headaches    Liothyronine Unknown     Severe gut reactions, ears itching, throat closing    Mold     No Clinical Screening - See Comments Other (See Comments)     Some antibiotics but not Zpak-Can tolerate Azithromycin  z-pack, antibiotics in general- per patient.     Procaine Other (See Comments)     headache    Seasonal Allergies      Ragweed, pollen    Simvastatin Other (See Comments)     \"hot flash\", muscle weakness, dizziness    Penicillins Unknown and Rash     As a child    Sulfa Drugs Itching, Other (See Comments), Rash and Hives     Current Outpatient Medications   Medication    artificial tears OINT ophthalmic ointment    Ascorbic Acid (VITAMIN C) 100 MG CHEW    Charcoal Activated (CHARCOAL PO)    Cholecalciferol (VITAMIN D) 2000 UNIT tablet    Digestive Enzymes TABS    DiphenhydrAMINE HCl (BENADRYL PO)    EPINEPHrine (EPIPEN) 0.3 MG/0.3ML injection 2-pack    estradiol (ESTRACE) 0.1 MG/GM vaginal cream    fluocinolone (SYNALAR) 0.01 % solution    HYDROCHLORIC ACID    hydrocortisone (CORTEF) 10 MG tablet    Hypromellose (ARTIFICIAL TEARS OP)    ketoconazole (NIZORAL) 2 % external cream    MAGNESIUM OXIDE PO    neomycin-polymyxin-dexamethasone (MAXITROL) 3.5-48359-4.1 SUSP ophthalmic susp    neomycin-polymyxin-dexamethasone (MAXITROL) 3.5-87928-2.1 SUSP ophthalmic susp    Nettle (Urtica Dioica) (NETTLE LEAF) 435 MG CAPS    Omega-3 Fatty Acids (FISH OIL PO)    peppermint oil liquid    Probiotic Product (ALOE 71424 & PROBIOTICS) CAPS    progesterone (PROMETRIUM) 200 MG capsule    progesterone (PROMETRIUM) 200 MG capsule    Quercetin 50 MG TABS    Testosterone Propionate (FIRST-TESTOSTERONE MC) 2 % CREA " "   thyroid (ARMOUR) 60 MG tablet    Turmeric 500 MG CAPS    zinc gluconate 50 MG tablet     No current facility-administered medications for this visit.     Ht 1.613 m (5' 3.5\")   Wt 61.2 kg (135 lb)   LMP  (LMP Unknown)   BMI 23.54 kg/m    GENERAL: healthy, alert and no distress  EYES: Eyes grossly normal to inspection, conjunctivae and sclerae normal  HENT: normal cephalic/atraumatic.  External ears, nose and mouth without ulcers or lesions.  RESP: no audible wheeze, cough, or visible cyanosis.  No visible retractions or increased work of breathing.  Able to speak fully in complete sentences.  NEURO: Cranial nerves grossly intact, mentation intact and speech normal  PSYCH: mentation appears normal, affect normal/bright, judgement and insight intact, normal speech and appearance well-groomed  : Single digit pelvic exam performed remarkable for very high tone pelvic floor with myofascial tenderness of the pelvic floor    PVR 1 mL by bladder scan    CC  Patient Care Team:  Milan Alvarez MD as PCP - General (Family Medicine)  Ladonna Reaves MD as MD (Specialist)  Samantha Figueroa APRN CNP as Nurse Practitioner (Nurse Practitioner)  Calderon La MD as MD (Cardiology)  Damir Cunningham MD as MD (Ophthalmology)  Thelma Arvizu MD as MD (Internal Medicine)  Kristan Kaba, ZULEYMA as Registered Nurse  Radha Ovalle MD as MD (Urology)  Gricelda Cabezas, RN as Registered Nurse (Urology)  Pancho Santos OD as MD (Optometry)  Milan Alvarez MD as Referring Physician (Family Practice)  Kim Herrmann APRN CNP as Nurse Practitioner (Nurse Practitioner)  Milan Alvarez MD as Assigned PCP  Dede Ghosh PA-C as Physician Assistant (Dermatology)  Damir Cunningham MD as Assigned Surgical Provider  Remy Guillaume MD as MD (Otolaryngology)  Pop Muller DPM as Assigned Musculoskeletal Provider  Armando Jaramillo MD as Fellow (Internal " Medicine)  [place]

## 2023-04-20 NOTE — NURSING NOTE
"Chief Complaint   Patient presents with     Consult     Discuss PFPT, seeing BI for pain, IBS, would like testing for GI problems        Blood pressure 123/75, pulse 76, height 1.613 m (5' 3.5\"), weight 61.2 kg (135 lb), not currently breastfeeding. Body mass index is 23.54 kg/m .    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     Pelvic floor dysfunction     Myalgia of pelvic floor     Other constipation     Abnormal defecation     Chronic abdominal pain     Backache     Chronic fatigue syndrome     Food intolerance     Generalized pain     Hemorrhoids     Major depressive disorder, recurrent episode (H)     Symptomatic menopausal or female climacteric states     History of hematuria     Mixed type age-related cataract, both eyes       Allergies   Allergen Reactions     Aspirin GI Disturbance     GI upset     Bee Venom Swelling     Birds [Feathers]      Cats      Dust Mites      Fluoride Other (See Comments) and Unknown     headaches     Liothyronine Unknown     Severe gut reactions, ears itching, throat closing     Mold      No Clinical Screening - See Comments Other (See Comments)     Some antibiotics but not Zpak-Can tolerate Azithromycin  z-pack, antibiotics in general- per patient.      Procaine Other (See Comments)     headache     Seasonal Allergies      Ragweed, pollen     Simvastatin Other (See Comments)     \"hot flash\", muscle weakness, dizziness     Penicillins Unknown and Rash     As a child     Sulfa Drugs Itching, Other (See Comments), Rash and " Hives       Current Outpatient Medications   Medication Sig Dispense Refill     estradiol (ESTRACE) 0.1 MG/GM vaginal cream Place 0.5 g vaginally twice a week Use one to two times weekly.       progesterone (PROMETRIUM) 200 MG capsule Take 200 mg by mouth every evening       Testosterone Propionate (FIRST-TESTOSTERONE MC) 2 % CREA        thyroid (ARMOUR) 60 MG tablet Take 60 mg by mouth every morning        artificial tears OINT ophthalmic ointment Use in both eyes at night 14 g 11     Ascorbic Acid (VITAMIN C) 100 MG CHEW Take by mouth every morning       Charcoal Activated (CHARCOAL PO) Take 2 capsules by mouth daily as needed For indigestion       Cholecalciferol (VITAMIN D) 2000 UNIT tablet Take 1 tablet by mouth every morning       Digestive Enzymes TABS 3 times daily (with meals) Use as directed       DiphenhydrAMINE HCl (BENADRYL PO) Take by mouth daily as needed       EPINEPHrine (EPIPEN) 0.3 MG/0.3ML injection 2-pack Inject 0.3 mLs (0.3 mg) into the muscle once as needed for anaphylaxis 0.3 mL 1     fluocinolone (SYNALAR) 0.01 % solution Apply topically 2 times daily To the scalp as needed for itching. 60 mL 3     HYDROCHLORIC ACID 3 times daily (with meals) Take as directed       hydrocortisone (CORTEF) 10 MG tablet        Hypromellose (ARTIFICIAL TEARS OP) Apply 1 drop to eye daily as needed       ketoconazole (NIZORAL) 2 % external cream Apply topically daily To feet and in between the toes 60 g 3     MAGNESIUM OXIDE PO Take 6-8 tablets by mouth every evening       neomycin-polymyxin-dexamethasone (MAXITROL) 3.5-99688-9.1 SUSP ophthalmic susp Place 1-2 drops into both eyes At Bedtime 5 mL 3     neomycin-polymyxin-dexamethasone (MAXITROL) 3.5-03182-7.1 SUSP ophthalmic susp Place 1 drop into both eyes 2 times daily as needed (as needed for worsening eye symptoms) 5 mL 1     Nettle (Urtica Dioica) (NETTLE LEAF) 435 MG CAPS Take by mouth every morning       Omega-3 Fatty Acids (FISH OIL PO) Take by mouth as  needed       peppermint oil liquid 1 mL daily as needed for other Taking gel tabs not oil, not in EMR.       Probiotic Product (ALOE 97380 & PROBIOTICS) CAPS Take by mouth every morning       progesterone (PROMETRIUM) 200 MG capsule Take 200 mg by mouth every evening       Quercetin 50 MG TABS Take 50 mg by mouth every morning       Turmeric 500 MG CAPS Take by mouth every morning       zinc gluconate 50 MG tablet Take 50 mg by mouth every morning         Social History     Tobacco Use     Smoking status: Never     Smokeless tobacco: Never   Substance Use Topics     Alcohol use: No     Drug use: No       Angel Alcantar  4/20/2023  11:34 AM

## 2023-04-21 ENCOUNTER — OFFICE VISIT (OUTPATIENT)
Dept: FAMILY MEDICINE | Facility: CLINIC | Age: 73
End: 2023-04-21
Payer: MEDICARE

## 2023-04-21 ENCOUNTER — TELEPHONE (OUTPATIENT)
Dept: OPHTHALMOLOGY | Facility: CLINIC | Age: 73
End: 2023-04-21

## 2023-04-21 VITALS — OXYGEN SATURATION: 99 % | SYSTOLIC BLOOD PRESSURE: 123 MMHG | DIASTOLIC BLOOD PRESSURE: 77 MMHG | HEART RATE: 74 BPM

## 2023-04-21 DIAGNOSIS — G89.29 CHRONIC BILATERAL LOW BACK PAIN WITH LEFT-SIDED SCIATICA: ICD-10-CM

## 2023-04-21 DIAGNOSIS — M53.3 SACROILIAC JOINT DYSFUNCTION: ICD-10-CM

## 2023-04-21 DIAGNOSIS — M25.552 HIP PAIN, LEFT: Primary | ICD-10-CM

## 2023-04-21 DIAGNOSIS — M54.42 CHRONIC BILATERAL LOW BACK PAIN WITH LEFT-SIDED SCIATICA: ICD-10-CM

## 2023-04-21 PROCEDURE — 99214 OFFICE O/P EST MOD 30 MIN: CPT | Performed by: FAMILY MEDICINE

## 2023-04-21 NOTE — NURSING NOTE
Jennifer Steiner is a 73 year old female that presents in clinic today for the following:     Chief Complaint   Patient presents with     Follow Up     Pt here for follow up; pt would like hip and SI joint looked at on left side       The patient's allergies and medications were reviewed. The patient's vitals were obtained without incident. The patient does not have any other questions for the provider.     Shahana Ragland, EMT at 1:10 PM on 4/21/2023.  Primary Care Clinic: [phone]

## 2023-04-21 NOTE — TELEPHONE ENCOUNTER
Called and spoke to Jennifer Rodriguez declined to see a different and only wants to see Dr. Cunningham. I added her to the waitlist, but she is requesting to be seen sooner       Arely Hedrick Communication Facilitator on 4/21/2023 at 3:15 PM

## 2023-04-21 NOTE — TELEPHONE ENCOUNTER
[facility] Call Center    Phone Message    May a detailed message be left on voicemail: yes     Reason for Call: Symptoms or Concerns     If patient has red-flag symptoms, warm transfer to triage line    Current symptom or concern: Pt reports over the past month she has noticed an increase in blurriness, burning and tearing.    Symptoms have been present for:  1 month(s)    Has patient previously been seen for this? Yes    By : Dr. Cunningham    Date: 12/20/2022    Are there any new or worsening symptoms? Yes: Increase in symptoms.      Action Taken: Message routed to:  [facility] E[facility]    Travel Screening: Not Applicable

## 2023-04-21 NOTE — PROGRESS NOTES
Assessment & Plan     Hip pain, left  See Sp Med  - Orthopedic  Referral; Future    See Sp Med/L hip, Spine low back, pain causes could be more than one we discuss, she'll continue [facility]pool PT    See GI as planned, keep Uro plans    See me in the summer    Review of external notes as documented elsewhere in note [facility]  31 minutes spent by me on the date of the encounter doing chart review, history and exam, documentation and further activities per the note    No follow-ups on file.    Milan Alvarez MD  [facility] [place]    Maral Rodriguez is a 73 year old, presenting for the following health issues:  Follow Up (Pt here for follow up; pt would like hip and SI joint looked at on left side)         View : No data to display.              HPI Here for a few things  One, sees GI soon, see my last note, sx persist  Two, went to [facility], they are starting pool PT  Three, saw pain clinic here, notes rvwd, she'll follow at [facility] for pain clinic  Four, did L hip, SI xrays last fall, she, after talking w/ PT, would like further eval of each, I suggest see [facility]erral [facility], for back pain/SI pain, and Sp Med for hip pain. Discussed difficulty of knowing of pain is from one/other/both  Five, working w/ U[facility] here, referred to pelvic floor  No acute c/o  Past Medical History:   Diagnosis Date     Anemia      Asthma     since childhood     Blepharitis      Constipation, chronic     Chronic enema use     CVA (cerebral infarction) 2009    carotid intimal repair, left     Hyperlipidemia age 58    identified age 58     Hypothyroidism      Irritable bowel disease      Mixed type age-related cataract, both eyes      Neck injuries      Pelvic floor dysfunction 2010     Postmenopausal hormone replacement therapy     From Dr. Link     Scoliosis      Past Surgical History:   Procedure Laterality Date     CYSTOSCOPY N/A 3/25/2019    Procedure: CYSTOSCOPY;  Surgeon: Radha Ovalle  MD;  Location: [facility] OR     DILATION AND CURETTAGE, OPERATIVE HYSTEROSCOPY WITH MORCELLATOR, COMBINED N/A 3/25/2019    Procedure: OPERATIVE HYSTEROSCOPY WITH MORCELLATOR;  Surgeon: Marybeth Huber MD;  Location: [facility] OR     HC BREATH HYDROGEN TEST  11/11/2013    Procedure: HYDROGEN BREATH TEST;  Surgeon: Esteban Short MD;  Location: [facility] GI     PHACOEMULSIFICATION WITH STANDARD INTRAOCULAR LENS IMPLANT Left 8/15/2022    Procedure: LEFT EYE PHACOEMULSIFICATION, CATARACT, WITH STANDARD INTRAOCULAR LENS IMPLANT INSERTION;  Surgeon: Damir Cunningham MD;  Location: [facility] OR     PHACOEMULSIFICATION WITH STANDARD INTRAOCULAR LENS IMPLANT Right 9/12/2022    Procedure: RIGHT EYE PHACOEMULSIFICATION, CATARACT, WITH TORIC  INTRAOCULAR LENS IMPLANT INSERTION;  Surgeon: Damir Cunningham MD;  Location: [facility] OR     SURGICAL HISTORY OF -       tonsillectomy     SURGICAL HISTORY OF -   ~1997    cosmetic skin removal: breast reduction; skin removal from arms legs and tummy tuck     [facility] STOMACH SURGERY PROCEDURE UNLISTED       Current Outpatient Medications   Medication     artificial tears OINT ophthalmic ointment     Ascorbic Acid (VITAMIN C) 100 MG CHEW     Charcoal Activated (CHARCOAL PO)     Cholecalciferol (VITAMIN D) 2000 UNIT tablet     Digestive Enzymes TABS     DiphenhydrAMINE HCl (BENADRYL PO)     EPINEPHrine (EPIPEN) 0.3 MG/0.3ML injection 2-pack     estradiol (ESTRACE) 0.1 MG/GM vaginal cream     fluocinolone (SYNALAR) 0.01 % solution     HYDROCHLORIC ACID     hydrocortisone (CORTEF) 10 MG tablet     Hypromellose (ARTIFICIAL TEARS OP)     ketoconazole (NIZORAL) 2 % external cream     MAGNESIUM OXIDE PO     neomycin-polymyxin-dexamethasone (MAXITROL) 3.5-67760-9.1 SUSP ophthalmic susp     neomycin-polymyxin-dexamethasone (MAXITROL) 3.5-80000-6.1 SUSP ophthalmic susp     Nettle (Urtica Dioica) (NETTLE LEAF) 435 MG CAPS     Omega-3 Fatty Acids (FISH OIL PO)     peppermint oil liquid     Probiotic Product (ALOE  "19113 & PROBIOTICS) CAPS     progesterone (PROMETRIUM) 200 MG capsule     progesterone (PROMETRIUM) 200 MG capsule     Quercetin 50 MG TABS     Testosterone Propionate (FIRST-TESTOSTERONE MC) 2 % CREA     thyroid (ARMOUR) 60 MG tablet     Turmeric 500 MG CAPS     zinc gluconate 50 MG tablet     No current facility-administered medications for this visit.     Allergies   Allergen Reactions     Aspirin GI Disturbance     GI upset     Bee Venom Swelling     Birds [Feathers]      Cats      Dust Mites      Fluoride Other (See Comments) and Unknown     headaches     Liothyronine Unknown     Severe gut reactions, ears itching, throat closing     Mold      No Clinical Screening - See Comments Other (See Comments)     Some antibiotics but not Zpak-Can tolerate Azithromycin  z-pack, antibiotics in general- per patient.      Procaine Other (See Comments)     headache     Seasonal Allergies      Ragweed, pollen     Simvastatin Other (See Comments)     \"hot flash\", muscle weakness, dizziness     Penicillins Unknown and Rash     As a child     Sulfa Drugs Itching, Other (See Comments), Rash and Hives         Review of Systems         Objective    /77 (BP Location: Right arm, Patient Position: Sitting, Cuff Size: Adult Regular)   Pulse 74   LMP  (LMP Unknown)   SpO2 99%   There is no height or weight on file to calculate BMI.  Physical Exam   GENERAL: healthy, alert and no distress  MS: no gross musculoskeletal defects noted, no edema              "

## 2023-04-25 NOTE — TELEPHONE ENCOUNTER
DIAGNOSIS: Hip pain, left [M25.552]  - Primary    APPOINTMENT DATE: 05/04/2023   NOTES STATUS DETAILS   OFFICE NOTE from referring provider Internal 04/21/2023 - Milan Alvarez - [facility]   OFFICE NOTE from other specialist [facility] 03/24/2023 - Jonatan Ulloa MA - [facility]   MEDICATION LIST Internal    LABS     DEXA PACS Internal   MRI PACS Internal   CT SCAN PACS Internal   XRAYS (IMAGES & REPORTS) PACS Internal

## 2023-04-26 DIAGNOSIS — H02.889 MEIBOMIAN GLAND DYSFUNCTION: ICD-10-CM

## 2023-04-27 DIAGNOSIS — M25.552 LEFT HIP PAIN: Primary | ICD-10-CM

## 2023-04-27 NOTE — TELEPHONE ENCOUNTER
neomycin-polymyxin-dexamethasone (MAXITROL) 3.5-87485-6.1 SUSP ophthalmic susp      Last Written Prescription Date:  10-  Last Fill Quantity: 5ml,   # refills: 1  Last Office Visit : 12-  Future Office visit:  5-3-2023    This is on the date 10- med,  There is also another for same med on 10- same med , different directions    Which one should patient be using and doing?

## 2023-04-28 RX ORDER — NEOMYCIN SULFATE, POLYMYXIN B SULFATE AND DEXAMETHASONE 3.5; 10000; 1 MG/ML; [USP'U]/ML; MG/ML
1 SUSPENSION/ DROPS OPHTHALMIC 2 TIMES DAILY PRN
Qty: 5 ML | OUTPATIENT
Start: 2023-04-28

## 2023-05-01 ENCOUNTER — DOCUMENTATION ONLY (OUTPATIENT)
Dept: GASTROENTEROLOGY | Facility: CLINIC | Age: 73
End: 2023-05-01
Payer: MEDICARE

## 2023-05-01 ENCOUNTER — PATIENT OUTREACH (OUTPATIENT)
Dept: GASTROENTEROLOGY | Facility: CLINIC | Age: 73
End: 2023-05-01
Payer: MEDICARE

## 2023-05-01 DIAGNOSIS — R19.7 DIARRHEA, UNSPECIFIED TYPE: Primary | ICD-10-CM

## 2023-05-01 NOTE — PROGRESS NOTES
SHASHANK Pedersen RN: Complete referral for [facility].     [facility] referral form was completed with the following:     Reason for referral: overflow diarrhea.   Previously diagnosed with pelvic floor dysfunction.   Needs further diagnostics and physical therapy.     Completed referral sheet and printed facesheet. Faxed documents to pelvic floor center on 5/1 at 3:00pm    [facility]  [address], [place] [address]  Phone: [phone]  Fax: [phone]    Vivien Buckley

## 2023-05-01 NOTE — PROGRESS NOTES
Called imaging scheduling to add-on KUB x-ray for stool burden assessment. Order updated appropriately.     Called patient to update her on the check-in time to 12:50pm.
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
## 2023-05-02 ENCOUNTER — TRANSFERRED RECORDS (OUTPATIENT)
Dept: HEALTH INFORMATION MANAGEMENT | Facility: CLINIC | Age: 73
End: 2023-05-02
Payer: MEDICARE

## 2023-05-03 ENCOUNTER — TELEPHONE (OUTPATIENT)
Dept: DERMATOLOGY | Facility: CLINIC | Age: 73
End: 2023-05-03

## 2023-05-03 ENCOUNTER — OFFICE VISIT (OUTPATIENT)
Dept: OPHTHALMOLOGY | Facility: CLINIC | Age: 73
End: 2023-05-03
Attending: OPHTHALMOLOGY
Payer: MEDICARE

## 2023-05-03 DIAGNOSIS — H02.889 MEIBOMIAN GLAND DYSFUNCTION: ICD-10-CM

## 2023-05-03 DIAGNOSIS — H01.02A SQUAMOUS BLEPHARITIS OF UPPER AND LOWER EYELIDS OF BOTH EYES: ICD-10-CM

## 2023-05-03 DIAGNOSIS — H01.02B SQUAMOUS BLEPHARITIS OF UPPER AND LOWER EYELIDS OF BOTH EYES: ICD-10-CM

## 2023-05-03 DIAGNOSIS — H04.123 BILATERAL DRY EYES: ICD-10-CM

## 2023-05-03 PROCEDURE — 99214 OFFICE O/P EST MOD 30 MIN: CPT | Performed by: OPHTHALMOLOGY

## 2023-05-03 PROCEDURE — G0463 HOSPITAL OUTPT CLINIC VISIT: HCPCS | Performed by: OPHTHALMOLOGY

## 2023-05-03 RX ORDER — NEOMYCIN SULFATE, POLYMYXIN B SULFATE AND DEXAMETHASONE 3.5; 10000; 1 MG/ML; [USP'U]/ML; MG/ML
1 SUSPENSION/ DROPS OPHTHALMIC 2 TIMES DAILY PRN
Qty: 5 ML | Refills: 1 | Status: SHIPPED | OUTPATIENT
Start: 2023-05-03 | End: 2023-08-02

## 2023-05-03 RX ORDER — NEOMYCIN SULFATE, POLYMYXIN B SULFATE AND DEXAMETHASONE 3.5; 10000; 1 MG/ML; [USP'U]/ML; MG/ML
1 SUSPENSION/ DROPS OPHTHALMIC 2 TIMES DAILY PRN
Qty: 5 ML | Refills: 1 | Status: SHIPPED | OUTPATIENT
Start: 2023-05-03 | End: 2023-05-03

## 2023-05-03 RX ORDER — NEOMYCIN SULFATE, POLYMYXIN B SULFATE AND DEXAMETHASONE 3.5; 10000; 1 MG/ML; [USP'U]/ML; MG/ML
1-2 SUSPENSION/ DROPS OPHTHALMIC AT BEDTIME
Qty: 5 ML | Refills: 3 | Status: SHIPPED | OUTPATIENT
Start: 2023-05-03 | End: 2023-05-03

## 2023-05-03 RX ORDER — NEOMYCIN SULFATE, POLYMYXIN B SULFATE AND DEXAMETHASONE 3.5; 10000; 1 MG/ML; [USP'U]/ML; MG/ML
1-2 SUSPENSION/ DROPS OPHTHALMIC AT BEDTIME
Qty: 5 ML | Refills: 3 | Status: SHIPPED | OUTPATIENT
Start: 2023-05-03

## 2023-05-03 ASSESSMENT — TONOMETRY
OD_IOP_MMHG: 11
OS_IOP_MMHG: 11
IOP_METHOD: ICARE

## 2023-05-03 ASSESSMENT — EXTERNAL EXAM - LEFT EYE: OS_EXAM: NORMAL

## 2023-05-03 ASSESSMENT — CONF VISUAL FIELD
OD_NORMAL: 1
OD_INFERIOR_TEMPORAL_RESTRICTION: 0
OD_SUPERIOR_TEMPORAL_RESTRICTION: 0
OS_NORMAL: 1
OS_SUPERIOR_TEMPORAL_RESTRICTION: 0
OS_SUPERIOR_NASAL_RESTRICTION: 0
OS_INFERIOR_NASAL_RESTRICTION: 0
OD_INFERIOR_NASAL_RESTRICTION: 0
OS_INFERIOR_TEMPORAL_RESTRICTION: 0
METHOD: COUNTING FINGERS
OD_SUPERIOR_NASAL_RESTRICTION: 0

## 2023-05-03 ASSESSMENT — VISUAL ACUITY
METHOD: SNELLEN - LINEAR
OD_SC+: -3
OS_SC+: -1
OD_SC: 20/25
OS_SC: 20/25

## 2023-05-03 ASSESSMENT — SLIT LAMP EXAM - LIDS
COMMENTS: MGD, SCURF
COMMENTS: MGD, SCURF

## 2023-05-03 NOTE — TELEPHONE ENCOUNTER
I don't have any openings unfortunately, she should be seen within a few days. Either another provider or Urgent Care.  Thanks,  Dede Ghosh PA-C

## 2023-05-03 NOTE — PROGRESS NOTES
"Chief Complaint(s) and History of Present Illness(es)     Follow Up            Comments: Dry eyes   s/p CE IOL, left eye 8/15/22  s/p CE IOL, right eye 9/12/22          Comments    Pt states she notices some blurry vision   pt states she is using the warm compresses, but has decreased AT's due to   the news media  States she has been having a hard time filling the maxitrol, states the   pharmacy would not OK the drops\"  C/o tearing and burning  States no flashes, floater or eye pain    Fay Cunningham COT 1:18 PM May 3, 2023   .             Review of systems for the eyes was negative other than the pertinent positives/negatives listed in the HPI.        s/p CE IOL, left eye 8/15/22  s/p CE IOL, right eye 9/12/22    Assessment & Plan      Jennifer Steiner is a 73 year old female with the following diagnoses:   1. Meibomian gland dysfunction    2. Bilateral dry eyes    3. Squamous blepharitis of upper and lower eyelids of both eyes         Here for intraocular pressure recheck   Ran out of maxitrol drops, but found an old bottle she has been using recently  Not using artificial tears   Not using ointment  Has not gotten glasses updated since cataract extraction     Intraocular pressure remains excellent  OK to resume using maxitrol gtts twice a day and ointment at bedtime   Encouraged artificial tears and warm compresses  Return precautions reviewed       Patient disposition:   Return in about 6 months (around 11/3/2023) for DFE.           Attending Physician Attestation:  Complete documentation of historical and exam elements from today's encounter can be found in the full encounter summary report (not reduplicated in this progress note).  I personally obtained the chief complaint(s) and history of present illness.  I confirmed and edited as necessary the review of systems, past medical/surgical history, family history, social history, and examination findings as documented by others; and I examined the patient myself.  I " personally reviewed the relevant tests, images, and reports as documented above.  I formulated and edited as necessary the assessment and plan and discussed the findings and management plan with the patient and family. . - Damir Cunningham MD

## 2023-05-03 NOTE — TELEPHONE ENCOUNTER
[facility] Call Center    Phone Message    May a detailed message be left on voicemail: yes     Reason for Call: Symptoms or Concerns     If patient has red-flag symptoms, warm transfer to triage line    Current symptom or concern: cyst on back of head.    Symptoms have been present for:  1 day(s)    Has patient previously been seen for this? No    By : N/A    Date: N/A    Are there any new or worsening symptoms? Yes: She cant lay on a pillow. Pt states it is painful to the touch.       Action Taken: Message routed to:  [facility] DERM    Travel Screening: Not Applicable

## 2023-05-03 NOTE — NURSING NOTE
"Chief Complaints and History of Present Illnesses   Patient presents with     Follow Up     Dry eyes   s/p CE IOL, left eye 8/15/22  s/p CE IOL, right eye 9/12/22     Chief Complaint(s) and History of Present Illness(es)     Follow Up            Comments: Dry eyes   s/p CE IOL, left eye 8/15/22  s/p CE IOL, right eye 9/12/22          Comments    Pt states she notices some blurry vision   pt states she is using the warm compresses, but has decreased AT's due to the news media  States she has been having a hard time filling the maxitrol, states the pharmacy would not OK the drops\"  C/o tearing and burning  States no flashes, floater or eye pain    Fay Cunningham COT 1:18 PM May 3, 2023   .                 "

## 2023-05-04 ENCOUNTER — ANCILLARY PROCEDURE (OUTPATIENT)
Dept: GENERAL RADIOLOGY | Facility: CLINIC | Age: 73
End: 2023-05-04
Attending: STUDENT IN AN ORGANIZED HEALTH CARE EDUCATION/TRAINING PROGRAM
Payer: MEDICARE

## 2023-05-04 ENCOUNTER — PRE VISIT (OUTPATIENT)
Dept: ORTHOPEDICS | Facility: CLINIC | Age: 73
End: 2023-05-04

## 2023-05-04 ENCOUNTER — ANCILLARY PROCEDURE (OUTPATIENT)
Dept: GENERAL RADIOLOGY | Facility: CLINIC | Age: 73
End: 2023-05-04
Attending: ORTHOPAEDIC SURGERY
Payer: MEDICARE

## 2023-05-04 ENCOUNTER — OFFICE VISIT (OUTPATIENT)
Dept: ORTHOPEDICS | Facility: CLINIC | Age: 73
End: 2023-05-04
Payer: MEDICARE

## 2023-05-04 VITALS — BODY MASS INDEX: 25.03 KG/M2 | HEIGHT: 62 IN | WEIGHT: 136 LBS

## 2023-05-04 DIAGNOSIS — M48.062 SPINAL STENOSIS OF LUMBAR REGION WITH NEUROGENIC CLAUDICATION: Primary | ICD-10-CM

## 2023-05-04 DIAGNOSIS — R19.7 DIARRHEA, UNSPECIFIED TYPE: ICD-10-CM

## 2023-05-04 DIAGNOSIS — M25.552 LEFT HIP PAIN: ICD-10-CM

## 2023-05-04 DIAGNOSIS — M25.552 HIP PAIN, LEFT: ICD-10-CM

## 2023-05-04 PROCEDURE — 73502 X-RAY EXAM HIP UNI 2-3 VIEWS: CPT | Mod: LT | Performed by: RADIOLOGY

## 2023-05-04 PROCEDURE — 99204 OFFICE O/P NEW MOD 45 MIN: CPT | Performed by: ORTHOPAEDIC SURGERY

## 2023-05-04 PROCEDURE — 74018 RADEX ABDOMEN 1 VIEW: CPT | Mod: 76 | Performed by: RADIOLOGY

## 2023-05-04 PROCEDURE — 74018 RADEX ABDOMEN 1 VIEW: CPT | Performed by: RADIOLOGY

## 2023-05-04 ASSESSMENT — ACTIVITIES OF DAILY LIVING (ADL)
ADL_MEAN: 2.65
ADL_SUM: 45
ADL_SUBSCALE_SCORE: 33.82

## 2023-05-04 ASSESSMENT — HOOS S4: HOW SEVERE IS YOUR HIP JOINT STIFFNESS AFTER FIRST WAKENING IN THE MORNING?: MODERATE

## 2023-05-04 NOTE — TELEPHONE ENCOUNTER
"Had an MRI previously and it showed it was a fluid filled cyst.  Monday, woke up and it was bothersome.  Better today.  Doesn't see a point to go to UC because \"there is nothing they can do for her\".  Unfortunately, we don't have any openings.  I did offer her an appointment Tuesday 5/9 with Dr. Bonilla.  Patient is unable to make it because she doesn't have a car and has other appointments.  Told her if the symptoms get worse she should be seen by UC or ER.                   "

## 2023-05-04 NOTE — PROGRESS NOTES
[facility]  Orthopaedic Surgery, Joint Replacement Consultation  by Elias Brooks M.D.    Jennifer Steiner MRN# [id number]    YOB: 1950     Requesting physician: Milan Dos Santos            Assessment and Plan:   Assessment:  Spinal stenosis involving lumbar spine no evidence of significant arthritis to account for symptoms radiographically in the left hip.    Plan:  1. MRI Left hip.  Results will be forwarded to patient via [employer].  If the hip MRI as no significant arthrosis of the left hip joint, then proceeding with nonsurgical management of her lumbar spinal stenosis as listed below would be appropriate.  If there is significant arthritis of the left hip joint identified on MR, then I will need to see patient back for follow-up examination.  2. Epidural steroid injection to L4-5  3. PMandR consultation.  4. If patient symptoms are persistent despite the above measures, then consider consultation with our spine surgical team.       For additional information and frequently asked questions regarding joint replacements, scan the QR code image below on your phone camera:     or go to:   [email or web address]            History of Present Illness:   73 year old female  chief complaint    Pt c/o LLE pain, radiating from buttock to L knee and foot region.  Was moving a box a year ago and noticed and pop and pain.  Sx's improved a bit but have persisted and are now getting worse.  Experiences a popping sensation in buttock region.    Denies groin pain    Current symptoms:  Problem: Left Hip Pain   Onset and duration: 1 year ago.   Awakens from sleep due to sx's:  Yes  Precipitating Injury:  No    Other joints or sites painful:  Yes           Physical Exam:     EXAMINATION pertinent findings:   PSYCH: Pleasant, healthy-appearing, alert, oriented x3, cooperative. Normal mood and affect.  VITAL SIGNS: not currently breastfeeding..   Reviewed nursing intake notes.   There is no height or weight on file to calculate BMI.  RESP: non labored breathing   ABD: benign, soft, non-tender, no acute peritoneal findings  SKIN: grossly normal   LYMPHATIC: grossly normal, no adenopathy, no extremity edema  NEURO: grossly normal , no motor deficits  VASCULAR: satisfactory perfusion of all extremities   MUSCULOSKELETAL:   Gait is normal.  Able to stand and erect posture.  Right hip range of motion is full.  Left hip range of motion is also full and rotation of the hip does not aggravate or cause pain.             Data:   All laboratory data reviewed  All imaging studies reviewed by me    I reviewed the MRI of her lumbar spine as well as pelvis and hip films today.  There is no evidence of any significant hip arthrosis involving her left hip joint.       MRI reviewed and shows moderately severe spinal stenosis in the upper lumbar spine but L L4-5 there is severe spinal stenosis with bilateral foraminal narrowing as well.        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     Pelvic floor dysfunction     Myalgia of pelvic floor     Other constipation     Abnormal defecation     Chronic abdominal pain     Backache     Chronic fatigue syndrome     Food intolerance     Generalized pain     Hemorrhoids     Major depressive disorder, recurrent episode (H)     Symptomatic menopausal or female climacteric states     History of hematuria     Mixed type  age-related cataract, both eyes     Past Medical History:   Diagnosis Date     Anemia      Asthma     since childhood     Blepharitis      Constipation, chronic     Chronic enema use     CVA (cerebral infarction) 2009    carotid intimal repair, left     Hyperlipidemia age 58    identified age 58     Hypothyroidism      Irritable bowel disease      Mixed type age-related cataract, both eyes      Neck injuries      Pelvic floor dysfunction 2010     Postmenopausal hormone replacement therapy     From Dr. Link     Scoliosis        Also see scanned health assessment forms.       Past Surgical History:     Past Surgical History:   Procedure Laterality Date     CYSTOSCOPY N/A 3/25/2019    Procedure: CYSTOSCOPY;  Surgeon: Radha Ovalle MD;  [facility]     DILATION AND CURETTAGE, OPERATIVE HYSTEROSCOPY WITH MORCELLATOR, COMBINED N/A 3/25/2019    Procedure: OPERATIVE HYSTEROSCOPY WITH MORCELLATOR;  Surgeon: Marybeth Huber MD;  [facility] OR     HC BREATH HYDROGEN TEST  11/11/2013    Procedure: HYDROGEN BREATH TEST;  Surgeon: Esteban Short MD;  [facility]: [facility]     PHACOEMULSIFICATION WITH STANDARD INTRAOCULAR LENS IMPLANT Left 8/15/2022    Procedure: LEFT EYE PHACOEMULSIFICATION, CATARACT, WITH STANDARD INTRAOCULAR LENS IMPLANT INSERTION;  Surgeon: Damir Cunningham MD;  Location: [facility] OR     PHACOEMULSIFICATION WITH STANDARD INTRAOCULAR LENS IMPLANT Right 9/12/2022    Procedure: RIGHT EYE PHACOEMULSIFICATION, CATARACT, WITH TORIC  INTRAOCULAR LENS IMPLANT INSERTION;  Surgeon: Damir Cunningham MD;  Location: [facility] OR     SURGICAL HISTORY OF -       tonsillectomy     SURGICAL HISTORY OF -   ~1997    cosmetic skin removal: breast reduction; skin removal from arms legs and tummy tuck     [facility] STOMACH SURGERY PROCEDURE UNLISTED              Social History:     Social History     Socioeconomic History     Marital status: Single     Spouse name: Not on file     Number of children: Not on file      Years of education: Not on file     Highest education level: Not on file   Occupational History     Occupation: disability     Comment:    Tobacco Use     Smoking status: Never     Smokeless tobacco: Never   Vaping Use     Vaping status: Not on file   Substance and Sexual Activity     Alcohol use: No     Drug use: No     Sexual activity: Not Currently     Birth control/protection: Post-menopausal   Other Topics Concern     Parent/sibling w/ CABG, MI or angioplasty before 65F 55M? Not Asked   Social History Narrative     Not on file     Social Determinants of Health     Financial Resource Strain: Not on file   Food Insecurity: Not on file   Transportation Needs: Not on file   Physical Activity: Not on file   Stress: Not on file   Social Connections: Not on file   Intimate Partner Violence: Not on file   Housing Stability: Not on file            Family History:       Family History   Problem Relation Age of Onset     Melanoma Mother      Skin Cancer Mother      Osteoporosis Mother      Diabetes Father      Gastrointestinal Disease Father 82         after surgery for Bowel obstruction     Obesity Father      Substance Abuse Sister      Cancer Sister      Cancer Sister 56        bone cancer     Anxiety Disorder Sister      Alcoholism Sister      Bone Cancer Sister      Alcohol/Drug Brother      Gastrointestinal Disease Brother         Hepatitis     Heart Disease Brother 62        hx of drug abuse,  age 62     Diabetes Brother      Skin Cancer Brother      Substance Abuse Brother      Allergies Brother      Anesthesia Reaction Brother         severe PRIMITIVO, effected surgery     Asthma Brother      Diabetes Brother      Obesity Brother      Alcoholism Brother      Colon Cancer No family hx of      Crohn's Disease No family hx of      Ulcerative Colitis No family hx of      Colon Polyps No family hx of      Glaucoma No family hx of      Macular Degeneration No family hx of      Deep Vein  Thrombosis (DVT) No family hx of             Medications:     Current Outpatient Medications   Medication Sig     artificial tears OINT ophthalmic ointment Use in both eyes at night     Ascorbic Acid (VITAMIN C) 100 MG CHEW Take by mouth every morning     Charcoal Activated (CHARCOAL PO) Take 2 capsules by mouth daily as needed For indigestion     Cholecalciferol (VITAMIN D) 2000 UNIT tablet Take 1 tablet by mouth every morning     Digestive Enzymes TABS 3 times daily (with meals) Use as directed     DiphenhydrAMINE HCl (BENADRYL PO) Take by mouth daily as needed     EPINEPHrine (EPIPEN) 0.3 MG/0.3ML injection 2-pack Inject 0.3 mLs (0.3 mg) into the muscle once as needed for anaphylaxis     estradiol (ESTRACE) 0.1 MG/GM vaginal cream Place 0.5 g vaginally twice a week Use one to two times weekly.     fluocinolone (SYNALAR) 0.01 % solution Apply topically 2 times daily To the scalp as needed for itching.     HYDROCHLORIC ACID 3 times daily (with meals) Take as directed     hydrocortisone (CORTEF) 10 MG tablet      Hypromellose (ARTIFICIAL TEARS OP) Apply 1 drop to eye daily as needed     ketoconazole (NIZORAL) 2 % external cream Apply topically daily To feet and in between the toes     MAGNESIUM OXIDE PO Take 6-8 tablets by mouth every evening     neomycin-polymyxin-dexamethasone (MAXITROL) 3.5-89170-0.1 SUSP ophthalmic susp Place 1-2 drops into both eyes At Bedtime     neomycin-polymyxin-dexamethasone (MAXITROL) 3.5-86777-9.1 SUSP ophthalmic susp Place 1 drop into both eyes 2 times daily as needed (as needed for worsening eye symptoms)     Nettle (Urtica Dioica) (NETTLE LEAF) 435 MG CAPS Take by mouth every morning     Omega-3 Fatty Acids (FISH OIL PO) Take by mouth as needed     peppermint oil liquid 1 mL daily as needed for other Taking gel tabs not oil, not in EMR.     Probiotic Product (ALOE 91006 & PROBIOTICS) CAPS Take by mouth every morning     progesterone (PROMETRIUM) 200 MG capsule Take 200 mg by mouth  every evening     progesterone (PROMETRIUM) 200 MG capsule Take 200 mg by mouth every evening     Quercetin 50 MG TABS Take 50 mg by mouth every morning     Testosterone Propionate (FIRST-TESTOSTERONE MC) 2 % CREA      thyroid (ARMOUR) 60 MG tablet Take 60 mg by mouth every morning      Turmeric 500 MG CAPS Take by mouth every morning     zinc gluconate 50 MG tablet Take 50 mg by mouth every morning     No current facility-administered medications for this visit.              Review of Systems:   A comprehensive 10 point review of systems (constitutional, ENT, cardiac, peripheral vascular, lymphatic, respiratory, GI, , Musculoskeletal, skin, Neurological) was performed and found to be negative except as described in this note.       HOOS Hip Dysfunction & Osteoarthritis Outcome Questionnaire         View : No data to display.                       [unfilled]    KOOS Knee Survey Assessment         View : No data to display.                       Promis 10 Assessment         View : No data to display.                       Ortho Oxford Knee Questionnaire         View : No data to display.                         See intake form completed by patient

## 2023-05-04 NOTE — LETTER
5/4/2023         RE: Jennifer Steiner  [address]  [place] [facility] [place] [facility] [address]        Dear Colleague,    Thank you for referring your patient, Jennifer Steiner, to the [facility] [place]. Please see a copy of my visit note below.        [facility]  Orthopaedic Surgery, Joint Replacement Consultation  by Elais Brooks M.D.    Jennifer Steiner MRN# [id number]    YOB: 1950     Requesting physician: Milan Dos Santos            Assessment and Plan:   Assessment:  Spinal stenosis involving lumbar spine no evidence of significant arthritis to account for symptoms radiographically in the left hip.    Plan:  MRI Left hip.  Results will be forwarded to patient via [employer].  If the hip MRI as no significant arthrosis of the left hip joint, then proceeding with nonsurgical management of her lumbar spinal stenosis as listed below would be appropriate.  If there is significant arthritis of the left hip joint identified on MR, then I will need to see patient back for follow-up examination.  Epidural steroid injection to L4-5  PMandR consultation.  If patient symptoms are persistent despite the above measures, then consider consultation with our spine surgical team.       For additional information and frequently asked questions regarding joint replacements, scan the QR code image below on your phone camera:     or go to:   [email or web address]            History of Present Illness:   73 year old female  chief complaint    Pt c/o LLE pain, radiating from buttock to L knee and foot region.  Was moving a box a year ago and noticed and pop and pain.  Sx's improved a bit but have persisted and are now getting worse.  Experiences a popping sensation in buttock region.    Denies groin pain    Current symptoms:  Problem: Left Hip Pain   Onset and duration: 1 year ago.   Awakens from sleep due to sx's:   Yes  Precipitating Injury:  No    Other joints or sites painful:  Yes           Physical Exam:     EXAMINATION pertinent findings:   PSYCH: Pleasant, healthy-appearing, alert, oriented x3, cooperative. Normal mood and affect.  VITAL SIGNS: not currently breastfeeding..  Reviewed nursing intake notes.   There is no height or weight on file to calculate BMI.  RESP: non labored breathing   ABD: benign, soft, non-tender, no acute peritoneal findings  SKIN: grossly normal   LYMPHATIC: grossly normal, no adenopathy, no extremity edema  NEURO: grossly normal , no motor deficits  VASCULAR: satisfactory perfusion of all extremities   MUSCULOSKELETAL:   Gait is normal.  Able to stand and erect posture.  Right hip range of motion is full.  Left hip range of motion is also full and rotation of the hip does not aggravate or cause pain.             Data:   All laboratory data reviewed  All imaging studies reviewed by me    I reviewed the MRI of her lumbar spine as well as pelvis and hip films today.  There is no evidence of any significant hip arthrosis involving her left hip joint.       MRI reviewed and shows moderately severe spinal stenosis in the upper lumbar spine but L L4-5 there is severe spinal stenosis with bilateral foraminal narrowing as well.        DATA for DOCUMENTATION:         Past Medical History:     Patient Active Problem List   Diagnosis    Transient cerebral ischemia    Carotid artery dissection (H)    IBS (irritable bowel syndrome)    Screen for colon cancer    Visit for screening mammogram    Encounter for routine gynecological examination    Hypothyroidism    CARDIOVASCULAR SCREENING; LDL GOAL LESS THAN 130    Rash and other nonspecific skin eruption    High cholesterol    Vaginal atrophy    Blepharitis    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    Pelvic floor dysfunction    Myalgia of pelvic floor    Other constipation    Abnormal defecation    Chronic abdominal pain     Backache    Chronic fatigue syndrome    Food intolerance    Generalized pain    Hemorrhoids    Major depressive disorder, recurrent episode (H)    Symptomatic menopausal or female climacteric states    History of hematuria    Mixed type age-related cataract, both eyes     Past Medical History:   Diagnosis Date    Anemia     Asthma     since childhood    Blepharitis     Constipation, chronic     Chronic enema use    CVA (cerebral infarction) 2009    carotid intimal repair, left    Hyperlipidemia age 58    identified age 58    Hypothyroidism     Irritable bowel disease     Mixed type age-related cataract, both eyes     Neck injuries     Pelvic floor dysfunction 2010    Postmenopausal hormone replacement therapy     From Dr. Link    Scoliosis        Also see scanned health assessment forms.       Past Surgical History:     Past Surgical History:   Procedure Laterality Date    CYSTOSCOPY N/A 3/25/2019    Procedure: CYSTOSCOPY;  Surgeon: Radha Ovalle MD;  [facility]    DILATION AND CURETTAGE, OPERATIVE HYSTEROSCOPY WITH MORCELLATOR, COMBINED N/A 3/25/2019    Procedure: OPERATIVE HYSTEROSCOPY WITH MORCELLATOR;  Surgeon: Marybeth Huber MD;  [facility]    HC BREATH HYDROGEN TEST  11/11/2013    Procedure: HYDROGEN BREATH TEST;  Surgeon: Esteban Short MD;  Location: [facility]    PHACOEMULSIFICATION WITH STANDARD INTRAOCULAR LENS IMPLANT Left 8/15/2022    Procedure: LEFT EYE PHACOEMULSIFICATION, CATARACT, WITH STANDARD INTRAOCULAR LENS IMPLANT INSERTION;  Surgeon: Damir Cunningham MD;  Location: [facility] OR    PHACOEMULSIFICATION WITH STANDARD INTRAOCULAR LENS IMPLANT Right 9/12/2022    Procedure: RIGHT EYE PHACOEMULSIFICATION, CATARACT, WITH TORIC  INTRAOCULAR LENS IMPLANT INSERTION;  Surgeon: Damir Cunningham MD;  Location: [facility] OR    SURGICAL HISTORY OF -       tonsillectomy    SURGICAL HISTORY OF -   ~1997    cosmetic skin removal: breast reduction; skin removal from arms legs and  tummy tuck    [facility] STOMACH SURGERY PROCEDURE UNLISTED              Social History:     Social History     Socioeconomic History    Marital status: Single     Spouse name: Not on file    Number of children: Not on file    Years of education: Not on file    Highest education level: Not on file   Occupational History    Occupation: disability     Comment:    Tobacco Use    Smoking status: Never    Smokeless tobacco: Never   Vaping Use    Vaping status: Not on file   Substance and Sexual Activity    Alcohol use: No    Drug use: No    Sexual activity: Not Currently     Birth control/protection: Post-menopausal   Other Topics Concern    Parent/sibling w/ CABG, MI or angioplasty before 65F 55M? Not Asked   Social History Narrative    Not on file     Social Determinants of Health     Financial Resource Strain: Not on file   Food Insecurity: Not on file   Transportation Needs: Not on file   Physical Activity: Not on file   Stress: Not on file   Social Connections: Not on file   Intimate Partner Violence: Not on file   Housing Stability: Not on file            Family History:       Family History   Problem Relation Age of Onset    Melanoma Mother     Skin Cancer Mother     Osteoporosis Mother     Diabetes Father     Gastrointestinal Disease Father 82         after surgery for Bowel obstruction    Obesity Father     Substance Abuse Sister     Cancer Sister     Cancer Sister 56        bone cancer    Anxiety Disorder Sister     Alcoholism Sister     Bone Cancer Sister     Alcohol/Drug Brother     Gastrointestinal Disease Brother         Hepatitis    Heart Disease Brother 62        hx of drug abuse,  age 62    Diabetes Brother     Skin Cancer Brother     Substance Abuse Brother     Allergies Brother     Anesthesia Reaction Brother         severe PRIMITIVO, effected surgery    Asthma Brother     Diabetes Brother     Obesity Brother     Alcoholism Brother     Colon Cancer No family hx of     Crohn's Disease No  family hx of     Ulcerative Colitis No family hx of     Colon Polyps No family hx of     Glaucoma No family hx of     Macular Degeneration No family hx of     Deep Vein Thrombosis (DVT) No family hx of             Medications:     Current Outpatient Medications   Medication Sig    artificial tears OINT ophthalmic ointment Use in both eyes at night    Ascorbic Acid (VITAMIN C) 100 MG CHEW Take by mouth every morning    Charcoal Activated (CHARCOAL PO) Take 2 capsules by mouth daily as needed For indigestion    Cholecalciferol (VITAMIN D) 2000 UNIT tablet Take 1 tablet by mouth every morning    Digestive Enzymes TABS 3 times daily (with meals) Use as directed    DiphenhydrAMINE HCl (BENADRYL PO) Take by mouth daily as needed    EPINEPHrine (EPIPEN) 0.3 MG/0.3ML injection 2-pack Inject 0.3 mLs (0.3 mg) into the muscle once as needed for anaphylaxis    estradiol (ESTRACE) 0.1 MG/GM vaginal cream Place 0.5 g vaginally twice a week Use one to two times weekly.    fluocinolone (SYNALAR) 0.01 % solution Apply topically 2 times daily To the scalp as needed for itching.    HYDROCHLORIC ACID 3 times daily (with meals) Take as directed    hydrocortisone (CORTEF) 10 MG tablet     Hypromellose (ARTIFICIAL TEARS OP) Apply 1 drop to eye daily as needed    ketoconazole (NIZORAL) 2 % external cream Apply topically daily To feet and in between the toes    MAGNESIUM OXIDE PO Take 6-8 tablets by mouth every evening    neomycin-polymyxin-dexamethasone (MAXITROL) 3.5-72959-9.1 SUSP ophthalmic susp Place 1-2 drops into both eyes At Bedtime    neomycin-polymyxin-dexamethasone (MAXITROL) 3.5-04089-3.1 SUSP ophthalmic susp Place 1 drop into both eyes 2 times daily as needed (as needed for worsening eye symptoms)    Nettle (Urtica Dioica) (NETTLE LEAF) 435 MG CAPS Take by mouth every morning    Omega-3 Fatty Acids (FISH OIL PO) Take by mouth as needed    peppermint oil liquid 1 mL daily as needed for other Taking gel tabs not oil, not in EMR.     Probiotic Product (ALOE 30226 & PROBIOTICS) CAPS Take by mouth every morning    progesterone (PROMETRIUM) 200 MG capsule Take 200 mg by mouth every evening    progesterone (PROMETRIUM) 200 MG capsule Take 200 mg by mouth every evening    Quercetin 50 MG TABS Take 50 mg by mouth every morning    Testosterone Propionate (FIRST-TESTOSTERONE MC) 2 % CREA     thyroid (ARMOUR) 60 MG tablet Take 60 mg by mouth every morning     Turmeric 500 MG CAPS Take by mouth every morning    zinc gluconate 50 MG tablet Take 50 mg by mouth every morning     No current facility-administered medications for this visit.              Review of Systems:   A comprehensive 10 point review of systems (constitutional, ENT, cardiac, peripheral vascular, lymphatic, respiratory, GI, , Musculoskeletal, skin, Neurological) was performed and found to be negative except as described in this note.       HOOS Hip Dysfunction & Osteoarthritis Outcome Questionnaire         View : No data to display.                       [unfilled]    KOOS Knee Survey Assessment         View : No data to display.                   Promis 10 Assessment         View : No data to display.                   Ortho Oxford Knee Questionnaire         View : No data to display.                         See intake form completed by patient

## 2023-05-04 NOTE — PROGRESS NOTES
Called [facility] - Received Fax Cover and demographics sheet - not referral form or records were received.     Office is requesting additional records for context of referral. No office notes as patient was not yet seen.     LPN in charge of [facility] referrals was notified.     Vivien Buckley

## 2023-05-08 ENCOUNTER — TELEPHONE (OUTPATIENT)
Dept: OPHTHALMOLOGY | Facility: CLINIC | Age: 73
End: 2023-05-08
Payer: MEDICARE

## 2023-05-08 NOTE — TELEPHONE ENCOUNTER
REFERRAL INFORMATION:    Referring Provider:  Dr. Milan Alvarez    Referring Clinic:  [facility]    Reason for Visit/Diagnosis: nausea, loose stools, bloat      FUTURE VISIT INFORMATION:    Appointment Date: 05-23-23    Appointment Time: @ 1pm     NOTES STATUS DETAILS   OFFICE NOTE from Referring Provider Internal 02-24-23, 12-16-22 Dr. Milan Alvaerz   OFFICE NOTE from Other Specialist Internal 04-05-17 Dr.Kimberly Arvizu  04-27-23 Doretha Bolaños,   04-19-23, 04-12-23, 04-06-23 Anders Kamara,   12-05-22 Dede Ghosh, DULCE     HOSPITAL DISCHARGE SUMMARY/  ED VISITS N/A    OPERATIVE REPORT N/A    MEDICATION LIST Internal         ENDOSCOPY  N/A    COLONOSCOPY [facility] 10-10-16([facility])   ERCP N/A    EUS N/A    STOOL TESTING N/A    PERTINENT LABS Internal C-diff: 10-20-22, 11-26-21, 08-17-21, 12-27-18  *additional in epic*   PATHOLOGY REPORTS (RELATED) N/A    IMAGING (CT, MRI, EGD, MRCP, Small Bowel Follow Through/SBT, MR/CT Enterography) Internal/[facility] XR abd: 05-04-23, 04-11-19  XR KUB: 05-04-23  CT abd: 04-04-19 05-08-23 sent request [facility] for XR abd @ 2:40pm fax# [phone]   05-15-23 Images recvd, pushed to PACS

## 2023-05-08 NOTE — TELEPHONE ENCOUNTER
Health Call Center    Phone Message    May a detailed message be left on voicemail: yes     Reason for Call: Medication Question or concern regarding medication   Prescription Clarification  Name of Medication: neomycin-polymyxin-dexamethasone (MAXITROL) 3.5-92919-1.1 SUSP ophthalmic susp  Prescribing Provider: Dr. Cunningham   Pharmacy: [facility][id number] [address]   What on the order needs clarification? When pt went to get her eye drops filled the Pharmacy states that the Rx was cancelled on their end. They did fill the medication since Dr. Cunningham had sent a copy with patient but patient wants to be sure she can get refills in the future. Please call pt to discuss. Thank you.           Action Taken: Message routed to:  [facility] EYE    Travel Screening: Not Applicable

## 2023-05-08 NOTE — TELEPHONE ENCOUNTER
LVM letting patient know that re-fills would be approved when needed.     Roslyn Cormier COT 11:49 AM May 8, 2023

## 2023-05-10 ENCOUNTER — ANCILLARY PROCEDURE (OUTPATIENT)
Dept: MRI IMAGING | Facility: CLINIC | Age: 73
End: 2023-05-10
Attending: ORTHOPAEDIC SURGERY
Payer: MEDICARE

## 2023-05-10 DIAGNOSIS — M48.062 SPINAL STENOSIS OF LUMBAR REGION WITH NEUROGENIC CLAUDICATION: ICD-10-CM

## 2023-05-10 PROCEDURE — G1010 CDSM STANSON: HCPCS | Mod: GC | Performed by: RADIOLOGY

## 2023-05-10 PROCEDURE — 73721 MRI JNT OF LWR EXTRE W/O DYE: CPT | Mod: LT | Performed by: RADIOLOGY

## 2023-05-15 ENCOUNTER — TELEPHONE (OUTPATIENT)
Dept: GASTROENTEROLOGY | Facility: CLINIC | Age: 73
End: 2023-05-15
Payer: MEDICARE

## 2023-05-15 DIAGNOSIS — M54.50 LOW BACK PAIN: Primary | ICD-10-CM

## 2023-05-15 NOTE — TELEPHONE ENCOUNTER
.Called to remind patient of their upcoming appointment with our GI clinic, on Tuesday 5/23/2023 at 12:45PM with Dr. Armando Inman. This appointment is scheduled as an in-person appt. Please arrive 15 minutes early to check in for your appointment. , if your appointment is virtual (video or telephone) you need to be in [place] for the visit. To reschedule or cancel patient to call [phone].    Lynda Shetty MA     Patient: Viki Busch    * No procedures listed *    Diagnosis:* No pre-op diagnosis entered *  Diagnosis Additional Information: No value filed.    Anesthesia Type:  No value filed.    Note:  Anesthesia Post Evaluation    Patient location during evaluation: Floor  Patient participation: Able to fully participate in evaluation  Level of consciousness: awake and alert  Pain management: adequate  Airway patency: patent  Cardiovascular status: acceptable  Respiratory status: acceptable  Hydration status: acceptable  PONV: none             Last vitals:  Vitals:    03/15/18 0646 03/15/18 0715 03/15/18 0851   BP: 132/65 122/73    Pulse:  88 63   Resp:  18    Temp: 98  F (36.7  C) 97.8  F (36.6  C) 97.3  F (36.3  C)   SpO2: 96%           Electronically Signed By: HALLE Kebede CRNA  March 15, 2018  11:12 AM

## 2023-05-18 ENCOUNTER — TELEPHONE (OUTPATIENT)
Dept: ORTHOPEDICS | Facility: CLINIC | Age: 73
End: 2023-05-18
Payer: MEDICARE

## 2023-05-18 NOTE — TELEPHONE ENCOUNTER
DIAGNOSIS: Spinal Stenosis of Lumbar Region with Neurogenic Claudication   APPOINTMENT DATE: 05/04/2023   NOTES STATUS DETAILS   OFFICE NOTE from referring provider Internal 05/04/2023 - Elias Brooks MD- [facility]   OFFICE NOTE from other specialist Internal  Media Tab 04/21/2023 - Milan Alvarez - [facility]    03/24/2023 - Jonatan Ulloa MA - [facility]   MEDICATION LIST Internal    LABS     DEXA PACS Internal   MRI PACS Internal   CT SCAN PACS Internal   XRAYS (IMAGES & REPORTS) PACS Internal

## 2023-05-18 NOTE — RESULT ENCOUNTER NOTE
Dear Jennifer Steiner:    I reviewed your MRI examination and there is minimal evidence of arthrosis of the left hip.  Therefore I would suggest that you undergo evaluation for your spine degenerative disease as a more likely cause of your symptoms.    Best regards,    MD Rocio Lozano Family Professor  Oncology and Adult Reconstructive Surgery  Dept Orthopaedic Surgery, [facility]  [phone] office  [email or web address]

## 2023-05-18 NOTE — TELEPHONE ENCOUNTER
- A call was placed to the patient. Patient did not answer phone so a voicemail was left.     -The following information was communicated:  Dear Jennifer Steiner:     I reviewed your MRI examination and there is minimal evidence of arthrosis of the left hip.  Therefore I would suggest that you undergo evaluation for your spine degenerative disease as a more likely cause of your symptoms.     Best regards,     Elias Brooks MD  [facility] Family Professor  Oncology and Adult Reconstructive Surgery  Dept Orthopaedic Surgery, [facility]  [phone] office  [email or web address]      - Call back number to clinic was given and patient was told to call to schedule a virtual follow if they had questions more questions or to schedule a spine evaluation.

## 2023-05-23 ENCOUNTER — TELEPHONE (OUTPATIENT)
Dept: ORTHOPEDICS | Facility: CLINIC | Age: 73
End: 2023-05-23

## 2023-05-23 ENCOUNTER — PRE VISIT (OUTPATIENT)
Dept: ORTHOPEDICS | Facility: CLINIC | Age: 73
End: 2023-05-23

## 2023-05-23 ENCOUNTER — OFFICE VISIT (OUTPATIENT)
Dept: GASTROENTEROLOGY | Facility: CLINIC | Age: 73
End: 2023-05-23
Attending: FAMILY MEDICINE
Payer: MEDICARE

## 2023-05-23 ENCOUNTER — PRE VISIT (OUTPATIENT)
Dept: GASTROENTEROLOGY | Facility: CLINIC | Age: 73
End: 2023-05-23

## 2023-05-23 ENCOUNTER — LAB (OUTPATIENT)
Dept: LAB | Facility: CLINIC | Age: 73
End: 2023-05-23
Payer: MEDICARE

## 2023-05-23 VITALS
DIASTOLIC BLOOD PRESSURE: 64 MMHG | HEIGHT: 62 IN | HEART RATE: 77 BPM | WEIGHT: 136.4 LBS | SYSTOLIC BLOOD PRESSURE: 116 MMHG | BODY MASS INDEX: 25.1 KG/M2

## 2023-05-23 DIAGNOSIS — M62.89 PELVIC FLOOR DYSFUNCTION: ICD-10-CM

## 2023-05-23 DIAGNOSIS — R19.4 CHANGE IN BOWEL HABIT: Primary | ICD-10-CM

## 2023-05-23 DIAGNOSIS — R19.4 CHANGE IN BOWEL HABIT: ICD-10-CM

## 2023-05-23 DIAGNOSIS — R19.7 DIARRHEA, UNSPECIFIED TYPE: ICD-10-CM

## 2023-05-23 LAB
ALBUMIN SERPL BCG-MCNC: 4.5 G/DL (ref 3.5–5.2)
ALP SERPL-CCNC: 86 U/L (ref 35–104)
ALT SERPL W P-5'-P-CCNC: 11 U/L (ref 10–35)
ANION GAP SERPL CALCULATED.3IONS-SCNC: 8 MMOL/L (ref 7–15)
AST SERPL W P-5'-P-CCNC: 17 U/L (ref 10–35)
BILIRUB SERPL-MCNC: 0.3 MG/DL
BUN SERPL-MCNC: 12.8 MG/DL (ref 8–23)
CALCIUM SERPL-MCNC: 10 MG/DL (ref 8.8–10.2)
CHLORIDE SERPL-SCNC: 102 MMOL/L (ref 98–107)
CREAT SERPL-MCNC: 0.78 MG/DL (ref 0.51–0.95)
DEPRECATED HCO3 PLAS-SCNC: 28 MMOL/L (ref 22–29)
ERYTHROCYTE [DISTWIDTH] IN BLOOD BY AUTOMATED COUNT: 13.4 % (ref 10–15)
GFR SERPL CREATININE-BSD FRML MDRD: 80 ML/MIN/1.73M2
GLUCOSE SERPL-MCNC: 96 MG/DL (ref 70–99)
HCT VFR BLD AUTO: 42.2 % (ref 35–47)
HGB BLD-MCNC: 13.9 G/DL (ref 11.7–15.7)
MCH RBC QN AUTO: 30.8 PG (ref 26.5–33)
MCHC RBC AUTO-ENTMCNC: 32.9 G/DL (ref 31.5–36.5)
MCV RBC AUTO: 94 FL (ref 78–100)
PLATELET # BLD AUTO: 249 10E3/UL (ref 150–450)
POTASSIUM SERPL-SCNC: 5 MMOL/L (ref 3.4–5.3)
PROT SERPL-MCNC: 7.4 G/DL (ref 6.4–8.3)
RBC # BLD AUTO: 4.51 10E6/UL (ref 3.8–5.2)
SODIUM SERPL-SCNC: 138 MMOL/L (ref 136–145)
WBC # BLD AUTO: 6.5 10E3/UL (ref 4–11)

## 2023-05-23 PROCEDURE — 82784 ASSAY IGA/IGD/IGG/IGM EACH: CPT | Performed by: STUDENT IN AN ORGANIZED HEALTH CARE EDUCATION/TRAINING PROGRAM

## 2023-05-23 PROCEDURE — 80053 COMPREHEN METABOLIC PANEL: CPT | Performed by: PATHOLOGY

## 2023-05-23 PROCEDURE — 99214 OFFICE O/P EST MOD 30 MIN: CPT | Mod: GC | Performed by: STUDENT IN AN ORGANIZED HEALTH CARE EDUCATION/TRAINING PROGRAM

## 2023-05-23 PROCEDURE — 86364 TISS TRNSGLTMNASE EA IG CLAS: CPT | Performed by: STUDENT IN AN ORGANIZED HEALTH CARE EDUCATION/TRAINING PROGRAM

## 2023-05-23 PROCEDURE — 85027 COMPLETE CBC AUTOMATED: CPT | Performed by: PATHOLOGY

## 2023-05-23 PROCEDURE — 36415 COLL VENOUS BLD VENIPUNCTURE: CPT | Performed by: PATHOLOGY

## 2023-05-23 ASSESSMENT — PAIN SCALES - GENERAL: PAINLEVEL: MILD PAIN (3)

## 2023-05-23 NOTE — NURSING NOTE
"Chief Complaint   Patient presents with     New Patient       Vitals:    05/23/23 1301   BP: 116/64   Pulse: 77   Weight: 61.9 kg (136 lb 6.4 oz)   Height: 1.575 m (5' 2\")       Body mass index is 24.95 kg/m .    Lynda Shetty MA    "
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
## 2023-05-23 NOTE — PATIENT INSTRUCTIONS
"It was a pleasure taking care of you today.  I've included a brief summary of our discussion and care plan from today's visit below.  Please review this information with your primary care provider.  _______________________________________________________________________    My recommendations are summarized as follows:  -- [facility] floor center referral (placed since 2 weeks ago)  -- Will obtain labs with complete blood count, and comprehensive metabolic panel today  -- We will further discuss additional medication for your abdominal pain next visit, this include \"nortriptyline (TCA)\".     Return to GI Clinic in 3 months _______________________________________________________________________    Who do I call with any questions after my visit?  Please be in touch if there are any further questions that arise following today's visit.  There are multiple ways to contact your gastroenterology care team.    During business hours, you may reach a Gastroenterology nurse at [phone] and choose option 3.     To schedule or reschedule an appointment, please call [phone].   You can always send a secure message through [employer].  [employer] messages are answered by your nurse or doctor typically within 24 hours.  Please allow extra time on weekends and holidays.    For urgent/emergent questions after business hours, you may reach the on-call GI Fellow by contacting the [facility] at [phone].     How will I get the results of any tests ordered?    You will receive all of your results.  If you have signed up for [employer], any tests ordered at your visit will be available to you after your physician reviews them.  Typically this takes 1-2 weeks.  If there are urgent results that require a change in your care plan, your physician or nurse will call you to discuss the next steps.      What is [employer]?  [employer] is a secure way for you to access all of your healthcare records from the [facility]. "  It is a web based computer program, so you can sign on to it from any location.  It also allows you to send secure messages to your care team.  I recommend signing up for [employer] access if you have not already done so and are comfortable with using a computer.      How do I schedule labs, imaging studies, or procedures that were ordered in clinic today?   - Labs: To schedule lab appointment at the [facility], use my chart or call [phone]. If you have a [place] lab closer to home where you are regularly seen you can give them a call.   - Procedures: If a colonoscopy, upper endoscopy, breath test, esophageal manometry, or pH impedence was ordered today, our endoscopy team will call you to schedule this. If you have not heard from our endoscopy team within a week, please call [phone] to schedule.   - Imaging Studies: If you were scheduled for a CT scan, X-ray, MRI, ultrasound, HIDA scan or other imaging study, please call [phone] to have this scheduled.   - Referral: If a referral to another specialty was ordered, expect a phone call or follow instructions above. If you have not heard from anyone regarding your referral in a week, please call our clinic to check the status.     Sincerely,    Armando Jaramillo MD  GI Fellow  [facility], Division of Gastroenterology

## 2023-05-24 LAB — IGA SERPL-MCNC: 144 MG/DL (ref 84–499)

## 2023-05-25 ENCOUNTER — TELEPHONE (OUTPATIENT)
Dept: FAMILY MEDICINE | Facility: CLINIC | Age: 73
End: 2023-05-25
Payer: MEDICARE

## 2023-05-25 NOTE — TELEPHONE ENCOUNTER
[facility] Call Center    Phone Message    May a detailed message be left on voicemail: yes     Reason for Call: Other: Patient calling to discuss her medication list with the nurse and go over it and make sure it is correct. Please call to discuss further.     Action Taken: Message routed to:  [facility] ([facility]    Travel Screening: Not Applicable

## 2023-05-26 ENCOUNTER — PATIENT OUTREACH (OUTPATIENT)
Dept: GASTROENTEROLOGY | Facility: CLINIC | Age: 73
End: 2023-05-26
Payer: MEDICARE

## 2023-05-26 ENCOUNTER — TELEPHONE (OUTPATIENT)
Dept: GASTROENTEROLOGY | Facility: CLINIC | Age: 73
End: 2023-05-26
Payer: MEDICARE

## 2023-05-26 LAB
TTG IGA SER-ACNC: 0.3 U/ML
TTG IGG SER-ACNC: <0.6 U/ML

## 2023-05-26 NOTE — TELEPHONE ENCOUNTER
"Writer received Vocera call from Abdiaziz in the call center.     Abdiaziz states Jennifer called in and asked to speak with \" someone who knows a lot about GI testing, was advised to have testing at last GI visit by a fellow, does not wish to speak to the fellow\"     Abdiaziz states Jennifer disconnected while he was speaking to writer.     Please call Jennifer to discuss questions regarding last GI visit.     Routed to [facility].   "

## 2023-05-26 NOTE — TELEPHONE ENCOUNTER
Patient is calling again to discuss below- stating she will keep calling until someone calls her back. Please call as soon as possible to discuss

## 2023-05-26 NOTE — TELEPHONE ENCOUNTER
[facility] Call Center    Phone Message    May a detailed message be left on voicemail: yes     Reason for Call: Other: Patient called and wanted to ask questions about testing for colonoscopy and celiac - blood test vs. Endoscopy.  At her last visit, Dr. Mcmahan and Dr. Jaramillo had different views and patient wants opinion from someone who may know more.  She saw Dr. Arvizu back in 2017.      1) Does patient need to do colonoscopy now or at 10 year maribel?  She had one 7 years ago and 2-3 polyps were found that there weren't any issues with.    2) Will a blood test be sufficient to test for celiac? Patient does not eat wheat right now.    Please follow up with patient.    Action Taken: Message routed to:  [facility] ([facility]): [facility]    Travel Screening: Not Applicable

## 2023-05-26 NOTE — TELEPHONE ENCOUNTER
Spoke with pt. She wants to correct her med list and she wants to fax us her own med list with current meds. Fax number given. Once we receive a fax, I will review.

## 2023-05-30 NOTE — TELEPHONE ENCOUNTER
Patient wanted some clarification on the plan after her clinic visit with Dr. Jaramillo last week.    It was recommended she complete an EGD/colonoscopy now. EGD is to work-up new reflux symptoms per documentation. Patient states they also discussed ruling out Celiac disease. She is wondering if the blood work she has completed is sufficient to rule out Celiac or if biopsies are required. She also wants to clarify if the colonoscopy can be deferred for a few years. She strongly prefers conscious sedation for her colonoscopy rather than MAC. She was told to have a repeat cscope in 10 years (last scope was 7 years ago; no polyps).  Patient reports she needs pediatric scopes used for her procedures; otherwise she experiences bleeding. Will follow-up with her providers about these questions.    Patient prefers supplements to mainstream medications.    Patient okay to stay with Dr. Jaramillo for follow-up. She is aware patient will be working with Dr. Arvizu soon.

## 2023-05-31 NOTE — PROGRESS NOTES
Office note from 5/25 is complete. Office note, facesheet, and referral form were faxed to the [facility] on 5/31 at 9:15am.     Faxed documentation required to complete referral process originally started on May 1st.     Vivien Buckley

## 2023-05-31 NOTE — TELEPHONE ENCOUNTER
Called back to patient today to clarify patient procedure question. Voice message left for the patient.   - OK to hold off on colonoscopy as she is not due for the next 2-3 years  - EGD is for new worsening reflux, also in the setting of no prior EGD.    Will try to call back again.   Armando Jaramillo MD  Gastroenterology/Hepatology Fellow

## 2023-06-01 RX ORDER — PROGESTERONE 200 MG/1
200 CAPSULE ORAL EVERY EVENING
COMMUNITY
Start: 2023-06-01

## 2023-06-01 NOTE — TELEPHONE ENCOUNTER
O reviewed pt's med list with our current med list. Most of them are compatible, but several meds need to be clarified.    Left a message to the pt to call back.

## 2023-06-01 NOTE — PROGRESS NOTES
Christian from [facility] called back on 6/1 at 11:25am. He confirmed receiving the completed referral.     He will reach out to the patient to get patient scheduled.     Nothing further needed at this time.     [facility]  [address], [place] [address]  Phone: [phone]  Fax: [phone]    Number Returning call on 6/1: [phone]      Vivien Buckley

## 2023-06-06 DIAGNOSIS — M54.50 LOW BACK PAIN: ICD-10-CM

## 2023-06-06 DIAGNOSIS — M48.062 SPINAL STENOSIS OF LUMBAR REGION WITH NEUROGENIC CLAUDICATION: Primary | ICD-10-CM

## 2023-06-09 NOTE — TELEPHONE ENCOUNTER
The patient said she got a missed call from [facility] to discuss her medication list, please review and follow up asap thank you.

## 2023-06-12 ENCOUNTER — TELEPHONE (OUTPATIENT)
Dept: GASTROENTEROLOGY | Facility: CLINIC | Age: 73
End: 2023-06-12
Payer: MEDICARE

## 2023-06-12 NOTE — TELEPHONE ENCOUNTER
DIAGNOSIS: Spinal Stenosis of Lumbar Region with Neurogenic Claudication   APPOINTMENT DATE: 6/13/2023   NOTES STATUS DETAILS   OFFICE NOTE from referring provider Internal 05/04/2023 - Elias Brooks MD- [facility]   OFFICE NOTE from other specialist Internal  Media Tab 04/21/2023 - Milan Alvarez - [facility]     03/24/2023 - Jonatan Ulloa MA - [facility]   MEDICATION LIST Internal     LABS       DEXA PACS Internal   MRI PACS Internal   CT SCAN PACS Internal   XRAYS (IMAGES & REPORTS) PACS Internal

## 2023-06-12 NOTE — TELEPHONE ENCOUNTER
received a message from Clinic Coordinator Esteban to help scheduled Pt for a sooner follow up visit, per follow up order.  called and left a message for the Pt, along with call back number.     has a time held on 8/16/2023 at 1:40pm with Dr. Armando Jaramillo for the Pt, if this will work for the Pt.

## 2023-06-13 ENCOUNTER — OFFICE VISIT (OUTPATIENT)
Dept: ORTHOPEDICS | Facility: CLINIC | Age: 73
End: 2023-06-13
Payer: MEDICARE

## 2023-06-13 ENCOUNTER — ANCILLARY PROCEDURE (OUTPATIENT)
Dept: GENERAL RADIOLOGY | Facility: CLINIC | Age: 73
End: 2023-06-13
Attending: PHYSICIAN ASSISTANT
Payer: MEDICARE

## 2023-06-13 ENCOUNTER — PRE VISIT (OUTPATIENT)
Dept: ORTHOPEDICS | Facility: CLINIC | Age: 73
End: 2023-06-13

## 2023-06-13 DIAGNOSIS — M54.50 LOW BACK PAIN: ICD-10-CM

## 2023-06-13 DIAGNOSIS — M81.0 AGE-RELATED OSTEOPOROSIS WITHOUT CURRENT PATHOLOGICAL FRACTURE: ICD-10-CM

## 2023-06-13 DIAGNOSIS — M48.062 LUMBAR STENOSIS WITH NEUROGENIC CLAUDICATION: ICD-10-CM

## 2023-06-13 DIAGNOSIS — M43.10 DEGENERATIVE SPONDYLOLISTHESIS: Primary | ICD-10-CM

## 2023-06-13 DIAGNOSIS — M48.062 SPINAL STENOSIS OF LUMBAR REGION WITH NEUROGENIC CLAUDICATION: ICD-10-CM

## 2023-06-13 PROCEDURE — 99215 OFFICE O/P EST HI 40 MIN: CPT | Performed by: PHYSICIAN ASSISTANT

## 2023-06-13 PROCEDURE — 72110 X-RAY EXAM L-2 SPINE 4/>VWS: CPT | Performed by: SURGERY

## 2023-06-13 NOTE — PROGRESS NOTES
Jennifer Steiner complains of    Chief Complaint   Patient presents with     Consult     Low back pain        I have reviewed and updated the patient's Past Medical History, Social History, Family History and Medication List.    ALLERGIES  Aspirin, Bee venom, Birds [feathers], Cats, Dust mites, Fluoride, Liothyronine, Mold, No clinical screening - see comments, Procaine, Seasonal allergies, Simvastatin, Penicillins, and Sulfa antibiotics    Spine Surgery Consultation    REFERRING PHYSICIAN: Milan Alvarez   PRIMARY CARE PHYSICIAN: Milan Alvarez           Chief Complaint:   Consult (Low back pain )      History of Present Illness:  Symptom Profile Including: location of symptoms, onset, severity, exacerbating/alleviating factors, previous treatments:        Jennifer Steiner is a 73 year old female who presents today for evaluation of low back pain.  She says that pain started about 1 year ago, she was moving and shifting heavy boxes when she heard a pop in her left low back.  Pain started after that in the left low back and left hip.  It did slowly improve with time and chiropractic care, pool exercises, walking, but has flared up again.  Pain is mainly localized to the left low back radiating to left lateral hip, left lateral thigh, left anterior/lateral shin and into the top of the foot.  She feels like her left leg is slightly weaker compared to the right.  Denies foot drop.  She denies right leg symptoms.  She denies bowel/bladder dysfunction or incontinence.  Admits to diarrhea.  Pain is worse with standing and walking, better if she can lean forward when walking or lay down.  She recently [facility] care at [facility] and is going to start some pool therapy exercises.  She has not had any epidural steroid injections and is not currently interested in injection.  She says that she cannot take a majority of pain medications because it causes her to feel ill/stomach upset.      She also recently saw   Todd recently for evaluation of the left hip, but it was decided that the left hip pain was due to the lumbar spine.    Additionally, she notes she had a DEXA scan done in 2021 which showed low bone density, but she was told this was due to being placed on too high dose of thyroid medications.  She does not have any history of fragility fracture only.  She is not currently on any osteoporotic medications.    Past treatments tried:  - Physical therapy: just started with [facility] pain clinic, aquatic therapy  - Injections: none  - Medications: can't take most pain medications due to stomach upset    Social:  Tobacco use: Denies  Walks with assistance of cane    TAMEKA Scores:  Oswestry (TAMEKA) Questionnaire        6/13/2023     3:07 PM   OSWESTRY DISABILITY INDEX   Count 9   Sum 21   Oswestry Score (%) 46.67 %        Visual Analog Pain Scale  Back Pain Scale 0-10: 3  Right leg pain: 0  Left leg pain: 3    PROMIS-10 Scores:                  Physical Exam:    Constitutional - Patient is healthy, well-nourished and appears stated age   Respiratory - Patient is breathing normally and in no respiratory distress.   Skin - No suspicious rashes or lesions.   Psychiatric - Normal mood and affect.   Cardiovascular - Extremities warm and well perfused.   Eyes - Visual acuity is normal to the written word.   ENT - Hearing intact to the spoken word.   GI - No abdominal distention.   Musculoskeletal - Non-antalgic gait without use of assistive devices.          Lumbar Spine:    Appearance - Normal . No surgical incisions    Palpation - Non-tender to palpation midline, paraspinals, PSIS, Greater trochanters    ROM - Full, painless    Motor -        LOWER EXTREMITY Left Right   Hip flexion 5/5 5/5   Knee flexion 5/5 5/5   Knee extension 4+/5 5/5   Ankle dorsiflexion 5/5 5/5   Ankle plantarflexion 5/5 5/5   Great toe extension 5/5 5/5        Special tests -     Seated straight leg raise - negative     Pain with hip ROM -  negative     Neurologic - Sensation intact to light touch bilaterally, but diminished in L5 distribution on left leg.      REFLEXES Left Right                  Patella 2+ 2+   Ankle jerk 1+ 1+   clonus 0 beats 0 beats                Imaging:     I independently reviewed & provided my own interpretation of new radiographs and/ or advanced imaging at this clinic visit. The results were discussed with the patient.  Findings include:    6/13/2023 XR lumbar spine AP/lateral and flexion/extension views: Neutral coronal alignment.  Right greater than left asymmetric disc collapse L4-5.  No significant hip degenerative changes.  L4-5 spondylolisthesis without significant motion between flexion and extension views.    1/10/2023 MRI lumbar spine without contrast: L4-5 degeneration and spondylolisthesis with severe central canal stenosis.  L3-4 moderate spinal canal stenosis.    8/17/2021 DEXA: The most negative and valid T-score of -4.1 at the level of the L1 - L3 lumbar spine corresponds with osteoporosis according to WHO criteria for postmenopausal females and men age 50 and over. The risk of osteoporotic fracture increases approximately 2-fold for each 1.0 SD decrease in T-score.           Assessment and Plan:   Assessment:  73 year old female with   1. L4-5 degenerative spondylolisthesis and central stenosis with left neurogenic claudication symptoms  2. Osteoporosis (most negative T-score -4.1; not currently on medications to treat)     Plan:  Reviewed patient's own XR and MR images with her today to help explain what is going on with her back.  She has degenerative spondylolisthesis which is causing stenosis at the L4-5 level.  This explains her left low back and left leg/hip symptoms that are worse with activity and when standing and walking.  We discussed that this is generally considered a quality-of-life problem and treatment options include nonoperative (physical therapy, injections, medications, etc.) versus  surgery.  Fortunately, she does not have any symptoms of cauda equina or significant leg weakness, and we discussed that if these were to develop surgery would be indicated.  Patient currently not interested in pursuing surgery.  Recommend she continue working with the neuro pain clinic on physical therapy exercises, possible injections, medications.  If pain worsens or continues despite these nonoperative treatment options, then I would recommend she follow-up with one of our surgeons to discuss what surgery would entail.  We discussed that it would likely be in the form of fusion due to the spondylolisthesis.     Additionally, we discussed her osteoporosis.  Fortunately, she has not had any fragility fractures, but we discussed the importance of managing bone health to prevent fractures in the future.  We also discussed that if she were to have a fusion surgery in the future we would want her bone health optimized.  Patient will reach out to her primary care doctor about possible repeat DEXA scan and discussion of initiation of osteoporotic medications.    - continue with comprehensive non-operative treatment with [facility] (PT, injections, medications)  - follow up with PCP for evaluation/ treatment of osteoporosis  - Follow up with spine surgeon to discuss surgical options if non-operative treatment options fail to improve pain.    Respectfully,  Tierra Messina (a.k.aDashawn Catalan), DULCE  Orthopaedic Spine Surgery  Dept Orthopaedic Surgery, [facility]  [facility] [place]: [phone]      50 minutes spent on the date of the encounter doing chart review, review of outside records, review of test results, my own interpretation of tests, documentation, patient history, physical exam & discussion of plan with patient and family.    Dictation Disclaimer: Some of this Note has been completed with voice-recognition dictation software. Although errors are generally corrected real-time, there is the potential for a rare error to be  present in the completed chart.

## 2023-06-13 NOTE — LETTER
6/13/2023         RE: Jennifer Steiner  [address]  [facility] [address]        Dear Colleague,    Thank you for referring your patient, Jennifer Steiner, to the [facility]. Please see a copy of my visit note below.    Jennifer Steiner complains of    Chief Complaint   Patient presents with    Consult     Low back pain        I have reviewed and updated the patient's Past Medical History, Social History, Family History and Medication List.    ALLERGIES  Aspirin, Bee venom, Birds [feathers], Cats, Dust mites, Fluoride, Liothyronine, Mold, No clinical screening - see comments, Procaine, Seasonal allergies, Simvastatin, Penicillins, and Sulfa antibiotics    Spine Surgery Consultation    REFERRING PHYSICIAN: Milan Alvarez   PRIMARY CARE PHYSICIAN: Milna Alvarez           Chief Complaint:   Consult (Low back pain )      History of Present Illness:  Symptom Profile Including: location of symptoms, onset, severity, exacerbating/alleviating factors, previous treatments:        Jennifer Steiner is a 73 year old female who presents today for evaluation of low back pain.  She says that pain started about 1 year ago, she was moving and shifting heavy boxes when she heard a pop in her left low back.  Pain started after that in the left low back and left hip.  It did slowly improve with time and chiropractic care, pool exercises, walking, but has flared up again.  Pain is mainly localized to the left low back radiating to left lateral hip, left lateral thigh, left anterior/lateral shin and into the top of the foot.  She feels like her left leg is slightly weaker compared to the right.  Denies foot drop.  She denies right leg symptoms.  She denies bowel/bladder dysfunction or incontinence.  Admits to diarrhea.  Pain is worse with standing and walking, better if she can lean forward when walking or lay down.  She [facility] and is  going to start some pool therapy exercises.  She has not had any epidural steroid injections and is not currently interested in injection.  She says that she cannot take a majority of pain medications because it causes her to feel ill/stomach upset.      She also recently saw Dr. Brooks recently for evaluation of the left hip, but it was decided that the left hip pain was due to the lumbar spine.    Additionally, she notes she had a DEXA scan done in 2021 which showed low bone density, but she was told this was due to being placed on too high dose of thyroid medications.  She does not have any history of fragility fracture only.  She is not currently on any osteoporotic medications.    Past treatments tried:  - Physical therapy: just started with [facility] pain clinic, aquatic therapy  - Injections: none  - Medications: can't take most pain medications due to stomach upset    Social:  Tobacco use: Denies  Walks with assistance of cane    TAMEKA Scores:  Oswestry (TAMEKA) Questionnaire        6/13/2023     3:07 PM   OSWESTRY DISABILITY INDEX   Count 9   Sum 21   Oswestry Score (%) 46.67 %        Visual Analog Pain Scale  Back Pain Scale 0-10: 3  Right leg pain: 0  Left leg pain: 3    PROMIS-10 Scores:                  Physical Exam:    Constitutional - Patient is healthy, well-nourished and appears stated age   Respiratory - Patient is breathing normally and in no respiratory distress.   Skin - No suspicious rashes or lesions.   Psychiatric - Normal mood and affect.   Cardiovascular - Extremities warm and well perfused.   Eyes - Visual acuity is normal to the written word.   ENT - Hearing intact to the spoken word.   GI - No abdominal distention.   Musculoskeletal - Non-antalgic gait without use of assistive devices.          Lumbar Spine:    Appearance - Normal . No surgical incisions    Palpation - Non-tender to palpation midline, paraspinals, PSIS, Greater trochanters    ROM - Full, painless    Motor -        LOWER EXTREMITY  Left Right   Hip flexion 5/5 5/5   Knee flexion 5/5 5/5   Knee extension 4+/5 5/5   Ankle dorsiflexion 5/5 5/5   Ankle plantarflexion 5/5 5/5   Great toe extension 5/5 5/5        Special tests -     Seated straight leg raise - negative     Pain with hip ROM - negative     Neurologic - Sensation intact to light touch bilaterally, but diminished in L5 distribution on left leg.      REFLEXES Left Right                  Patella 2+ 2+   Ankle jerk 1+ 1+   clonus 0 beats 0 beats                Imaging:     I independently reviewed & provided my own interpretation of new radiographs and/ or advanced imaging at this clinic visit. The results were discussed with the patient.  Findings include:    6/13/2023 XR lumbar spine AP/lateral and flexion/extension views: Neutral coronal alignment.  Right greater than left asymmetric disc collapse L4-5.  No significant hip degenerative changes.  L4-5 spondylolisthesis without significant motion between flexion and extension views.    1/10/2023 MRI lumbar spine without contrast: L4-5 degeneration and spondylolisthesis with severe central canal stenosis.  L3-4 moderate spinal canal stenosis.    8/17/2021 DEXA: The most negative and valid T-score of -4.1 at the level of the L1 - L3 lumbar spine corresponds with osteoporosis according to WHO criteria for postmenopausal females and men age 50 and over. The risk of osteoporotic fracture increases approximately 2-fold for each 1.0 SD decrease in T-score.           Assessment and Plan:   Assessment:  73 year old female with   1. L4-5 degenerative spondylolisthesis and central stenosis with left neurogenic claudication symptoms  2. Osteoporosis (most negative T-score -4.1; not currently on medications to treat)     Plan:  Reviewed patient's own XR and MR images with her today to help explain what is going on with her back.  She has degenerative spondylolisthesis which is causing stenosis at the L4-5 level.  This explains her left low back and  left leg/hip symptoms that are worse with activity and when standing and walking.  We discussed that this is generally considered a quality-of-life problem and treatment options include nonoperative (physical therapy, injections, medications, etc.) versus surgery.  Fortunately, she does not have any symptoms of cauda equina or significant leg weakness, and we discussed that if these were to develop surgery would be indicated.  Patient currently not interested in pursuing surgery.  Recommend she continue working with the neuro pain clinic on physical therapy exercises, possible injections, medications.  If pain worsens or continues despite these nonoperative treatment options, then I would recommend she follow-up with one of our surgeons to discuss what surgery would entail.  We discussed that it would likely be in the form of fusion due to the spondylolisthesis.     Additionally, we discussed her osteoporosis.  Fortunately, she has not had any fragility fractures, but we discussed the importance of managing bone health to prevent fractures in the future.  We also discussed that if she were to have a fusion surgery in the future we would want her bone health optimized.  Patient will reach out to her primary care doctor about possible repeat DEXA scan and discussion of initiation of osteoporotic medications.    - continue with comprehensive non-operative treatment with [facility] (PT, injections, medications)  - follow up with PCP for evaluation/ treatment of osteoporosis  - Follow up with spine surgeon to discuss surgical options if non-operative treatment options fail to improve pain.    Respectfully,  Tierra Messina (irwin Catalan), DULCE  Orthopaedic Spine Surgery  Dept Orthopaedic Surgery, [facility]  [facility] Phone: [phone]      50 minutes spent on the date of the encounter doing chart review, review of outside records, review of test results, my own interpretation of tests, documentation, patient history,  physical exam & discussion of plan with patient and family.    Dictation Disclaimer: Some of this Note has been completed with voice-recognition dictation software. Although errors are generally corrected real-time, there is the potential for a rare error to be present in the completed chart.

## 2023-06-15 RX ORDER — PROGESTERONE 200 MG/1
200 CAPSULE ORAL EVERY EVENING
COMMUNITY
Start: 2023-06-15 | End: 2023-06-15

## 2023-06-15 NOTE — TELEPHONE ENCOUNTER
Med list all updated after the close review and from the pt's own med list.    Left a message to the pt regarding this matter.

## 2023-06-16 NOTE — TELEPHONE ENCOUNTER
[facility] Call Center    Phone Message    May a detailed message be left on voicemail: yes     Reason for Call: Other:      Pt returned call from the clinic in regards to scheduling an earlier appt on 08/16. Pt stated that date did not work for them and declined appt. offering    Action Taken: Message routed to:  [facility]    Travel Screening: Not Applicable

## 2023-06-19 NOTE — TELEPHONE ENCOUNTER
Writer called and left message for Pt to call back and discuss scheduling an appointment. Writer left call back number.

## 2023-06-21 NOTE — TELEPHONE ENCOUNTER
called and talked with Pt. Pt accepted a sooner appt with Dr. Jaramillo on 8/2/2023 at 1:40pm for an in-person clinic visit.      was unable to complete scheduling due to health insurance coverage not allowing scheduling with provider. The appt date and time has been held for the Pt on provider's schedule.      will send a message to Monet BEE

## 2023-06-26 ENCOUNTER — TRANSFERRED RECORDS (OUTPATIENT)
Dept: HEALTH INFORMATION MANAGEMENT | Facility: CLINIC | Age: 73
End: 2023-06-26
Payer: MEDICARE

## 2023-07-13 ENCOUNTER — TELEPHONE (OUTPATIENT)
Dept: PHYSICAL MEDICINE AND REHAB | Facility: CLINIC | Age: 73
End: 2023-07-13
Payer: MEDICARE

## 2023-07-13 NOTE — Clinical Note
Here is the patient that will be rescheduled with you on 8/30/23 to discuss her medical history, imaging, and treatment options. I routed to Jesús to get her scheduled for that visit with you.  Thank you and patient also greatly appreciated getting scheduled to see you to discuss her treatment options further.   Thanks,   Rowena

## 2023-07-13 NOTE — TELEPHONE ENCOUNTER
Left detailed message for pt, stating per Dr Angeles  Her appt for July 17th is canceled, and asked that she calls back with any questions.   Please cancel appt. w/ me. Patient was seen for same issue in March 2023 by Dr Taylor and per ortho spine surgery note she is now seeing [facility] for lumbar spine issues

## 2023-07-14 NOTE — TELEPHONE ENCOUNTER
Pt returned call.  She is only doing pool therapy at [facility] and not seeing a provider there.  She wants to schedule an appointment with Dr. Angeles.  Please call Pt back to discuss.  Thanks.

## 2023-07-17 NOTE — TELEPHONE ENCOUNTER
Called patient back.     She states she only does Pool therapy at [facility] due to the [facility] not having pool therapy, does not see a provider there.   Has been seeing Acupuncturist through [facility].  Had one appointment with [facility] with Dr. Taylor and Fellow Dr. Pfeiffer on 3/13/23 - patient stated she did not feel they really spent time reviewing options with her or explaining anything to her, did not feel heard or that they took the time to review options with her.  Has had appts Dr. Brooks and Tierra Messina with orthopedic surgery, but would like to see Dr. Angeles to review her history more thoroughly and see what his recommendations are based on his history and background, she feels he may be able to educate her better and inform her more thoroughly on what options he feels would be best recommended, as she feels she is not knowledgeable to make an informed decision with what she knows still at this time.     Advised this information will be discussed with Dr. Angeles and then we will reach back out to her.     Rowena Sheriff, RIPN RN  RN Care Coordinator for Physical Medicine and Rehabilitation  [facility] - [address] [address]  Office: [phone]  Fax: [phone]

## 2023-07-17 NOTE — TELEPHONE ENCOUNTER
Health Call Center    Phone Message    May a detailed message be left on voicemail: yes     Reason for Call: Other: Patient called stating she is only doing Pool Therapy at [facility], and she is interested in still seeing Dr. Angeles. Please advise.    Action Taken: Message routed to:  [facility] PM&R    Travel Screening: Not Applicable

## 2023-07-17 NOTE — TELEPHONE ENCOUNTER
Dr. Angeles will meet with patient to review her medical history and imaging and discuss further what her treatment options are that may help her.     Called patient back and she is agreeable to see Dr. Angeles for the end of August appt time that is available.     Routed to Clinic Navigator to assist with getting patient scheduled for the [facility] Med[facility]Spine appt with Dr. Angeles on 8/30/23 at 12:30 PM with 12:15 PM check-in.     RIP DeeN RN  RN Care Coordinator for Physical Medicine and Rehabilitation  [facility] [address] [address]  Office: [phone]  Fax: [phone]

## 2023-07-24 ENCOUNTER — TELEPHONE (OUTPATIENT)
Dept: GASTROENTEROLOGY | Facility: CLINIC | Age: 73
End: 2023-07-24
Payer: MEDICARE

## 2023-07-24 NOTE — TELEPHONE ENCOUNTER
Called to remind patient of their upcoming appointment with our GI clinic, on Wednesday 8/2/2023 at 1:25PM with Dr. Armando Inman. This appointment is scheduled as an in-person appt. Please arrive 15 minutes early to check in for your appointment. , if your appointment is virtual (video or telephone) you need to be in [place] for the visit. To reschedule or cancel appointment patient needs to call [phone] option 1.  To confirm appointment patient advised to call back.     Lynda Shetty MA

## 2023-07-28 ENCOUNTER — OFFICE VISIT (OUTPATIENT)
Dept: FAMILY MEDICINE | Facility: CLINIC | Age: 73
End: 2023-07-28
Payer: MEDICARE

## 2023-07-28 VITALS
HEART RATE: 70 BPM | SYSTOLIC BLOOD PRESSURE: 99 MMHG | HEIGHT: 62 IN | OXYGEN SATURATION: 98 % | WEIGHT: 137.3 LBS | DIASTOLIC BLOOD PRESSURE: 62 MMHG | BODY MASS INDEX: 25.27 KG/M2

## 2023-07-28 DIAGNOSIS — K59.00 CONSTIPATION, UNSPECIFIED CONSTIPATION TYPE: ICD-10-CM

## 2023-07-28 DIAGNOSIS — M81.0 OSTEOPOROSIS, UNSPECIFIED OSTEOPOROSIS TYPE, UNSPECIFIED PATHOLOGICAL FRACTURE PRESENCE: Primary | ICD-10-CM

## 2023-07-28 DIAGNOSIS — M48.062 SPINAL STENOSIS OF LUMBAR REGION WITH NEUROGENIC CLAUDICATION: ICD-10-CM

## 2023-07-28 PROCEDURE — 99214 OFFICE O/P EST MOD 30 MIN: CPT | Performed by: FAMILY MEDICINE

## 2023-07-28 RX ORDER — LACTULOSE 10 G/10G
SOLUTION ORAL
Qty: 10 PACKET | Refills: 1 | Status: SHIPPED | OUTPATIENT
Start: 2023-07-28

## 2023-07-28 RX ORDER — ESTRADIOL 10 UG/1
10 INSERT VAGINAL
COMMUNITY
Start: 2023-06-23

## 2023-07-28 NOTE — NURSING NOTE
"Jennifer Steiner is a 73 year old female patient that presents today in clinic for the following:    Chief Complaint   Patient presents with    Follow Up     Pt here for 3 month follow up and would like to discuss colonoscopy, endoscopy concerns, and bone concerns.     The patient's allergies and medications were reviewed as noted. A set of vitals were recorded as noted without incident: BP 99/62 (BP Location: Right arm, Patient Position: Sitting, Cuff Size: Adult Regular)   Pulse 70   Ht 1.575 m (5' 2.01\")   Wt 62.3 kg (137 lb 4.8 oz)   LMP  (LMP Unknown)   SpO2 98%   BMI 25.11 kg/m  . The patient does not have any other questions for the provider.    Elsa Damon, EMT at 12:52 PM on 7/28/2023  "

## 2023-07-28 NOTE — PROGRESS NOTES
"  Assessment & Plan     Osteoporosis, unspecified osteoporosis type, unspecified pathological fracture presence  Due  - DX Hip/Pelvis/Spine; Future    Constipation, unspecified constipation type  Try low dose at her request  - lactulose (CEPHULAC) 10 GM packet; Take 1/3 packet daily, mix in water and drink or sprinkle on food    Spinal stenosis of lumbar region with neurogenic claudication  Discussed spine notes, she'll cont [facility] for now      28 minutes spent by me on the date of the encounter doing chart review, history and exam, documentation and further activities per the note  {  BMI:   Estimated body mass index is 25.11 kg/m  as calculated from the following:    Height as of this encounter: 1.575 m (5' 2.01\").    Weight as of this encounter: 62.3 kg (137 lb 4.8 oz).     No follow-ups on file.    Milan Alvarez MD  [facility] [place]    Maral Rodriguez is a 73 year old, presenting for the following health issues:  Follow Up (Pt here for 3 month follow up and would like to discuss colonoscopy, endoscopy concerns, and bone concerns.)    HPI   Pt has outside provider recommending she use lactulose low dose as prebiotic. SHe has alternating constipatino and diarrhea; reviewed it can cause diarrhea, she'll try low dose and follow-up  Rvwd interval GI visit, she has not done ordered tests yet, discussed that it is reasonable to do them  Ongoing back pain, helped by [facility] PT, we revew Spine clinic notes, she is will to do new dexa as they advise. We discuss that, in past, she's been hesitant to see endocrine to discuss rx options and that that will be ongoing advice from me.   No new c/o  Past Medical History:   Diagnosis Date    Anemia     Asthma     since childhood    Blepharitis     Constipation, chronic     Chronic enema use    CVA (cerebral infarction) 2009    carotid intimal repair, left    Hyperlipidemia age 58    identified age 58    Hypothyroidism     Irritable " bowel disease     Mixed type age-related cataract, both eyes     Neck injuries     Pelvic floor dysfunction 2010    Postmenopausal hormone replacement therapy     From Dr. Link    Scoliosis      Past Surgical History:   Procedure Laterality Date    CYSTOSCOPY N/A 3/25/2019    Procedure: CYSTOSCOPY;  Surgeon: Radha Ovalle MD;  Location: [facility] OR    DILATION AND CURETTAGE, OPERATIVE HYSTEROSCOPY WITH MORCELLATOR, COMBINED N/A 3/25/2019    Procedure: OPERATIVE HYSTEROSCOPY WITH MORCELLATOR;  Surgeon: Marybeth Huber MD;  Location: [facility] OR    HC BREATH HYDROGEN TEST  11/11/2013    Procedure: HYDROGEN BREATH TEST;  Surgeon: Esteban Short MD;  Location: [facility] GI    PHACOEMULSIFICATION WITH STANDARD INTRAOCULAR LENS IMPLANT Left 8/15/2022    Procedure: LEFT EYE PHACOEMULSIFICATION, CATARACT, WITH STANDARD INTRAOCULAR LENS IMPLANT INSERTION;  Surgeon: Damir Cunningham MD;  Location: [facility] OR    PHACOEMULSIFICATION WITH STANDARD INTRAOCULAR LENS IMPLANT Right 9/12/2022    Procedure: RIGHT EYE PHACOEMULSIFICATION, CATARACT, WITH TORIC  INTRAOCULAR LENS IMPLANT INSERTION;  Surgeon: Damir Cunningham MD;  Location: [facility] OR    SURGICAL HISTORY OF -       tonsillectomy    SURGICAL HISTORY OF -   ~1997    cosmetic skin removal: breast reduction; skin removal from arms legs and tummy tuck    ZZC STOMACH SURGERY PROCEDURE UNLISTED       Current Outpatient Medications   Medication    estradiol (VAGIFEM) 10 MCG TABS vaginal tablet    lactulose (CEPHULAC) 10 GM packet    artificial tears OINT ophthalmic ointment    Cholecalciferol (VITAMIN D) 2000 UNIT tablet    Digestive Enzymes TABS    DiphenhydrAMINE HCl (BENADRYL PO)    EPINEPHrine (EPIPEN) 0.3 MG/0.3ML injection 2-pack    Hypromellose (ARTIFICIAL TEARS OP)    ketoconazole (NIZORAL) 2 % external cream    MAGNESIUM OXIDE PO    neomycin-polymyxin-dexamethasone (MAXITROL) 3.5-40365-3.1 SUSP ophthalmic susp    neomycin-polymyxin-dexamethasone (MAXITROL)  "3.5-63437-0.1 SUSP ophthalmic susp    peppermint oil liquid    Probiotic Product (ALOE 15654 & PROBIOTICS) CAPS    progesterone (PROMETRIUM) 200 MG capsule    Testosterone Propionate (FIRST-TESTOSTERONE MC) 2 % CREA    thyroid (ARMOUR) 60 MG tablet     No current facility-administered medications for this visit.   .  Allergies   Allergen Reactions    Aspirin GI Disturbance     GI upset    Bee Venom Swelling    Birds [Feathers]     Cats     Dust Mites     Fluoride Other (See Comments) and Unknown     headaches    Liothyronine Unknown     Severe gut reactions, ears itching, throat closing    Mold     No Clinical Screening - See Comments Other (See Comments)     Some antibiotics but not Zpak-Can tolerate Azithromycin  z-pack, antibiotics in general- per patient.     Procaine Other (See Comments)     headache    Seasonal Allergies      Ragweed, pollen    Simvastatin Other (See Comments)     \"hot flash\", muscle weakness, dizziness    Penicillins Unknown and Rash     As a child    Sulfa Antibiotics Itching, Other (See Comments), Rash and Hives           Review of Systems         Objective    BP 99/62 (BP Location: Right arm, Patient Position: Sitting, Cuff Size: Adult Regular)   Pulse 70   Ht 1.575 m (5' 2.01\")   Wt 62.3 kg (137 lb 4.8 oz)   LMP  (LMP Unknown)   SpO2 98%   BMI 25.11 kg/m    Body mass index is 25.11 kg/m .  Physical Exam   GENERAL: healthy, alert and no distress  MS: no gross musculoskeletal defects noted, no edema            "

## 2023-07-31 ENCOUNTER — TELEPHONE (OUTPATIENT)
Dept: OPHTHALMOLOGY | Facility: CLINIC | Age: 73
End: 2023-07-31
Payer: MEDICARE

## 2023-08-01 ENCOUNTER — TELEPHONE (OUTPATIENT)
Dept: FAMILY MEDICINE | Facility: CLINIC | Age: 73
End: 2023-08-01
Payer: MEDICARE

## 2023-08-02 ENCOUNTER — TELEPHONE (OUTPATIENT)
Dept: OPHTHALMOLOGY | Facility: CLINIC | Age: 73
End: 2023-08-02

## 2023-08-02 ENCOUNTER — OFFICE VISIT (OUTPATIENT)
Dept: GASTROENTEROLOGY | Facility: CLINIC | Age: 73
End: 2023-08-02
Attending: STUDENT IN AN ORGANIZED HEALTH CARE EDUCATION/TRAINING PROGRAM
Payer: MEDICARE

## 2023-08-02 VITALS
WEIGHT: 135 LBS | DIASTOLIC BLOOD PRESSURE: 78 MMHG | HEART RATE: 72 BPM | BODY MASS INDEX: 23.92 KG/M2 | SYSTOLIC BLOOD PRESSURE: 113 MMHG | HEIGHT: 63 IN | OXYGEN SATURATION: 100 %

## 2023-08-02 DIAGNOSIS — B35.3 TINEA PEDIS OF BOTH FEET: ICD-10-CM

## 2023-08-02 DIAGNOSIS — R19.4 CHANGE IN BOWEL HABIT: ICD-10-CM

## 2023-08-02 DIAGNOSIS — K62.4 ANAL STENOSIS: ICD-10-CM

## 2023-08-02 DIAGNOSIS — M62.89 PELVIC FLOOR DYSFUNCTION: Primary | ICD-10-CM

## 2023-08-02 PROCEDURE — 99215 OFFICE O/P EST HI 40 MIN: CPT | Mod: GC | Performed by: STUDENT IN AN ORGANIZED HEALTH CARE EDUCATION/TRAINING PROGRAM

## 2023-08-02 RX ORDER — KETOCONAZOLE 20 MG/G
CREAM TOPICAL DAILY PRN
Qty: 60 G | Refills: 3 | Status: SHIPPED | OUTPATIENT
Start: 2023-08-02
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
## 2023-08-02 NOTE — NURSING NOTE
"Chief Complaint   Patient presents with    Follow Up       Vitals:    08/02/23 1334   BP: 113/78   Pulse: 72   SpO2: 100%   Weight: 61.2 kg (135 lb)   Height: 1.588 m (5' 2.5\")       Body mass index is 24.3 kg/m .    [employer]    "

## 2023-08-02 NOTE — PROGRESS NOTES
"PHYSICAL THERAPY EVALUATION  Type of Visit: Evaluation    See electronic medical record for Abuse and Falls Screening details.    Subjective       Presenting condition or subjective complaint: pelvic floor - GI \"stuck\" muscles - pain, bloating. No issues with urinary incontinence. Slow transit time through gut. Not able to pass stool without enema, and magnesium oxide. Usually can pass some stool - will take enema to empty completely. Experiences lower abdominal spasms and pain from taking Mag oxide. Passing stool helps to alleviate abdominal discomfort some. Also taking new probiotic.   Date of onset: 04/20/23 (referral)    Relevant medical history: Anemia; Asthma; Bladder or bowel problems; Chest pain; COPD; Dizziness; Menopause; Migraines or headaches; Neck injury; Osteoporosis; Overweight; Severe dizziness; Significant weakness; Stroke; Thyroid problems; Vision problems   Dates & types of surgery: Tonsils - cild; skin removal after wt loss as teen, surgery 30 years ago, removal of fibroids in uterus 5 years ago    Prior diagnostic imaging/testing results: Other tests in July at [facility] - colon and rectal surgery on [place] - order by GI and urology at [facility];   Prior therapy history for the same diagnosis, illness or injury: Yes about 10 years ago - biofeedback - [facility]    Living Environment  Social support:     Type of home:     Stairs to enter the home:         Ramp:     Stairs inside the home:         Help at home:    Equipment owned:       Employment:      Hobbies/Interests:      Patient goals for therapy: bathroom, be gone from home longer periods, release of muscles when it is time.       Objective      PELVIC EVALUATION  ADDITIONAL HISTORY:  Sex assigned at birth: Female  Gender identity: Female    Pronouns: She/Her Hers      Bladder History:  Feels bladder filling: Yes  Triggers for feeling of inability to wait to go to the bathroom: No    How long can you wait to urinate: 1/2 if urgent  Gets " up at night to urinate: Yes 2  Can stop the flow of urine when urinating: -- (haven't tried)  Volume of urine usually released: Medium   Other issues:    Number of bladder infections in last 12 months:    Fluid intake per day: 20+ oz      Medications taken for bladder: No     Activities causing urine leak:      Amount of urine typically leaked:    Pads used to help with leaking:          Bowel History:  Frequency of bowel movement: not natural, use magnesium and enemas;  Consistency of stool: Other liquid  Ignores the urge to defecate: Sometimes  Other bowel issues: Pain when pooping; Loss of gas; Straining to have bowel movement (unable to pass gas)  Length of time spent trying to have a bowel movement:      Sexual Function History:  Sexual orientation: Straight    Sexually active: No  Lubrication used:      Pelvic pain: Sitting; Pelvic exams; Rectal exams    Pain or difficulty with orgasms/erection/ejaculation:      State of menopause: Post-menopause (I am done with menopause)  Hormone medications:        Are you currently pregnant: No, Number of previous pregnancies: 0, Do you get regular exercise: Yes, I do this type of exercise: pool, walk, Have you tried pelvic floor strengthening exercises for 4 weeks: Yes, Do you have any history of trauma that is relevant to your care that you d like to share: Yes, I d like to discuss it with my provider in person.    Discussed reason for referral regarding pelvic health needs and external/internal pelvic floor muscle examination with patient/guardian.  Opportunity provided to ask questions and verbal consent for assessment and intervention was given.    POSTURE: Standing Posture: Thoracic kyphosis increased  HIP SCREEN: ROM Hip flex WFL: IR/ER limited 50% kacy; adduction mod limited kacy   Strength:   Pain: - none + mild ++ moderate +++ severe  Strength Scale: 0-5/5 Left Right   Hip Internal Rotation 4- 4-   Hip External Rotation 4- 4-      Functional Strength Testing:  Double Leg Squat: Anterior knee translation, Increased trunk lean, and Improper use of glutes/hips  SLS: decreased balance L>R: lateral trunk lean kacy      BREATHING SYMMETRY: Rib cage flaring, Decreased rib cage mobility    PELVIC EXAM  External Visual Inspection:  At rest: Elevated perineal body      Internal Digital Palpation:  Per Vagina:  Tone: high  Digital Muscle Performance: P (Power): 2/5  Compensations: Breath holding  Relaxation Post-Contraction: Partial/delayed relaxation  DBE - very little movement - improved with focus on diaphragm movement   Bear down - no movement - demonstrated PF contraction     ABDOMINAL ASSESSMENT    Abdominal Activation/Strength: SLR: min pelvic rotation     Scar:   Location/Type: transverse lower abdominal across the abdomen   Mobility: Hypomobile    Fascial Tension/Restriction/Tone: Hypomobile       Assessment & Plan   CLINICAL IMPRESSIONS  Medical Diagnosis: Lower urinary tract symptoms (LUTS)  Irritable bowel syndrome with constipation  Abnormal defecation  High-tone pelvic floor dysfunction in female  Myalgia of pelvic floor    Treatment Diagnosis: pelvic floor dysfunction   Impression/Assessment: Patient is a 73 year old female with pelvic/GI complaints.  The following significant findings have been identified: Pain, Decreased ROM/flexibility, Decreased strength, Impaired balance, Decreased activity tolerance, and Impaired posture. These impairments interfere with their ability to perform self care tasks as compared to previous level of function.     Clinical Decision Making (Complexity):  Clinical Presentation: Stable/Uncomplicated  Clinical Presentation Rationale: based on medical and personal factors listed in PT evaluation  Clinical Decision Making (Complexity): Low complexity    PLAN OF CARE  Treatment Interventions:  Interventions: Gait Training, Manual Therapy, Neuromuscular Re-education, Therapeutic Activity, Therapeutic Exercise, Self-Care/Home Management    Long  Term Goals     PT Goal 1  Goal Identifier: LTG 1  Goal Description: Patient will decrease PF muscle tone to improve bowel habits to pass stool without straining or pain  Rationale: to maximize safety and independence with performance of ADLs and functional tasks  Target Date: 10/09/23      Frequency of Treatment: 1x/week for 3 weeks tapering to every other week 6 weeks  Duration of Treatment: 9 weeks    Recommended Referrals to Other Professionals: Physical Therapy  Education Assessment:        Risks and benefits of evaluation/treatment have been explained.   Patient/Family/caregiver agrees with Plan of Care.     Evaluation Time:           Signing Clinician: Gina Vergara PT      [facility]                                                                                   OUTPATIENT PHYSICAL THERAPY      PLAN OF TREATMENT FOR OUTPATIENT REHABILITATION   Patient's Last Name, First Name, Jennifer Phillips YOB: 1950   Provider's Name   [facility]   Medical Record No.  [id number]     Onset Date: 04/20/23 (referral)  Start of Care Date: 08/07/23     Medical Diagnosis:  Lower urinary tract symptoms (LUTS)  Irritable bowel syndrome with constipation  Abnormal defecation  High-tone pelvic floor dysfunction in female  Myalgia of pelvic floor      PT Treatment Diagnosis:  pelvic floor dysfunction Plan of Treatment  Frequency/Duration: 1x/week for 3 weeks tapering to every other week 6 weeks/ 9 weeks    Certification date from 08/07/23 to 10/09/23         See note for plan of treatment details and functional goals     Gina Vergara PT                         I CERTIFY THE NEED FOR THESE SERVICES FURNISHED UNDER        THIS PLAN OF TREATMENT AND WHILE UNDER MY CARE     (Physician attestation of this document indicates review and certification of the therapy plan).                Referring Provider:  Radha Ovalle      Initial  Assessment  See Epic Evaluation- Start of Care Date: 08/07/23

## 2023-08-02 NOTE — PATIENT INSTRUCTIONS
It was a pleasure taking care of you today.  I've included a brief summary of our discussion and care plan from today's visit below.  Please review this information with your primary care provider.  _______________________________________________________________________    It is highly unlikely that you have celiac given the negative blood test and with your age. If you want to do the test to check for the celiac disease, you should be on gluten diet with 2 bread a day for 2 weeks prior to the upper endoscopy or the blood test.    My recommendations are summarized as follows:  -- Continue to work with pelvic floor therapy  -- Let us know if you'd like to move forward with the upper endoscopy for new acid reflux  -- You are not due for colonoscopy until 2026 for polyp follow-up for colon cancer screening  -- If you are interested in the medication for neuromodulator including nortriptyline (TCA), please contract us.  --  If you have any questions, please don't hesitate to contact me through our GI RN Clinic Coordinator, Samantha Castro, at [phone].    Return to GI Clinic in 6 months    _______________________________________________________________________    Who do I call with any questions after my visit?  Please be in touch if there are any further questions that arise following today's visit.  There are multiple ways to contact your gastroenterology care team.    During business hours, you may reach a Gastroenterology nurse at [phone] and choose option 3.     To schedule or reschedule an appointment, please call [phone].   You can always send a secure message through [employer].  [employer] messages are answered by your nurse or doctor typically within 24 hours.  Please allow extra time on weekends and holidays.    For urgent/emergent questions after business hours, you may reach the on-call GI Fellow by contacting the [facility]  at [phone].     How will I get the results of any  tests ordered?    You will receive all of your results.  If you have signed up for [employer], any tests ordered at your visit will be available to you after your physician reviews them.  Typically this takes 1-2 weeks.  If there are urgent results that require a change in your care plan, your physician or nurse will call you to discuss the next steps.      What is [employer]?  [employer] is a secure way for you to access all of your healthcare records from the [facility].  It is a web based computer program, so you can sign on to it from any location.  It also allows you to send secure messages to your care team.  I recommend signing up for [employer] access if you have not already done so and are comfortable with using a computer.      How do I schedule labs, imaging studies, or procedures that were ordered in clinic today?   - Labs: To schedule lab appointment at the [facility], use my chart or call [phone]. If you have a [place] lab closer to home where you are regularly seen you can give them a call.   - Procedures: If a colonoscopy, upper endoscopy, breath test, esophageal manometry, or pH impedence was ordered today, our endoscopy team will call you to schedule this. If you have not heard from our endoscopy team within a week, please call [phone] to schedule.   - Imaging Studies: If you were scheduled for a CT scan, X-ray, MRI, ultrasound, HIDA scan or other imaging study, please call [phone] to have this scheduled.   - Referral: If a referral to another specialty was ordered, expect a phone call or follow instructions above. If you have not heard from anyone regarding your referral in a week, please call our clinic to check the status.     Sincerely,    Armando Jaramillo MD  GI Fellow  [facility], Division of Gastroenterology
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
## 2023-08-07 ENCOUNTER — THERAPY VISIT (OUTPATIENT)
Dept: PHYSICAL THERAPY | Facility: CLINIC | Age: 73
End: 2023-08-07
Payer: MEDICARE

## 2023-08-07 DIAGNOSIS — M62.89 HIGH-TONE PELVIC FLOOR DYSFUNCTION IN FEMALE: ICD-10-CM

## 2023-08-07 DIAGNOSIS — R19.8 ABNORMAL DEFECATION: ICD-10-CM

## 2023-08-07 DIAGNOSIS — K58.1 IRRITABLE BOWEL SYNDROME WITH CONSTIPATION: ICD-10-CM

## 2023-08-07 DIAGNOSIS — M79.18 MYALGIA OF PELVIC FLOOR: ICD-10-CM

## 2023-08-07 DIAGNOSIS — R39.9 LOWER URINARY TRACT SYMPTOMS (LUTS): ICD-10-CM

## 2023-08-07 PROCEDURE — 97112 NEUROMUSCULAR REEDUCATION: CPT | Mod: GP

## 2023-08-07 PROCEDURE — 97530 THERAPEUTIC ACTIVITIES: CPT | Mod: GP

## 2023-08-07 PROCEDURE — 97140 MANUAL THERAPY 1/> REGIONS: CPT | Mod: GP

## 2023-08-07 PROCEDURE — 97161 PT EVAL LOW COMPLEX 20 MIN: CPT | Mod: GP

## 2023-08-09 ENCOUNTER — PATIENT OUTREACH (OUTPATIENT)
Dept: GASTROENTEROLOGY | Facility: CLINIC | Age: 73
End: 2023-08-09
Payer: MEDICARE

## 2023-08-09 NOTE — PROGRESS NOTES
Received a call from Jennifer and she is requesting a copy of the Pelvic Floor note.   Note printed and mailed to her home address.   Jennifer stated she has some questions regarding what is documented in the Pelvic Floor note and discussed with her this needs to be discussed with [facility].   She will call them.

## 2023-08-16 ENCOUNTER — TELEPHONE (OUTPATIENT)
Dept: OPHTHALMOLOGY | Facility: CLINIC | Age: 73
End: 2023-08-16
Payer: MEDICARE

## 2023-08-16 NOTE — TELEPHONE ENCOUNTER
Last glasses Rx mailed per request    Thomas Benson RN 9:52 AM 08/16/23            [facility] Call Center    Phone Message    May a detailed message be left on voicemail: no     Reason for Call: Form or Letter   Type or form/letter needing completion: latest glasses prescriptions mailed  Provider:   Date form needed: By early next week  Once completed: Mail form to Name: Jennifer Steiner, at Address: [address] [place] [id number]     Action Taken: Message routed to:  [facility] eye    Travel Screening: Not Applicable

## 2023-08-24 ENCOUNTER — TELEPHONE (OUTPATIENT)
Dept: OPHTHALMOLOGY | Facility: CLINIC | Age: 73
End: 2023-08-24
Payer: MEDICARE

## 2023-08-24 NOTE — TELEPHONE ENCOUNTER
Desiree vision times one month per message    Called and left message with direct number    Able to review earlier scheduling options    Thomas Benson RN 3:49 PM 08/24/23

## 2023-08-24 NOTE — TELEPHONE ENCOUNTER
[facility] Call [facility]    Phone Message    May a detailed message be left on voicemail: yes     Reason for Call: Symptoms or Concerns     If patient has red-flag symptoms, warm transfer to triage line    Current symptom or concern: pt says that she feels that her vision is getting more blurry in her left eye. She would like to know if she should be seen sooner than her 11/28 appt with Octavio or if she should make an appt with Tom to follow up with this    Symptoms have been present for:  1 month(s)    Has patient previously been seen for this? No    By : NA    Date: NA    Are there any new or worsening symptoms? No    Action Taken: Message routed to:  [facility]    Travel Screening: Not Applicable

## 2023-08-25 NOTE — TELEPHONE ENCOUNTER
Spoke to pt at 1130 8-    Left eye blurrier vision in past month and maybe more noticeable in past week.    No eye pain/no eye pain    Some pressure/pain one week ago    Pt was also to f/U early August with Dr. Cunningham per TE 8-2-2023    Scheduled in acute clinic when Dr. Cunningham staffing next week    Scheduled next Tuesday with Dr. Paula    Pt aware of date/time/location and can have extended duration in acute clinic    Pt with back issues and reviewed have procedure room with bed to rest/lay in needed for comfort.    Thomas Benson RN 11:41 AM 08/25/23

## 2023-08-28 ENCOUNTER — THERAPY VISIT (OUTPATIENT)
Dept: PHYSICAL THERAPY | Facility: CLINIC | Age: 73
End: 2023-08-28
Payer: MEDICARE

## 2023-08-28 DIAGNOSIS — M62.89 HIGH-TONE PELVIC FLOOR DYSFUNCTION IN FEMALE: Primary | ICD-10-CM

## 2023-08-28 DIAGNOSIS — M79.18 MYALGIA OF PELVIC FLOOR: ICD-10-CM

## 2023-08-28 DIAGNOSIS — R19.8 ABNORMAL DEFECATION: ICD-10-CM

## 2023-08-28 DIAGNOSIS — R39.9 LOWER URINARY TRACT SYMPTOMS (LUTS): ICD-10-CM

## 2023-08-28 PROCEDURE — 97140 MANUAL THERAPY 1/> REGIONS: CPT | Mod: GP

## 2023-08-28 PROCEDURE — 97110 THERAPEUTIC EXERCISES: CPT | Mod: GP

## 2023-08-28 PROCEDURE — 97112 NEUROMUSCULAR REEDUCATION: CPT | Mod: GP

## 2023-08-29 ENCOUNTER — OFFICE VISIT (OUTPATIENT)
Dept: OPHTHALMOLOGY | Facility: CLINIC | Age: 73
End: 2023-08-29
Attending: OPHTHALMOLOGY
Payer: MEDICARE

## 2023-08-29 DIAGNOSIS — H04.123 BILATERAL DRY EYES: Primary | ICD-10-CM

## 2023-08-29 DIAGNOSIS — H01.02A SQUAMOUS BLEPHARITIS OF UPPER AND LOWER EYELIDS OF BOTH EYES: ICD-10-CM

## 2023-08-29 DIAGNOSIS — H01.02B SQUAMOUS BLEPHARITIS OF UPPER AND LOWER EYELIDS OF BOTH EYES: ICD-10-CM

## 2023-08-29 DIAGNOSIS — H02.889 MEIBOMIAN GLAND DYSFUNCTION: ICD-10-CM

## 2023-08-29 PROCEDURE — 99214 OFFICE O/P EST MOD 30 MIN: CPT | Mod: GC | Performed by: OPHTHALMOLOGY

## 2023-08-29 PROCEDURE — G0463 HOSPITAL OUTPT CLINIC VISIT: HCPCS

## 2023-08-29 RX ORDER — NEOMYCIN SULFATE, POLYMYXIN B SULFATE, AND DEXAMETHASONE 3.5; 10000; 1 MG/G; [USP'U]/G; MG/G
0.5 OINTMENT OPHTHALMIC AT BEDTIME
Qty: 3.5 G | Refills: 0 | Status: SHIPPED | OUTPATIENT
Start: 2023-08-29

## 2023-08-29 ASSESSMENT — TONOMETRY
OD_IOP_MMHG: 16
OS_IOP_MMHG: 17
IOP_METHOD: ICARE

## 2023-08-29 ASSESSMENT — REFRACTION_MANIFEST
OS_AXIS: 085
OS_CYLINDER: +0.75
OD_ADD: +2.50
OS_SPHERE: -0.50
OD_CYLINDER: +0.50
OD_SPHERE: -1.00
OD_AXIS: 150
OS_ADD: +2.50

## 2023-08-29 ASSESSMENT — CONF VISUAL FIELD
OS_SUPERIOR_TEMPORAL_RESTRICTION: 0
OS_INFERIOR_TEMPORAL_RESTRICTION: 0
OS_SUPERIOR_NASAL_RESTRICTION: 0
OD_INFERIOR_NASAL_RESTRICTION: 0
OD_INFERIOR_TEMPORAL_RESTRICTION: 0
OD_NORMAL: 1
OD_SUPERIOR_TEMPORAL_RESTRICTION: 0
OD_SUPERIOR_NASAL_RESTRICTION: 0
METHOD: COUNTING FINGERS
OS_INFERIOR_NASAL_RESTRICTION: 0
OS_NORMAL: 1

## 2023-08-29 ASSESSMENT — VISUAL ACUITY
OD_SC+: -1
OD_SC: 20/50
METHOD: SNELLEN - LINEAR
METHOD_MR: DX PURPOSES
OS_SC: 20/30

## 2023-08-29 ASSESSMENT — SLIT LAMP EXAM - LIDS
COMMENTS: MGD, SCURF
COMMENTS: MGD, SCURF

## 2023-08-29 ASSESSMENT — EXTERNAL EXAM - LEFT EYE: OS_EXAM: NORMAL

## 2023-08-29 NOTE — NURSING NOTE
Chief Complaints and History of Present Illnesses   Patient presents with    Vision Changes Os     Chief Complaint(s) and History of Present Illness(es)       Vision Changes Os              Laterality: left eye    Onset: gradual    Quality: blurred vision    Associated symptoms: dryness and eye pain.  Negative for flashes and floaters    Treatments tried: artificial tears and warm compresses    Response to treatment: mild improvement              Comments    Here for blurry vision left eye. Some dryness and eye pain. Uses lubricating drops and warm compresses with mild improvement.    Brian Petersen COT 1:34 PM August 29, 2023

## 2023-08-29 NOTE — PROGRESS NOTES
Ophthalmology Acute Clinic     Chief Complaint(s) and History of Present Illness(es)    No visit information to display           HPI:   Jennifer Steiner is a 73 year old female who presents for increased blurry vision for 1 week. Patient follows with Dr. Cunningham for MGD, dry eyes, and blepharitis bilaterally. She was last seen in 5/2023 with Dr. Cunningham.    She has foreign body sensation in both eyes, as if she has contacts in. Also had eye redness and discharge in the left eye last week which resolved with warm and cold compresses. Also feels like her left eye has had blurrier vision than the right. Also noting sinking of the right eye and proptosis of the left, but feels this has stabilized.     Lab Results   Component Value Date    A1C 5.2 04/29/2009       Past Ocular history:   - Glasses: readers  - Contact lens wear: none  - Ocular Surgical History: CEIOL bilaterally 8/2022 and 9/2022  - Current Eye drops: Maxitrol drops BID both eyes    PMH:   Past Medical History:   Diagnosis Date    Anemia     Asthma     since childhood    Blepharitis     Constipation, chronic     Chronic enema use    CVA (cerebral infarction) 2009    carotid intimal repair, left    Hyperlipidemia age 58    identified age 58    Hypothyroidism     Irritable bowel disease     Mixed type age-related cataract, both eyes     Neck injuries     Pelvic floor dysfunction 2010    Postmenopausal hormone replacement therapy     From Dr. Link    Scoliosis        FH: None reported    Review of systems for the eyes was negative other than the pertinent positives/negatives listed in the HPI.      Imaging:   None    Assessment & Plan      Jennifer Steiner is a 73 year old female with the following diagnoses:   1. Bilateral dry eyes    2. Meibomian gland dysfunction    3. Squamous blepharitis of upper and lower eyelids of both eyes       Patient presents for left eye blurry vision for the past month that was noticeable more last week. She also had redness and  inflammation of the left eye around this time that resolved with warm and cold compresses. Exam today stable with mild PEE inferiorly bilaterally. VA corrected to 20/20 bilaterally with refraction. Dilated exam stable. Patient has had difficulty with obtaining maxitrol ointment, will try sending to new pharmacy.    Plan:  - Continue maxitrol drops BID both eyes  - Start maxitrol ointment qhs      Patient disposition:   Return in about 6 months (around 2/29/2024) for change 11/28 appt to 6 mo f/u with delano.    Patient seen with Dr. Cunningham    Thank you for entrusting us with your care  Ruben Paula MD  Resident Physician, PGY-2  Department of Ophthalmology  08/29/23 7:58 AM    Attending Physician Attestation:  Complete documentation of historical and exam elements from today's encounter can be found in the full encounter summary report (not reduplicated in this progress note).  I personally obtained the chief complaint(s) and history of present illness.  I confirmed and edited as necessary the review of systems, past medical/surgical history, family history, social history, and examination findings as documented by others; and I examined the patient myself.  I personally reviewed the relevant tests, images, and reports as documented above.  I formulated and edited as necessary the assessment and plan and discussed the findings and management plan with the patient and family. . - Damir Cunningham MD

## 2023-08-29 NOTE — PATIENT INSTRUCTIONS
Maxitrol drop in both eyes twice per day  Maxitrol ointment in both eye at night (put in after drops)

## 2023-08-30 ENCOUNTER — OFFICE VISIT (OUTPATIENT)
Dept: PHYSICAL MEDICINE AND REHAB | Facility: CLINIC | Age: 73
End: 2023-08-30
Payer: MEDICARE

## 2023-08-30 VITALS
SYSTOLIC BLOOD PRESSURE: 119 MMHG | DIASTOLIC BLOOD PRESSURE: 76 MMHG | OXYGEN SATURATION: 98 % | BODY MASS INDEX: 24.29 KG/M2 | RESPIRATION RATE: 16 BRPM | HEART RATE: 74 BPM | WEIGHT: 137.1 LBS | HEIGHT: 63 IN

## 2023-08-30 DIAGNOSIS — M48.062 SPINAL STENOSIS OF LUMBAR REGION WITH NEUROGENIC CLAUDICATION: Primary | ICD-10-CM

## 2023-08-30 DIAGNOSIS — M47.816 LUMBAR SPONDYLOSIS: ICD-10-CM

## 2023-08-30 DIAGNOSIS — M54.17 RADICULAR PAIN OF LUMBOSACRAL REGION: ICD-10-CM

## 2023-08-30 PROCEDURE — 99205 OFFICE O/P NEW HI 60 MIN: CPT | Performed by: PHYSICAL MEDICINE & REHABILITATION

## 2023-08-30 NOTE — PATIENT INSTRUCTIONS
It was a pleasure seeing you today in our [facility]. We discussed the following:    #. Physical Therapy referral - [facility].     An order for physical therapy was added today. Physical therapy schedulers should call you in the next few days to schedule an appointment. You may also call [phone] to arrange an appointment at any of the [facility] outpatient physical therapy locations.  It will be very important for you to do the physical therapy exercises on a regular basis to decrease your pain and prevent future flares of pain.      I have also asked them to trial an SI belt.     #. Continue your pool therapies and home exercises which is great.    #. Follow up with you primary doctor for bone health     #. We reviewed physical symptoms that should prompt ED visit: new changes in bowel/bladder, loss strength, changes in sensation (ie new or worsening numbness/tingling), sudden increase in pain.     Follow up ~4mos

## 2023-08-30 NOTE — LETTER
8/30/2023       RE: Jennifer Steiner  [address]  [facility] [address]       Dear Colleague,    Thank you for referring your patient, Jennifer Steiner, to the [facility] [place] at [facility]. Please see a copy of my visit note below.    HISTORY OF PRESENT ILLNESS:  Jennifer Steiner is a 73 year old female who presents with a chief complaint of low back and left leg and hip pain.     Pain began +1.5yrs ago (May 2022) and is associated with trauma. States she was helping her mother move boxes, the box slipped, and heard a pop falling into a box of linens with associated acute low back, left hip and shooting pain down her left leg.  She has been seen and evaluated by multiple specialists including [facility] pain clinic, [facility] pain clinic, orthopedics and orthopedic spine surgery.  Overall, she provides me with a detailed summary of her spine, GI, pelvic floor issues and reports longer-standing back injuries dating back more remotely.  She identifies herself as an active individual and her primary objectives are to remain active, continue to have good quality of life and have a contact point that can help direct some of her spine care.    The pain is localized to the left>right low back with radiating symptoms to the left lower limb; she reports chronic decree sensation in the left lateral leg; described as a combination of burning, sharp, numb, dull/aching, pressure-like in nature. Pain is reported as 4/10 at rest today and up to 6/10 at worst in the last week. Symptoms are worse with standing, prolonged sitting or walking; and improved with lying down, exercise. She does report pain-related sleep disturbance and pain that     Functional limitations: Much of which are related to IBS, pelvic floor issues.     Uses pool therapy, ice/heat, chiropractic care, acupuncture.   Does not respond well  to injections/invasive procedures and sensitive to medications. Feels she is plateaued in pool therapy. Interested in working on core and abdominal strengthening. Not ready for back surgery -although this was discussed in the orthopedic spine surgery clinic and she was introduced to some minimally invasive interventions through [facility], such as MILD procedure.    Follows with her primary care doctor for bone health and reports decreased bone mineral density due to thyroid disease.    She describes chronic GI issues due to IBS but no saddle anesthesia, bladder issues or weakness.     PRIOR INJURIES/TREATMENT:   Ice/Heat: +ice and heat  Brace: back brace prn   Physical Therapy:   Current pelvic floor PT and aquatic/pool therapy      - Current Pain Medications -   None    Prior Procedures:  Date    Procedure   Improvement (%)  none              Prior Related Surgery: none   Other (acupuncture, OMT, CMM, TENS, DME, etc.):   +Chiropractic care  +Acupuncture     Specialists Seen - (with most recent, available notes and clinic visits reviewed)   1.  Pain clinic - [facility] - Dr Taylor  2.  Pain clinic - [facility]  3.  Orthopedic spine surgery - Tierra Messina PA-C  4.  Orthopedic surgery - Dr. Brooks       IMAGING - reviewed   01/10/23 MRI lumbar spine  FINDINGS:  There are 5 lumbar type vertebrae, used for the purposes of this  dictation. Conus tip at L1. Grade 1-2 anterolisthesis of L4 on L5,  unchanged. Moderate to severe loss of intervertebral disc height at  L4-5. Mild loss of disc height at L3-4. Schmorl's node in the superior  endplate of L3. Normal marrow signal.     On a level by level basis, the findings are as follows:     L1-2: No spinal canal or neural foraminal stenosis.   L2-3: Posterior disc bulge and bilateral facet hypertrophy. Ligamentum  flavum thickening. Mild spinal canal narrowing. Mild left neural foraminal narrowing. No right neural foraminal stenosis.   L3-4: Posterior disc bulge, bilateral facet hypertrophy, and  ligamentum flavum thickening. Moderate spinal canal stenosis. Mild left neural foraminal narrowing. No right neural foraminal stenosis.   L4-5: Grade 1-2 anterolisthesis with uncovering of the disc. Bilateral facet hypertrophy and ligamentum flavum thickening. Severe spinal canal stenosis. Moderate right and mild left neural foraminal narrowing. Several small external synovial cysts associated with the bilateral facet joints.   L5-S1: Bilateral facet hypertrophy. No spinal canal stenosis. Mild bilateral neural foraminal narrowing.     The visualized paraspinous soft tissues are within normal limits. Partially evaluated hepatic and bilateral renal cysts, also demonstrated on the comparison body CT examination.                                                                    IMPRESSION:  Multilevel lumbar degenerative changes, greatest at L4-5 where there is grade 1-2 anterolisthesis, severe spinal canal stenosis, moderate right and mild left neural foraminal stenosis.    06/13/23 XR Lumbar spine   Findings:  5 lumbar type vertebral bodies.   No acute osseous abnormality..  Mild multilevel spondylosis most prominent L4-L5.  Grade 1 retrolisthesis L1 on L2. Grade 1 anterolisthesis L4 on L5. No  translational instability with positional maneuvers.   Nonobstructive bowel gas pattern.                                                                   Impression:  1.  Mild lumbar spondylosis.  2.  No translational instability.    Review Of Systems:  I am responding to those symptoms which are directly relevant to the specific indication for my consultation. I recommend that the patient follow up with their primary or referring provider to pursue any other symptoms which may be of concern.       Medical History:  She  has a past medical history of Anemia, Asthma, Blepharitis, Constipation, chronic, CVA (cerebral infarction) (2009), Hyperlipidemia (age 58), Hypothyroidism, Irritable bowel disease, Mixed type age-related  cataract, both eyes, Neck injuries, Pelvic floor dysfunction (2010), Postmenopausal hormone replacement therapy, and Scoliosis.     She  has a past surgical history that includes surgical history of - ; surgical history of -  (~1997); BREATH HYDROGEN TEST (11/11/2013); STOMACH SURGERY PROCEDURE UNLISTED; Cystoscopy (N/A, 3/25/2019); Dilation and curettage, operative hysteroscopy with morcellator, combined (N/A, 3/25/2019); Phacoemulsification with standard intraocular lens implant (Left, 8/15/2022); and Phacoemulsification with standard intraocular lens implant (Right, 9/12/2022).    Family History  Her family history includes Alcohol/Drug in her brother; Alcoholism in her brother and sister; Allergies in her brother; Anesthesia Reaction in her brother; Anxiety Disorder in her sister; Asthma in her brother; Bone Cancer in her sister; Cancer in her sister; Cancer (age of onset: 56) in her sister; Diabetes in her brother, brother, and father; Gastrointestinal Disease in her brother; Gastrointestinal Disease (age of onset: 82) in her father; Heart Disease (age of onset: 62) in her brother; Melanoma in her mother; Obesity in her brother and father; Osteoporosis in her mother; Skin Cancer in her brother and mother; Substance Abuse in her brother and sister.     Social History:  Current living situation: Lives in [facility].   She  reports that she has never smoked. She has never used smokeless tobacco. She reports that she does not drink alcohol and does not use drugs.        Current Medications:   She has a current medication list which includes the following prescription(s): vitamin d3, digestive enzymes, diphenhydramine hcl, epinephrine, estradiol, carboxymethylcellulose sodium, ketoconazole, lactulose, magnesium oxide, neomycin-polymyxin-dexamethasone, neomycin-polymyxin-dexamethasone, peppermint oil, aloe [id number] & probiotics, progesterone, first-testosterone mc, and thyroid.     Allergies:    -- Aspirin -- GI Disturbance     "--  GI upset   -- Bee Venom -- Swelling   -- Birds [Feathers]    -- Cats    -- Dust Mites    -- Fluoride -- Other (See Comments) and Unknown    --  headaches   -- Liothyronine -- Unknown    --  Severe gut reactions, ears itching, throat closing   -- Mold    -- No Clinical Screening - See Comments -- Other (See Comments)    --  Some antibiotics but not Zpak-Can tolerate             Azithromycinz-pack, antibiotics in general- per             patient.   -- Procaine -- Other (See Comments)    --  headache   -- Seasonal Allergies     --  Ragweed, pollen   -- Simvastatin -- Other (See Comments)    --  \"hot flash\", muscle weakness, dizziness   -- Penicillins -- Unknown and Rash    --  As a child   -- Sulfa Antibiotics -- Itching, Other (See Comments), Rash                           and Hives    PHYSICAL EXAMINATION:  /76 (BP Location: Right arm, Patient Position: Sitting, Cuff Size: Adult Regular)   Pulse 74   Resp 16   Ht 1.6 m (5' 3\")   Wt 62.2 kg (137 lb 1.6 oz)   LMP  (LMP Unknown)   SpO2 98%   BMI 24.29 kg/m     General: Pleasant, straightforward, WDWN individual.  Mental Status: Pleasant, direct, appropriate mood and affect  Resp: breathing is unlabored without audible wheeze  Vascular: No visible cyanosis, no venous stasis changes  Heme: No visible ecchymosis or erythema on extremities  Skin: No notable rash    Neurologic:  Strength: All major muscle groups of the bilateral lower extremities have normal and symmetric muscle strength     Sensation: SILT in lower extremities bilaterally L3-S1     DTRs: bilateral lower extremity stretch reflexes are equal and symmetric     Gait: reveals normal ousmane and stride     Musculoskeletal:  Lumbar Spine: ROM WFL, left Gmax and piriformis tender to palpation, posterior pelvic tilt, Str Leg Raise negative, hip provocative maneuvers negative with left groin /adductor discomfort (non-concordant pain)    SI joint maneuvers: TTP SIJ (sacral sulcus/PSIS), Luis (pt " pointing) pos, Jae neg, Yeoman's equivocal     ASSESSMENT:  Jennifer Steiner is a pleasant 73 year old female who presents with:    #.  Chronic lumbosacral and left lumbar radicular pain in the setting of L4-5 spondylolisthesis and significant central canal stenosis. She would like to continue and exhaust nonoperative measures while avoiding medications and interventions for modulation of pain.    #.  Low bone mineral density, follow-up with primary care physician.  No history of fragility fractures     We had a good, long talk regarding current symptoms, imaging, activity, and prognosis, as well as, physical therapy options. Re-iterated physical symptoms that should prompt ED visit: changes in bowel/bladder, loss strength, changes in sensation (ie new or worsening numbness/tingling), sudden increase in pain.  Patient expressed understanding.     PLAN:  - MRI Lumbar spine reviewed  - Refer to PT [facility] program.   - Defers medication management and interventional spine procedures at this time.  - RTC x3-4 months.     Ready to learn, no apparent learning barriers.  Education provided on treatment plan according to patient's preferred learning style.  Patient verbalizes understanding.   __________________________________    I spent a total of 60 minutes on the day of the visit.   Time spent by me doing chart review, history and exam, documentation and further activities per the note          Again, thank you for allowing me to participate in the care of your patient.      Sincerely,    Radha Angeles MD

## 2023-08-30 NOTE — PROGRESS NOTES
HISTORY OF PRESENT ILLNESS:  Jennifer Steiner is a 73 year old female who presents with a chief complaint of low back and left leg and hip pain.     Pain began +1.5yrs ago (May 2022) and is associated with trauma. States she was helping her mother move boxes, the box slipped, and heard a pop falling into a box of linens with associated acute low back, left hip and shooting pain down her left leg.  She has been seen and evaluated by multiple specialists including [facility] pain clinic, [facility] pain clinic, orthopedics and orthopedic spine surgery.  Overall, she provides me with a detailed summary of her spine, GI, pelvic floor issues and reports longer-standing back injuries dating back more remotely.  She identifies herself as an active individual and her primary objectives are to remain active, continue to have good quality of life and have a contact point that can help direct some of her spine care.    The pain is localized to the left>right low back with radiating symptoms to the left lower limb; she reports chronic decree sensation in the left lateral leg; described as a combination of burning, sharp, numb, dull/aching, pressure-like in nature. Pain is reported as 4/10 at rest today and up to 6/10 at worst in the last week. Symptoms are worse with standing, prolonged sitting or walking; and improved with lying down, exercise. She does report pain-related sleep disturbance and pain that     Functional limitations: Much of which are related to IBS, pelvic floor issues.     Uses pool therapy, ice/heat, chiropractic care, acupuncture.   Does not respond well to injections/invasive procedures and sensitive to medications. Feels she is plateaued in pool therapy. Interested in working on core and abdominal strengthening. Not ready for back surgery -although this was discussed in the orthopedic spine surgery clinic and she was introduced to some minimally invasive interventions through [facility], such as MILD  procedure.    Follows with her primary care doctor for bone health and reports decreased bone mineral density due to thyroid disease.    She describes chronic GI issues due to IBS but no saddle anesthesia, bladder issues or weakness.     PRIOR INJURIES/TREATMENT:   Ice/Heat: +ice and heat  Brace: back brace prn   Physical Therapy:   Current pelvic floor PT and aquatic/pool therapy      - Current Pain Medications -   None    Prior Procedures:  Date    Procedure   Improvement (%)  none              Prior Related Surgery: none   Other (acupuncture, OMT, CMM, TENS, DME, etc.):   +Chiropractic care  +Acupuncture     Specialists Seen - (with most recent, available notes and clinic visits reviewed)   1.  Pain clinic - [facility] - Dr Taylor  2.  Pain clinic - [facility]  3.  Orthopedic spine surgery - Tierra Messina PA-C  4.  Orthopedic surgery - Dr. Brooks       IMAGING - reviewed   01/10/23 MRI lumbar spine  FINDINGS:  There are 5 lumbar type vertebrae, used for the purposes of this  dictation. Conus tip at L1. Grade 1-2 anterolisthesis of L4 on L5,  unchanged. Moderate to severe loss of intervertebral disc height at  L4-5. Mild loss of disc height at L3-4. Schmorl's node in the superior  endplate of L3. Normal marrow signal.     On a level by level basis, the findings are as follows:     L1-2: No spinal canal or neural foraminal stenosis.   L2-3: Posterior disc bulge and bilateral facet hypertrophy. Ligamentum  flavum thickening. Mild spinal canal narrowing. Mild left neural foraminal narrowing. No right neural foraminal stenosis.   L3-4: Posterior disc bulge, bilateral facet hypertrophy, and ligamentum flavum thickening. Moderate spinal canal stenosis. Mild left neural foraminal narrowing. No right neural foraminal stenosis.   L4-5: Grade 1-2 anterolisthesis with uncovering of the disc. Bilateral facet hypertrophy and ligamentum flavum thickening. Severe spinal canal stenosis. Moderate right and mild left neural foraminal narrowing.  Several small external synovial cysts associated with the bilateral facet joints.   L5-S1: Bilateral facet hypertrophy. No spinal canal stenosis. Mild bilateral neural foraminal narrowing.     The visualized paraspinous soft tissues are within normal limits. Partially evaluated hepatic and bilateral renal cysts, also demonstrated on the comparison body CT examination.                                                                    IMPRESSION:  Multilevel lumbar degenerative changes, greatest at L4-5 where there is grade 1-2 anterolisthesis, severe spinal canal stenosis, moderate right and mild left neural foraminal stenosis.    06/13/23 XR Lumbar spine   Findings:  5 lumbar type vertebral bodies.   No acute osseous abnormality..  Mild multilevel spondylosis most prominent L4-L5.  Grade 1 retrolisthesis L1 on L2. Grade 1 anterolisthesis L4 on L5. No  translational instability with positional maneuvers.   Nonobstructive bowel gas pattern.                                                                   Impression:  1.  Mild lumbar spondylosis.  2.  No translational instability.    Review Of Systems:  I am responding to those symptoms which are directly relevant to the specific indication for my consultation. I recommend that the patient follow up with their primary or referring provider to pursue any other symptoms which may be of concern.       Medical History:  She  has a past medical history of Anemia, Asthma, Blepharitis, Constipation, chronic, CVA (cerebral infarction) (2009), Hyperlipidemia (age 58), Hypothyroidism, Irritable bowel disease, Mixed type age-related cataract, both eyes, Neck injuries, Pelvic floor dysfunction (2010), Postmenopausal hormone replacement therapy, and Scoliosis.     She  has a past surgical history that includes surgical history of - ; surgical history of -  (~1997); BREATH HYDROGEN TEST (11/11/2013); STOMACH SURGERY PROCEDURE UNLISTED; Cystoscopy (N/A, 3/25/2019); Dilation and  curettage, operative hysteroscopy with morcellator, combined (N/A, 3/25/2019); Phacoemulsification with standard intraocular lens implant (Left, 8/15/2022); and Phacoemulsification with standard intraocular lens implant (Right, 9/12/2022).    Family History  Her family history includes Alcohol/Drug in her brother; Alcoholism in her brother and sister; Allergies in her brother; Anesthesia Reaction in her brother; Anxiety Disorder in her sister; Asthma in her brother; Bone Cancer in her sister; Cancer in her sister; Cancer (age of onset: 56) in her sister; Diabetes in her brother, brother, and father; Gastrointestinal Disease in her brother; Gastrointestinal Disease (age of onset: 82) in her father; Heart Disease (age of onset: 62) in her brother; Melanoma in her mother; Obesity in her brother and father; Osteoporosis in her mother; Skin Cancer in her brother and mother; Substance Abuse in her brother and sister.     Social History:  Current living situation: Lives in [facility].   She  reports that she has never smoked. She has never used smokeless tobacco. She reports that she does not drink alcohol and does not use drugs.        Current Medications:   She has a current medication list which includes the following prescription(s): vitamin d3, digestive enzymes, diphenhydramine hcl, epinephrine, estradiol, carboxymethylcellulose sodium, ketoconazole, lactulose, magnesium oxide, neomycin-polymyxin-dexamethasone, neomycin-polymyxin-dexamethasone, peppermint oil, aloe [id number] & probiotics, progesterone, first-testosterone mc, and thyroid.     Allergies:    -- Aspirin -- GI Disturbance    --  GI upset   -- Bee Venom -- Swelling   -- Birds [Feathers]    -- Cats    -- Dust Mites    -- Fluoride -- Other (See Comments) and Unknown    --  headaches   -- Liothyronine -- Unknown    --  Severe gut reactions, ears itching, throat closing   -- Mold    -- No Clinical Screening - See Comments -- Other (See Comments)    --  Some antibiotics  "but not Zpak-Can tolerate             Azithromycinz-pack, antibiotics in general- per             patient.   -- Procaine -- Other (See Comments)    --  headache   -- Seasonal Allergies     --  Ragweed, pollen   -- Simvastatin -- Other (See Comments)    --  \"hot flash\", muscle weakness, dizziness   -- Penicillins -- Unknown and Rash    --  As a child   -- Sulfa Antibiotics -- Itching, Other (See Comments), Rash                           and Hives    PHYSICAL EXAMINATION:  /76 (BP Location: Right arm, Patient Position: Sitting, Cuff Size: Adult Regular)   Pulse 74   Resp 16   Ht 1.6 m (5' 3\")   Wt 62.2 kg (137 lb 1.6 oz)   LMP  (LMP Unknown)   SpO2 98%   BMI 24.29 kg/m     General: Pleasant, straightforward, WDWN individual.  Mental Status: Pleasant, direct, appropriate mood and affect  Resp: breathing is unlabored without audible wheeze  Vascular: No visible cyanosis, no venous stasis changes  Heme: No visible ecchymosis or erythema on extremities  Skin: No notable rash    Neurologic:  Strength: All major muscle groups of the bilateral lower extremities have normal and symmetric muscle strength     Sensation: SILT in lower extremities bilaterally L3-S1     DTRs: bilateral lower extremity stretch reflexes are equal and symmetric     Gait: reveals normal ousmane and stride     Musculoskeletal:  Lumbar Spine: ROM WFL, left Gmax and piriformis tender to palpation, posterior pelvic tilt, Str Leg Raise negative, hip provocative maneuvers negative with left groin /adductor discomfort (non-concordant pain)    SI joint maneuvers: TTP SIJ (sacral sulcus/PSIS), Luis (pt pointing) pos, Jae neg, Yeoman's equivocal     ASSESSMENT:  Jennifer Steiner is a pleasant 73 year old female who presents with:    #.  Chronic lumbosacral and left lumbar radicular pain in the setting of L4-5 spondylolisthesis and significant central canal stenosis. She would like to continue and exhaust nonoperative measures while avoiding " medications and interventions for modulation of pain.    #.  Low bone mineral density, follow-up with primary care physician.  No history of fragility fractures     We had a good, long talk regarding current symptoms, imaging, activity, and prognosis, as well as, physical therapy options. Re-iterated physical symptoms that should prompt ED visit: changes in bowel/bladder, loss strength, changes in sensation (ie new or worsening numbness/tingling), sudden increase in pain.  Patient expressed understanding.     PLAN:  - MRI Lumbar spine reviewed  - Refer to PT [facility] program.   - Defers medication management and interventional spine procedures at this time.  - RTC x3-4 months.     Ready to learn, no apparent learning barriers.  Education provided on treatment plan according to patient's preferred learning style.  Patient verbalizes understanding.   __________________________________  Radha Angeles MD  Physical Medicine & Rehabilitation        I spent a total of 60 minutes on the day of the visit.   Time spent by me doing chart review, history and exam, documentation and further activities per the note

## 2023-08-30 NOTE — NURSING NOTE
Chief Complaint   Patient presents with    New Patient     Here for consult, confirmed with patient       Judy Calvo

## 2023-09-05 ENCOUNTER — PRE VISIT (OUTPATIENT)
Dept: UROLOGY | Facility: CLINIC | Age: 73
End: 2023-09-05
Payer: MEDICARE

## 2023-09-06 NOTE — TELEPHONE ENCOUNTER
Reason for visit: symptom check      Relevant information: PFD    Records/imaging/labs/orders: records available    Pt called: No need for a call    At Rooming: Francis Shen CMA  9/5/2023  8:48 PM

## 2023-09-07 ENCOUNTER — ANCILLARY PROCEDURE (OUTPATIENT)
Dept: BONE DENSITY | Facility: CLINIC | Age: 73
End: 2023-09-07
Attending: FAMILY MEDICINE
Payer: MEDICARE

## 2023-09-07 ENCOUNTER — OFFICE VISIT (OUTPATIENT)
Dept: UROLOGY | Facility: CLINIC | Age: 73
End: 2023-09-07
Payer: MEDICARE

## 2023-09-07 VITALS
HEIGHT: 64 IN | SYSTOLIC BLOOD PRESSURE: 95 MMHG | BODY MASS INDEX: 23.05 KG/M2 | HEART RATE: 92 BPM | DIASTOLIC BLOOD PRESSURE: 47 MMHG | WEIGHT: 135 LBS

## 2023-09-07 DIAGNOSIS — Z98.890 HISTORY OF ABDOMINOPLASTY: ICD-10-CM

## 2023-09-07 DIAGNOSIS — R19.8 ABNORMAL DEFECATION: ICD-10-CM

## 2023-09-07 DIAGNOSIS — M79.18 MYALGIA OF PELVIC FLOOR: ICD-10-CM

## 2023-09-07 DIAGNOSIS — M81.0 OSTEOPOROSIS, UNSPECIFIED OSTEOPOROSIS TYPE, UNSPECIFIED PATHOLOGICAL FRACTURE PRESENCE: ICD-10-CM

## 2023-09-07 DIAGNOSIS — K58.1 IRRITABLE BOWEL SYNDROME WITH CONSTIPATION: ICD-10-CM

## 2023-09-07 DIAGNOSIS — M62.89 HIGH-TONE PELVIC FLOOR DYSFUNCTION IN FEMALE: Primary | ICD-10-CM

## 2023-09-07 DIAGNOSIS — R39.9 LOWER URINARY TRACT SYMPTOMS (LUTS): ICD-10-CM

## 2023-09-07 PROCEDURE — 99213 OFFICE O/P EST LOW 20 MIN: CPT | Performed by: UROLOGY

## 2023-09-07 PROCEDURE — 77080 DXA BONE DENSITY AXIAL: CPT | Performed by: INTERNAL MEDICINE

## 2023-09-07 ASSESSMENT — PAIN SCALES - GENERAL: PAINLEVEL: NO PAIN (0)

## 2023-09-07 NOTE — NURSING NOTE
"Chief Complaint   Patient presents with    Follow Up     Symptom check       Blood pressure 95/47, pulse 92, height 1.613 m (5' 3.5\"), weight 61.2 kg (135 lb), not currently breastfeeding. Body mass index is 23.54 kg/m .    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    High-tone pelvic floor dysfunction in female    Myalgia of pelvic floor    Other constipation    Abnormal defecation    Chronic abdominal pain    Backache    Chronic fatigue syndrome    Food intolerance    Generalized pain    Hemorrhoids    Major depressive disorder, recurrent episode (H)    Symptomatic menopausal or female climacteric states    History of hematuria    Mixed type age-related cataract, both eyes    Lower urinary tract symptoms (LUTS)       Allergies   Allergen Reactions    Aspirin GI Disturbance     GI upset    Bee Venom Swelling    Birds [Feathers]     Cats     Dust Mites     Fluoride Other (See Comments) and Unknown     headaches    Liothyronine Unknown     Severe gut reactions, ears itching, throat closing    Mold     No Clinical Screening - See Comments Other (See Comments)     Some antibiotics but not Zpak-Can tolerate Azithromycin  z-pack, antibiotics in general- per patient.     Procaine Other (See Comments)     headache    Seasonal Allergies      Ragweed, pollen    Simvastatin Other (See Comments)     \"hot flash\", muscle weakness, dizziness    Penicillins Unknown and Rash     As a child    Sulfa Antibiotics Itching, Other (See Comments), Rash and Hives       Current " Outpatient Medications   Medication Sig Dispense Refill    Cholecalciferol (VITAMIN D) 2000 UNIT tablet Take 1 tablet by mouth every morning      Digestive Enzymes CAPS daily Use as directed      DiphenhydrAMINE HCl (BENADRYL PO) Take by mouth daily as needed      EPINEPHrine (EPIPEN) 0.3 MG/0.3ML injection 2-pack Inject 0.3 mLs (0.3 mg) into the muscle once as needed for anaphylaxis 0.3 mL 1    estradiol (VAGIFEM) 10 MCG TABS vaginal tablet Place 10 mcg vaginally twice a week      Hypromellose (ARTIFICIAL TEARS OP) Apply 1 drop to eye daily as needed      ketoconazole (NIZORAL) 2 % external cream Apply topically daily as needed for itching To feet and in between the toes 60 g 3    lactulose (CEPHULAC) 10 GM packet Take 1/3 packet daily, mix in water and drink or sprinkle on food 10 packet 1    MAGNESIUM OXIDE PO Take 6-8 tablets by mouth every evening      neomycin-polymyxin-dexAMETHasone (MAXITROL) 3.5-42342-1.1 ophthalmic ointment Place 0.1429 Applications (0.5 g) into both eyes At Bedtime 3.5 g 0    neomycin-polymyxin-dexamethasone (MAXITROL) 3.5-16227-2.1 SUSP ophthalmic susp Place 1-2 drops into both eyes At Bedtime 5 mL 3    peppermint oil liquid 1 mL daily as needed for other Taking gel tabs not oil, not in EMR.      Probiotic Product (ALOE 43885 & PROBIOTICS) CAPS Take by mouth every morning      progesterone (PROMETRIUM) 200 MG capsule Take 1 capsule (200 mg) by mouth every evening      Testosterone Propionate (FIRST-TESTOSTERONE MC) 2 % CREA Place onto the skin three times a week      thyroid (ARMOUR) 60 MG tablet Take 60 mg by mouth every morning          Social History     Tobacco Use    Smoking status: Never    Smokeless tobacco: Never   Substance Use Topics    Alcohol use: No    Drug use: No       Angel Alcantar  9/7/2023  1:52 PM

## 2023-09-07 NOTE — PROGRESS NOTES
September 7, 2023    Jennifre was seen today for follow up.    Diagnoses and all orders for this visit:    High-tone pelvic floor dysfunction in female  -     Adult Plastic Surgery  Referral; Future    Myalgia of pelvic floor  -     Adult Plastic Surgery  Referral; Future    Abnormal defecation    Irritable bowel syndrome with constipation    Lower urinary tract symptoms (LUTS)    History of abdominoplasty  -     Adult Plastic Surgery  Referral; Future     Addendum:    The patient was seen and evaluated with the resident.  The plan was formulated in conjunction with me and I agree with the note with changes made as necessary.    25 minutes were spent today on the day of the encounter in reviewing the EMR, direct patient care, coordination of care and documentation    Radha Ovalle MD MPH  (she/her/hers)   of Urology  [facility]      Subjective  73F with history of pelvic floor pain and issues, here to follow up after anal manometry and visits with GI to discuss defecation issues.    States that she continues to have issues with urination including urgency and frequency. Saw GI to discuss IBS, which is also an issue for her. States she has a recent back injury that is followed by orthopedics and managed non-surgically.    States that urinary symptoms are more or less stable. States her most bothersome urinary symptoms are urgency and pain. Had pelvic floor testing recently at the pelvic floor center. Is connected with pelvic floor PT.    States she is frustrated by a lack of concrete recommendations for her situation. Intersected in referral to Dr Stinson, plastic surgeon to discuss swelling near stomach. States that she also believes she has issues with her diaphragm and posture which contribute to her pelvic floor symptoms.    Today we reviewed her anal manometry results, focusing on her urologic health. Notably while she did have elevated pelvic floor muscle  "tone, she did not have prolapse. No urologic procedure is indicated.  She denies any changes in health since last visit    BP 95/47   Pulse 92   Ht 1.613 m (5' 3.5\")   Wt 61.2 kg (135 lb)   LMP  (LMP Unknown)   BMI 23.54 kg/m    GENERAL: healthy, alert and no distress  EYES: Eyes grossly normal to inspection, conjunctivae and sclerae normal  HENT: normal cephalic/atraumatic.  External ears, nose and mouth without ulcers or lesions.  RESP: no audible wheeze, cough, or visible cyanosis.  No visible retractions or increased work of breathing.  Able to speak fully in complete sentences.  NEURO: Cranial nerves grossly intact, mentation intact and speech normal  PSYCH: mentation appears normal, affect normal/bright, judgement and insight intact, normal speech and appearance well-groomed    Labs/imaging/pathology  [facility], from GI note: \"proven pelvic floor dysfunction from anorectal manometry, and also presence of paradoxical contraction on rectal exam. 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 (this could be delayed without colopathy in patient with pelvic floor dysfunction)\"    CC  Patient Care Team:  Milan Alvarez MD as PCP - General (Family Medicine)  Ladonna Reaves MD as MD (Specialist)  Samantha Figueroa APRN CNP as Nurse Practitioner (Nurse Practitioner)  Calderon La MD as MD (Cardiology)  Damir Cunningham MD as MD (Ophthalmology)  Thelma Arvizu MD as MD (Internal Medicine)  Kristan Kaba, RN as Registered Nurse  Radha Ovalle MD as MD (Urology)  Gricelda Cabezas, RN as Registered Nurse (Urology)  Pancho Santos OD as MD (Optometry)  Milan Alvarez MD as Referring Physician (Family Practice)  Kim Herrmann APRN CNP as Nurse Practitioner (Nurse Practitioner)  Milan Alvarez MD as Assigned PCP  Dede Ghosh PA-C as Physician Assistant (Dermatology)  Damir Cunningham MD " as Assigned Surgical Provider  Remy Guillaume MD as MD (Otolaryngology)  Armando Jaramillo MD as Fellow (Internal Medicine)  Radha Angeles MD as MD (Physical Medicine and Rehabilitation)  Tierra Messina PA-C as Assigned Musculoskeletal Provider  Yancy Stinson MD as MD (Plastic Surgery)

## 2023-09-07 NOTE — LETTER
9/7/2023       RE: Jennifer Steiner  [address]  [facility] [id number]     Dear Colleague,    Thank you for referring your patient, Jennifer Steiner, to the [facility] [place] [facility]. Please see a copy of my visit note below.    September 7, 2023    Jennifer was seen today for follow up.    Diagnoses and all orders for this visit:    High-tone pelvic floor dysfunction in female  -     Adult Plastic Surgery  Referral; Future    Myalgia of pelvic floor  -     Adult Plastic Surgery  Referral; Future    Abnormal defecation    Irritable bowel syndrome with constipation    Lower urinary tract symptoms (LUTS)    History of abdominoplasty  -     Adult Plastic Surgery  Referral; Future     Addendum:    The patient was seen and evaluated with the resident.  The plan was formulated in conjunction with me and I agree with the note with changes made as necessary.    25 minutes were spent today on the day of the encounter in reviewing the EMR, direct patient care, coordination of care and documentation    Radha Ovalle MD MPH  (she/her/hers)   of Urology  [facility]      Subjective  73F with history of pelvic floor pain and issues, here to follow up after anal manometry and visits with GI to discuss defecation issues.    States that she continues to have issues with urination including urgency and frequency. Saw GI to discuss IBS, which is also an issue for her. States she has a recent back injury that is followed by orthopedics and managed non-surgically.    States that urinary symptoms are more or less stable. States her most bothersome urinary symptoms are urgency and pain. Had pelvic floor testing recently at the pelvic floor center. Is connected with pelvic floor PT.    States she is frustrated by a lack of concrete recommendations for her situation. Intersected in  "referral to Dr Stinson, plastic surgeon to discuss swelling near stomach. States that she also believes she has issues with her diaphragm and posture which contribute to her pelvic floor symptoms.    Today we reviewed her anal manometry results, focusing on her urologic health. Notably while she did have elevated pelvic floor muscle tone, she did not have prolapse. No urologic procedure is indicated.  She denies any changes in health since last visit    BP 95/47   Pulse 92   Ht 1.613 m (5' 3.5\")   Wt 61.2 kg (135 lb)   LMP  (LMP Unknown)   BMI 23.54 kg/m    GENERAL: healthy, alert and no distress  EYES: Eyes grossly normal to inspection, conjunctivae and sclerae normal  HENT: normal cephalic/atraumatic.  External ears, nose and mouth without ulcers or lesions.  RESP: no audible wheeze, cough, or visible cyanosis.  No visible retractions or increased work of breathing.  Able to speak fully in complete sentences.  NEURO: Cranial nerves grossly intact, mentation intact and speech normal  PSYCH: mentation appears normal, affect normal/bright, judgement and insight intact, normal speech and appearance well-groomed    Labs/imaging/pathology  [facility], from GI note: \"proven pelvic floor dysfunction from anorectal manometry, and also presence of paradoxical contraction on rectal exam. 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 (this could be delayed without colopathy in patient with pelvic floor dysfunction)\"    CC  Patient Care Team:  Milan Alvarez MD as PCP - General (Family Medicine)  Ladonna Reaves MD as MD (Specialist)  Samantha Figueroa APRN CNP as Nurse Practitioner (Nurse Practitioner)  Calderon La MD as MD (Cardiology)  Damir Cunningham MD as MD (Ophthalmology)  Thelma Arvizu MD as MD (Internal Medicine)  Kristan Kaba, RN as Registered Nurse  Radha Ovalle MD as MD (Urology)  Gricelda Cabezas, RN as " Registered Nurse (Urology)  Pancho Santos OD as MD (Optometry)  Milan Alvarez MD as Referring Physician (Family Practice)  Kim Herrmann APRN CNP as Nurse Practitioner (Nurse Practitioner)  Milan Alvarez MD as Assigned PCP  Dede Ghosh PA-C as Physician Assistant (Dermatology)  Damir Cunningham MD as Assigned Surgical Provider  Remy Guillaume MD as MD (Otolaryngology)  Armando Jaramillo MD as Fellow (Internal Medicine)  Radha Angeles MD as MD (Physical Medicine and Rehabilitation)  Tierra Messina PA-C as Assigned Musculoskeletal Provider  Yancy Stinson MD as MD (Plastic Surgery)

## 2023-09-08 ENCOUNTER — TELEPHONE (OUTPATIENT)
Dept: INTERNAL MEDICINE | Facility: CLINIC | Age: 73
End: 2023-09-08
Payer: MEDICARE

## 2023-09-08 DIAGNOSIS — M81.0 OSTEOPOROSIS: Primary | ICD-10-CM

## 2023-09-08 NOTE — TELEPHONE ENCOUNTER
Left a detailed message to the pt regarding the result and recommendation. Pt will be called for the endocrinology appt.    Soon-Mi  ------------------------------------------------------------------------------------------------------------------        ----- Message from Milan Alvarez MD sent at 9/8/2023 10:09 AM CDT -----  Can you let her know DEXA shows osteoporosis. Has in past too. At one point saw Endocrine here for this; offer referral back.

## 2023-09-13 ENCOUNTER — TELEPHONE (OUTPATIENT)
Dept: ENDOCRINOLOGY | Facility: CLINIC | Age: 73
End: 2023-09-13

## 2023-09-13 ENCOUNTER — THERAPY VISIT (OUTPATIENT)
Dept: PHYSICAL THERAPY | Facility: CLINIC | Age: 73
End: 2023-09-13
Payer: MEDICARE

## 2023-09-13 DIAGNOSIS — R19.8 ABNORMAL DEFECATION: ICD-10-CM

## 2023-09-13 DIAGNOSIS — M79.18 MYALGIA OF PELVIC FLOOR: ICD-10-CM

## 2023-09-13 DIAGNOSIS — R39.9 LOWER URINARY TRACT SYMPTOMS (LUTS): ICD-10-CM

## 2023-09-13 DIAGNOSIS — M62.89 HIGH-TONE PELVIC FLOOR DYSFUNCTION IN FEMALE: Primary | ICD-10-CM

## 2023-09-13 PROCEDURE — 97140 MANUAL THERAPY 1/> REGIONS: CPT | Mod: GP

## 2023-09-13 PROCEDURE — 97110 THERAPEUTIC EXERCISES: CPT | Mod: GP

## 2023-09-13 NOTE — TELEPHONE ENCOUNTER
Left Voicemail (1st Attempt) for the patient to call back and schedule the following:    Appointment type: New Endocrine  Provider: Any general endocrine provider  Return date: First available  Specialty phone number: [phone]  Additional appointment(s) needed: N/A  Additional Notes: N/A    Left error in first voicemail. Called back and left 2nd voicemail.     Ayan Archer on 9/13/2023 at 11:57 AM

## 2023-09-14 ENCOUNTER — TELEPHONE (OUTPATIENT)
Dept: FAMILY MEDICINE | Facility: CLINIC | Age: 73
End: 2023-09-14
Payer: MEDICARE

## 2023-09-19 NOTE — TELEPHONE ENCOUNTER
Left Voicemail (2nd Attempt) for the patient to call back and schedule the following:     Appointment type: New Endocrine  Provider: Any general endocrine provider  Return date: First available  Specialty phone number: [phone]  Additional appointment(s) needed: N/A  Additional Notes: N/A    Cecille Benavides

## 2023-09-25 ENCOUNTER — THERAPY VISIT (OUTPATIENT)
Dept: PHYSICAL THERAPY | Facility: CLINIC | Age: 73
End: 2023-09-25
Attending: PHYSICAL MEDICINE & REHABILITATION
Payer: MEDICARE

## 2023-09-25 DIAGNOSIS — M54.17 RADICULAR PAIN OF LUMBOSACRAL REGION: ICD-10-CM

## 2023-09-25 DIAGNOSIS — M47.816 LUMBAR SPONDYLOSIS: ICD-10-CM

## 2023-09-25 DIAGNOSIS — M48.062 SPINAL STENOSIS OF LUMBAR REGION WITH NEUROGENIC CLAUDICATION: ICD-10-CM

## 2023-09-25 PROCEDURE — 97110 THERAPEUTIC EXERCISES: CPT | Mod: GP

## 2023-09-25 PROCEDURE — 97112 NEUROMUSCULAR REEDUCATION: CPT | Mod: GP

## 2023-09-25 PROCEDURE — 97162 PT EVAL MOD COMPLEX 30 MIN: CPT | Mod: GP

## 2023-09-25 NOTE — PROGRESS NOTES
Lumbar MedX Initial testing    AROM (full=  0-72  lumbar)    Max Extension Torque     Flex: ext ratio (ideal 1.4:1)        Date    Lumbar Parameters    Top Dead Center (TDC)    Counterbalance (CB)    Seat Pad    Femur Restraint    Week/Visit    Enter Week/Visit #    Weight (lbs)    Reps (#)    Time    ROM (degrees)    Pain    Therapist cues      Date 9/25/2023   Exercise    Lumbar mobility routine (hooklying lumbar rotation, bow and arrow, cat cow, emanuel pose) X12 minutes

## 2023-09-25 NOTE — PROGRESS NOTES
PHYSICAL THERAPY EVALUATION  Type of Visit: Evaluation    See electronic medical record for Abuse and Falls Screening details.    Subjective       Presenting condition or subjective complaint: Lower back, lumbar especially, pain and damage  Date of onset: 08/30/23    Relevant medical history: Anemia; Asthma; Bladder or bowel problems; COPD; Dizziness; Menopause; Neck injury; Osteoporosis; Overweight; Severe dizziness; Significant weakness; Stroke; Thyroid problems; Vision problems   Dates & types of surgery: child - tonsils, 1990 - skin removal, 2015 - fiberoids; uterus, 2022 - cataract surgery    Prior diagnostic imaging/testing results: MRI; X-ray     Prior therapy history for the same diagnosis, illness or injury: Yes Pool [facility] at [facility]    Prior Level of Function  Transfers:   Ambulation:   ADL:   IADL:     Living Environment  Social support: Alone   Type of home: Apartment/condo   Stairs to enter the home: Yes   Is there a railing: Yes   Ramp: No   Stairs inside the home: Yes 12 Is there a railing: Yes   Help at home: None  Equipment owned: Straight Cane     Employment: No    Hobbies/Interests: major GI issues create own set of limitations - back is an add on, issues/autoimmune; allergies, asthma    Patient goals for therapy: some lifting, straighter spine, preventative of further damage, things that strengthening, what shouldn't I do that could cause more damage    Pain assessment:      Objective   LUMBAR SPINE EVALUATION  PAIN: Pain Level at Rest: 2/10  Pain is Worst: 8/10  INTEGUMENTARY (edema, incisions):   POSTURE:  Increased thoracic kyphosis, decreased lumbar lordosis   GAIT:   Weightbearing Status:   Assistive Device(s):   Gait Deviations:   BALANCE/PROPRIOCEPTION:   WEIGHTBEARING ALIGNMENT:   NON-WEIGHTBEARING ALIGNMENT:    ROM:   (Degrees) Left AROM Left PROM  Right AROM Right PROM   Hip Flexion       Hip Extension       Hip Abduction       Hip Adduction       Hip Internal Rotation       Hip External  Rotation       Knee Flexion       Knee Extension       Lumbar Side glide Min loss Min loss    Lumbar Flexion Mod loss    Lumbar Extension Major loss    Pain:   End feel:   PELVIC/SI SCREEN:   STRENGTH:      Pain: - none + mild ++ moderate +++ severe  Strength Scale: 0-5/5 Left Right   Hip Flexion 4- 4   Hip Extension     Hip Abduction (seated) 4 4   Hip Adduction (seated)  4- 4-   Hip Internal Rotation     Hip External Rotation     Knee Flexion 4- 4-   Knee Extension 4 4+       MYOTOMES:   DTR S:   CORD SIGNS:   DERMATOMES:   NEURAL TENSION:   FLEXIBILITY:   LUMBAR/HIP Special Tests:    Left Right   KAY     FADIR/Labrum/NABOR     Femoral Nerve     Navin's     Piriformis     Quadrant Testing     SLR Positive Negative    Slump Positive Negative    Stork with Extension     Otto             PELVIS/SI SPECIAL TESTS:   FUNCTIONAL TESTS:   PALPATION:   SPINAL SEGMENTAL CONCLUSIONS:       Assessment & Plan   CLINICAL IMPRESSIONS  Medical Diagnosis: M48.062 (ICD-10-CM) - Spinal stenosis of lumbar region with neurogenic claudication  M47.816 (ICD-10-CM) - Lumbar spondylosis  M54.17 (ICD-10-CM) - Radicular pain of lumbosacral region    Treatment Diagnosis: Chronic low back pain secondary to global weakness and intolerance to activity   Impression/Assessment: Patient is a 73 year old female with low back and leg complaints.  The following significant findings have been identified: Pain, Decreased ROM/flexibility, and Decreased strength. These impairments interfere with their ability to perform self care tasks, recreational activities, household chores, household mobility, and community mobility as compared to previous level of function.     Clinical Decision Making (Complexity):  Clinical Presentation: Stable/Uncomplicated  Clinical Presentation Rationale: based on medical and personal factors listed in PT evaluation  Clinical Decision Making (Complexity): Moderate complexity    PLAN OF CARE  Treatment  Interventions:  Interventions: Manual Therapy, Neuromuscular Re-education, Therapeutic Activity, Therapeutic Exercise, Self-Care/Home Management    Long Term Goals     PT Goal 1  Goal Identifier: 1  Goal Description: Patient will be independent with HEP in order to demonstrate ability to modulate symptoms.  Target Date: 10/23/23  PT Goal 2  Goal Identifier: 2  Goal Description: Patient will normalize lumbar ROM in order to demonstrate improved impairments.  Target Date: 11/06/23  PT Goal 3  Goal Identifier: 3  Goal Description: Patient will tolerate walking for 30 minutes or more with no increase in symptoms in order to demonstrate improved function.  Target Date: 11/20/23  PT Goal 4  Goal Identifier: 4  Goal Description: Patient will improve TAMEKA form 48% to 20% or better in order to demonstrate improved disability  Target Date: 12/18/23      Frequency of Treatment: 1-2x/wk  Duration of Treatment: 12 weeks    Recommended Referrals to Other Professionals:   Education Assessment:   Learner/Method: Patient    Risks and benefits of evaluation/treatment have been explained.   Patient/Family/caregiver agrees with Plan of Care.     Evaluation Time:     PT Eval, Moderate Complexity Minutes (55665): 20       Signing Clinician: Phong Lord, PT      [facility]                                                                                   OUTPATIENT PHYSICAL THERAPY      PLAN OF TREATMENT FOR OUTPATIENT REHABILITATION   Patient's Last Name, First Name, Jennifer Phillips YOB: 1950   Provider's Name   [facility]   Medical Record No.  [id number]     Onset Date: 08/30/23  Start of Care Date: 09/25/23     Medical Diagnosis:  M48.062 (ICD-10-CM) - Spinal stenosis of lumbar region with neurogenic claudication  M47.816 (ICD-10-CM) - Lumbar spondylosis  M54.17 (ICD-10-CM) - Radicular pain of lumbosacral region      PT Treatment Diagnosis:  Chronic  low back pain secondary to global weakness and intolerance to activity Plan of Treatment  Frequency/Duration: 1-2x/wk/ 12 weeks    Certification date from 09/25/23 to 12/18/23         See note for plan of treatment details and functional goals     Phong Lord, PT                         I CERTIFY THE NEED FOR THESE SERVICES FURNISHED UNDER        THIS PLAN OF TREATMENT AND WHILE UNDER MY CARE .             Physician Signature               Date    X_____________________________________________________                    Referring Provider:  Radha Angeles      Initial Assessment  See Epic Evaluation- Start of Care Date: 09/25/23

## 2023-09-27 ENCOUNTER — THERAPY VISIT (OUTPATIENT)
Dept: PHYSICAL THERAPY | Facility: CLINIC | Age: 73
End: 2023-09-27
Payer: MEDICARE

## 2023-09-27 DIAGNOSIS — M48.062 SPINAL STENOSIS OF LUMBAR REGION WITH NEUROGENIC CLAUDICATION: Primary | ICD-10-CM

## 2023-09-27 PROCEDURE — 97110 THERAPEUTIC EXERCISES: CPT | Mod: GP

## 2023-09-27 NOTE — PROGRESS NOTES
Lumbar MedX Initial testing 9/27/2023    AROM (full=  0-72  lumbar) 12-39   Max Extension Torque  107   Flex: ext ratio (ideal 1.4:1) 2.06:1     Date 9/27/2023    Lumbar Parameters    Top Dead Center (TDC) 24   Counterbalance (CB) 206   Seat Pad 1   Femur Restraint 5   Week/Visit    Enter Week/Visit # 1/1   Weight (lbs) 40#   Reps (#) 16   Time 71   ROM (degrees) 12-39   Pain Slight pain in low back   Therapist cues      Date 9/27/2023    Exercise    nustep X5 min   Rotary torso 20# to L  Smaller ROM   Lumbar mobility routine (hooklying lumbar rotation, bow and arrow, cat cow, emanuel pose)    Repeated flexion exercises to centralize symptoms     PPT X5, pillow case for external cueing   Supine marches (#3) X10, not too difficult   SLR with ab set  Bx12, cues for slow controlled motion.

## 2023-10-05 ENCOUNTER — THERAPY VISIT (OUTPATIENT)
Dept: PHYSICAL THERAPY | Facility: CLINIC | Age: 73
End: 2023-10-05
Payer: MEDICARE

## 2023-10-05 DIAGNOSIS — M54.17 RADICULAR PAIN OF LUMBOSACRAL REGION: ICD-10-CM

## 2023-10-05 DIAGNOSIS — M48.062 SPINAL STENOSIS OF LUMBAR REGION WITH NEUROGENIC CLAUDICATION: Primary | ICD-10-CM

## 2023-10-05 DIAGNOSIS — M47.816 LUMBAR SPONDYLOSIS: ICD-10-CM

## 2023-10-05 PROCEDURE — 97110 THERAPEUTIC EXERCISES: CPT | Mod: GP

## 2023-10-09 ENCOUNTER — THERAPY VISIT (OUTPATIENT)
Dept: PHYSICAL THERAPY | Facility: CLINIC | Age: 73
End: 2023-10-09
Payer: MEDICARE

## 2023-10-09 DIAGNOSIS — M48.062 SPINAL STENOSIS OF LUMBAR REGION WITH NEUROGENIC CLAUDICATION: Primary | ICD-10-CM

## 2023-10-09 DIAGNOSIS — M62.89 HIGH-TONE PELVIC FLOOR DYSFUNCTION IN FEMALE: ICD-10-CM

## 2023-10-09 DIAGNOSIS — M79.18 MYALGIA OF PELVIC FLOOR: ICD-10-CM

## 2023-10-09 DIAGNOSIS — R39.9 LOWER URINARY TRACT SYMPTOMS (LUTS): ICD-10-CM

## 2023-10-09 DIAGNOSIS — M47.816 LUMBAR SPONDYLOSIS: ICD-10-CM

## 2023-10-09 DIAGNOSIS — R19.8 ABNORMAL DEFECATION: ICD-10-CM

## 2023-10-09 DIAGNOSIS — M54.17 RADICULAR PAIN OF LUMBOSACRAL REGION: ICD-10-CM

## 2023-10-09 DIAGNOSIS — K58.1 IRRITABLE BOWEL SYNDROME WITH CONSTIPATION: ICD-10-CM

## 2023-10-09 PROCEDURE — 97110 THERAPEUTIC EXERCISES: CPT | Mod: GP

## 2023-10-09 NOTE — PROGRESS NOTES
Lumbar MedX Initial testing 9/27/2023    AROM (full=  0-72  lumbar) 12-39   Max Extension Torque  107   Flex: ext ratio (ideal 1.4:1) 2.06:1     Date 10/9/2023 10/5/2023 9/27/2023    Lumbar Parameters      Top Dead Center (TDC) 24 24 24   Counterbalance (CB) 206 206 206   Seat Pad 1 1 1   Femur Restraint 5 5 5   Week/Visit      Enter Week/Visit # 3/1 2/1 1/1   Weight (lbs) 41# 35# 40#   Reps (#) 25 30 16   Time 119 91 71   ROM (degrees) 12-39 12-39 12-39   Pain 0/10 0/10 Slight pain in low back   Therapist cues        Date 10/9/2023 10/5/2023 9/27/2023    Exercise      nustep X5 min X5 min X5 min   Rotary torso 22# R 20# R 20# to L  Smaller ROM   Lumbar mobility routine (hooklying lumbar rotation, bow and arrow, cat cow, emanuel pose)      Repeated flexion exercises to centralize symptoms       PPT   X5, pillow case for external cueing   Supine marches (#3)   X10, not too difficult   SLR with ab set    Bx12, cues for slow controlled motion.   Reformer: squat  2x12 RRR 2x12 RRB    Standing bent over S/L kick back  2x12 each

## 2023-10-11 ENCOUNTER — THERAPY VISIT (OUTPATIENT)
Dept: PHYSICAL THERAPY | Facility: CLINIC | Age: 73
End: 2023-10-11
Payer: MEDICARE

## 2023-10-11 DIAGNOSIS — M48.062 SPINAL STENOSIS OF LUMBAR REGION WITH NEUROGENIC CLAUDICATION: Primary | ICD-10-CM

## 2023-10-11 DIAGNOSIS — M47.816 LUMBAR SPONDYLOSIS: ICD-10-CM

## 2023-10-11 PROCEDURE — 97110 THERAPEUTIC EXERCISES: CPT | Mod: GP

## 2023-10-11 NOTE — PROGRESS NOTES
Lumbar MedX Initial testing 9/27/2023    AROM (full=  0-72  lumbar) 12-39   Max Extension Torque  107   Flex: ext ratio (ideal 1.4:1) 2.06:1     Date 10/11/2023  10/9/2023 10/5/2023 9/27/2023    Lumbar Parameters       Top Dead Center (TDC) 24 24 24 24   Counterbalance (CB) 206 206 206 206   Seat Pad 1 1 1 1   Femur Restraint 5 5 5 5   Week/Visit       Enter Week/Visit # 3/2 3/1 2/1 1/1   Weight (lbs) 45# 41# 35# 40#   Reps (#) 31 25 30 16   Time 118 119 91 71   ROM (degrees) 12-39 12-39 12-39 12-39   Pain fatigue 0/10 0/10 Slight pain in low back   Therapist cues ROM        Date 10/11/2023  10/9/2023 10/5/2023 9/27/2023    Exercise       nustep X4 min X5 min X5 min X5 min   Rotary torso 22# to L 22# R 20# R 20# to L  Smaller ROM   Lumbar mobility routine (hooklying lumbar rotation, bow and arrow, cat cow, emanuel pose)       Repeated flexion exercises to centralize symptoms        PPT    X5, pillow case for external cueing   Supine marches (#3)    X10, not too difficult   SLR with ab set  Review  Bx15  Cues for ab set   Bx12, cues for slow controlled motion.   Reformer: squat   2x12 RRR 2x12 RRB    Standing bent over S/L kick back   2x12 each      Pallof press Red x15

## 2023-10-16 ENCOUNTER — OFFICE VISIT (OUTPATIENT)
Dept: DERMATOLOGY | Facility: CLINIC | Age: 73
End: 2023-10-16
Payer: MEDICARE

## 2023-10-16 DIAGNOSIS — L82.1 SEBORRHEIC KERATOSES: ICD-10-CM

## 2023-10-16 DIAGNOSIS — Z12.83 SKIN CANCER SCREENING: Primary | ICD-10-CM

## 2023-10-16 DIAGNOSIS — L29.9 SCALP PRURITUS: ICD-10-CM

## 2023-10-16 DIAGNOSIS — D22.9 MULTIPLE PIGMENTED NEVI: ICD-10-CM

## 2023-10-16 PROCEDURE — 99213 OFFICE O/P EST LOW 20 MIN: CPT | Performed by: PHYSICIAN ASSISTANT

## 2023-10-16 ASSESSMENT — PAIN SCALES - GENERAL: PAINLEVEL: NO PAIN (0)

## 2023-10-16 NOTE — NURSING NOTE
Dermatology Rooming Note    Jennifer Steiner's goals for this visit include:   Chief Complaint   Patient presents with    Skin Check     Annual FBSE.  Ongoing concern with scalp, hives underneath the skin (heat, redness, itchy)/    Derm Problem     Athletes foot.  Uses essential oils.      Trena Theodore, CMA

## 2023-10-16 NOTE — PROGRESS NOTES
[facility] Dermatology Note  Encounter Date: Oct 16, 2023  Office Visit     Dermatology Problem List:  #FBSE 10/16/23  1. Seborrheic dermatitis, scalp  2. Tinea pedis  - Current rx: vinegar soaks, ketoconazole 2% cream    3. Scalp pruritus  - s/p punch biopsy 10/11/21 resulting as sparse perivascular and perifollicular lymphohistiocytic infiltrate with rare eosinophils  - continue fluocinolone 0.01% BID  4. Hx of eczema  ____________________________________________    Assessment & Plan:   # History of Tinea pedis with mild onychomycosis  - Continue ketoconazole 2% cream  - Advised to use OTC antifungals as needed and/or the vinegar soaks (50% water, 50% vinegar soaks once daily for 15 minutes)  - File down nails (second and third on right foot)     # Scalp pruritus, dermal hypersensitivity reaction vs urticarial dermatitis, not controlled.  - Patient notes how stress affects this condition as well as the fluctuating changes in her life.  - Okay to rinse the scalp with water and apple cider vinegar.   - Continue using a mild soap without scent.  - Continue antihistamines as needed.  - Consider keeping a diary to record what is and is not working.     # Skin cancer screening with multiple benign nevi, solar lentigines    - ABCDEs: Counseled ABCDEs of melanoma: Asymmetry, Border (irregularity), Color (not uniform, changes in color), Diameter (greater than 6 mm which is about the size of a pencil eraser), and Evolving (any changes in preexisting moles).  - Sun protection: Counseled SPF30+ sunscreen, UPF clothing, sun avoidance, tanning bed avoidance.    # Seborrheic keratoses,  Benign, reassurance given.        Procedures Performed:   None.     Follow-up: 1 year(s) in-person, or earlier for new or changing lesions    Staff and Scribe:     Selene CASTAÑEDA, am serving as a scribe to document services personally performed by Dede Ghosh PA-C based on data collection and the provider's  statements to me.     Provider Disclosure:   The documentation recorded by the scribe accurately reflects the services I personally performed and the decisions made by me.    All risks, benefits and alternatives were discussed with patient.  Patient is in agreement and understands the assessment and plan.  All questions were answered.  Sun Screen Education was given.   Return to Clinic in 1 year or sooner as needed.   Dede Ghosh PA-C   [facility]    ____________________________________________    CC: Skin Check (Annual FBSE.  Ongoing concern with scalp, hives underneath the skin (heat, redness, itchy)/) and Derm Problem (Athletes foot.  Uses essential oils. )    HPI:  Ms. Jennifer Steiner is a(n) 73 year old female who presents today as a return patient for a skin check. Patient was last seen by myself on 12/05/22.     Patient reports her scalp/back has been of concern as it is very itchy, but it has improved. Patient does not use shampoo or conditioner anymore. When her scalp/back itches, patient will do a wash with apple cider vinegar and apply ice packs. Patient also uses peppermint essential oils, and it helps. Patient has not been able to narrow down the cause of her itchiness. Patient reports the antihistamines she uses has not had a noticeable effect. Patient reports ketoconazole helped her as well.     Patient reports she is good at keeping up with skin protection. Patient also reports acupuncture has been helping alleviate her symptoms. Patient is otherwise feeling well, without additional skin concerns.     Labs Reviewed:  N/A    Physical Exam:  Vitals: LMP  (LMP Unknown)   SKIN: Full skin, which includes the head/face, both arms, chest, back, abdomen,both legs, genitalia and/or groin buttocks, digits and/or nails, was examined.  - Multiple regular brown pigmented macules and papules are identified on the trunk and extremities.   - There are waxy stuck on tan to brown  papules on the trunk and extremities.   - Scattered brown macules on sun exposed areas.  - There is no active scaling or erythema to the scalp.   - The nails on her right foot (the 2nd and 3) were slightly arched.  - No other lesions of concern on areas examined.     Medications:  Current Outpatient Medications   Medication    Cholecalciferol (VITAMIN D) 2000 UNIT tablet    Digestive Enzymes CAPS    DiphenhydrAMINE HCl (BENADRYL PO)    EPINEPHrine (EPIPEN) 0.3 MG/0.3ML injection 2-pack    estradiol (VAGIFEM) 10 MCG TABS vaginal tablet    Hypromellose (ARTIFICIAL TEARS OP)    ketoconazole (NIZORAL) 2 % external cream    lactulose (CEPHULAC) 10 GM packet    MAGNESIUM OXIDE PO    neomycin-polymyxin-dexAMETHasone (MAXITROL) 3.5-84040-6.1 ophthalmic ointment    neomycin-polymyxin-dexamethasone (MAXITROL) 3.5-41914-0.1 SUSP ophthalmic susp    peppermint oil liquid    Probiotic Product (ALOE 11140 & PROBIOTICS) CAPS    progesterone (PROMETRIUM) 200 MG capsule    Testosterone Propionate (FIRST-TESTOSTERONE MC) 2 % CREA    thyroid (ARMOUR) 60 MG tablet     No current facility-administered medications for this visit.      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    High-tone pelvic floor dysfunction in female    Myalgia of pelvic floor    Other constipation    Abnormal defecation    Chronic abdominal pain    Backache    Chronic fatigue syndrome    Food intolerance    Generalized pain    Hemorrhoids    Major depressive disorder, recurrent  episode (H24)    Symptomatic menopausal or female climacteric states    History of hematuria    Mixed type age-related cataract, both eyes    Lower urinary tract symptoms (LUTS)    Spinal stenosis of lumbar region with neurogenic claudication    Lumbar spondylosis    Radicular pain of lumbosacral region     Past Medical History:   Diagnosis Date    Anemia     Asthma     since childhood    Blepharitis     Constipation, chronic     Chronic enema use    CVA (cerebral infarction) 2009    carotid intimal repair, left    Hyperlipidemia age 58    identified age 58    Hypothyroidism     Irritable bowel disease     Mixed type age-related cataract, both eyes     Neck injuries     Pelvic floor dysfunction 2010    Postmenopausal hormone replacement therapy     From Dr. Link    Scoliosis        CC No referring provider defined for this encounter. on close of this encounter.

## 2023-10-16 NOTE — LETTER
10/16/2023       RE: Jennifer Steiner  [address]  [facility] [address]     Dear Colleague,    Thank you for referring your patient, Jennifer Steiner, to the [facility]MAT[facility] [place] at [facility]. Please see a copy of my visit note below.    [facility] Dermatology Note  Encounter Date: Oct 16, 2023  Office Visit     Dermatology Problem List:  #FBSE 10/16/23  1. Seborrheic dermatitis, scalp  2. Tinea pedis  - Current rx: vinegar soaks, ketoconazole 2% cream    3. Scalp pruritus  - s/p punch biopsy 10/11/21 resulting as sparse perivascular and perifollicular lymphohistiocytic infiltrate with rare eosinophils  - continue fluocinolone 0.01% BID  4. Hx of eczema  ____________________________________________    Assessment & Plan:   # History of Tinea pedis with mild onychomycosis  - Continue ketoconazole 2% cream  - Advised to use OTC antifungals as needed and/or the vinegar soaks (50% water, 50% vinegar soaks once daily for 15 minutes)  - File down nails (second and third on right foot)     # Scalp pruritus, dermal hypersensitivity reaction vs urticarial dermatitis, not controlled.  - Patient notes how stress affects this condition as well as the fluctuating changes in her life.  - Okay to rinse the scalp with water and apple cider vinegar.   - Continue using a mild soap without scent.  - Continue antihistamines as needed.  - Consider keeping a diary to record what is and is not working.     # Skin cancer screening with multiple benign nevi, solar lentigines    - ABCDEs: Counseled ABCDEs of melanoma: Asymmetry, Border (irregularity), Color (not uniform, changes in color), Diameter (greater than 6 mm which is about the size of a pencil eraser), and Evolving (any changes in preexisting moles).  - Sun protection: Counseled SPF30+ sunscreen, UPF clothing, sun avoidance, tanning bed avoidance.    # Seborrheic  keratoses,  Benign, reassurance given.        Procedures Performed:   None.     Follow-up: 1 year(s) in-person, or earlier for new or changing lesions    Staff and Scribe:     I, Selene Leon, am serving as a scribe to document services personally performed by Dede Ghosh PA-C based on data collection and the provider's statements to me.     Provider Disclosure:   The documentation recorded by the scribe accurately reflects the services I personally performed and the decisions made by me.    All risks, benefits and alternatives were discussed with patient.  Patient is in agreement and understands the assessment and plan.  All questions were answered.  Sun Screen Education was given.   Return to Clinic in 1 year or sooner as needed.   Dede Ghosh PA-C   [facility]    ____________________________________________    CC: Skin Check (Annual FBSE.  Ongoing concern with scalp, hives underneath the skin (heat, redness, itchy)/) and Derm Problem (Athletes foot.  Uses essential oils. )    HPI:  Ms. Jennifer Steiner is a(n) 73 year old female who presents today as a return patient for a skin check. Patient was last seen by myself on 12/05/22.     Patient reports her scalp/back has been of concern as it is very itchy, but it has improved. Patient does not use shampoo or conditioner anymore. When her scalp/back itches, patient will do a wash with apple cider vinegar and apply ice packs. Patient also uses peppermint essential oils, and it helps. Patient has not been able to narrow down the cause of her itchiness. Patient reports the antihistamines she uses has not had a noticeable effect. Patient reports ketoconazole helped her as well.     Patient reports she is good at keeping up with skin protection. Patient also reports acupuncture has been helping alleviate her symptoms. Patient is otherwise feeling well, without additional skin concerns.     Labs Reviewed:  N/A    Physical  Exam:  Vitals: LMP  (LMP Unknown)   SKIN: Full skin, which includes the head/face, both arms, chest, back, abdomen,both legs, genitalia and/or groin buttocks, digits and/or nails, was examined.  - Multiple regular brown pigmented macules and papules are identified on the trunk and extremities.   - There are waxy stuck on tan to brown papules on the trunk and extremities.   - Scattered brown macules on sun exposed areas.  - There is no active scaling or erythema to the scalp.   - The nails on her right foot (the 2nd and 3) were slightly arched.  - No other lesions of concern on areas examined.     Medications:  Current Outpatient Medications   Medication    Cholecalciferol (VITAMIN D) 2000 UNIT tablet    Digestive Enzymes CAPS    DiphenhydrAMINE HCl (BENADRYL PO)    EPINEPHrine (EPIPEN) 0.3 MG/0.3ML injection 2-pack    estradiol (VAGIFEM) 10 MCG TABS vaginal tablet    Hypromellose (ARTIFICIAL TEARS OP)    ketoconazole (NIZORAL) 2 % external cream    lactulose (CEPHULAC) 10 GM packet    MAGNESIUM OXIDE PO    neomycin-polymyxin-dexAMETHasone (MAXITROL) 3.5-40671-6.1 ophthalmic ointment    neomycin-polymyxin-dexamethasone (MAXITROL) 3.5-24607-3.1 SUSP ophthalmic susp    peppermint oil liquid    Probiotic Product (ALOE 40050 & PROBIOTICS) CAPS    progesterone (PROMETRIUM) 200 MG capsule    Testosterone Propionate (FIRST-TESTOSTERONE MC) 2 % CREA    thyroid (ARMOUR) 60 MG tablet     No current facility-administered medications for this visit.      Past Medical History:   Patient Active Problem List   Diagnosis    Transient cerebral ischemia    Carotid artery dissection (H24)    Irritable bowel syndrome with constipation    Screen for colon cancer    Visit for screening mammogram    Encounter for routine gynecological examination    Hypothyroidism    CARDIOVASCULAR SCREENING; LDL GOAL LESS THAN 130    Rash and other nonspecific skin eruption    High cholesterol    Vaginal atrophy    Blepharitis    Pain disorder associated  with psychological factors and medical condition    High-tone pelvic floor dysfunction in female    Myalgia of pelvic floor    Other constipation    Abnormal defecation    Chronic abdominal pain    Backache    Chronic fatigue syndrome    Food intolerance    Generalized pain    Hemorrhoids    Major depressive disorder, recurrent episode (H24)    Symptomatic menopausal or female climacteric states    History of hematuria    Mixed type age-related cataract, both eyes    Lower urinary tract symptoms (LUTS)    Spinal stenosis of lumbar region with neurogenic claudication    Lumbar spondylosis    Radicular pain of lumbosacral region     Past Medical History:   Diagnosis Date    Anemia     Asthma     since childhood    Blepharitis     Constipation, chronic     Chronic enema use    CVA (cerebral infarction) 2009    carotid intimal repair, left    Hyperlipidemia age 58    identified age 58    Hypothyroidism     Irritable bowel disease     Mixed type age-related cataract, both eyes     Neck injuries     Pelvic floor dysfunction 2010    Postmenopausal hormone replacement therapy     From Dr. Link    Scoliosis        CC No referring provider defined for this encounter. on close of this encounter.

## 2023-10-19 ENCOUNTER — THERAPY VISIT (OUTPATIENT)
Dept: PHYSICAL THERAPY | Facility: CLINIC | Age: 73
End: 2023-10-19
Payer: MEDICARE

## 2023-10-19 DIAGNOSIS — M54.17 RADICULAR PAIN OF LUMBOSACRAL REGION: ICD-10-CM

## 2023-10-19 DIAGNOSIS — M47.816 LUMBAR SPONDYLOSIS: ICD-10-CM

## 2023-10-19 DIAGNOSIS — M48.062 SPINAL STENOSIS OF LUMBAR REGION WITH NEUROGENIC CLAUDICATION: Primary | ICD-10-CM

## 2023-10-19 PROCEDURE — 97110 THERAPEUTIC EXERCISES: CPT | Mod: GP

## 2023-10-19 NOTE — PROGRESS NOTES
Lumbar MedX Initial testing 9/27/2023    AROM (full=  0-72  lumbar) 12-39   Max Extension Torque  107   Flex: ext ratio (ideal 1.4:1) 2.06:1     Date 10/19/2023 10/11/2023  10/9/2023 10/5/2023 9/27/2023    Lumbar Parameters        Top Dead Center (TDC) 24 24 24 24 24   Counterbalance (CB) 206 206 206 206 206   Seat Pad 1 1 1 1 1   Femur Restraint 5 5 5 5 5   Week/Visit        Enter Week/Visit # 4/1 3/2 3/1 2/1 1/1   Weight (lbs) 49 45# 41# 35# 40#   Reps (#) 30 31 25 30 16   Time 78 118 119 91 71   ROM (degrees) 12-39 12-39 12-39 12-39 12-39   Pain 0/10 fatigue 0/10 0/10 Slight pain in low back   Therapist cues Full ROM; tempo  ROM        Date 10/19/2023 10/11/2023  10/9/2023 10/5/2023 9/27/2023    Exercise        nustep X4 min X4 min X5 min X5 min X5 min   Rotary torso 24# R 22# to L 22# R 20# R 20# to L  Smaller ROM   Lumbar mobility routine (hooklying lumbar rotation, bow and arrow, cat cow, emanuel pose)        Repeated flexion exercises to centralize symptoms         PPT     X5, pillow case for external cueing   Supine marches (#3)     X10, not too difficult   SLR with ab set   Review  Bx15  Cues for ab set   Bx12, cues for slow controlled motion.   Reformer: squat  2x12 RRR   2x12 RRR 2x12 RRB    Standing bent over S/L kick back  2x12 each   2x12 each      Pallof press  Red x15

## 2023-10-19 NOTE — TELEPHONE ENCOUNTER
"Left a message to the pt tp call me back.    Soon-Mi  -------------------------------------------------------------------        ----- Message from Milan Alvarez MD sent at 1/22/2019  8:43 AM CST -----  Her stress test is likley ok, the ultrasound was fine. The EKG had some just borderline changes--not clearly abnormal, but I\"d like her to see cardiology in office to discuss if needs any other tests or not. Can you let her know and arrange, ideally this week or next.    " Attending Attestation (For Attendings USE Only)...

## 2023-10-23 ENCOUNTER — THERAPY VISIT (OUTPATIENT)
Dept: PHYSICAL THERAPY | Facility: CLINIC | Age: 73
End: 2023-10-23
Payer: MEDICARE

## 2023-10-23 DIAGNOSIS — M47.816 LUMBAR SPONDYLOSIS: ICD-10-CM

## 2023-10-23 DIAGNOSIS — M48.062 SPINAL STENOSIS OF LUMBAR REGION WITH NEUROGENIC CLAUDICATION: Primary | ICD-10-CM

## 2023-10-23 DIAGNOSIS — R19.8 ABNORMAL DEFECATION: ICD-10-CM

## 2023-10-23 DIAGNOSIS — K58.1 IRRITABLE BOWEL SYNDROME WITH CONSTIPATION: ICD-10-CM

## 2023-10-23 DIAGNOSIS — R39.9 LOWER URINARY TRACT SYMPTOMS (LUTS): ICD-10-CM

## 2023-10-23 DIAGNOSIS — M54.17 RADICULAR PAIN OF LUMBOSACRAL REGION: ICD-10-CM

## 2023-10-23 DIAGNOSIS — M62.89 HIGH-TONE PELVIC FLOOR DYSFUNCTION IN FEMALE: ICD-10-CM

## 2023-10-23 DIAGNOSIS — M79.18 MYALGIA OF PELVIC FLOOR: ICD-10-CM

## 2023-10-23 PROCEDURE — 97110 THERAPEUTIC EXERCISES: CPT | Mod: GP

## 2023-10-23 NOTE — PROGRESS NOTES
Lumbar MedX Initial testing 9/27/2023    AROM (full=  0-72  lumbar) 12-39   Max Extension Torque  107   Flex: ext ratio (ideal 1.4:1) 2.06:1     Date 10/23/2023 10/19/2023 10/11/2023  10/9/2023 10/5/2023 9/27/2023    Lumbar Parameters         Top Dead Center (TDC) 24 24 24 24 24 24   Counterbalance (CB) 206 206 206 206 206 206   Seat Pad 1 1 1 1 1 1   Femur Restraint 5 5 5 5 5 5   Week/Visit         Enter Week/Visit # 5/1 4/1 3/2 3/1 2/1 1/1   Weight (lbs) 33 49 45# 41# 35# 40#   Reps (#) 30 30 31 25 30 16   Time 185 78 118 119 91 71   ROM (degrees) 12-39 12-39 12-39 12-39 12-39 12-39   Pain 0/10 0/10 fatigue 0/10 0/10 Slight pain in low back   Therapist cues  Full ROM; tempo  ROM        Date 10/23/2023 10/19/2023 10/11/2023  10/9/2023 10/5/2023 9/27/2023    Exercise         nustep X4 min X4 min X4 min X5 min X5 min X5 min   Rotary torso 24# L  24# R 22# to L 22# R 20# R 20# to L  Smaller ROM   Lumbar mobility routine (hooklying lumbar rotation, bow and arrow, cat cow, emanuel pose)         Repeated flexion exercises to centralize symptoms          PPT      X5, pillow case for external cueing   Supine marches (#3)      X10, not too difficult   SLR with ab set    Review  Bx15  Cues for ab set   Bx12, cues for slow controlled motion.   Reformer: squat  2x12 RRR 2x12 RRR   2x12 RRR 2x12 RRB    Standing bent over S/L kick back  2x12 each  2x12 each   2x12 each      Pallof press   Red x15

## 2023-10-26 ENCOUNTER — THERAPY VISIT (OUTPATIENT)
Dept: PHYSICAL THERAPY | Facility: CLINIC | Age: 73
End: 2023-10-26
Payer: MEDICARE

## 2023-10-26 DIAGNOSIS — M54.17 RADICULAR PAIN OF LUMBOSACRAL REGION: ICD-10-CM

## 2023-10-26 DIAGNOSIS — M48.062 SPINAL STENOSIS OF LUMBAR REGION WITH NEUROGENIC CLAUDICATION: Primary | ICD-10-CM

## 2023-10-26 DIAGNOSIS — M47.816 LUMBAR SPONDYLOSIS: ICD-10-CM

## 2023-10-26 PROCEDURE — 97110 THERAPEUTIC EXERCISES: CPT | Mod: GP

## 2023-10-26 NOTE — PROGRESS NOTES
Lumbar MedX Initial testing 9/27/2023    AROM (full=  0-72  lumbar) 12-39   Max Extension Torque  107   Flex: ext ratio (ideal 1.4:1) 2.06:1     Date 10/26/2023 10/23/2023 10/19/2023 10/11/2023  10/9/2023 10/5/2023 9/27/2023    Lumbar Parameters          Top Dead Center (TDC) 24 24 24 24 24 24 24   Counterbalance (CB) 206 206 206 206 206 206 206   Seat Pad 1 1 1 1 1 1 1   Femur Restraint 5 5 5 5 5 5 5   Week/Visit          Enter Week/Visit # 5/2 5/1 4/1 3/2 3/1 2/1 1/1   Weight (lbs) 49 33 49 45# 41# 35# 40#   Reps (#) 30 30 30 31 25 30 16   Time 127 185 78 118 119 91 71   ROM (degrees) 12-39 12-39 12-39 12-39 12-39 12-39 12-39   Pain 0/10 0/10 0/10 fatigue 0/10 0/10 Slight pain in low back   Therapist cues   Full ROM; tempo  ROM        Date 10/26/2023 10/23/2023 10/19/2023 10/11/2023  10/9/2023 10/5/2023 9/27/2023    Exercise          nustep X4 min X4 min X4 min X4 min X5 min X5 min X5 min   Rotary torso 26# R 24# L  24# R 22# to L 22# R 20# R 20# to L  Smaller ROM   Lumbar mobility routine (hooklying lumbar rotation, bow and arrow, cat cow, emanuel pose)          Repeated flexion exercises to centralize symptoms           PPT       X5, pillow case for external cueing   Supine marches (#3)       X10, not too difficult   SLR with ab set     Review  Bx15  Cues for ab set   Bx12, cues for slow controlled motion.   Reformer: squat  2x12 RRR  2x12 RRR 2x12 RRR   2x12 RRR 2x12 RRB    Standing bent over S/L kick back  2x12 each  2x12 each  2x12 each   2x12 each      Pallof press    Red x15

## 2023-11-01 ENCOUNTER — TELEPHONE (OUTPATIENT)
Dept: OPHTHALMOLOGY | Facility: CLINIC | Age: 73
End: 2023-11-01
Payer: MEDICARE

## 2023-11-01 NOTE — TELEPHONE ENCOUNTER
Called and LVM     Jennifer does not need her appointment with Dr. Cunningham in Nov     Per Dr. castillo     Return in six months - Return in about 6 months (around 2/29/2024) for change 11/28 appt to 6 mo f/u with delano Hedrick Communication Facilitator on 11/1/2023 at 2:48 PM

## 2023-11-01 NOTE — TELEPHONE ENCOUNTER
[facility] Call Center    Phone Message    May a detailed message be left on voicemail: yes     Reason for Call: Other: The disposition for pt's last appt states Return in about 6 months (around 2/29/2024) for change 11/28 appt to 6 mo f/u with delano. Pt is scheduled on 11/28 as well as 2/27 as she was seen in May and August. Please review and call pt to advise if either should be cancelled. Thank you.      Action Taken: Message routed to:  [facility] EYE    Travel Screening: Not Applicable

## 2023-11-02 DIAGNOSIS — H01.02B SQUAMOUS BLEPHARITIS OF UPPER AND LOWER EYELIDS OF BOTH EYES: ICD-10-CM

## 2023-11-02 DIAGNOSIS — H04.123 BILATERAL DRY EYES: Primary | ICD-10-CM

## 2023-11-02 DIAGNOSIS — H01.02A SQUAMOUS BLEPHARITIS OF UPPER AND LOWER EYELIDS OF BOTH EYES: ICD-10-CM

## 2023-11-02 RX ORDER — NEOMYCIN SULFATE, POLYMYXIN B SULFATE, AND DEXAMETHASONE 3.5; 10000; 1 MG/G; [USP'U]/G; MG/G
0.5 OINTMENT OPHTHALMIC AT BEDTIME
Qty: 3.5 G | Refills: 5 | Status: SHIPPED | OUTPATIENT
Start: 2023-11-02

## 2023-11-02 NOTE — TELEPHONE ENCOUNTER
[facility] Call Center    Phone Message    May a detailed message be left on voicemail: yes     Reason for Call: Medication Refill Request    Has the patient contacted the pharmacy for the refill? Yes   Name of medication being requested: neomycin-polymyxin-dexAMETHasone (MAXITROL) 3.5-91310-2.1 ophthalmic ointment  Provider who prescribed the medication: Dr Cunningham  Pharmacy: [facility]ACY #[id number] - [place], [place] - [address]  Date medication is needed: ASAP     Patient is out of medication and will need a refill ASAP. Please send to [email or web address] since [facility] does not have any in stock. Thank you.    Action Taken: Message routed to:  Other: Med Refill    Travel Screening: Not Applicable

## 2023-11-02 NOTE — TELEPHONE ENCOUNTER
Medication:     neomycin-polymyxin-dexAMETHasone (MAXITROL) 3.5-97382-7.1 ophthalmic ointment  Requested directions:  0.5 inch strip each eye at night as needed for irritation        Last Written Prescription Date:  8/29/23  Last Fill Quantity: 1 tube,   # refills: 0    Last Office Visit: 8/29/23  Future Office visit: 2/1/24    Attending Provider: TIA  Last Clinic Note:   1. Bilateral dry eyes    2. Meibomian gland dysfunction    3. Squamous blepharitis of upper and lower eyelids of both eyes       Patient presents for left eye blurry vision for the past month that was noticeable more last week. She also had redness and inflammation of the left eye around this time that resolved with warm and cold compresses. Exam today stable with mild PEE inferiorly bilaterally. VA corrected to 20/20 bilaterally with refraction. Dilated exam stable. Patient has had difficulty with obtaining maxitrol ointment, will try sending to new pharmacy.     Plan:  - Continue maxitrol drops BID both eyes  - Start maxitrol ointment qhs        Patient disposition:   Return in about 6 months (around 2/29/2024) for change 11/28 appt to 6 mo f/u with tia.       Routing refill request to provider for review/approval because:  Needs provide approval          Pharmacy: [facility]/PHARMACY #[id number] - [place], [place] - [address]     Patient is out of medication and will need a refill ASAP. Please send to [facility] since [facility] does not have any in stock. Thank you

## 2023-11-03 ENCOUNTER — THERAPY VISIT (OUTPATIENT)
Dept: PHYSICAL THERAPY | Facility: CLINIC | Age: 73
End: 2023-11-03
Payer: MEDICARE

## 2023-11-03 DIAGNOSIS — M54.17 RADICULAR PAIN OF LUMBOSACRAL REGION: ICD-10-CM

## 2023-11-03 DIAGNOSIS — M48.062 SPINAL STENOSIS OF LUMBAR REGION WITH NEUROGENIC CLAUDICATION: Primary | ICD-10-CM

## 2023-11-03 DIAGNOSIS — M47.816 LUMBAR SPONDYLOSIS: ICD-10-CM

## 2023-11-03 PROCEDURE — 97110 THERAPEUTIC EXERCISES: CPT | Mod: GP

## 2023-11-03 NOTE — PROGRESS NOTES
Lumbar MedX 4-week retest  11/3/2023  Initial testing 9/27/2023    AROM (full=  0-72  lumbar) 12-39 12-39   Max Extension Torque  169 107   Flex: ext ratio (ideal 1.4:1) 1.32:1 2.06:1     Date 11/3/2023  10/26/2023 10/23/2023 10/19/2023 10/11/2023  10/9/2023 10/5/2023 9/27/2023    Lumbar Parameters           Top Dead Center (TDC) 24 24 24 24 24 24 24 24   Counterbalance (CB) 206 206 206 206 206 206 206 206   Seat Pad 1 1 1 1 1 1 1 1   Femur Restraint 5 5 5 5 5 5 5 5   Week/Visit           Enter Week/Visit # ** corrected visit/week #    4/2 5/2 5/1 4/1 3/2 3/1 2/1 1/1   Weight (lbs) 55# 49 33 49 45# 41# 35# 40#   Reps (#) 28 30 30 30 31 25 30 16   Time 138 127 185 78 118 119 91 71   ROM (degrees) 12-39 12-39 12-39 12-39 12-39 12-39 12-39 12-39   Pain fatigue 0/10 0/10 0/10 fatigue 0/10 0/10 Slight pain in low back   Therapist cues ROM   Full ROM; tempo  ROM        Date 11/3/2023  10/26/2023 10/23/2023 10/19/2023 10/11/2023  10/9/2023 10/5/2023 9/27/2023    Exercise           nustep X5 min X4 min X4 min X4 min X4 min X5 min X5 min X5 min   Rotary torso 26# L 26# R 24# L  24# R 22# to L 22# R 20# R 20# to L  Smaller ROM   Lumbar mobility routine (hooklying lumbar rotation, bow and arrow, cat cow, emanuel pose)           Repeated flexion exercises to centralize symptoms            PPT        X5, pillow case for external cueing   Supine marches (#3)        X10, not too difficult   SLR with ab set      Review  Bx15  Cues for ab set   Bx12, cues for slow controlled motion.   Reformer: squat  3R x20 2x12 RRR  2x12 RRR 2x12 RRR   2x12 RRR 2x12 RRB    Standing bent over S/L kick back   2x12 each  2x12 each  2x12 each   2x12 each      Pallof press     Red x15      Seated hip hinge X10, instruct can use some weights in arms for resistance.

## 2023-11-06 ENCOUNTER — THERAPY VISIT (OUTPATIENT)
Dept: PHYSICAL THERAPY | Facility: CLINIC | Age: 73
End: 2023-11-06
Payer: MEDICARE

## 2023-11-06 DIAGNOSIS — R19.8 ABNORMAL DEFECATION: ICD-10-CM

## 2023-11-06 DIAGNOSIS — M79.18 MYALGIA OF PELVIC FLOOR: ICD-10-CM

## 2023-11-06 DIAGNOSIS — M62.89 HIGH-TONE PELVIC FLOOR DYSFUNCTION IN FEMALE: Primary | ICD-10-CM

## 2023-11-06 DIAGNOSIS — R39.9 LOWER URINARY TRACT SYMPTOMS (LUTS): ICD-10-CM

## 2023-11-06 PROCEDURE — 97112 NEUROMUSCULAR REEDUCATION: CPT | Mod: GP

## 2023-11-06 PROCEDURE — 97110 THERAPEUTIC EXERCISES: CPT | Mod: GP

## 2023-11-06 PROCEDURE — 97140 MANUAL THERAPY 1/> REGIONS: CPT | Mod: GP

## 2023-11-06 NOTE — PROGRESS NOTES
Pt has completed 4 visits      11/06/23 0500   Appointment Info   Signing clinician's name / credentials Gina Vergara, PT, DPT   Total/Authorized Visits 6 (PT)   Visits Used 4   Medical Diagnosis Lower urinary tract symptoms (LUTS)  Irritable bowel syndrome with constipation  Abnormal defecation  High-tone pelvic floor dysfunction in female  Myalgia of pelvic floor   PT Tx Diagnosis pelvic floor dysfunction   Quick Adds Certification;Pelvic Consent   Progress Note/Certification   Start of Care Date 08/07/23   Onset of illness/injury or Date of Surgery 04/20/23  (referral)   Therapy Frequency 1x/week tapering to every other week   Predicted Duration up to 10 weeks   Certification date from 11/06/23   Certification date to 01/15/24   Progress Note Completed Date 11/06/23       Present No   PT Goal 1   Goal Identifier LTG 1   Goal Description Patient will decrease PF muscle tone to improve bowel habits to pass stool without straining or pain   Rationale to maximize safety and independence with performance of ADLs and functional tasks   Target Date 01/15/24   Subjective Report   Subjective Report Pt returns after several weeks. Has been working on PT on her back which has been taking a lot of her time. Reports she was having more pain in lower abdomen. Continues to note swelling at R lower quadrant where scar tissue is. Notes GI symptoms fluctuate, notes more diarrhea this past weekend.   Objective Measures   Objective Measures Objective Measure 3   Objective Measure 1   Objective Measure DBE   Details improved rib mobility   Objective Measure 2   Objective Measure Scar restirction   Details increased at R 1/2 incision; resticted more in superior and lateral directon   Objective Measure 3   Objective Measure abdominal massage   Details increased tension at level of sigmoid colon   Treatment Interventions (PT)   Interventions Therapeutic Activity;Therapeutic Procedure/Exercise;Manual  Therapy;Neuromuscular Re-education   Therapeutic Procedure/Exercise   Therapeutic Procedures: strength, endurance, ROM, flexibillity minutes (57048) 5   Ther Proc 1 fielding pt questions on abdominal symptoms, scar massage and effects of scar tissue on organs.   Skilled Intervention see above   Patient Response/Progress good stability in pelvis   Ther Proc 1 - Details discussion   PTRx Ther Proc 2 Supine Abdominal Exercise #3 (Marching)   PTRx Ther Proc 2 - Details NT   Neuromuscular Re-education   Neuromuscular re-ed of mvmt, balance, coord, kinesthetic sense, posture, proprioception minutes (08040) 8   PTRx Neuro Re-ed 1 Diaphragmatic Breathing   PTRx Neuro Re-ed 1 - Details mod cues for abdominal expansion ; therapist OP at ribs with exhalation and vc to expand into ribs with inahation; trial pt OP at ribs with exhalation; tolerated well   Manual Therapy   Manual Therapy: Mobilization, MFR, MLD, friction massage minutes (67403) 27   Manual Therapy Manual Therapy 4   Manual Therapy 1 Diaphragm release   Manual Therapy 1 - Details mod pressure; mod restriction   Manual Therapy 2 MFR: abdomen;   Manual Therapy 2 - Details decreased mobility to R and superior; upper quadrants decreased mobility   Manual Therapy 3 Abdominal massage   Manual Therapy 3 - Details 1x; min pressure   Skilled Intervention see above   Patient Response/Progress tolerated well ; reports feeling less pulling on abdomen after  manual work   Manual Therapy 4 scar massage   Manual Therapy 4 - Details lower abdominal transverse scar.   Education   Learner/Method Patient;Listening;Demonstration;No Barriers to Learning   Plan   Home program print ptrx   Plan for next session biofeedback; MFR, diaphragm release, DBE, PF downtraining   Comments   Pelvic Health Informed Consent Statement Discussed with patient/guardian reason for referral regarding pelvic health needs and external/internal pelvic floor muscle examination.  Opportunity provided to ask  questions and verbal consent for assessment and intervention was given.   Total Session Time   Timed Code Treatment Minutes 40   Total Treatment Time (sum of timed and untimed services) 40           [facility]                                                                                   OUTPATIENT PHYSICAL THERAPY    PLAN OF TREATMENT FOR OUTPATIENT REHABILITATION   Patient's Last Name, First Name, Jennifer Phillisp YOB: 1950   Provider's Name   [facility]   Medical Record No.  [id number]     Onset Date: 04/20/23 (referral)  Start of Care Date: 08/07/23     Medical Diagnosis:  Lower urinary tract symptoms (LUTS)  Irritable bowel syndrome with constipation  Abnormal defecation  High-tone pelvic floor dysfunction in female  Myalgia of pelvic floor      PT Treatment Diagnosis:  pelvic floor dysfunction Plan of Treatment  Frequency/Duration: 1x/week tapering to every other week/ up to 10 weeks    Certification date from 11/06/23 to 01/15/24         See note for plan of treatment details and functional goals     Gina Vergara, PT                         I CERTIFY THE NEED FOR THESE SERVICES FURNISHED UNDER        THIS PLAN OF TREATMENT AND WHILE UNDER MY CARE     (Physician attestation of this document indicates review and certification of the therapy plan).                Referring Provider:  Radha Ovalle      Initial Assessment  See Epic Evaluation- Start of Care Date: 08/07/23        PLAN  Pt continues to report improvement in discomfort after sessions, would continue to benefit from continued PT in order to address impairments and improve PF function.     Beginning/End Dates of Progress Note Reporting Period:  11/06/23 to 11/06/2023    Referring Provider:  Radha Ovalle

## 2023-11-08 ENCOUNTER — THERAPY VISIT (OUTPATIENT)
Dept: PHYSICAL THERAPY | Facility: CLINIC | Age: 73
End: 2023-11-08
Payer: MEDICARE

## 2023-11-08 DIAGNOSIS — M47.816 LUMBAR SPONDYLOSIS: ICD-10-CM

## 2023-11-08 DIAGNOSIS — M54.17 RADICULAR PAIN OF LUMBOSACRAL REGION: Primary | ICD-10-CM

## 2023-11-08 PROCEDURE — 97110 THERAPEUTIC EXERCISES: CPT | Mod: GP

## 2023-11-08 NOTE — PROGRESS NOTES
"      Lumbar MedX 4-week retest  11/3/2023  Initial testing 9/27/2023    AROM (full=  0-72  lumbar) 12-39 12-39   Max Extension Torque  169 107   Flex: ext ratio (ideal 1.4:1) 1.32:1 2.06:1     Date 11/8/2023  11/3/2023  10/26/2023 10/23/2023 10/19/2023 10/11/2023  10/9/2023 10/5/2023 9/27/2023    Lumbar Parameters            Top Dead Center (TDC) 24 24 24 24 24 24 24 24 24   Counterbalance (CB) 206 206 206 206 206 206 206 206 206   Seat Pad 1 1 1 1 1 1 1 1 1   Femur Restraint 5 5 5 5 5 5 5 5 5   Week/Visit            Enter Week/Visit # 5/1 ** corrected visit/week #    4/2 5/2 5/1 4/1 3/2 3/1 2/1 1/1   Weight (lbs) 60# 55# 49 33 49 45# 41# 35# 40#   Reps (#) 31 28 30 30 30 31 25 30 16   Time 135 138 127 185 78 118 119 91 71   ROM (degrees) 12-39 12-39 12-39 12-39 12-39 12-39 12-39 12-39 12-39   Pain Fatigue, felt good on upper back fatigue 0/10 0/10 0/10 fatigue 0/10 0/10 Slight pain in low back   Therapist cues  ROM   Full ROM; tempo  ROM        Date 11/8/2023  11/3/2023  10/26/2023 10/23/2023 10/19/2023 10/11/2023  10/9/2023 10/5/2023 9/27/2023    Exercise            nustep X5 min X5 min X4 min X4 min X4 min X4 min X5 min X5 min X5 min   Rotary torso 28# R 26# L 26# R 24# L  24# R 22# to L 22# R 20# R 20# to L  Smaller ROM   Lumbar mobility routine (hooklying lumbar rotation, bow and arrow, cat cow, emanuel pose) Review bow and arrow Bx10 cues for 2-3\" hold & for pain free range           Repeated flexion exercises to centralize symptoms             PPT         X5, pillow case for external cueing   Supine marches (#3)         X10, not too difficult   SLR with ab set       Review  Bx15  Cues for ab set   Bx12, cues for slow controlled motion.   Reformer: squat   3R x20 2x12 RRR  2x12 RRR 2x12 RRR   2x12 RRR 2x12 RRB    Standing bent over S/L kick back    2x12 each  2x12 each  2x12 each   2x12 each      Pallof press      Red x15      Seated hip hinge  X10, instruct can use some weights in arms for resistance.        " "  Rhomboid stretch B x20\"                                                               "

## 2023-11-17 ENCOUNTER — OFFICE VISIT (OUTPATIENT)
Dept: FAMILY MEDICINE | Facility: CLINIC | Age: 73
End: 2023-11-17
Payer: MEDICARE

## 2023-11-17 VITALS
OXYGEN SATURATION: 98 % | BODY MASS INDEX: 23.05 KG/M2 | SYSTOLIC BLOOD PRESSURE: 104 MMHG | HEIGHT: 64 IN | DIASTOLIC BLOOD PRESSURE: 55 MMHG | WEIGHT: 135 LBS | HEART RATE: 60 BPM

## 2023-11-17 DIAGNOSIS — M62.89 HIGH-TONE PELVIC FLOOR DYSFUNCTION IN FEMALE: ICD-10-CM

## 2023-11-17 DIAGNOSIS — E03.9 HYPOTHYROIDISM, UNSPECIFIED TYPE: ICD-10-CM

## 2023-11-17 DIAGNOSIS — K59.00 CONSTIPATION, UNSPECIFIED CONSTIPATION TYPE: ICD-10-CM

## 2023-11-17 DIAGNOSIS — M48.062 SPINAL STENOSIS OF LUMBAR REGION WITH NEUROGENIC CLAUDICATION: ICD-10-CM

## 2023-11-17 DIAGNOSIS — Z12.31 VISIT FOR SCREENING MAMMOGRAM: Primary | ICD-10-CM

## 2023-11-17 DIAGNOSIS — E78.00 HIGH CHOLESTEROL: ICD-10-CM

## 2023-11-17 PROCEDURE — 99214 OFFICE O/P EST MOD 30 MIN: CPT | Performed by: FAMILY MEDICINE

## 2023-11-17 RX ORDER — RESPIRATORY SYNCYTIAL VIRUS VACCINE 120MCG/0.5
0.5 KIT INTRAMUSCULAR ONCE
Qty: 1 EACH | Refills: 0 | Status: CANCELLED | OUTPATIENT
Start: 2023-11-17 | End: 2023-11-17

## 2023-11-17 RX ORDER — LACTULOSE 10 G/15ML
10 SOLUTION ORAL 2 TIMES DAILY
Qty: 237 ML | Refills: 3 | Status: SHIPPED | OUTPATIENT
Start: 2023-11-17

## 2023-11-17 NOTE — PATIENT INSTRUCTIONS
To schedule a lab appointment at the [facility], please call [phone].

## 2023-11-17 NOTE — PROGRESS NOTES
Jennifer is a 73 year old that presents in clinic today for the following:     Chief Complaint   Patient presents with    Follow Up     Concerns:  Endorcinologists meds make her stomach upset  GI doctor visit update and opinion to relay to provider  Dexa Scan   Pt states diarrhea is new symptom.           11/17/2023     1:41 PM   Additional Questions   Roomed by MVO       Screenings from encounters over the past 10 days    No data recorded       Gavin Dubon at 1:42 PM on 11/17/2023    Assessment & Plan     Visit for screening mammogram  She defers    Hypothyroidism  She requests labs w/ [facility] provider    Constipation, unspecified constipation type  See last note  - lactulose (CHRONULAC) 10 GM/15ML solution; Take 15 mLs (10 g) by mouth 2 times daily    Rvwd spine, GI,  plans for back pain and pelvic floor, discussed    High cholesterol  Due  - Lipid panel reflex to direct LDL Fasting; Future      33 minutes spent by me on the date of the encounter doing chart review, history and exam, documentation and further activities per the note      Return in about 3 months (around 2/17/2024).    Milan Alvarez MD  [facility]   Jennifer is a 73 year old, presenting for the following health issues:  Follow Up (Concerns:/Endocrinologists meds make her stomach upset/GI doctor visit update and opinion to relay to provider/Dexa Scan results compared to 2 years ago/Pt states diarrhea is new symptom; also new acid reflux /Labwork requested. )      11/17/2023     1:41 PM   Additional Questions   Roomed by MVO       HPI Here in follow-up  Osteroporosis: rvwd, Encouraged Endo follow-up. Pt will consider, holds off on for now.   Will keep Follow-up GI, sees plastics in spring for pelvic floor   notes rvwd  She's proceeding w/ Spine MD plans  Defers immunizations, offered  Defers mammogram  No lab s needed today, will do lipids soon, prefers thyroid labs at [facility] provider  No  acute c/o   See my last note, never tried lactulose, per pt needs liquid not powder form, she'll stop if significant diarrhea from  Past Medical History:   Diagnosis Date    Anemia     Asthma     since childhood    Blepharitis     Constipation, chronic     Chronic enema use    CVA (cerebral infarction) 2009    carotid intimal repair, left    Hyperlipidemia age 58    identified age 58    Hypothyroidism     Irritable bowel disease     Mixed type age-related cataract, both eyes     Neck injuries     Pelvic floor dysfunction 2010    Postmenopausal hormone replacement therapy     From Dr. Link    Scoliosis      Past Surgical History:   Procedure Laterality Date    CYSTOSCOPY N/A 3/25/2019    Procedure: CYSTOSCOPY;  Surgeon: Radha Ovalle MD;  Location: [facility] OR    DILATION AND CURETTAGE, OPERATIVE HYSTEROSCOPY WITH MORCELLATOR, COMBINED N/A 3/25/2019    Procedure: OPERATIVE HYSTEROSCOPY WITH MORCELLATOR;  Surgeon: Marybeth Huber MD;  Location: [facility] OR    HC BREATH HYDROGEN TEST  11/11/2013    Procedure: HYDROGEN BREATH TEST;  Surgeon: Esteban Short MD;  Location: [facility] GI    PHACOEMULSIFICATION WITH STANDARD INTRAOCULAR LENS IMPLANT Left 8/15/2022    Procedure: LEFT EYE PHACOEMULSIFICATION, CATARACT, WITH STANDARD INTRAOCULAR LENS IMPLANT INSERTION;  Surgeon: Damir Cunningham MD;  Location: [facility] OR    PHACOEMULSIFICATION WITH STANDARD INTRAOCULAR LENS IMPLANT Right 9/12/2022    Procedure: RIGHT EYE PHACOEMULSIFICATION, CATARACT, WITH TORIC  INTRAOCULAR LENS IMPLANT INSERTION;  Surgeon: Damir Cunningham MD;  Location: [facility] OR    SURGICAL HISTORY OF -       tonsillectomy    SURGICAL HISTORY OF -   ~1997    cosmetic skin removal: breast reduction; skin removal from arms legs and tummy ck    [facility] STOMACH SURGERY PROCEDURE UNLISTED       Current Outpatient Medications   Medication    lactulose (CHRONULAC) 10 GM/15ML solution    Cholecalciferol (VITAMIN D) 2000 UNIT tablet    Digestive Enzymes CAPS  "   DiphenhydrAMINE HCl (BENADRYL PO)    EPINEPHrine (EPIPEN) 0.3 MG/0.3ML injection 2-pack    estradiol (VAGIFEM) 10 MCG TABS vaginal tablet    Hypromellose (ARTIFICIAL TEARS OP)    ketoconazole (NIZORAL) 2 % external cream    lactulose (CEPHULAC) 10 GM packet    MAGNESIUM OXIDE PO    neomycin-polymyxin-dexAMETHasone (MAXITROL) 3.5-61061-9.1 ophthalmic ointment    neomycin-polymyxin-dexAMETHasone (MAXITROL) 3.5-67893-7.1 ophthalmic ointment    neomycin-polymyxin-dexamethasone (MAXITROL) 3.5-06613-2.1 SUSP ophthalmic susp    peppermint oil liquid    Probiotic Product (ALOE 65539 & PROBIOTICS) CAPS    progesterone (PROMETRIUM) 200 MG capsule    Testosterone Propionate (FIRST-TESTOSTERONE MC) 2 % CREA    thyroid (ARMOUR) 60 MG tablet     No current facility-administered medications for this visit.     Allergies   Allergen Reactions    Aspirin GI Disturbance     GI upset    Bee Venom Swelling    Birds [Feathers]     Cats     Dust Mites     Fluoride Other (See Comments) and Unknown     headaches    Liothyronine Unknown     Severe gut reactions, ears itching, throat closing    Mold     No Clinical Screening - See Comments Other (See Comments)     Some antibiotics but not Zpak-Can tolerate Azithromycin  z-pack, antibiotics in general- per patient.     Procaine Other (See Comments)     headache    Seasonal Allergies      Ragweed, pollen    Simvastatin Other (See Comments)     \"hot flash\", muscle weakness, dizziness    Penicillins Unknown and Rash     As a child    Sulfa Antibiotics Itching, Other (See Comments), Rash and Hives               Review of Systems         Objective    /55 (BP Location: Right arm, Patient Position: Sitting, Cuff Size: Adult Regular)   Pulse 60   Ht 1.613 m (5' 3.5\")   Wt 61.2 kg (135 lb)   LMP  (LMP Unknown)   SpO2 98%   BMI 23.54 kg/m    Body mass index is 23.54 kg/m .  Physical Exam   GENERAL: healthy, alert and no distress  MS: no gross musculoskeletal defects noted, no edema        "

## 2023-11-21 NOTE — PROGRESS NOTES
Jennifer is a 73 year old that presents in clinic today for the following:     Chief Complaint   Patient presents with    Follow Up     Concerns:  Endocrinologists meds make her stomach upset  GI doctor visit update and opinion to relay to provider  Dexa Scan results compared to 2 years ago  Pt states diarrhea is new symptom; also new acid reflux   Labwork requested.            11/17/2023     1:41 PM   Additional Questions   Roomed by MVO       Screenings from encounters over the past 10 days    No data recorded       Gavin Dubon

## 2023-11-22 ENCOUNTER — TELEPHONE (OUTPATIENT)
Dept: FAMILY MEDICINE | Facility: CLINIC | Age: 73
End: 2023-11-22
Payer: MEDICARE

## 2023-11-24 ENCOUNTER — THERAPY VISIT (OUTPATIENT)
Dept: PHYSICAL THERAPY | Facility: CLINIC | Age: 73
End: 2023-11-24
Payer: MEDICARE

## 2023-11-24 DIAGNOSIS — M54.17 RADICULAR PAIN OF LUMBOSACRAL REGION: Primary | ICD-10-CM

## 2023-11-24 PROCEDURE — 97110 THERAPEUTIC EXERCISES: CPT | Mod: GP

## 2023-11-24 NOTE — PROGRESS NOTES
"      Lumbar MedX 4-week retest  11/3/2023  Initial testing 9/27/2023    AROM (full=  0-72  lumbar) 12-39 12-39   Max Extension Torque  169 107   Flex: ext ratio (ideal 1.4:1) 1.32:1 2.06:1     Date 11/24/2023  11/8/2023  11/3/2023  10/26/2023 10/23/2023 10/19/2023 10/11/2023  10/9/2023 10/5/2023 9/27/2023    Lumbar Parameters             Top Dead Center (TDC) 24 24 24 24 24 24 24 24 24 24   Counterbalance (CB) 206 206 206 206 206 206 206 206 206 206   Seat Pad 1 1 1 1 1 1 1 1 1 1   Femur Restraint 5 5 5 5 5 5 5 5 5 5   Week/Visit             Enter Week/Visit # 6/1 5/1 ** corrected visit/week #    4/2 5/2 5/1 4/1 3/2 3/1 2/1 1/1   Weight (lbs) 70# 60# 55# 49 33 49 45# 41# 35# 40#   Reps (#) ~20 31 28 30 30 30 31 25 30 16   Time 104 135 138 127 185 78 118 119 91 71   ROM (degrees) 12-39 12-39 12-39 12-39 12-39 12-39 12-39 12-39 12-39 12-39   Pain fatigue Fatigue, felt good on upper back fatigue 0/10 0/10 0/10 fatigue 0/10 0/10 Slight pain in low back   Therapist cues   ROM   Full ROM; tempo  ROM        Date 11/24/2023  11/8/2023  11/3/2023  10/26/2023 10/23/2023 10/19/2023 10/11/2023  10/9/2023 10/5/2023 9/27/2023    Exercise             nustep X5 min X5 min X5 min X4 min X4 min X4 min X4 min X5 min X5 min X5 min   Rotary torso 28# L 28# R 26# L 26# R 24# L  24# R 22# to L 22# R 20# R 20# to L  Smaller ROM   Lumbar mobility routine (hooklying lumbar rotation, bow and arrow, cat cow, emanuel pose)  Review bow and arrow Bx10 cues for 2-3\" hold & for pain free range           Repeated flexion exercises to centralize symptoms              PPT          X5, pillow case for external cueing   Supine marches (#3)          X10, not too difficult   SLR with ab set        Review  Bx15  Cues for ab set   Bx12, cues for slow controlled motion.   Reformer: squat  [place]  DL x10  SL x10  3R x20 2x12 RRR  2x12 RRR 2x12 RRR   2x12 RRR 2x12 RRB    Reformer 90-90 toe taps X20     X15 with arms raise above     X10 with arm raise & " "toe taps            Reformer bridge  2R   X10 with good control            Reformer 90-90 pull down 1B x10    1R 1B x10   Cues for arm positioning            Standing bent over S/L kick back     2x12 each  2x12 each  2x12 each   2x12 each      Pallof press       Red x15      Seated hip hinge   X10, instruct can use some weights in arms for resistance.          Rhomboid stretch  B x20\"           Body weight deadlift X5    Cues for posture and body positioning                                                       "

## 2023-11-27 ENCOUNTER — TELEPHONE (OUTPATIENT)
Dept: FAMILY MEDICINE | Facility: CLINIC | Age: 73
End: 2023-11-27

## 2023-11-29 ENCOUNTER — THERAPY VISIT (OUTPATIENT)
Dept: PHYSICAL THERAPY | Facility: CLINIC | Age: 73
End: 2023-11-29
Payer: MEDICARE

## 2023-11-29 DIAGNOSIS — R19.8 ABNORMAL DEFECATION: ICD-10-CM

## 2023-11-29 DIAGNOSIS — M62.89 HIGH-TONE PELVIC FLOOR DYSFUNCTION IN FEMALE: Primary | ICD-10-CM

## 2023-11-29 DIAGNOSIS — M79.18 MYALGIA OF PELVIC FLOOR: ICD-10-CM

## 2023-11-29 DIAGNOSIS — R39.9 LOWER URINARY TRACT SYMPTOMS (LUTS): ICD-10-CM

## 2023-11-29 PROCEDURE — 97112 NEUROMUSCULAR REEDUCATION: CPT | Mod: 59

## 2023-11-29 PROCEDURE — 97530 THERAPEUTIC ACTIVITIES: CPT | Mod: GP

## 2023-11-29 PROCEDURE — 97140 MANUAL THERAPY 1/> REGIONS: CPT | Mod: 59

## 2023-12-08 ENCOUNTER — THERAPY VISIT (OUTPATIENT)
Dept: PHYSICAL THERAPY | Facility: CLINIC | Age: 73
End: 2023-12-08
Payer: MEDICARE

## 2023-12-08 DIAGNOSIS — M47.816 LUMBAR SPONDYLOSIS: Primary | ICD-10-CM

## 2023-12-08 DIAGNOSIS — M48.062 SPINAL STENOSIS OF LUMBAR REGION WITH NEUROGENIC CLAUDICATION: ICD-10-CM

## 2023-12-08 PROCEDURE — 97110 THERAPEUTIC EXERCISES: CPT | Mod: GP

## 2023-12-08 NOTE — PROGRESS NOTES
"      Lumbar MedX 4-week retest  11/3/2023  Initial testing 9/27/2023    AROM (full=  0-72  lumbar) 12-39 12-39   Max Extension Torque  169 107   Flex: ext ratio (ideal 1.4:1) 1.32:1 2.06:1     Date 12/8/2023  11/24/2023  11/8/2023  11/3/2023  10/26/2023 10/23/2023 10/19/2023 10/11/2023  10/9/2023 10/5/2023 9/27/2023    Lumbar Parameters              Top Dead Center (TDC) 24 24 24 24 24 24 24 24 24 24 24   Counterbalance (CB) 206 206 206 206 206 206 206 206 206 206 206   Seat Pad 1 1 1 1 1 1 1 1 1 1 1   Femur Restraint 5 5 5 5 5 5 5 5 5 5 5   Week/Visit              Enter Week/Visit # 7/1 6/1 5/1 ** corrected visit/week #    4/2 5/2 5/1 4/1 3/2 3/1 2/1 1/1   Weight (lbs) 70# 70# 60# 55# 49 33 49 45# 41# 35# 40#   Reps (#) 27 ~20 31 28 30 30 30 31 25 30 16   Time 138 104 135 138 127 185 78 118 119 91 71   ROM (degrees) 12-39 12-39 12-39 12-39 12-39 12-39 12-39 12-39 12-39 12-39 12-39   Pain fatigue fatigue Fatigue, felt good on upper back fatigue 0/10 0/10 0/10 fatigue 0/10 0/10 Slight pain in low back   Therapist cues ROM   ROM   Full ROM; tempo  ROM        Date 12/8/2023  11/24/2023  11/8/2023  11/3/2023  10/26/2023 10/23/2023 10/19/2023 10/11/2023  10/9/2023 10/5/2023 9/27/2023    Exercise              nustep X5 min X5 min X5 min X5 min X4 min X4 min X4 min X4 min X5 min X5 min X5 min   Rotary torso 30# R 28# L 28# R 26# L 26# R 24# L  24# R 22# to L 22# R 20# R 20# to L  Smaller ROM   Lumbar mobility routine (hooklying lumbar rotation, bow and arrow, cat cow, emanuel pose)   Review bow and arrow Bx10 cues for 2-3\" hold & for pain free range           Repeated flexion exercises to centralize symptoms               PPT           X5, pillow case for external cueing   Supine marches (#3)           X10, not too difficult   SLR with ab set         Review  Bx15  Cues for ab set   Bx12, cues for slow controlled motion.   Reformer: squat   [place]  DL x10  SL x10  3R x20 2x12 RRR  2x12 RRR 2x12 RRR   2x12 RRR 2x12 RRB  " "  Reformer 90-90 toe taps  X20     X15 with arms raise above     X10 with arm raise & toe taps            Reformer bridge   2R   X10 with good control            Reformer 90-90 pull down  1B x10    1R 1B x10   Cues for arm positioning            Standing bent over S/L kick back      2x12 each  2x12 each  2x12 each   2x12 each      Pallof press        Red x15      Seated hip hinge    X10, instruct can use some weights in arms for resistance.          Rhomboid stretch   B x20\"           Body weight deadlift  X5    Cues for posture and body positioning            Supine 90-90 pull down with theraband Red x15 with cueing for technique and positioning      Green x10 with cueing for positioning       Blue TB x10                                                                         "

## 2023-12-11 ENCOUNTER — THERAPY VISIT (OUTPATIENT)
Dept: PHYSICAL THERAPY | Facility: CLINIC | Age: 73
End: 2023-12-11
Payer: MEDICARE

## 2023-12-11 DIAGNOSIS — R39.9 LOWER URINARY TRACT SYMPTOMS (LUTS): ICD-10-CM

## 2023-12-11 DIAGNOSIS — M79.18 MYALGIA OF PELVIC FLOOR: ICD-10-CM

## 2023-12-11 DIAGNOSIS — M62.89 HIGH-TONE PELVIC FLOOR DYSFUNCTION IN FEMALE: Primary | ICD-10-CM

## 2023-12-11 DIAGNOSIS — R19.8 ABNORMAL DEFECATION: ICD-10-CM

## 2023-12-11 PROCEDURE — 97140 MANUAL THERAPY 1/> REGIONS: CPT | Mod: GP

## 2023-12-11 PROCEDURE — 97110 THERAPEUTIC EXERCISES: CPT | Mod: GP

## 2023-12-11 PROCEDURE — 97112 NEUROMUSCULAR REEDUCATION: CPT | Mod: GP

## 2023-12-11 NOTE — PROGRESS NOTES
12/11/23 0500   Appointment Info   Signing clinician's name / credentials Gina Vergara, PT, DPT   Total/Authorized Visits 6 (PT)   Visits Used 6   Medical Diagnosis Lower urinary tract symptoms (LUTS)  Irritable bowel syndrome with constipation  Abnormal defecation  High-tone pelvic floor dysfunction in female  Myalgia of pelvic floor   PT Tx Diagnosis pelvic floor dysfunction   Quick Adds Certification;Pelvic Consent   Progress Note/Certification   Start of Care Date 08/07/23   Onset of illness/injury or Date of Surgery 04/20/23  (referral)   Therapy Frequency 1x/week tapering to every other week   Predicted Duration up to 10 weeks   Certification date from 11/06/23   Certification date to 01/15/24   Progress Note Completed Date 12/11/23       Present No   PT Goal 1   Goal Identifier LTG 1   Goal Description Patient will decrease PF muscle tone to improve bowel habits to pass stool without straining or pain   Rationale to maximize safety and independence with performance of ADLs and functional tasks   Goal Progress This varies but overall has been ok.   Target Date 01/15/24   Subjective Report   Subjective Report PT continues to have some pain in back and hips. Overall feels a little better but knows she has a long way to go.   Objective Measures   Objective Measures Objective Measure 3   Objective Measure 1   Objective Measure DBE   Details improved rib mobility today; good diaphragm movement   Objective Measure 2   Objective Measure Scar restirction   Details increased at R 1/2 incision; resticted more in superior and lateral directon   Objective Measure 3   Objective Measure PFM   Details improved lengthening with inhalation ; took several breaths to improve PF response   Treatment Interventions (PT)   Interventions Therapeutic Activity;Therapeutic Procedure/Exercise;Manual Therapy;Neuromuscular Re-education   Therapeutic Procedure/Exercise   Therapeutic Procedures: strength,  endurance, ROM, flexibillity minutes (89924) 5   PTRx Ther Proc 2 Supine Abdominal Exercise #3 (Marching)   PTRx Ther Proc 2 - Details x 10; vc to keep one leg on table before lifting other leg up. vc to prevent breath holding   Skilled Intervention see above   Patient Response/Progress good stability in pelvis   Therapeutic Activity   Therapeutic Activities: dynamic activities to improve functional performance minutes (17451) 5   Ther Act 1 Pt education   Ther Act 1 - Details discussion on continuation of HEP, review of HEP, and PF downtraining and PF   Skilled Intervention see above   Patient Response/Progress pt verbally agreed.   Neuromuscular Re-education   Neuromuscular re-ed of mvmt, balance, coord, kinesthetic sense, posture, proprioception minutes (72160) 8   PTRx Neuro Re-ed 1 Diaphragmatic Breathing   PTRx Neuro Re-ed 1 - Details mod cues for abdominal expansion ; improved PF response here today   Skilled Intervention see above   Patient Response/Progress improved rib cage mobility and diaphragm movement.   Manual Therapy   Manual Therapy: Mobilization, MFR, MLD, friction massage minutes (60789) 20   Manual Therapy Manual Therapy 5   Manual Therapy 1 Diaphragm release   Manual Therapy 1 - Details mod pressure; mod restriction R >L   Manual Therapy 2 MFR: abdomen;   Manual Therapy 2 - Details decreased mobility to R and superior; upper quadrants decreased mobility   Manual Therapy 3 Abdominal massage   Manual Therapy 3 - Details NT:   Manual Therapy 4 scar massage   Manual Therapy 4 - Details lower abdominal transverse scar.   Skilled Intervention see above   Patient Response/Progress tolerated well ; reports feeling less pulling on abdomen after  manual work   Manual Therapy 5 TPR: levator ani   Manual Therapy 5 - Details mod pressure improved with hold   Education   Learner/Method Patient;Listening;Demonstration;No Barriers to Learning   Plan   Home program print ptrx   Plan for next session discharge    Comments   Pelvic Health Informed Consent Statement Discussed with patient/guardian reason for referral regarding pelvic health needs and external/internal pelvic floor muscle examination.  Opportunity provided to ask questions and verbal consent for assessment and intervention was given.   Total Session Time   Timed Code Treatment Minutes 38   Total Treatment Time (sum of timed and untimed services) 38         DISCHARGE  Reason for Discharge: plan to continue with HEP due to plateau in progress -will follow up as needed in future.     Equipment Issued: none     Discharge Plan: Patient to continue home program.    Referring Provider:  Radha Ovalle

## 2023-12-26 ENCOUNTER — TELEPHONE (OUTPATIENT)
Dept: PHYSICAL MEDICINE AND REHAB | Facility: CLINIC | Age: 73
End: 2023-12-26
Payer: MEDICARE

## 2023-12-26 NOTE — TELEPHONE ENCOUNTER
Provider: Dr. Angeles    Left patient a voicemail reminding them of the appointment on [id number] Monday with a check in time of 1:15PM.     Reason for visit? Consult     Follow up/Return appointment scheduled? No    Physical Medicine and Rehabilitation Scheduling Phone Number: [phone]

## 2023-12-29 ENCOUNTER — THERAPY VISIT (OUTPATIENT)
Dept: PHYSICAL THERAPY | Facility: CLINIC | Age: 73
End: 2023-12-29
Payer: MEDICARE

## 2023-12-29 DIAGNOSIS — M47.816 LUMBAR SPONDYLOSIS: Primary | ICD-10-CM

## 2023-12-29 PROCEDURE — 97110 THERAPEUTIC EXERCISES: CPT | Mod: GP

## 2023-12-29 NOTE — PROGRESS NOTES
[facility]                                                                                   OUTPATIENT PHYSICAL THERAPY    PLAN OF TREATMENT FOR OUTPATIENT REHABILITATION   Patient's Last Name, First Name, Jennifer Phillips YOB: 1950   Provider's Name   [facility]   Medical Record No.  [id number]     Onset Date: 08/30/23  Start of Care Date: 09/25/23     Medical Diagnosis:  M48.062 (ICD-10-CM) - Spinal stenosis of lumbar region with neurogenic claudication  M47.816 (ICD-10-CM) - Lumbar spondylosis  M54.17 (ICD-10-CM) - Radicular pain of lumbosacral region      PT Treatment Diagnosis:  Chronic low back pain secondary to global weakness and intolerance to activity Plan of Treatment  Frequency/Duration: 1-2x/wk/ 12 weeks    Certification date from 12/08/23 to 01/05/24         See note for plan of treatment details and functional goals     MASOUD JIMÉNEZ, PT                         I CERTIFY THE NEED FOR THESE SERVICES FURNISHED UNDER        THIS PLAN OF TREATMENT AND WHILE UNDER MY CARE     (Physician attestation of this document indicates review and certification of the therapy plan).              Referring Provider:  Radha Angeles    Initial Assessment  See Epic Evaluation- Start of Care Date: 09/25/23

## 2023-12-29 NOTE — PROGRESS NOTES
"  Lumbar MedX 4-week retest  11/3/2023  Initial testing 9/27/2023    AROM (full=  0-72  lumbar) 12-39 12-39   Max Extension Torque  169 107   Flex: ext ratio (ideal 1.4:1) 1.32:1 2.06:1     Date 12/29/2023  12/8/2023  11/24/2023  11/8/2023  11/3/2023  10/26/2023 10/23/2023 10/19/2023 10/11/2023  10/9/2023 10/5/2023 9/27/2023    Lumbar Parameters               Top Dead Center (TDC) 24 24 24 24 24 24 24 24 24 24 24 24   Counterbalance (CB) 206 206 206 206 206 206 206 206 206 206 206 206   Seat Pad 1 1 1 1 1 1 1 1 1 1 1 1   Femur Restraint 5 5 5 5 5 5 5 5 5 5 5 5   Week/Visit               Enter Week/Visit # 8/1 7/1 6/1 5/1 ** corrected visit/week #    4/2 5/2 5/1 4/1 3/2 3/1 2/1 1/1   Weight (lbs) 75# 70# 70# 60# 55# 49 33 49 45# 41# 35# 40#   Reps (#) ~14-15 --> did not complete full ROM 27 ~20 31 28 30 30 30 31 25 30 16   Time 80 138 104 135 138 127 185 78 118 119 91 71   ROM (degrees) 12-39 12-39 12-39 12-39 12-39 12-39 12-39 12-39 12-39 12-39 12-39 12-39   Pain fatigue fatigue fatigue Fatigue, felt good on upper back fatigue 0/10 0/10 0/10 fatigue 0/10 0/10 Slight pain in low back   Therapist cues  ROM   ROM   Full ROM; tempo  ROM        Date 12/29/2023  12/8/2023  11/24/2023  11/8/2023  11/3/2023  10/26/2023 10/23/2023 10/19/2023 10/11/2023  10/9/2023 10/5/2023 9/27/2023    Exercise               nustep X5 min X5 min X5 min X5 min X5 min X4 min X4 min X4 min X4 min X5 min X5 min X5 min   Rotary torso 30# L 30# R 28# L 28# R 26# L 26# R 24# L  24# R 22# to L 22# R 20# R 20# to L  Smaller ROM   Lumbar mobility routine (hooklying lumbar rotation, bow and arrow, cat cow, emanuel pose)    Review bow and arrow Bx10 cues for 2-3\" hold & for pain free range           Repeated flexion exercises to centralize symptoms                PPT            X5, pillow case for external cueing   Supine marches (#3)            X10, not too difficult   SLR with ab set  Review x12 cues for continued ab set and slow motion        " "Review  Bx15  Cues for ab set   Bx12, cues for slow controlled motion.   Reformer: squat    All springs  DL x10  SL x10  3R x20 2x12 RRR  2x12 RRR 2x12 RRR   2x12 RRR 2x12 RRB    Reformer 90-90 toe taps   X20     X15 with arms raise above     X10 with arm raise & toe taps            Reformer bridge    2R   X10 with good control            Reformer 90-90 pull down   1B x10    1R 1B x10   Cues for arm positioning            Standing bent over S/L kick back       2x12 each  2x12 each  2x12 each   2x12 each      Pallof press         Red x15      Seated hip hinge Review  X10   Cues for form     X10, instruct can use some weights in arms for resistance.          Rhomboid stretch    B x20\"           Body weight deadlift Review x10    Cues for posture   X5    Cues for posture and body positioning            Supine 90-90 pull down with theraband  Red x15 with cueing for technique and positioning      Green x10 with cueing for positioning       Blue TB x10                                                                             "

## 2024-01-08 ENCOUNTER — OFFICE VISIT (OUTPATIENT)
Dept: PHYSICAL MEDICINE AND REHAB | Facility: CLINIC | Age: 74
End: 2024-01-08
Payer: MEDICARE

## 2024-01-08 DIAGNOSIS — M54.17 RADICULAR PAIN OF LUMBOSACRAL REGION: ICD-10-CM

## 2024-01-08 DIAGNOSIS — M48.062 SPINAL STENOSIS OF LUMBAR REGION WITH NEUROGENIC CLAUDICATION: Primary | ICD-10-CM

## 2024-01-08 DIAGNOSIS — M47.816 LUMBAR SPONDYLOSIS: ICD-10-CM

## 2024-01-08 PROCEDURE — 99214 OFFICE O/P EST MOD 30 MIN: CPT | Performed by: PHYSICAL MEDICINE & REHABILITATION

## 2024-01-08 NOTE — LETTER
1/8/2024       RE: Jennifer Steiner  [address]  [place] [address]     Dear Colleague,    Thank you for referring your patient, Jennifer Steiner, to the [facility] PHYSICAL MEDICINE AND REHABILITATION CLINIC [place] at [facility]. Please see a copy of my visit note below.    PM&R Follow-Up Visit -     Date of Initial Visit: 08/30/23  LOV: 08/30/23  TD: 1/8/2024     Recall: Jennifer Steiner is a 73 year old female with history of osteoporosis who follows up for chronic lumbosacral and left lumbar radicular pain in the setting of L4-5 spondylolisthesis and significant central canal stenosis.     INTERVAL HISTORY:   Patient was last seen in clinic 8/30/2023. At that visit, the plan was to refer her to physical therapy for [facility] program.    Since last time Jennifer attended MedX therapy. She felt stronger when she was attending twice per week and completed therapy right before [place]. Overall, since completing her training at [facility] she has noticed an improvement in pain. She would like to do ballet, but she was recommended against that by her  at [facility].     She still struggles with not knowing what exercises/movements are appropriate for her. She would like to know where to go from here and what she can expect with her condition. She is interested in more physical therapy.     Continues HEP and pool x2 /week. Would like to go to gym and use machines with similar program to MedX.     While her back pain is much more manageable at this time, she continues to have both low back and occasional left lower extremity radicular pain.  Her radicular pain in particular has improved quite a bit overall.  In addition to her pool program, she does walk and stay as active as she is able to.  She uses heat for relief and limits/modifies her lifting and carrying.    She denies new weakness, numbness/tingling, saddle anesthesia or bladder issues.  She  does have chronic GI issues including abdominal pain and intermittent bouts of diarrhea.    RECALL HISTORY OF PRESENT ILLNESS:  Jennifer Steiner is a 73 year old female who presents with a chief complaint of low back and left leg and hip pain.     Pain began +1.5yrs ago (May 2022) and is associated with trauma. States she was helping her mother move boxes, the box slipped, and heard a pop falling into a box of linens with associated acute low back, left hip and shooting pain down her left leg.  She has been seen and evaluated by multiple specialists including [facility] pain clinic, [facility] pain clinic, orthopedics and orthopedic spine surgery.  Overall, she provides me with a detailed summary of her spine, GI, pelvic floor issues and reports longer-standing back injuries dating back more remotely.  She identifies herself as an active individual and her primary objectives are to remain active, continue to have good quality of life and have a contact point that can help direct some of her spine care.    The pain is localized to the left>right low back with radiating symptoms to the left lower limb; she reports chronic decree sensation in the left lateral leg; described as a combination of burning, sharp, numb, dull/aching, pressure-like in nature. Pain is reported as 4/10 at rest today and up to 6/10 at worst in the last week. Symptoms are worse with standing, prolonged sitting or walking; and improved with lying down, exercise. She does report pain-related sleep disturbance and pain that     Functional limitations: Much of which are related to IBS, pelvic floor issues.     Uses pool therapy, ice/heat, chiropractic care, acupuncture.   Does not respond well to injections/invasive procedures and sensitive to medications. Feels she is plateaued in pool therapy. Interested in working on core and abdominal strengthening. Not ready for back surgery -although this was discussed in the orthopedic spine surgery clinic and  she was introduced to some minimally invasive interventions through [facility], such as MILD procedure.    Follows with her primary care doctor for bone health and reports decreased bone mineral density due to thyroid disease.    She describes chronic GI issues due to IBS but no saddle anesthesia, bladder issues or weakness.     PRIOR INJURIES/TREATMENT:   Ice/Heat: +ice and heat  Brace: back brace prn   Physical Therapy:   2023 - pelvic floor PT and aquatic/pool therapy   Sept-Dec 2023 - Spine PT (MedX) - helpful      - Current Pain Medications -   None    Prior Procedures:  Date    Procedure   Improvement (%)  none              Prior Related Surgery: none   Other (acupuncture, OMT, CMM, TENS, DME, etc.):   +Chiropractic care  +Acupuncture     Specialists Seen - (with most recent, available notes and clinic visits reviewed)   1.  Pain clinic - [facility] - Dr Taylor  2.  Pain clinic - [facility]  3.  Orthopedic spine surgery - Tierra Messina PA-C  4.  Orthopedic surgery - Dr. Brooks       IMAGING - reviewed   01/10/23 MRI lumbar spine  FINDINGS:  There are 5 lumbar type vertebrae, used for the purposes of this  dictation. Conus tip at L1. Grade 1-2 anterolisthesis of L4 on L5,  unchanged. Moderate to severe loss of intervertebral disc height at  L4-5. Mild loss of disc height at L3-4. Schmorl's node in the superior  endplate of L3. Normal marrow signal.     On a level by level basis, the findings are as follows:     L1-2: No spinal canal or neural foraminal stenosis.   L2-3: Posterior disc bulge and bilateral facet hypertrophy. Ligamentum  flavum thickening. Mild spinal canal narrowing. Mild left neural foraminal narrowing. No right neural foraminal stenosis.   L3-4: Posterior disc bulge, bilateral facet hypertrophy, and ligamentum flavum thickening. Moderate spinal canal stenosis. Mild left neural foraminal narrowing. No right neural foraminal stenosis.   L4-5: Grade 1-2 anterolisthesis with uncovering of the disc. Bilateral facet  hypertrophy and ligamentum flavum thickening. Severe spinal canal stenosis. Moderate right and mild left neural foraminal narrowing. Several small external synovial cysts associated with the bilateral facet joints.   L5-S1: Bilateral facet hypertrophy. No spinal canal stenosis. Mild bilateral neural foraminal narrowing.     The visualized paraspinous soft tissues are within normal limits. Partially evaluated hepatic and bilateral renal cysts, also demonstrated on the comparison body CT examination.                                                                    IMPRESSION:  Multilevel lumbar degenerative changes, greatest at L4-5 where there is grade 1-2 anterolisthesis, severe spinal canal stenosis, moderate right and mild left neural foraminal stenosis.    06/13/23 XR Lumbar spine   Findings:  5 lumbar type vertebral bodies.   No acute osseous abnormality..  Mild multilevel spondylosis most prominent L4-L5.  Grade 1 retrolisthesis L1 on L2. Grade 1 anterolisthesis L4 on L5. No  translational instability with positional maneuvers.   Nonobstructive bowel gas pattern.                                                                   Impression:  1.  Mild lumbar spondylosis.  2.  No translational instability.     Medical History:  She  has a past medical history of Anemia, Asthma, Blepharitis, Constipation, chronic, CVA (cerebral infarction) (2009), Hyperlipidemia (age 58), Hypothyroidism, Irritable bowel disease, Mixed type age-related cataract, both eyes, Neck injuries, Pelvic floor dysfunction (2010), Postmenopausal hormone replacement therapy, and Scoliosis.     She  has a past surgical history that includes surgical history of - ; surgical history of -  (~1997); BREATH HYDROGEN TEST (11/11/2013); STOMACH SURGERY PROCEDURE UNLISTED; Cystoscopy (N/A, 3/25/2019); Dilation and curettage, operative hysteroscopy with morcellator, combined (N/A, 3/25/2019); Phacoemulsification with standard intraocular lens implant  (Left, 8/15/2022); and Phacoemulsification with standard intraocular lens implant (Right, 9/12/2022).     Family History  Her family history includes Alcohol/Drug in her brother; Alcoholism in her brother and sister; Allergies in her brother; Anesthesia Reaction in her brother; Anxiety Disorder in her sister; Asthma in her brother; Bone Cancer in her sister; Cancer in her sister; Cancer (age of onset: 56) in her sister; Diabetes in her brother, brother, and father; Gastrointestinal Disease in her brother; Gastrointestinal Disease (age of onset: 82) in her father; Heart Disease (age of onset: 62) in her brother; Melanoma in her mother; Obesity in her brother and father; Osteoporosis in her mother; Skin Cancer in her brother and mother; Substance Abuse in her brother and sister.     Social History:  Current living situation: [place] [email or web address]   She  reports that she has never smoked. She has never used smokeless tobacco. She reports that she does not drink alcohol and does not use drugs.        Current Medications:   She has a current medication list which includes the following prescription(s): vitamin d3, digestive enzymes, diphenhydramine hcl, epinephrine, estradiol, carboxymethylcellulose sodium, ketoconazole, lactulose, lactulose, magnesium oxide, neomycin-polymyxin-dexamethasone, neomycin-polymyxin-dexamethasone, neomycin-polymyxin-dexamethasone, peppermint oil, aloe [id number] & probiotics, progesterone, first-testosterone mc, and thyroid.       Allergies:    -- Aspirin -- GI Disturbance    --  GI upset   -- Bee Venom -- Swelling   -- Birds [Feathers]    -- Cats    -- Dust Mites    -- Fluoride -- Other (See Comments) and Unknown    --  headaches   -- Liothyronine -- Unknown    --  Severe gut reactions, ears itching, throat closing   -- Mold    -- No Clinical Screening - See Comments -- Other (See Comments)    --  Some antibiotics but not Zpak-Can tolerate             Azithromycinz-pack, antibiotics in general- per        "      patient.   -- Procaine -- Other (See Comments)    --  headache   -- Seasonal Allergies     --  Ragweed, pollen   -- Simvastatin -- Other (See Comments)    --  \"hot flash\", muscle weakness, dizziness   -- Penicillins -- Unknown and Rash    --  As a child   -- Sulfa Antibiotics -- Itching, Other (See Comments), Rash                           and Hives    PHYSICAL EXAMINATION:  LMP  (LMP Unknown)    General: Pleasant, straightforward, WDWN individual.  Mental Status: Pleasant, direct, appropriate mood and affect  Resp: breathing is unlabored without audible wheeze  Vascular: No visible cyanosis, no venous stasis changes  Heme: No visible ecchymosis or erythema on extremities  Skin: No notable rash    Neurologic:  Strength: All major muscle groups of the bilateral lower extremities have normal and symmetric muscle strength      Gait: reveals normal ousmane and stride     ASSESSMENT:  Jennifer Steiner is a pleasant 73 year old female who presents with:    #.  Chronic lumbosacral and left lumbar radicular pain in the setting of L4-5 spondylolisthesis and significant central canal stenosis. She would like to continue and exhaust nonoperative measures while avoiding medications and interventions for modulation of pain.    #.  Osteoporosis and low BMD. Follow-up with primary care physician.  No history of fragility fractures     Again, re-iterated physical symptoms that should prompt ED visit: changes in bowel/bladder, loss strength, changes in sensation (ie new or worsening numbness/tingling), sudden increase in pain.       PLAN:  -Continue HEP. Stressed the importance of home exercise program when it comes to achieving meaningful, long-lasting pain relief    -Follow with PCP for bone health and osteoporosis management.  -She would like to continue her HEP and machine assistance program that she learned from her MedX therapy for a few months.  States that she may be interested in an additional course of spine PT in the " spring/summer and can contact our clinic for a referral at that time.  -Follow up as needed.     Ready to learn, no apparent learning barriers.  Education provided on treatment plan according to patient's preferred learning style.  Patient verbalizes understanding.   __________________________________      I spent a total of 30 minutes on the day of the visit.   Time spent by me doing chart review, history and exam, documentation and further activities per the note          Again, thank you for allowing me to participate in the care of your patient.      Sincerely,    Radha Angeles MD

## 2024-01-08 NOTE — PROGRESS NOTES
PM&R Follow-Up Visit -     Date of Initial Visit: 08/30/23  LOV: 08/30/23  TD: 1/8/2024     Recall: Jennifer Steiner is a 73 year old female with history of osteoporosis who follows up for chronic lumbosacral and left lumbar radicular pain in the setting of L4-5 spondylolisthesis and significant central canal stenosis.     INTERVAL HISTORY:   Patient was last seen in clinic 8/30/2023. At that visit, the plan was to refer her to physical therapy for [facility] program.    Since last time Jennifer attended [facility] therapy. She felt stronger when she was attending twice per week and completed therapy right before [place]. Overall, since completing her training at [facility] she has noticed an improvement in pain. She would like to do ballet, but she was recommended against that by her  at [facility].     She still struggles with not knowing what exercises/movements are appropriate for her. She would like to know where to go from here and what she can expect with her condition. She is interested in more physical therapy.     Continues HEP and pool x2 /week. Would like to go to gym and use machines with similar program to [facility].     While her back pain is much more manageable at this time, she continues to have both low back and occasional left lower extremity radicular pain.  Her radicular pain in particular has improved quite a bit overall.  In addition to her pool program, she does walk and stay as active as she is able to.  She uses heat for relief and limits/modifies her lifting and carrying.    She denies new weakness, numbness/tingling, saddle anesthesia or bladder issues.  She does have chronic GI issues including abdominal pain and intermittent bouts of diarrhea.    RECALL HISTORY OF PRESENT ILLNESS:  Jennifer Steiner is a 73 year old female who presents with a chief complaint of low back and left leg and hip pain.     Pain began +1.5yrs ago (May 2022) and is associated with trauma. States she was helping her mother move boxes,  the box slipped, and heard a pop falling into a box of linens with associated acute low back, left hip and shooting pain down her left leg.  She has been seen and evaluated by multiple specialists including [facility] pain clinic, [facility] pain clinic, orthopedics and orthopedic spine surgery.  Overall, she provides me with a detailed summary of her spine, GI, pelvic floor issues and reports longer-standing back injuries dating back more remotely.  She identifies herself as an active individual and her primary objectives are to remain active, continue to have good quality of life and have a contact point that can help direct some of her spine care.    The pain is localized to the left>right low back with radiating symptoms to the left lower limb; she reports chronic decree sensation in the left lateral leg; described as a combination of burning, sharp, numb, dull/aching, pressure-like in nature. Pain is reported as 4/10 at rest today and up to 6/10 at worst in the last week. Symptoms are worse with standing, prolonged sitting or walking; and improved with lying down, exercise. She does report pain-related sleep disturbance and pain that     Functional limitations: Much of which are related to IBS, pelvic floor issues.     Uses pool therapy, ice/heat, chiropractic care, acupuncture.   Does not respond well to injections/invasive procedures and sensitive to medications. Feels she is plateaued in pool therapy. Interested in working on core and abdominal strengthening. Not ready for back surgery -although this was discussed in the orthopedic spine surgery clinic and she was introduced to some minimally invasive interventions through [facility], such as MILD procedure.    Follows with her primary care doctor for bone health and reports decreased bone mineral density due to thyroid disease.    She describes chronic GI issues due to IBS but no saddle anesthesia, bladder issues or weakness.     PRIOR INJURIES/TREATMENT:    Ice/Heat: +ice and heat  Brace: back brace prn   Physical Therapy:   2023 - pelvic floor PT and aquatic/pool therapy   Sept-Dec 2023 - Spine PT ([facility]) - helpful      - Current Pain Medications -   None    Prior Procedures:  Date    Procedure   Improvement (%)  none              Prior Related Surgery: none   Other (acupuncture, OMT, CMM, TENS, DME, etc.):   +Chiropractic care  +Acupuncture     Specialists Seen - (with most recent, available notes and clinic visits reviewed)   1.  Pain clinic - [facility] - Dr Taylor  2.  Pain clinic - [facility]  3.  Orthopedic spine surgery - Tierra Messina PA-C  4.  Orthopedic surgery - Dr. Brooks       IMAGING - reviewed   01/10/23 MRI lumbar spine  FINDINGS:  There are 5 lumbar type vertebrae, used for the purposes of this  dictation. Conus tip at L1. Grade 1-2 anterolisthesis of L4 on L5,  unchanged. Moderate to severe loss of intervertebral disc height at  L4-5. Mild loss of disc height at L3-4. Schmorl's node in the superior  endplate of L3. Normal marrow signal.     On a level by level basis, the findings are as follows:     L1-2: No spinal canal or neural foraminal stenosis.   L2-3: Posterior disc bulge and bilateral facet hypertrophy. Ligamentum  flavum thickening. Mild spinal canal narrowing. Mild left neural foraminal narrowing. No right neural foraminal stenosis.   L3-4: Posterior disc bulge, bilateral facet hypertrophy, and ligamentum flavum thickening. Moderate spinal canal stenosis. Mild left neural foraminal narrowing. No right neural foraminal stenosis.   L4-5: Grade 1-2 anterolisthesis with uncovering of the disc. Bilateral facet hypertrophy and ligamentum flavum thickening. Severe spinal canal stenosis. Moderate right and mild left neural foraminal narrowing. Several small external synovial cysts associated with the bilateral facet joints.   L5-S1: Bilateral facet hypertrophy. No spinal canal stenosis. Mild bilateral neural foraminal narrowing.     The visualized paraspinous  soft tissues are within normal limits. Partially evaluated hepatic and bilateral renal cysts, also demonstrated on the comparison body CT examination.                                                                    IMPRESSION:  Multilevel lumbar degenerative changes, greatest at L4-5 where there is grade 1-2 anterolisthesis, severe spinal canal stenosis, moderate right and mild left neural foraminal stenosis.    06/13/23 XR Lumbar spine   Findings:  5 lumbar type vertebral bodies.   No acute osseous abnormality..  Mild multilevel spondylosis most prominent L4-L5.  Grade 1 retrolisthesis L1 on L2. Grade 1 anterolisthesis L4 on L5. No  translational instability with positional maneuvers.   Nonobstructive bowel gas pattern.                                                                   Impression:  1.  Mild lumbar spondylosis.  2.  No translational instability.     Medical History:  She  has a past medical history of Anemia, Asthma, Blepharitis, Constipation, chronic, CVA (cerebral infarction) (2009), Hyperlipidemia (age 58), Hypothyroidism, Irritable bowel disease, Mixed type age-related cataract, both eyes, Neck injuries, Pelvic floor dysfunction (2010), Postmenopausal hormone replacement therapy, and Scoliosis.     She  has a past surgical history that includes surgical history of - ; surgical history of -  (~1997); BREATH HYDROGEN TEST (11/11/2013); STOMACH SURGERY PROCEDURE UNLISTED; Cystoscopy (N/A, 3/25/2019); Dilation and curettage, operative hysteroscopy with morcellator, combined (N/A, 3/25/2019); Phacoemulsification with standard intraocular lens implant (Left, 8/15/2022); and Phacoemulsification with standard intraocular lens implant (Right, 9/12/2022).     Family History  Her family history includes Alcohol/Drug in her brother; Alcoholism in her brother and sister; Allergies in her brother; Anesthesia Reaction in her brother; Anxiety Disorder in her sister; Asthma in her brother; Bone Cancer in her  "sister; Cancer in her sister; Cancer (age of onset: 56) in her sister; Diabetes in her brother, brother, and father; Gastrointestinal Disease in her brother; Gastrointestinal Disease (age of onset: 82) in her father; Heart Disease (age of onset: 62) in her brother; Melanoma in her mother; Obesity in her brother and father; Osteoporosis in her mother; Skin Cancer in her brother and mother; Substance Abuse in her brother and sister.     Social History:  Current living situation: Lives in [facility].   She  reports that she has never smoked. She has never used smokeless tobacco. She reports that she does not drink alcohol and does not use drugs.        Current Medications:   She has a current medication list which includes the following prescription(s): vitamin d3, digestive enzymes, diphenhydramine hcl, epinephrine, estradiol, carboxymethylcellulose sodium, ketoconazole, lactulose, lactulose, magnesium oxide, neomycin-polymyxin-dexamethasone, neomycin-polymyxin-dexamethasone, neomycin-polymyxin-dexamethasone, peppermint oil, aloe [id number] & probiotics, progesterone, first-testosterone mc, and thyroid.       Allergies:    -- Aspirin -- GI Disturbance    --  GI upset   -- Bee Venom -- Swelling   -- Birds [Feathers]    -- Cats    -- Dust Mites    -- Fluoride -- Other (See Comments) and Unknown    --  headaches   -- Liothyronine -- Unknown    --  Severe gut reactions, ears itching, throat closing   -- Mold    -- No Clinical Screening - See Comments -- Other (See Comments)    --  Some antibiotics but not Zpak-Can tolerate             Azithromycinz-pack, antibiotics in general- per             patient.   -- Procaine -- Other (See Comments)    --  headache   -- Seasonal Allergies     --  Ragweed, pollen   -- Simvastatin -- Other (See Comments)    --  \"hot flash\", muscle weakness, dizziness   -- Penicillins -- Unknown and Rash    --  As a child   -- Sulfa Antibiotics -- Itching, Other (See Comments), Rash                           and " Hives    PHYSICAL EXAMINATION:  LMP  (LMP Unknown)    General: Pleasant, straightforward, WDWN individual.  Mental Status: Pleasant, direct, appropriate mood and affect  Resp: breathing is unlabored without audible wheeze  Vascular: No visible cyanosis, no venous stasis changes  Heme: No visible ecchymosis or erythema on extremities  Skin: No notable rash    Neurologic:  Strength: All major muscle groups of the bilateral lower extremities have normal and symmetric muscle strength      Gait: reveals normal ousmane and stride     ASSESSMENT:  Jennifer Steiner is a pleasant 73 year old female who presents with:    #.  Chronic lumbosacral and left lumbar radicular pain in the setting of L4-5 spondylolisthesis and significant central canal stenosis. She would like to continue and exhaust nonoperative measures while avoiding medications and interventions for modulation of pain.    #.  Osteoporosis and low BMD. Follow-up with primary care physician.  No history of fragility fractures     Again, re-iterated physical symptoms that should prompt ED visit: changes in bowel/bladder, loss strength, changes in sensation (ie new or worsening numbness/tingling), sudden increase in pain.       PLAN:  -Continue HEP. Stressed the importance of home exercise program when it comes to achieving meaningful, long-lasting pain relief    -Follow with PCP for bone health and osteoporosis management.  -She would like to continue her HEP and machine assistance program that she learned from her [facility] therapy for a few months.  States that she may be interested in an additional course of spine PT in the spring/summer and can contact our clinic for a referral at that time.  -Follow up as needed.     Ready to learn, no apparent learning barriers.  Education provided on treatment plan according to patient's preferred learning style.  Patient verbalizes understanding.   __________________________________  Radha Angeles MD  Physical Medicine &  Rehabilitation        I spent a total of 30 minutes on the day of the visit.   Time spent by me doing chart review, history and exam, documentation and further activities per the note

## 2024-01-08 NOTE — NURSING NOTE
Chief Complaint   Patient presents with    RECHECK     Here for a consult, confirmed with patient     Judy Calvo

## 2024-01-08 NOTE — PATIENT INSTRUCTIONS
It was a pleasure seeing you today in our [facility]. We discussed the following:    #. Continue to follow with your primary care physician for bone scans and discussion of osteoporosis     #. Physical symptoms that should prompt ED visit: significant or new changes in bowel/bladder, loss of strength, new changes in sensation (ie new or worsening numbness/tingling), sudden increase in pain out of proportion than typical 'ups and downs.'     #. Continue your home and gym exercise program. We can certainly revisit additional course of physical therapy in the Spring or Summer. You can reach out or message our clinic and we could place a referral.     Follow up as needed.

## 2024-01-10 NOTE — PROGRESS NOTES
DISCHARGE  Reason for Discharge: Patient chooses to discontinue therapy.    Equipment Issued: n/a    Discharge Plan: Patient to continue home program and start at [facility].    Referring Provider:  Radha Angeles

## 2024-02-01 ENCOUNTER — OFFICE VISIT (OUTPATIENT)
Dept: OPHTHALMOLOGY | Facility: CLINIC | Age: 74
End: 2024-02-01
Attending: RADIOLOGY
Payer: MEDICARE

## 2024-02-01 DIAGNOSIS — Z96.1 PSEUDOPHAKIA OF BOTH EYES: ICD-10-CM

## 2024-02-01 DIAGNOSIS — H01.02A SQUAMOUS BLEPHARITIS OF UPPER AND LOWER EYELIDS OF BOTH EYES: ICD-10-CM

## 2024-02-01 DIAGNOSIS — H04.123 DRY EYES: ICD-10-CM

## 2024-02-01 DIAGNOSIS — H01.02B SQUAMOUS BLEPHARITIS OF UPPER AND LOWER EYELIDS OF BOTH EYES: ICD-10-CM

## 2024-02-01 DIAGNOSIS — H04.123 BILATERAL DRY EYES: Primary | ICD-10-CM

## 2024-02-01 DIAGNOSIS — H02.88A MEIBOMIAN GLAND DYSFUNCTION (MGD), BILATERAL, BOTH UPPER AND LOWER LIDS: ICD-10-CM

## 2024-02-01 DIAGNOSIS — H02.88B MEIBOMIAN GLAND DYSFUNCTION (MGD), BILATERAL, BOTH UPPER AND LOWER LIDS: ICD-10-CM

## 2024-02-01 PROCEDURE — 92014 COMPRE OPH EXAM EST PT 1/>: CPT | Mod: GC | Performed by: OPHTHALMOLOGY

## 2024-02-01 PROCEDURE — G0463 HOSPITAL OUTPT CLINIC VISIT: HCPCS | Performed by: OPHTHALMOLOGY

## 2024-02-01 ASSESSMENT — CONF VISUAL FIELD
OS_SUPERIOR_TEMPORAL_RESTRICTION: 0
OS_INFERIOR_TEMPORAL_RESTRICTION: 0
METHOD: COUNTING FINGERS
OD_SUPERIOR_NASAL_RESTRICTION: 0
OD_SUPERIOR_TEMPORAL_RESTRICTION: 0
OD_INFERIOR_TEMPORAL_RESTRICTION: 0
OD_INFERIOR_NASAL_RESTRICTION: 0
OS_NORMAL: 1
OS_SUPERIOR_NASAL_RESTRICTION: 0
OD_NORMAL: 1
OS_INFERIOR_NASAL_RESTRICTION: 0

## 2024-02-01 ASSESSMENT — VISUAL ACUITY
OD_SC: 20/30
OD_PH_SC: 20/20
OS_SC: 20/25
OD_PH_SC+: -2
METHOD: SNELLEN - LINEAR
OS_SC+: +1

## 2024-02-01 ASSESSMENT — SLIT LAMP EXAM - LIDS: COMMENTS: NORMAL

## 2024-02-01 ASSESSMENT — TONOMETRY
IOP_METHOD: TONOPEN
OD_IOP_MMHG: 14
OS_IOP_MMHG: 12

## 2024-02-01 ASSESSMENT — EXTERNAL EXAM - LEFT EYE: OS_EXAM: NORMAL

## 2024-02-01 ASSESSMENT — CUP TO DISC RATIO
OS_RATIO: 0.3
OD_RATIO: 0.3

## 2024-02-01 NOTE — NURSING NOTE
Chief Complaints and History of Present Illnesses   Patient presents with    Follow Up     Follow up Meibomian gland dysfunction.  Last seen May 3, 2023.      Chief Complaint(s) and History of Present Illness(es)       Follow Up              Laterality: both eyes    Onset: months ago    Quality: blurred vision    Associated symptoms: dryness.  Negative for double vision, flashes and floaters    Treatments tried: artificial tears    Pain scale: 0/10    Comments: Follow up Meibomian gland dysfunction.  Last seen May 3, 2023.               Comments    Pt reports gradual decrease in vision since LV, both near and distance. Allergies have also been bad this last few months, causing some pressure sensation. Aside from the usual dryness. Denies new flaoters/flashes/pain. BE   Has not used the ointment or drops for a week per pt.     Yair Goetz, February 1, 2024

## 2024-02-01 NOTE — PROGRESS NOTES
Chief Complaint(s) and History of Present Illness(es)     Follow Up            Laterality: both eyes    Onset: months ago    Quality: blurred vision    Associated symptoms: dryness.  Negative for double vision, flashes and   floaters    Treatments tried: artificial tears    Pain scale: 0/10    Comments: Follow up Meibomian gland dysfunction.  Last seen May 3, 2023.             Comments    Pt reports gradual decrease in vision since LV, both near and distance. RE   > LE   Allergies have also been bad this last few months, causing some pressure   sensation. Aside from the usual dryness.   Denies new flaoters/flashes/pain. BE   Has not used the ointment or drops for a week per pt.     Yair Sofy, February 1, 2024      Using Maxitrol drops and ointment 1-2 times per month. Overall she is being well managed with artificial tears and warm compresses.      Review of systems for the eyes was negative other than the pertinent positives/negatives listed in the HPI.      Assessment & Plan      Jennifer Steiner is a 73 year old female with the following diagnoses:   1. Bilateral dry eyes    2. Pseudophakia of both eyes    3. Dry eyes    4. Meibomian gland dysfunction    5. Squamous blepharitis of upper and lower eyelids of both eyes       s/p CE IOL, left eye 8/15/22  s/p CE IOL, right eye 9/12/22  Minimal posterior capsular opacity (PCO), not visually significant   Monitor     Intraocular pressure remains excellent  OK to continue using maxitrol gtts twice a day and ointment at bedtime as needed  Continue artificial tears and warm compresses  Return precautions reviewed       Patient disposition:   Return in about 1 year (around 2/1/2025) for DFE.    Rafael Castelan MD        Attending Physician Attestation:  Complete documentation of historical and exam elements from today's encounter can be found in the full encounter summary report (not reduplicated in this progress note).  I personally obtained the chief  complaint(s) and history of present illness.  I confirmed and edited as necessary the review of systems, past medical/surgical history, family history, social history, and examination findings as documented by others; and I examined the patient myself.  I personally reviewed the relevant tests, images, and reports as documented above.  I formulated and edited as necessary the assessment and plan and discussed the findings and management plan with the patient and family. . - Damir Cunningham MD

## 2024-02-02 NOTE — TELEPHONE ENCOUNTER
FUTURE VISIT INFORMATION      FUTURE VISIT INFORMATION:  Date: 4/30/24  Time: 1:00pm  Location: [facility]  REFERRAL INFORMATION:  Referring provider:  JOE EUBANKS   Referring providers clinic:  [facility]  Reason for visit/diagnosis  High-tone pelvic floor dysfunction in female, Myalgia of pelvic floor, History of abdominoplasty     RECORDS REQUESTED FROM:       Clinic name Comments Records Status Imaging Status   [facility] OV/referral 9/7/23  Ov 4/20/23 EPIC    Imaging DX Hip/ Pelvic/spine 9/7/23 EPIC

## 2024-02-07 PROBLEM — H02.88B MEIBOMIAN GLAND DYSFUNCTION (MGD), BILATERAL, BOTH UPPER AND LOWER LIDS: Status: ACTIVE | Noted: 2024-02-07

## 2024-02-07 PROBLEM — H02.88A MEIBOMIAN GLAND DYSFUNCTION (MGD), BILATERAL, BOTH UPPER AND LOWER LIDS: Status: ACTIVE | Noted: 2024-02-07

## 2024-02-12 ENCOUNTER — TRANSFERRED RECORDS (OUTPATIENT)
Dept: HEALTH INFORMATION MANAGEMENT | Facility: CLINIC | Age: 74
End: 2024-02-12
Payer: MEDICARE

## 2024-02-14 ENCOUNTER — PRE VISIT (OUTPATIENT)
Dept: UROLOGY | Facility: CLINIC | Age: 74
End: 2024-02-14

## 2024-02-14 NOTE — TELEPHONE ENCOUNTER
Reason for visit: high tone pelvic floor dysfunction     Relevant information: Adult plastic surgery referral for PFD and Myalgia of pelvic floor. Abnormal defecation, IBS with constipation, LUTS, hx of abdominoplasty. 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 (could be delayed w/o colopathy in patient with pelvic floor dysfunction)    Records/imaging/labs/orders: all records available    Pt called: no need for a call    At Rooming: have pt empty bladder/pvr, Document PVR      Matt Mcgee  2/14/2024  10:28 AM

## 2024-03-21 ENCOUNTER — OFFICE VISIT (OUTPATIENT)
Dept: UROLOGY | Facility: CLINIC | Age: 74
End: 2024-03-21
Payer: MEDICARE

## 2024-03-21 VITALS
HEART RATE: 71 BPM | WEIGHT: 133 LBS | OXYGEN SATURATION: 98 % | DIASTOLIC BLOOD PRESSURE: 67 MMHG | SYSTOLIC BLOOD PRESSURE: 122 MMHG | HEIGHT: 64 IN | BODY MASS INDEX: 22.71 KG/M2

## 2024-03-21 DIAGNOSIS — Z98.890 HISTORY OF ABDOMINOPLASTY: ICD-10-CM

## 2024-03-21 DIAGNOSIS — R19.8 ABNORMAL DEFECATION: ICD-10-CM

## 2024-03-21 DIAGNOSIS — R39.9 LOWER URINARY TRACT SYMPTOMS (LUTS): ICD-10-CM

## 2024-03-21 DIAGNOSIS — M62.89 HIGH-TONE PELVIC FLOOR DYSFUNCTION IN FEMALE: ICD-10-CM

## 2024-03-21 DIAGNOSIS — M79.18 MYALGIA OF PELVIC FLOOR: Primary | ICD-10-CM

## 2024-03-21 DIAGNOSIS — N39.41 URGENCY INCONTINENCE: ICD-10-CM

## 2024-03-21 LAB
ALBUMIN UR-MCNC: NEGATIVE MG/DL
APPEARANCE UR: CLEAR
BILIRUB UR QL STRIP: NEGATIVE
COLOR UR AUTO: YELLOW
GLUCOSE UR STRIP-MCNC: NEGATIVE MG/DL
HGB UR QL STRIP: NEGATIVE
KETONES UR STRIP-MCNC: NEGATIVE MG/DL
LEUKOCYTE ESTERASE UR QL STRIP: ABNORMAL
NITRATE UR QL: NEGATIVE
PH UR STRIP: 6 [PH] (ref 5–8)
SP GR UR STRIP: 1.02 (ref 1–1.03)
UROBILINOGEN UR STRIP-ACNC: 0.2 E.U./DL

## 2024-03-21 PROCEDURE — 81003 URINALYSIS AUTO W/O SCOPE: CPT | Performed by: PATHOLOGY

## 2024-03-21 PROCEDURE — 99212 OFFICE O/P EST SF 10 MIN: CPT | Mod: GC | Performed by: UROLOGY

## 2024-03-21 ASSESSMENT — PAIN SCALES - GENERAL: PAINLEVEL: SEVERE PAIN (6)

## 2024-03-21 NOTE — PROGRESS NOTES
"March 21, 2024    Jennifer was seen today for consult.    Diagnoses and all orders for this visit:    Myalgia of pelvic floor  -     Physical Therapy  Referral; Future    High-tone pelvic floor dysfunction in female  -     Physical Therapy  Referral; Future    Abnormal defecation  -     Physical Therapy  Referral; Future    History of abdominoplasty  -     Physical Therapy  Referral; Future    Lower urinary tract symptoms (LUTS)  -     Physical Therapy  Referral; Future    Urgency incontinence  -     Physical Therapy  Referral; Future     At this time patient requests a new referral for PFPT, referral placed    RTC 6 months (per her request), sooner if needed    5 minutes were spent today on the day of the encounter in reviewing the EMR , direct patient care , coordination of care and documentation    Jose Enrique Pavon MD  PGY-2  Urology    Addendum:    The patient was seen and evaluated with the resident.  The plan was formulated in conjunction with me and I agree with the note with changes made as necessary.    5 minutes were spent today on the date of the encounter in reviewing the EMR, direct patient care, coordination of care, and documentation.    Radha Ovalle MD MPH  (she/her/hers)   of Urology  [facility]      Subjective    Seen previously for myofascial pain. Does have herniated disc in her lumbar spine; not treated surgically at this time. She denies any changes in health since last visit    Having some urinary urgency that is new; no urinary leakage. These symptoms are less bothersome than her IBS.    Did some PFPT which was helpful but other health issues were interfering so she would like another referral, ideally with her original therapist.    PVR 0 mL by bladder scan.    /67   Pulse 71   Ht 1.613 m (5' 3.5\")   Wt 60.3 kg (133 lb)   LMP  (LMP Unknown)   SpO2 98%   BMI 23.19 kg/m    GENERAL: healthy, alert and no " "distress  EYES: Eyes grossly normal to inspection, conjunctivae and sclerae normal  HENT: normal cephalic/atraumatic.  External ears, nose and mouth without ulcers or lesions.  RESP: no audible wheeze, cough, or visible cyanosis.  No visible retractions or increased work of breathing.  Able to speak fully in complete sentences.  NEURO: Cranial nerves grossly intact, mentation intact and speech normal  PSYCH: mentation appears normal, affect normal/bright, judgement and insight intact, normal speech and appearance well-groomed    Labs/imaging/pathology from prior notes  [facility], from GI note: \"proven pelvic floor dysfunction from anorectal manometry, and also presence of paradoxical contraction on rectal exam. 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 (this could be delayed without colopathy in patient with pelvic floor dysfunction)\"       CC  Patient Care Team:  Milan Alvarez MD as PCP - General (Family Medicine)  Ladonna Reaves MD as MD (Specialist)  Samantha Figueroa APRN CNP as Nurse Practitioner (Nurse Practitioner)  Calderon La MD as MD (Cardiology)  Damir Cunningham MD as MD (Ophthalmology)  Thelma Arvizu MD as MD (Internal Medicine)  Kristan Kaba, RN as Registered Nurse  Radha Ovalle MD as MD (Urology)  Gricelda Cabezas, RN as Registered Nurse (Urology)  Pancho Santos OD as MD (Optometry)  Milan Alvarez MD as Referring Physician (Family Practice)  Kim Herrmann APRN CNP as Nurse Practitioner (Nurse Practitioner)  Milan Alvarez MD as Assigned PCP  Dede Ghosh PA-C as Physician Assistant (Dermatology)  Damir Cunningham MD as Assigned Surgical Provider  Remy Guillaume MD as MD (Otolaryngology)  Armando Jaramillo MD as Fellow (Internal Medicine)  Radha Angeles MD as MD (Physical Medicine and Rehabilitation)  Tierra Messina PA-C as Assigned " Musculoskeletal Provider  Radha Angeles MD as Assigned Neuroscience Provider  Yancy Stinson MD as MD (Plastic Surgery)  Dede Ghosh PA-C as Physician Assistant (Dermatology)  SHERRI VARGAS

## 2024-03-21 NOTE — NURSING NOTE
"Chief Complaint   Patient presents with    Consult     3 month check up     pt states, that gut is agiated when eating, is gluten free to aide. discomfort is always there, but more intense pain comes and goes. inflammation (from pelvic floor to back, left leg). the inflammation creates pressure and causes frequency and urgency, now its more like a spasm. Theres not that much every times she goes when she has frequency.       Blood pressure 122/67, pulse 71, height 1.613 m (5' 3.5\"), weight 60.3 kg (133 lb), SpO2 98%, not currently breastfeeding. Body mass index is 23.19 kg/m .    Patient Active Problem List   Diagnosis    Transient cerebral ischemia    Carotid artery dissection (H24)    Irritable bowel syndrome with constipation    Screen for colon cancer    Visit for screening mammogram    Encounter for routine gynecological examination    Hypothyroidism    CARDIOVASCULAR SCREENING; LDL GOAL LESS THAN 130    Rash and other nonspecific skin eruption    High cholesterol    Vaginal atrophy    Blepharitis    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    High-tone pelvic floor dysfunction in female    Myalgia of pelvic floor    Other constipation    Abnormal defecation    Chronic abdominal pain    Backache    Chronic fatigue syndrome    Food intolerance    Generalized pain    Hemorrhoids    Major depressive disorder, recurrent episode (H24)    Symptomatic menopausal or female climacteric states    History of hematuria    Mixed type age-related cataract, both eyes    Lower urinary tract symptoms (LUTS)    Spinal stenosis of lumbar region with neurogenic claudication    Lumbar spondylosis    Radicular pain of lumbosacral region    Meibomian gland dysfunction (MGD), bilateral, both upper and lower lids       Allergies   Allergen Reactions    Aspirin GI Disturbance     GI upset    Bee Venom Swelling    Birds [Feathers]     Cats     Cephalosporins     Dust Mites     Fluoride Other (See " "Comments) and Unknown     headaches    Liothyronine Unknown     Severe gut reactions, ears itching, throat closing    Mold     No Clinical Screening - See Comments Other (See Comments)     Some antibiotics but not Zpak-Can tolerate Azithromycin  z-pack, antibiotics in general- per patient.     Procaine Other (See Comments)     headache    Seasonal Allergies      Ragweed, pollen    Simvastatin Other (See Comments)     \"hot flash\", muscle weakness, dizziness    Penicillins Unknown and Rash     As a child    Sulfa Antibiotics Itching, Other (See Comments), Rash and Hives       Current Outpatient Medications   Medication Sig Dispense Refill    Cholecalciferol (VITAMIN D) 2000 UNIT tablet Take 1 tablet by mouth every morning      Digestive Enzymes CAPS daily Use as directed      DiphenhydrAMINE HCl (BENADRYL PO) Take by mouth daily as needed      EPINEPHrine (EPIPEN) 0.3 MG/0.3ML injection 2-pack Inject 0.3 mLs (0.3 mg) into the muscle once as needed for anaphylaxis 0.3 mL 1    estradiol (VAGIFEM) 10 MCG TABS vaginal tablet Place 10 mcg vaginally twice a week      Hypromellose (ARTIFICIAL TEARS OP) Apply 1 drop to eye daily as needed      ketoconazole (NIZORAL) 2 % external cream Apply topically daily as needed for itching To feet and in between the toes 60 g 3    lactulose (CEPHULAC) 10 GM packet Take 1/3 packet daily, mix in water and drink or sprinkle on food 10 packet 1    lactulose (CHRONULAC) 10 GM/15ML solution Take 15 mLs (10 g) by mouth 2 times daily 237 mL 3    MAGNESIUM OXIDE PO Take 6-8 tablets by mouth every evening      neomycin-polymyxin-dexAMETHasone (MAXITROL) 3.5-62962-9.1 ophthalmic ointment Place 0.1429 Applications (0.5 g) into both eyes at bedtime 0.5 inch strip each eye at night as needed for irritation 3.5 g 5    neomycin-polymyxin-dexAMETHasone (MAXITROL) 3.5-00115-1.1 ophthalmic ointment Place 0.1429 Applications (0.5 g) into both eyes At Bedtime 3.5 g 0    neomycin-polymyxin-dexamethasone " (MAXITROL) 3.5-97417-9.1 SUSP ophthalmic susp Place 1-2 drops into both eyes At Bedtime 5 mL 3    peppermint oil liquid 1 mL daily as needed for other Taking gel tabs not oil, not in EMR.      Probiotic Product (ALOE 42296 & PROBIOTICS) CAPS Take by mouth every morning      progesterone (PROMETRIUM) 200 MG capsule Take 1 capsule (200 mg) by mouth every evening      Testosterone Propionate (FIRST-TESTOSTERONE MC) 2 % CREA Place onto the skin three times a week      thyroid (ARMOUR) 60 MG tablet Take 60 mg by mouth every morning          Social History     Tobacco Use    Smoking status: Never    Smokeless tobacco: Never   Substance Use Topics    Alcohol use: No    Drug use: No       Matt Mcgee  3/21/2024  1:56 PM

## 2024-03-21 NOTE — LETTER
3/21/2024       RE: Jennifer Steiner  [address]  [address]  [facility] [address]     Dear Colleague,    Thank you for referring your patient, Jennifer Steiner, to the [facility]. Please see a copy of my visit note below.    March 21, 2024    Jennifer was seen today for consult.    Diagnoses and all orders for this visit:    Myalgia of pelvic floor  -     Physical Therapy  Referral; Future    High-tone pelvic floor dysfunction in female  -     Physical Therapy  Referral; Future    Abnormal defecation  -     Physical Therapy  Referral; Future    History of abdominoplasty  -     Physical Therapy  Referral; Future    Lower urinary tract symptoms (LUTS)  -     Physical Therapy  Referral; Future    Urgency incontinence  -     Physical Therapy  Referral; Future     At this time patient requests a new referral for PFPT, referral placed    RTC 6 months (per her request), sooner if needed    5 minutes were spent today on the day of the encounter in reviewing the EMR , direct patient care , coordination of care and documentation    Jose Enrique Pavon MD  PGY-2  Urology    Addendum:    The patient was seen and evaluated with the resident.  The plan was formulated in conjunction with me and I agree with the note with changes made as necessary.    5 minutes were spent today on the date of the encounter in reviewing the EMR, direct patient care, coordination of care, and documentation.    Radha Ovalle MD MPH  (she/her/hers)   of Urology  [facility]      Subjective    Seen previously for myofascial pain. Does have herniated disc in her lumbar spine; not treated surgically at this time. She denies any changes in health since last visit    Having some urinary urgency that is new; no urinary leakage. These symptoms are less bothersome than her IBS.    Did  "some PFPT which was helpful but other health issues were interfering so she would like another referral, ideally with her original therapist.    PVR 0 mL by bladder scan.    /67   Pulse 71   Ht 1.613 m (5' 3.5\")   Wt 60.3 kg (133 lb)   LMP  (LMP Unknown)   SpO2 98%   BMI 23.19 kg/m    GENERAL: healthy, alert and no distress  EYES: Eyes grossly normal to inspection, conjunctivae and sclerae normal  HENT: normal cephalic/atraumatic.  External ears, nose and mouth without ulcers or lesions.  RESP: no audible wheeze, cough, or visible cyanosis.  No visible retractions or increased work of breathing.  Able to speak fully in complete sentences.  NEURO: Cranial nerves grossly intact, mentation intact and speech normal  PSYCH: mentation appears normal, affect normal/bright, judgement and insight intact, normal speech and appearance well-groomed    Labs/imaging/pathology from prior notes  P[facility], from GI note: \"proven pelvic floor dysfunction from anorectal manometry, and also presence of paradoxical contraction on rectal exam. 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 (this could be delayed without colopathy in patient with pelvic floor dysfunction)\"       CC  Patient Care Team:  Milan Alvarez MD as PCP - General (Family Medicine)  Ladonna Reaves MD as MD (Specialist)  Samantha Figueroa APRN CNP as Nurse Practitioner (Nurse Practitioner)  Calderon La MD as MD (Cardiology)  Damir Cunningham MD as MD (Ophthalmology)  Thelma Arvizu MD as MD (Internal Medicine)  Kristan Kaba, RN as Registered Nurse  Radha Ovalle MD as MD (Urology)  Gricelda Cabezas, RN as Registered Nurse (Urology)  Pancho Santos OD as MD (Optometry)  Milan Alvarez MD as Referring Physician (Family Practice)  Kim Herrmann APRN CNP as Nurse Practitioner (Nurse Practitioner)  Milan Alvarez MD as Assigned " PCP  Dede Ghosh PA-C as Physician Assistant (Dermatology)  Damir Cunningham MD as Assigned Surgical Provider  Remy Guillaume MD as MD (Otolaryngology)  Armando Jaramillo MD as Fellow (Internal Medicine)  Radha Angeles MD as MD (Physical Medicine and Rehabilitation)  Tierra Messina PA-C as Assigned Musculoskeletal Provider  Radha Angeles MD as Assigned Neuroscience Provider  Yancy Stinson MD as MD (Plastic Surgery)  Dede Ghosh PA-C as Physician Assistant (Dermatology)  SHERRI VARGAS

## 2024-03-21 NOTE — PATIENT INSTRUCTIONS
Websites with free information:    [employer] patient website: www.voicesforpfd.org    Total Control Program: www.totalcontrolprogram.com    Please see one of the dedicated pelvic floor physical therapist. If you have not heard from the scheduling office within 2 business days, please call [phone] for [facility]    Please return 6 months, sooner if needed    It was a pleasure meeting with you today.  Thank you for allowing me and my team the privilege of caring for you today.  YOU are the reason we are here, and I truly hope we provided you with the excellent service you deserve.  Please let us know if there is anything else we can do for you so that we can be sure you are leaving completely satisfied with your care experience.

## 2024-03-27 ENCOUNTER — TELEPHONE (OUTPATIENT)
Dept: UROLOGY | Facility: CLINIC | Age: 74
End: 2024-03-27
Payer: MEDICARE

## 2024-04-04 ENCOUNTER — LAB (OUTPATIENT)
Dept: LAB | Facility: CLINIC | Age: 74
End: 2024-04-04
Payer: MEDICARE

## 2024-04-04 ENCOUNTER — OFFICE VISIT (OUTPATIENT)
Dept: FAMILY MEDICINE | Facility: CLINIC | Age: 74
End: 2024-04-04
Payer: MEDICARE

## 2024-04-04 VITALS
HEART RATE: 72 BPM | OXYGEN SATURATION: 98 % | BODY MASS INDEX: 23.19 KG/M2 | WEIGHT: 133 LBS | DIASTOLIC BLOOD PRESSURE: 72 MMHG | SYSTOLIC BLOOD PRESSURE: 106 MMHG

## 2024-04-04 DIAGNOSIS — E78.00 HIGH CHOLESTEROL: ICD-10-CM

## 2024-04-04 DIAGNOSIS — E06.3 HASHIMOTO'S THYROIDITIS: ICD-10-CM

## 2024-04-04 DIAGNOSIS — M48.062 SPINAL STENOSIS OF LUMBAR REGION WITH NEUROGENIC CLAUDICATION: ICD-10-CM

## 2024-04-04 DIAGNOSIS — E03.9 HYPOTHYROIDISM, UNSPECIFIED TYPE: Primary | ICD-10-CM

## 2024-04-04 DIAGNOSIS — E03.9 HYPOTHYROIDISM: ICD-10-CM

## 2024-04-04 DIAGNOSIS — Z00.00 HEALTH CARE MAINTENANCE: ICD-10-CM

## 2024-04-04 DIAGNOSIS — Z11.59 ENCOUNTER FOR SCREENING FOR OTHER VIRAL DISEASES: ICD-10-CM

## 2024-04-04 DIAGNOSIS — G89.29 CHRONIC BILATERAL LOW BACK PAIN WITH LEFT-SIDED SCIATICA: ICD-10-CM

## 2024-04-04 DIAGNOSIS — M54.42 CHRONIC BILATERAL LOW BACK PAIN WITH LEFT-SIDED SCIATICA: ICD-10-CM

## 2024-04-04 DIAGNOSIS — R19.7 DIARRHEA, UNSPECIFIED TYPE: ICD-10-CM

## 2024-04-04 LAB
ALBUMIN SERPL BCG-MCNC: 4.2 G/DL (ref 3.5–5.2)
ALP SERPL-CCNC: 82 U/L (ref 40–150)
ALT SERPL W P-5'-P-CCNC: 10 U/L (ref 0–50)
ANION GAP SERPL CALCULATED.3IONS-SCNC: 12 MMOL/L (ref 7–15)
AST SERPL W P-5'-P-CCNC: 17 U/L (ref 0–45)
BASOPHILS # BLD AUTO: 0.1 10E3/UL (ref 0–0.2)
BASOPHILS NFR BLD AUTO: 1 %
BILIRUB SERPL-MCNC: 0.3 MG/DL
BUN SERPL-MCNC: 8.6 MG/DL (ref 8–23)
CALCIUM SERPL-MCNC: 9.5 MG/DL (ref 8.8–10.2)
CHLORIDE SERPL-SCNC: 104 MMOL/L (ref 98–107)
CHOLEST SERPL-MCNC: 322 MG/DL
CREAT SERPL-MCNC: 0.77 MG/DL (ref 0.51–0.95)
DEPRECATED HCO3 PLAS-SCNC: 24 MMOL/L (ref 22–29)
EGFRCR SERPLBLD CKD-EPI 2021: 81 ML/MIN/1.73M2
EOSINOPHIL # BLD AUTO: 0.2 10E3/UL (ref 0–0.7)
EOSINOPHIL NFR BLD AUTO: 2 %
ERYTHROCYTE [DISTWIDTH] IN BLOOD BY AUTOMATED COUNT: 14 % (ref 10–15)
FASTING STATUS PATIENT QL REPORTED: YES
GLUCOSE SERPL-MCNC: 92 MG/DL (ref 70–99)
HCT VFR BLD AUTO: 39.6 % (ref 35–47)
HDLC SERPL-MCNC: 64 MG/DL
HGB BLD-MCNC: 13.2 G/DL (ref 11.7–15.7)
IMM GRANULOCYTES # BLD: 0 10E3/UL
IMM GRANULOCYTES NFR BLD: 0 %
LDLC SERPL CALC-MCNC: 223 MG/DL
LYMPHOCYTES # BLD AUTO: 1.7 10E3/UL (ref 0.8–5.3)
LYMPHOCYTES NFR BLD AUTO: 23 %
MCH RBC QN AUTO: 31.1 PG (ref 26.5–33)
MCHC RBC AUTO-ENTMCNC: 33.3 G/DL (ref 31.5–36.5)
MCV RBC AUTO: 93 FL (ref 78–100)
MONOCYTES # BLD AUTO: 0.5 10E3/UL (ref 0–1.3)
MONOCYTES NFR BLD AUTO: 7 %
NEUTROPHILS # BLD AUTO: 4.8 10E3/UL (ref 1.6–8.3)
NEUTROPHILS NFR BLD AUTO: 67 %
NONHDLC SERPL-MCNC: 258 MG/DL
NRBC # BLD AUTO: 0 10E3/UL
NRBC BLD AUTO-RTO: 0 /100
PLATELET # BLD AUTO: 252 10E3/UL (ref 150–450)
POTASSIUM SERPL-SCNC: 4.4 MMOL/L (ref 3.4–5.3)
PROT SERPL-MCNC: 7.3 G/DL (ref 6.4–8.3)
RBC # BLD AUTO: 4.25 10E6/UL (ref 3.8–5.2)
SODIUM SERPL-SCNC: 140 MMOL/L (ref 135–145)
TRIGL SERPL-MCNC: 177 MG/DL
TSH SERPL DL<=0.005 MIU/L-ACNC: 0.57 UIU/ML (ref 0.3–4.2)
WBC # BLD AUTO: 7.2 10E3/UL (ref 4–11)

## 2024-04-04 PROCEDURE — 86803 HEPATITIS C AB TEST: CPT | Performed by: FAMILY MEDICINE

## 2024-04-04 PROCEDURE — 84443 ASSAY THYROID STIM HORMONE: CPT | Performed by: PATHOLOGY

## 2024-04-04 PROCEDURE — 85025 COMPLETE CBC W/AUTO DIFF WBC: CPT | Performed by: PATHOLOGY

## 2024-04-04 PROCEDURE — 99215 OFFICE O/P EST HI 40 MIN: CPT | Performed by: FAMILY MEDICINE

## 2024-04-04 PROCEDURE — 36415 COLL VENOUS BLD VENIPUNCTURE: CPT | Performed by: PATHOLOGY

## 2024-04-04 PROCEDURE — 80061 LIPID PANEL: CPT | Performed by: PATHOLOGY

## 2024-04-04 PROCEDURE — 80053 COMPREHEN METABOLIC PANEL: CPT | Performed by: PATHOLOGY

## 2024-04-04 PROCEDURE — 99000 SPECIMEN HANDLING OFFICE-LAB: CPT | Performed by: PATHOLOGY

## 2024-04-04 RX ORDER — RESPIRATORY SYNCYTIAL VIRUS VACCINE 120MCG/0.5
0.5 KIT INTRAMUSCULAR ONCE
Qty: 1 EACH | Refills: 0 | Status: CANCELLED | OUTPATIENT
Start: 2024-04-04 | End: 2024-04-04

## 2024-04-04 ASSESSMENT — ASTHMA QUESTIONNAIRES
QUESTION_3 LAST FOUR WEEKS HOW OFTEN DID YOUR ASTHMA SYMPTOMS (WHEEZING, COUGHING, SHORTNESS OF BREATH, CHEST TIGHTNESS OR PAIN) WAKE YOU UP AT NIGHT OR EARLIER THAN USUAL IN THE MORNING: ONCE OR TWICE
ACT_TOTALSCORE: 23
QUESTION_4 LAST FOUR WEEKS HOW OFTEN HAVE YOU USED YOUR RESCUE INHALER OR NEBULIZER MEDICATION (SUCH AS ALBUTEROL): NOT AT ALL
ACT_TOTALSCORE: 23
QUESTION_5 LAST FOUR WEEKS HOW WOULD YOU RATE YOUR ASTHMA CONTROL: WELL CONTROLLED
QUESTION_1 LAST FOUR WEEKS HOW MUCH OF THE TIME DID YOUR ASTHMA KEEP YOU FROM GETTING AS MUCH DONE AT WORK, SCHOOL OR AT HOME: NONE OF THE TIME
QUESTION_2 LAST FOUR WEEKS HOW OFTEN HAVE YOU HAD SHORTNESS OF BREATH: NOT AT ALL

## 2024-04-04 NOTE — PROGRESS NOTES
Assessment & Plan     Hypothyroidism  Due, normal  - TSH WITH FREE T4 REFLEX; Future    Chronic bilateral low back pain with left-sided sciatica  Painful, limits comfort/function, she'd like [facility] pain clinic  - Pain Management  Referral; Future    Spinal stenosis of lumbar region with neurogenic claudication  same  - Pain Management  Referral; Future    Hashimoto's thyroiditis  Same  - TSH WITH FREE T4 REFLEX; Future    High cholesterol  Defers meds, will work on lifestyle  - Lipid panel reflex to direct LDL Fasting; Future    Health care maintenance  Due  - Hepatitis C Screen Reflex to HCV RNA Quant and Genotype; Future    Diarrhea, unspecified type  Sees GI in May  - Elastase Fecal; Future  - Fat Stool Qual Random Collection; Future  - Enteric Bacteria and Virus Panel by LESLIE Stool; Future  - CBC with platelets and differential; Future  - Comprehensive metabolic panel (BMP + Alb, Alk Phos, ALT, AST, Total. Bili, TP); Future  - C. DIFF TOXIN A & B, BY EIA    Encounter for screening for other viral diseases    - Hepatitis C Screen Reflex to HCV RNA Quant and Genotype; Future    Review of external notes as documented elsewhere in [facility] extensive records  42 minutes spent by me on the date of the encounter doing chart review, history and exam, documentation and further activities per the note        No follow-ups on file.    Maral Rodriguez is a 74 year old, presenting for the following health issues:  Follow Up      4/4/2024     2:12 PM   Additional Questions   Roomed by KTR     History of Present Illness       Reason for visit:  Follow up    She eats 2-3 servings of fruits and vegetables daily.She consumes 0 sweetened beverage(s) daily.She exercises with enough effort to increase her heart rate 30 to 60 minutes per day.  She exercises with enough effort to increase her heart rate 7 days per week.   She is taking medications regularly.   Defesrs shots, mammogram, discussed  Sees GI  soon will discuss further GI eval, but many loose stools long term, she shows me Greta jackson, she'd like us to run labs for loose stool before she sees GI  Pain: back into legs, she'd like follow-up at [facility]  Due for thyroid labs  Past Medical History:   Diagnosis Date    Anemia     Asthma     since childhood    Blepharitis     Constipation, chronic     Chronic enema use    CVA (cerebral infarction) 2009    carotid intimal repair, left    Hyperlipidemia age 58    identified age 58    Hypothyroidism     Irritable bowel disease     Mixed type age-related cataract, both eyes     Neck injuries     Pelvic floor dysfunction 2010    Postmenopausal hormone replacement therapy     From Dr. Link    Scoliosis      Past Surgical History:   Procedure Laterality Date    CYSTOSCOPY N/A 3/25/2019    Procedure: CYSTOSCOPY;  Surgeon: Radha Ovalle MD;  Location: [facility] OR    DILATION AND CURETTAGE, OPERATIVE HYSTEROSCOPY WITH MORCELLATOR, COMBINED N/A 3/25/2019    Procedure: OPERATIVE HYSTEROSCOPY WITH MORCELLATOR;  Surgeon: Marybeth Huber MD;  Location: [facility] OR    HC BREATH HYDROGEN TEST  11/11/2013    Procedure: HYDROGEN BREATH TEST;  Surgeon: Esteban Short MD;  Location: [facility]    PHACOEMULSIFICATION WITH STANDARD INTRAOCULAR LENS IMPLANT Left 8/15/2022    Procedure: LEFT EYE PHACOEMULSIFICATION, CATARACT, WITH STANDARD INTRAOCULAR LENS IMPLANT INSERTION;  Surgeon: Damir Cunningham MD;  Location: [facility] OR    PHACOEMULSIFICATION WITH STANDARD INTRAOCULAR LENS IMPLANT Right 9/12/2022    Procedure: RIGHT EYE PHACOEMULSIFICATION, CATARACT, WITH TORIC  INTRAOCULAR LENS IMPLANT INSERTION;  Surgeon: Damir Cunningham MD;  Location: [facility] OR    SURGICAL HISTORY OF -       tonsillectomy    SURGICAL HISTORY OF -   ~1997    cosmetic skin removal: breast reduction; skin removal from arms legs and tummy [facility] STOMACH SURGERY PROCEDURE UNLISTED       Current Outpatient Medications   Medication Sig Dispense Refill     Cholecalciferol (VITAMIN D) 2000 UNIT tablet Take 1 tablet by mouth every morning      Digestive Enzymes CAPS daily Use as directed      DiphenhydrAMINE HCl (BENADRYL PO) Take by mouth daily as needed      EPINEPHrine (EPIPEN) 0.3 MG/0.3ML injection 2-pack Inject 0.3 mLs (0.3 mg) into the muscle once as needed for anaphylaxis 0.3 mL 1    estradiol (VAGIFEM) 10 MCG TABS vaginal tablet Place 10 mcg vaginally twice a week      Hypromellose (ARTIFICIAL TEARS OP) Apply 1 drop to eye daily as needed      ketoconazole (NIZORAL) 2 % external cream Apply topically daily as needed for itching To feet and in between the toes 60 g 3    lactulose (CEPHULAC) 10 GM packet Take 1/3 packet daily, mix in water and drink or sprinkle on food 10 packet 1    lactulose (CHRONULAC) 10 GM/15ML solution Take 15 mLs (10 g) by mouth 2 times daily 237 mL 3    MAGNESIUM OXIDE PO Take 6-8 tablets by mouth every evening      neomycin-polymyxin-dexAMETHasone (MAXITROL) 3.5-32706-8.1 ophthalmic ointment Place 0.1429 Applications (0.5 g) into both eyes at bedtime 0.5 inch strip each eye at night as needed for irritation 3.5 g 5    neomycin-polymyxin-dexAMETHasone (MAXITROL) 3.5-15620-4.1 ophthalmic ointment Place 0.1429 Applications (0.5 g) into both eyes At Bedtime 3.5 g 0    neomycin-polymyxin-dexamethasone (MAXITROL) 3.5-09163-3.1 SUSP ophthalmic susp Place 1-2 drops into both eyes At Bedtime 5 mL 3    peppermint oil liquid 1 mL daily as needed for other Taking gel tabs not oil, not in EMR.      Probiotic Product (ALOE 74495 & PROBIOTICS) CAPS Take by mouth every morning      progesterone (PROMETRIUM) 200 MG capsule Take 1 capsule (200 mg) by mouth every evening      Testosterone Propionate (FIRST-TESTOSTERONE MC) 2 % CREA Place onto the skin three times a week      thyroid (ARMOUR) 60 MG tablet Take 60 mg by mouth every morning        No current facility-administered medications for this visit.     Allergies   Allergen Reactions    Aspirin GI  "Disturbance     GI upset    Bee Venom Swelling    Birds [Feathers]     Cats     Cephalosporins     Dust Mites     Fluoride Other (See Comments) and Unknown     headaches    Liothyronine Unknown     Severe gut reactions, ears itching, throat closing    Mold     No Clinical Screening - See Comments Other (See Comments)     Some antibiotics but not Zpak-Can tolerate Azithromycin  z-pack, antibiotics in general- per patient.     Procaine Other (See Comments)     headache    Seasonal Allergies      Ragweed, pollen    Simvastatin Other (See Comments)     \"hot flash\", muscle weakness, dizziness    Penicillins Unknown and Rash     As a child    Sulfa Antibiotics Itching, Other (See Comments), Rash and Hives     Family History   Problem Relation Age of Onset    Melanoma Mother     Skin Cancer Mother     Osteoporosis Mother     Diabetes Father     Gastrointestinal Disease Father 82         after surgery for Bowel obstruction    Obesity Father     Substance Abuse Sister     Cancer Sister     Cancer Sister 56        bone cancer    Anxiety Disorder Sister     Alcoholism Sister     Bone Cancer Sister     Alcohol/Drug Brother     Gastrointestinal Disease Brother         Hepatitis    Heart Disease Brother 62        hx of drug abuse,  age 62    Diabetes Brother     Skin Cancer Brother     Substance Abuse Brother     Allergies Brother     Anesthesia Reaction Brother         severe PRIMITIVO, effected surgery    Asthma Brother     Diabetes Brother     Obesity Brother     Alcoholism Brother     Colon Cancer No family hx of     Crohn's Disease No family hx of     Ulcerative Colitis No family hx of     Colon Polyps No family hx of     Glaucoma No family hx of     Macular Degeneration No family hx of     Deep Vein Thrombosis (DVT) No family hx of      Social History     Socioeconomic History    Marital status: Single     Spouse name: Not on file    Number of children: Not on file    Years of education: Not on file    Highest education " level: Not on file   Occupational History    Occupation: disability     Comment:    Tobacco Use    Smoking status: Never    Smokeless tobacco: Never   Substance and Sexual Activity    Alcohol use: No    Drug use: No    Sexual activity: Not Currently     Birth control/protection: Post-menopausal   Other Topics Concern    Parent/sibling w/ CABG, MI or angioplasty before 65F 55M? Not Asked   Social History Narrative    Not on file     Social Determinants of Health     Financial Resource Strain: Unknown (11/17/2023)    Financial Resource Strain     Within the past 12 months, have you or your family members you live with been unable to get utilities (heat, electricity) when it was really needed?: Patient refused   Food Insecurity: Unknown (11/17/2023)    Food Insecurity     Within the past 12 months, did you worry that your food would run out before you got money to buy more?: Patient refused     Within the past 12 months, did the food you bought just not last and you didn t have money to get more?: Patient refused   Transportation Needs: Unknown (11/17/2023)    Transportation Needs     Within the past 12 months, has lack of transportation kept you from medical appointments, getting your medicines, non-medical meetings or appointments, work, or from getting things that you need?: Patient refused   Physical Activity: Not on file   Stress: Not on file   Social Connections: Not on file   Interpersonal Safety: Low Risk  (11/17/2023)    Interpersonal Safety     Do you feel physically and emotionally safe where you currently live?: Yes     Within the past 12 months, have you been hit, slapped, kicked or otherwise physically hurt by someone?: No     Within the past 12 months, have you been humiliated or emotionally abused in other ways by your partner or ex-partner?: No   Housing Stability: Unknown (11/17/2023)    Housing Stability     Do you have housing? : Patient refused     Are you worried about losing your  housing?: Patient refused               Objective    /72 (BP Location: Right arm, Patient Position: Sitting, Cuff Size: Adult Regular)   Pulse 72   Wt 60.3 kg (133 lb)   LMP  (LMP Unknown)   SpO2 98%   BMI 23.19 kg/m    Body mass index is 23.19 kg/m .  Physical Exam   GENERAL: alert and no distress  MS: no gross musculoskeletal defects noted, no edema            Signed Electronically by: Milan Alvarez MD

## 2024-04-05 LAB — HCV AB SERPL QL IA: NONREACTIVE

## 2024-04-09 ENCOUNTER — TELEPHONE (OUTPATIENT)
Dept: FAMILY MEDICINE | Facility: CLINIC | Age: 74
End: 2024-04-09
Payer: MEDICARE

## 2024-04-09 NOTE — TELEPHONE ENCOUNTER
Patient calling stating the referral for pain was not received at [facility] in [place]. Please resend thank you.

## 2024-04-09 NOTE — TELEPHONE ENCOUNTER
Pain referral faxed to [facility] at fax number [phone].  Nathaly POTTER LPN  [facility]

## 2024-04-10 NOTE — TELEPHONE ENCOUNTER
Referral refaxed to [facility] at [phone].  Nathaly POTTER LPN  [facility]

## 2024-04-10 NOTE — TELEPHONE ENCOUNTER
Patient calling stating Wei has not received the fax again. Please call Wei when you fax it, call them to verify they received at [phone].

## 2024-04-10 NOTE — TELEPHONE ENCOUNTER
The patient said she spoke with [facility] and they told her the Order is for Pain Eval, The patient thought the referral was for Continued  Physical Therapy. Pain Management was never discussed per the patient, she is requesting to be contacted about the intentions for the Referral to [facility] thank you.

## 2024-04-11 NOTE — TELEPHONE ENCOUNTER
Left a detailed message to the pt to keep the appt with sandie to discuss about pain management options including PT.

## 2024-04-11 NOTE — TELEPHONE ENCOUNTER
FUTURE VISIT INFORMATION      FUTURE VISIT INFORMATION:  Date: 5/29/24  Time: 2:00pm  Location: [facility]  REFERRAL INFORMATION:  Referring provider:  JOE EUBANKS   Referring providers clinic:  [facility]  Reason for visit/diagnosis  High-tone pelvic floor dysfunction in female, Myalgia of pelvic floor, History of abdominoplasty      RECORDS REQUESTED FROM:         Clinic name Comments Records Status Imaging Status   [facility] OV/referral 9/7/23  Ov 4/20/23 EPIC     Imaging DX Hip/ Pelvic/spine 9/7/23 EPIC

## 2024-04-18 NOTE — PROGRESS NOTES
PHYSICAL THERAPY EVALUATION  Type of Visit: Evaluation    See electronic medical record for Abuse and Falls Screening details.    Subjective       Presenting condition or subjective complaint: pelvic floor and related issues. Reports sitting for longer than 30 min triggers back pain which also tends to trigger her GI flare up and vice versa. Reports spasming of intestines with mealtime. Reports she is struggling with diarrhea after eating. Notices everything will flare up with gut is flared up and pelvic floor will also get inflamed. Abdomen gets very tender to the touch. No issues with urinary leakage. Today in particular she is having a bad day. She is having pain in abdomen, back and into her legs.   Date of onset: 03/21/24    Relevant medical history: Anemia; Asthma; Bladder or bowel problems; COPD; Dizziness; Menopause; Neck injury; Osteoporosis; Overweight; Severe dizziness; Significant weakness; Stroke; Thyroid problems; Vision problems   Dates & types of surgery: child - tonsils, 1990 - skin removal, 2015 - fiberoids; uterus, 2022 - cataract surgery    Prior diagnostic imaging/testing results:       Prior therapy history for the same diagnosis, illness or injury: Yes      Living Environment  Social support: Alone   Type of home: Apartment/condo   Stairs to enter the home:         Ramp:     Stairs inside the home:         Help at home: None  Equipment owned: Straight Cane     Employment: No    Hobbies/Interests: artc, music, nature, reading    Patient goals for therapy: relax pelvic floor, better elimination, less pain    Pain assessment: See objective evaluation for additional pain details     Objective      PELVIC EVALUATION  ADDITIONAL HISTORY:  Sex assigned at birth: Female  Gender identity: Female    Pronouns: She/Her Hers      Bladder History:  Feels bladder filling: Yes  Triggers for feeling of inability to wait to go to the bathroom: Yes pressure on abdomen  How long can you wait to urinate: 15  min  Gets up at night to urinate: Yes    Can stop the flow of urine when urinating: Yes  Volume of urine usually released: Medium   Other issues:    Number of bladder infections in last 12 months:    Fluid intake per day: 8 cups      Medications taken for bladder: No     Activities causing urine leak:      Amount of urine typically leaked:    Pads used to help with leaking:          Bowel History:  Frequency of bowel movement: IBS with constipation alternating with diarrhea  Consistency of stool:   forces, lax and enemas  Ignores the urge to defecate: No  Other bowel issues: Pain when pooping; Straining to have bowel movement; Loss of stool; Loss of gas  Length of time spent trying to have a bowel movement:      Sexual Function History:  Sexual orientation: Straight    Sexually active: No  Lubrication used:      Pelvic pain: Sitting; Rectal exams; Pelvic exams    Pain or difficulty with orgasms/erection/ejaculation:      State of menopause: Post-menopause (I am done with menopause)  Hormone medications: Yes estradiol    Are you currently pregnant: No, Number of previous pregnancies: 0, Number of deliveries: 0, Have you tried pelvic floor strengthening exercises for 4 weeks: Yes, Do you have any history of trauma that is relevant to your care that you d like to share: Yes, I d like to discuss it with my provider in person.    Discussed reason for referral regarding pelvic health needs and external/internal pelvic floor muscle examination with patient/guardian.  Opportunity provided to ask questions and verbal consent for assessment and intervention was given.    POSTURE: Standing Posture: Rounded shoulders, Forward head, Lordosis decreased, Thoracic kyphosis increased  LUMBAR SCREEN:  flex hands to lower leg, bends knees not lumbar; ext max loss feels tight. R and L rot mod loss tightness      Functional Strength Testing: Double Leg Squat: Anterior knee translation, Increased trunk lean, and LBP   SLS: decreased  stability kacy L>R:     BREATHING SYMMETRY: Rib cage flaring, Decreased rib cage mobility    PELVIC EXAM; will assess at next visit;       ABDOMINAL ASSESSMENT    Scar:   Location/Type: transverse from iliac crests   Mobility: Hypomobile    Fascial Tension/Restriction/Tone: Hypomobile       Assessment & Plan   CLINICAL IMPRESSIONS  Medical Diagnosis: Myalgia of pelvic floor  High-tone pelvic floor dysfunction in female  Abnormal defecation  History of abdominoplasty  Lower urinary tract symptoms (LUTS)  Urgency incontinence    Treatment Diagnosis: Myalgia of pelvic floor  High-tone pelvic floor dysfunction in female  Abnormal defecation  History of abdominoplasty  Lower urinary tract symptoms (LUTS)  Urgency incontinence   Impression/Assessment: Patient is a 74 year old female with pelvic floor complaints.  The following significant findings have been identified: Pain, Decreased ROM/flexibility, Impaired balance, Decreased proprioception, and Decreased activity tolerance. These impairments interfere with their ability to perform self care tasks, recreational activities, household mobility, and community mobility as compared to previous level of function.     Clinical Decision Making (Complexity):  Clinical Presentation: Stable/Uncomplicated  Clinical Presentation Rationale: based on medical and personal factors listed in PT evaluation  Clinical Decision Making (Complexity): Low complexity    PLAN OF CARE  Treatment Interventions:  Interventions: Gait Training, Manual Therapy, Neuromuscular Re-education, Therapeutic Activity, Therapeutic Exercise, Self-Care/Home Management    Long Term Goals     PT Goal 1  Goal Identifier: LTG 1  Goal Description: Pt will sit for 45 min pelvic and lower abdominal pain 2/10 or less  Rationale: to maximize safety and independence with performance of ADLs and functional tasks  Goal Progress: current flared up after 30 min  Target Date: 07/17/24      Frequency of Treatment: 1x/week taper  as able  Duration of Treatment: 12 weeks    Recommended Referrals to Other Professionals: Physical Therapy  Education Assessment:   Learner/Method: Patient;Listening    Risks and benefits of evaluation/treatment have been explained.   Patient/Family/caregiver agrees with Plan of Care.     Evaluation Time:     PT Eval, Low Complexity Minutes (73087): 25     Signing Clinician: Gina Vergara PT      [facility]                                                                                   OUTPATIENT PHYSICAL THERAPY      PLAN OF TREATMENT FOR OUTPATIENT REHABILITATION   Patient's Last Name, First Name, Jennifer Phillips YOB: 1950   Provider's Name   [facility]   Medical Record No.  [id number]     Onset Date: 03/21/24  Start of Care Date: 04/24/24     Medical Diagnosis:  Myalgia of pelvic floor  High-tone pelvic floor dysfunction in female  Abnormal defecation  History of abdominoplasty  Lower urinary tract symptoms (LUTS)  Urgency incontinence      PT Treatment Diagnosis:  Myalgia of pelvic floor  High-tone pelvic floor dysfunction in female  Abnormal defecation  History of abdominoplasty  Lower urinary tract symptoms (LUTS)  Urgency incontinence Plan of Treatment  Frequency/Duration: 1x/week taper as able/ 12 weeks    Certification date from 04/24/24 to 07/17/24         See note for plan of treatment details and functional goals     Gina Vergara, PT                         I CERTIFY THE NEED FOR THESE SERVICES FURNISHED UNDER        THIS PLAN OF TREATMENT AND WHILE UNDER MY CARE     (Physician attestation of this document indicates review and certification of the therapy plan).              Referring Provider:  Radha Ovalle    Initial Assessment  See Epic Evaluation- Start of Care Date: 04/24/24

## 2024-04-19 ENCOUNTER — TRANSFERRED RECORDS (OUTPATIENT)
Dept: HEALTH INFORMATION MANAGEMENT | Facility: CLINIC | Age: 74
End: 2024-04-19
Payer: MEDICARE

## 2024-04-24 ENCOUNTER — THERAPY VISIT (OUTPATIENT)
Dept: PHYSICAL THERAPY | Facility: CLINIC | Age: 74
End: 2024-04-24
Attending: UROLOGY
Payer: MEDICARE

## 2024-04-24 DIAGNOSIS — M79.18 MYALGIA OF PELVIC FLOOR: ICD-10-CM

## 2024-04-24 DIAGNOSIS — Z98.890 HISTORY OF ABDOMINOPLASTY: ICD-10-CM

## 2024-04-24 DIAGNOSIS — R19.8 ABNORMAL DEFECATION: ICD-10-CM

## 2024-04-24 DIAGNOSIS — N39.41 URGENCY INCONTINENCE: ICD-10-CM

## 2024-04-24 DIAGNOSIS — R39.9 LOWER URINARY TRACT SYMPTOMS (LUTS): ICD-10-CM

## 2024-04-24 DIAGNOSIS — M62.89 HIGH-TONE PELVIC FLOOR DYSFUNCTION IN FEMALE: ICD-10-CM

## 2024-04-24 PROCEDURE — 97140 MANUAL THERAPY 1/> REGIONS: CPT | Mod: GP

## 2024-04-24 PROCEDURE — 97161 PT EVAL LOW COMPLEX 20 MIN: CPT | Mod: GP

## 2024-04-24 PROCEDURE — 97110 THERAPEUTIC EXERCISES: CPT | Mod: GP

## 2024-04-30 ENCOUNTER — PRE VISIT (OUTPATIENT)
Dept: PLASTIC SURGERY | Facility: CLINIC | Age: 74
End: 2024-04-30

## 2024-05-02 ENCOUNTER — TELEPHONE (OUTPATIENT)
Dept: GASTROENTEROLOGY | Facility: CLINIC | Age: 74
End: 2024-05-02
Payer: MEDICARE

## 2024-05-02 NOTE — TELEPHONE ENCOUNTER
Left Voicemail (1st Attempt) for the patient to call back and schedule the following:    Appointment type: return GI  Provider: Dr. Moreno  Return date: 5/15   Specialty phone number: [phone]  Additional appointment(s) needed:   Additonal Notes:         *

## 2024-05-03 ENCOUNTER — TRANSFERRED RECORDS (OUTPATIENT)
Dept: HEALTH INFORMATION MANAGEMENT | Facility: CLINIC | Age: 74
End: 2024-05-03
Payer: MEDICARE

## 2024-05-23 ENCOUNTER — TELEPHONE (OUTPATIENT)
Dept: ORTHOPEDICS | Facility: CLINIC | Age: 74
End: 2024-05-23
Payer: MEDICARE

## 2024-05-23 ENCOUNTER — TELEPHONE (OUTPATIENT)
Dept: FAMILY MEDICINE | Facility: CLINIC | Age: 74
End: 2024-05-23
Payer: MEDICARE

## 2024-05-23 NOTE — TELEPHONE ENCOUNTER
Called and spoke with patient. Patient relates that Wei has suggested a type of surgery for her back. She is scheduled to have a telephone visit with them on 6/13/2024. She would like to discuss this with Tierra. Follow up appointment was scheduled.

## 2024-05-23 NOTE — TELEPHONE ENCOUNTER
[facility]    Phone Message    May a detailed message be left on voicemail: yes     Reason for Call: Requesting Results     Name/type of test: All Recent [facility]  Date of test: 4/4/24  Was test done at a location other than [facility] (Please fill in the location if not [facility])?: No    The patient would like for her lab results to be sent to her Via Mail to the Current address on file which she verified, please review and follow up if needed thank you.    Action Taken: Message routed to:  [facility]    Travel Screening: Not Applicable

## 2024-05-23 NOTE — TELEPHONE ENCOUNTER
Other: pt called would like ask questions to follow up on with what was communicated regarding surgery.      Could we send this information to you in [employer] or would you prefer to receive a phone call?:   Patient would prefer a phone call   Okay to leave a detailed message?: Yes at Cell number on file:    Telephone Information:   Mobile [phone]

## 2024-05-23 NOTE — TELEPHONE ENCOUNTER
Result letter for 04/04/24 blood test printed and mailed out to pt address.      Erich Ayoub CMA ([facility]) at 1:05 PM on 5/23/2024

## 2024-05-28 ENCOUNTER — TELEPHONE (OUTPATIENT)
Dept: GASTROENTEROLOGY | Facility: CLINIC | Age: 74
End: 2024-05-28
Payer: MEDICARE

## 2024-05-28 NOTE — TELEPHONE ENCOUNTER
Called to remind patient of their upcoming appointment with our GI clinic, on Wed June 5th at 2:20pm  with Dr. Armando Inman. This appointment is scheduled as an in-person appt. Please arrive 15 minutes early to check in for your appointment. , if your appointment is virtual (video or telephone) you need to be in [place] for the visit. To reschedule or cancel patient to call [phone].    Vivien Buckley

## 2024-05-29 ENCOUNTER — PRE VISIT (OUTPATIENT)
Dept: PLASTIC SURGERY | Facility: CLINIC | Age: 74
End: 2024-05-29

## 2024-05-29 ENCOUNTER — OFFICE VISIT (OUTPATIENT)
Dept: PLASTIC SURGERY | Facility: CLINIC | Age: 74
End: 2024-05-29
Payer: MEDICARE

## 2024-05-29 VITALS
SYSTOLIC BLOOD PRESSURE: 122 MMHG | OXYGEN SATURATION: 97 % | BODY MASS INDEX: 22.71 KG/M2 | HEART RATE: 76 BPM | DIASTOLIC BLOOD PRESSURE: 85 MMHG | WEIGHT: 133 LBS | HEIGHT: 64 IN

## 2024-05-29 DIAGNOSIS — Z98.890 STATUS POST ABDOMINOPLASTY: Primary | ICD-10-CM

## 2024-05-29 PROCEDURE — 99204 OFFICE O/P NEW MOD 45 MIN: CPT | Performed by: STUDENT IN AN ORGANIZED HEALTH CARE EDUCATION/TRAINING PROGRAM

## 2024-05-29 ASSESSMENT — PAIN SCALES - GENERAL: PAINLEVEL: SEVERE PAIN (6)

## 2024-05-29 NOTE — NURSING NOTE
"Chief Complaint   Patient presents with    Consult      History of abdominoplasty with painful, raised scar.       Vitals:    05/29/24 1355   BP: 122/85   BP Location: Left arm   Patient Position: Sitting   Cuff Size: Adult Regular   Pulse: 76   SpO2: 97%   Weight: 60.3 kg (133 lb)   Height: 1.613 m (5' 3.5\")       Body mass index is 23.19 kg/m .    Patient reports severe abdominal pain (6/10).    Jorge L Fernandes, EMT    "

## 2024-05-29 NOTE — LETTER
5/29/2024       RE: Jennifer Steiner  [address]  [address]  [facility] [place] [facility] [address]       Dear Colleague,    Thank you for referring your patient, Jennifer Steiner, to the [facility] PLAST[facility]GERY CLIN[facility] [place] [facility]. Please see a copy of my visit note below.    PRS    HPI: 74-year-old female with history of massive weight loss status post body contouring surgery presenting with residual abdominal discomfort.  Patient has a history of 100+ pound weight loss and underwent body contouring surgery 20+ years ago.  She has some issues with pain, and presents with chronic abdominal pain and is inquiring about whether there is any residual effect from the trunk contouring surgery.  Of note, she had bilateral brachioplasty, mastopexy, abdominoplasty, and thigh surgery.    ROS: Negative, see HPI  Past medical history:  Past Medical History:   Diagnosis Date    Anemia     Asthma     since childhood    Blepharitis     Constipation, chronic     Chronic enema use    CVA (cerebral infarction) 2009    carotid intimal repair, left    Hyperlipidemia age 58    identified age 58    Hypothyroidism     Irritable bowel disease     Mixed type age-related cataract, both eyes     Neck injuries     Pelvic floor dysfunction 2010    Postmenopausal hormone replacement therapy     From Dr. Link    Scoliosis      PSH:    Past Surgical History:   Procedure Laterality Date    CYSTOSCOPY N/A 3/25/2019    Procedure: CYSTOSCOPY;  Surgeon: Radha Ovalle MD;  Location: [facility] OR    DILATION AND CURETTAGE, OPERATIVE HYSTEROSCOPY WITH MORCELLATOR, COMBINED N/A 3/25/2019    Procedure: OPERATIVE HYSTEROSCOPY WITH MORCELLATOR;  Surgeon: Marybeth Huber MD;  Location: [facility] OR    HC BREATH HYDROGEN TEST  11/11/2013    Procedure: HYDROGEN BREATH TEST;  Surgeon: Esteban Short MD;  Location: [facility] GI    PHACOEMULSIFICATION WITH STANDARD INTRAOCULAR LENS  IMPLANT Left 8/15/2022    Procedure: LEFT EYE PHACOEMULSIFICATION, CATARACT, WITH STANDARD INTRAOCULAR LENS IMPLANT INSERTION;  Surgeon: Damir Cunningham MD;  Location: [facility] OR    PHACOEMULSIFICATION WITH STANDARD INTRAOCULAR LENS IMPLANT Right 9/12/2022    Procedure: RIGHT EYE PHACOEMULSIFICATION, CATARACT, WITH TORIC  INTRAOCULAR LENS IMPLANT INSERTION;  Surgeon: Damir Cunningham MD;  Location: [facility] OR    SURGICAL HISTORY OF -       tonsillectomy    SURGICAL HISTORY OF -   ~1997    cosmetic skin removal: breast reduction; skin removal from arms legs and tummy ck    [facility] STOMACH SURGERY PROCEDURE UNLISTED       Medications:  Current Outpatient Medications   Medication Sig Dispense Refill    Cholecalciferol (VITAMIN D) 2000 UNIT tablet Take 1 tablet by mouth every morning      Digestive Enzymes CAPS daily Use as directed      DiphenhydrAMINE HCl (BENADRYL PO) Take by mouth daily as needed      EPINEPHrine (EPIPEN) 0.3 MG/0.3ML injection 2-pack Inject 0.3 mLs (0.3 mg) into the muscle once as needed for anaphylaxis 0.3 mL 1    estradiol (VAGIFEM) 10 MCG TABS vaginal tablet Place 10 mcg vaginally twice a week      Hypromellose (ARTIFICIAL TEARS OP) Apply 1 drop to eye daily as needed      ketoconazole (NIZORAL) 2 % external cream Apply topically daily as needed for itching To feet and in between the toes 60 g 3    lactulose (CEPHULAC) 10 GM packet Take 1/3 packet daily, mix in water and drink or sprinkle on food 10 packet 1    lactulose (CHRONULAC) 10 GM/15ML solution Take 15 mLs (10 g) by mouth 2 times daily 237 mL 3    MAGNESIUM OXIDE PO Take 6-8 tablets by mouth every evening      neomycin-polymyxin-dexAMETHasone (MAXITROL) 3.5-22084-7.1 ophthalmic ointment Place 0.1429 Applications (0.5 g) into both eyes at bedtime 0.5 inch strip each eye at night as needed for irritation 3.5 g 5    neomycin-polymyxin-dexAMETHasone (MAXITROL) 3.5-69355-3.1 ophthalmic ointment Place 0.1429 Applications (0.5 g) into both  "eyes At Bedtime 3.5 g 0    neomycin-polymyxin-dexamethasone (MAXITROL) 3.5-65033-7.1 SUSP ophthalmic susp Place 1-2 drops into both eyes At Bedtime 5 mL 3    peppermint oil liquid 1 mL daily as needed for other Taking gel tabs not oil, not in EMR.      Probiotic Product (ALOE 28688 & PROBIOTICS) CAPS Take by mouth every morning      progesterone (PROMETRIUM) 200 MG capsule Take 1 capsule (200 mg) by mouth every evening      Testosterone Propionate (FIRST-TESTOSTERONE MC) 2 % CREA Place onto the skin three times a week      thyroid (ARMOUR) 60 MG tablet Take 60 mg by mouth every morning        No current facility-administered medications for this visit.      Allergies:     Allergies   Allergen Reactions    Aspirin GI Disturbance     GI upset    Bee Venom Swelling    Birds [Feathers]     Cats     Cephalosporins     Dust Mites     Fluoride Other (See Comments) and Unknown     headaches    Liothyronine Unknown     Severe gut reactions, ears itching, throat closing    Mold     No Clinical Screening - See Comments Other (See Comments)     Some antibiotics but not Zpak-Can tolerate Azithromycin  z-pack, antibiotics in general- per patient.     Procaine Other (See Comments)     headache    Seasonal Allergies      Ragweed, pollen    Simvastatin Other (See Comments)     \"hot flash\", muscle weakness, dizziness    Penicillins Unknown and Rash     As a child    Sulfa Antibiotics Itching, Other (See Comments), Rash and Hives     SH: Non-smoker, denies any tobacco or nicotine use  Family history: No bleeding or clotting problems, or issues with anesthesia    Examination:  /85 (BP Location: Left arm, Patient Position: Sitting, Cuff Size: Adult Regular)   Pulse 76   Ht 1.613 m (5' 3.5\")   Wt 60.3 kg (133 lb)   LMP  (LMP Unknown)   SpO2 97%   BMI 23.19 kg/m    Nonlabored breathing  Not distressed  Soft abdomen with no noticeable bulge, hernia, or even rectus abdominis diastases  Well remodeled lower abdominal scars  Some " very mild tenderness over the lower abdomen near the anterior superior iliac spines bilaterally, but no Tinel's and intact lateral femoral cutaneous sensation  No area of abdominal wall fat necrosis    A/P: 74-year-old female with history of massive weight loss status post trunk contouring surgery presenting with dull abdominal pain    -In my opinion, there does not appear to be any clinically appreciable cause related to the trunk contouring as a relates to what she describes to be her symptoms.  No evidence of abdominal wall fat necrosis.  No wounds.  Well remodeling scars with no hypertrophic scarring.  No evidence of LFCN neuroma.  No bulge.  No hernia.  No rectus abdominis diastases recurrence, at least not obvious on exam.  Patient should return to her primary care physician for ongoing evaluation.  No additional surgical intervention from a plastic surgery standpoint is recommended.  -A total of 45 minutes was devoted to review of chart, direct face-to-face patient counseling and documentation during this encounter, exclusive of any procedure performed.        Again, thank you for allowing me to participate in the care of your patient.      Sincerely,    Otto Cuba MD

## 2024-05-30 NOTE — TELEPHONE ENCOUNTER
Called to remind patient of their upcoming appointment with our GI clinic, on Wed June 5th at 2:20pm  with Dr. Armando Inman. This appointment is scheduled as an in-person appt. Please arrive 15 minutes early to check in for your appointment. , if your appointment is virtual (video or telephone) you need to be in [place] for the visit. To reschedule or cancel patient to call [phone].     Vivien Buckley

## 2024-05-30 NOTE — PROGRESS NOTES
PRS    HPI: 74-year-old female with history of massive weight loss status post body contouring surgery presenting with residual abdominal discomfort.  Patient has a history of 100+ pound weight loss and underwent body contouring surgery 20+ years ago.  She has some issues with pain, and presents with chronic abdominal pain and is inquiring about whether there is any residual effect from the trunk contouring surgery.  Of note, she had bilateral brachioplasty, mastopexy, abdominoplasty, and thigh surgery.    ROS: Negative, see HPI  Past medical history:  Past Medical History:   Diagnosis Date    Anemia     Asthma     since childhood    Blepharitis     Constipation, chronic     Chronic enema use    CVA (cerebral infarction) 2009    carotid intimal repair, left    Hyperlipidemia age 58    identified age 58    Hypothyroidism     Irritable bowel disease     Mixed type age-related cataract, both eyes     Neck injuries     Pelvic floor dysfunction 2010    Postmenopausal hormone replacement therapy     From Dr. Link    Scoliosis      PSH:    Past Surgical History:   Procedure Laterality Date    CYSTOSCOPY N/A 3/25/2019    Procedure: CYSTOSCOPY;  Surgeon: Radha Ovalle MD;  Location: [facility]    DILATION AND CURETTAGE, OPERATIVE HYSTEROSCOPY WITH MORCELLATOR, COMBINED N/A 3/25/2019    Procedure: OPERATIVE HYSTEROSCOPY WITH MORCELLATOR;  Surgeon: Marybeth Huber MD;  Location: [facility] OR    HC BREATH HYDROGEN TEST  11/11/2013    Procedure: HYDROGEN BREATH TEST;  Surgeon: Esteban Short MD;  Location: [facility]    PHACOEMULSIFICATION WITH STANDARD INTRAOCULAR LENS IMPLANT Left 8/15/2022    Procedure: LEFT EYE PHACOEMULSIFICATION, CATARACT, WITH STANDARD INTRAOCULAR LENS IMPLANT INSERTION;  Surgeon: Damir Cunningham MD;  Location: [facility] OR    PHACOEMULSIFICATION WITH STANDARD INTRAOCULAR LENS IMPLANT Right 9/12/2022    Procedure: RIGHT EYE PHACOEMULSIFICATION, CATARACT, WITH TORIC  INTRAOCULAR LENS IMPLANT  INSERTION;  Surgeon: Damir Cunningham MD;  Location: [facility] OR    SURGICAL HISTORY OF -       tonsillectomy    SURGICAL HISTORY OF -   ~1997    cosmetic skin removal: breast reduction; skin removal from arms legs and tummy tuck    [facility] STOMACH SURGERY PROCEDURE UNLISTED       Medications:  Current Outpatient Medications   Medication Sig Dispense Refill    Cholecalciferol (VITAMIN D) 2000 UNIT tablet Take 1 tablet by mouth every morning      Digestive Enzymes CAPS daily Use as directed      DiphenhydrAMINE HCl (BENADRYL PO) Take by mouth daily as needed      EPINEPHrine (EPIPEN) 0.3 MG/0.3ML injection 2-pack Inject 0.3 mLs (0.3 mg) into the muscle once as needed for anaphylaxis 0.3 mL 1    estradiol (VAGIFEM) 10 MCG TABS vaginal tablet Place 10 mcg vaginally twice a week      Hypromellose (ARTIFICIAL TEARS OP) Apply 1 drop to eye daily as needed      ketoconazole (NIZORAL) 2 % external cream Apply topically daily as needed for itching To feet and in between the toes 60 g 3    lactulose (CEPHULAC) 10 GM packet Take 1/3 packet daily, mix in water and drink or sprinkle on food 10 packet 1    lactulose (CHRONULAC) 10 GM/15ML solution Take 15 mLs (10 g) by mouth 2 times daily 237 mL 3    MAGNESIUM OXIDE PO Take 6-8 tablets by mouth every evening      neomycin-polymyxin-dexAMETHasone (MAXITROL) 3.5-08002-7.1 ophthalmic ointment Place 0.1429 Applications (0.5 g) into both eyes at bedtime 0.5 inch strip each eye at night as needed for irritation 3.5 g 5    neomycin-polymyxin-dexAMETHasone (MAXITROL) 3.5-40646-5.1 ophthalmic ointment Place 0.1429 Applications (0.5 g) into both eyes At Bedtime 3.5 g 0    neomycin-polymyxin-dexamethasone (MAXITROL) 3.5-87636-8.1 SUSP ophthalmic susp Place 1-2 drops into both eyes At Bedtime 5 mL 3    peppermint oil liquid 1 mL daily as needed for other Taking gel tabs not oil, not in EMR.      Probiotic Product (ALOE 42834 & PROBIOTICS) CAPS Take by mouth every morning      progesterone  "(PROMETRIUM) 200 MG capsule Take 1 capsule (200 mg) by mouth every evening      Testosterone Propionate (FIRST-TESTOSTERONE MC) 2 % CREA Place onto the skin three times a week      thyroid (ARMOUR) 60 MG tablet Take 60 mg by mouth every morning        No current facility-administered medications for this visit.      Allergies:     Allergies   Allergen Reactions    Aspirin GI Disturbance     GI upset    Bee Venom Swelling    Birds [Feathers]     Cats     Cephalosporins     Dust Mites     Fluoride Other (See Comments) and Unknown     headaches    Liothyronine Unknown     Severe gut reactions, ears itching, throat closing    Mold     No Clinical Screening - See Comments Other (See Comments)     Some antibiotics but not Zpak-Can tolerate Azithromycin  z-pack, antibiotics in general- per patient.     Procaine Other (See Comments)     headache    Seasonal Allergies      Ragweed, pollen    Simvastatin Other (See Comments)     \"hot flash\", muscle weakness, dizziness    Penicillins Unknown and Rash     As a child    Sulfa Antibiotics Itching, Other (See Comments), Rash and Hives     SH: Non-smoker, denies any tobacco or nicotine use  Family history: No bleeding or clotting problems, or issues with anesthesia    Examination:  /85 (BP Location: Left arm, Patient Position: Sitting, Cuff Size: Adult Regular)   Pulse 76   Ht 1.613 m (5' 3.5\")   Wt 60.3 kg (133 lb)   LMP  (LMP Unknown)   SpO2 97%   BMI 23.19 kg/m    Nonlabored breathing  Not distressed  Soft abdomen with no noticeable bulge, hernia, or even rectus abdominis diastases  Well remodeled lower abdominal scars  Some very mild tenderness over the lower abdomen near the anterior superior iliac spines bilaterally, but no Tinel's and intact lateral femoral cutaneous sensation  No area of abdominal wall fat necrosis    A/P: 74-year-old female with history of massive weight loss status post trunk contouring surgery presenting with dull abdominal pain    -In my " opinion, there does not appear to be any clinically appreciable cause related to the trunk contouring as a relates to what she describes to be her symptoms.  No evidence of abdominal wall fat necrosis.  No wounds.  Well remodeling scars with no hypertrophic scarring.  No evidence of LFCN neuroma.  No bulge.  No hernia.  No rectus abdominis diastases recurrence, at least not obvious on exam.  Patient should return to her primary care physician for ongoing evaluation.  No additional surgical intervention from a plastic surgery standpoint is recommended.  -A total of 45 minutes was devoted to review of chart, direct face-to-face patient counseling and documentation during this encounter, exclusive of any procedure performed.    Otto Cuba MD, PhD

## 2024-06-03 ENCOUNTER — THERAPY VISIT (OUTPATIENT)
Dept: PHYSICAL THERAPY | Facility: CLINIC | Age: 74
End: 2024-06-03
Payer: MEDICARE

## 2024-06-03 DIAGNOSIS — M62.89 HIGH-TONE PELVIC FLOOR DYSFUNCTION IN FEMALE: Primary | ICD-10-CM

## 2024-06-03 DIAGNOSIS — M79.18 MYALGIA OF PELVIC FLOOR: ICD-10-CM

## 2024-06-03 DIAGNOSIS — N39.41 URGENCY INCONTINENCE: ICD-10-CM

## 2024-06-03 DIAGNOSIS — R39.9 LOWER URINARY TRACT SYMPTOMS (LUTS): ICD-10-CM

## 2024-06-03 DIAGNOSIS — Z98.890 HISTORY OF ABDOMINOPLASTY: ICD-10-CM

## 2024-06-03 DIAGNOSIS — R19.8 ABNORMAL DEFECATION: ICD-10-CM

## 2024-06-03 PROCEDURE — 97140 MANUAL THERAPY 1/> REGIONS: CPT | Mod: GP

## 2024-06-03 PROCEDURE — 97110 THERAPEUTIC EXERCISES: CPT | Mod: GP

## 2024-06-05 ENCOUNTER — ANCILLARY PROCEDURE (OUTPATIENT)
Dept: GENERAL RADIOLOGY | Facility: CLINIC | Age: 74
End: 2024-06-05
Attending: STUDENT IN AN ORGANIZED HEALTH CARE EDUCATION/TRAINING PROGRAM
Payer: MEDICARE

## 2024-06-05 ENCOUNTER — LAB (OUTPATIENT)
Dept: LAB | Facility: CLINIC | Age: 74
End: 2024-06-05
Payer: MEDICARE

## 2024-06-05 ENCOUNTER — OFFICE VISIT (OUTPATIENT)
Dept: GASTROENTEROLOGY | Facility: CLINIC | Age: 74
End: 2024-06-05
Attending: STUDENT IN AN ORGANIZED HEALTH CARE EDUCATION/TRAINING PROGRAM
Payer: MEDICARE

## 2024-06-05 VITALS
OXYGEN SATURATION: 98 % | BODY MASS INDEX: 22.71 KG/M2 | HEART RATE: 69 BPM | DIASTOLIC BLOOD PRESSURE: 74 MMHG | SYSTOLIC BLOOD PRESSURE: 109 MMHG | HEIGHT: 64 IN | WEIGHT: 133 LBS

## 2024-06-05 DIAGNOSIS — R19.4 CHANGE IN BOWEL HABIT: ICD-10-CM

## 2024-06-05 DIAGNOSIS — M54.42 CHRONIC BILATERAL LOW BACK PAIN WITH LEFT-SIDED SCIATICA: Primary | ICD-10-CM

## 2024-06-05 DIAGNOSIS — R19.7 DIARRHEA: Primary | ICD-10-CM

## 2024-06-05 DIAGNOSIS — M62.89 PELVIC FLOOR DYSFUNCTION: Primary | ICD-10-CM

## 2024-06-05 DIAGNOSIS — G89.29 CHRONIC BILATERAL LOW BACK PAIN WITH LEFT-SIDED SCIATICA: Primary | ICD-10-CM

## 2024-06-05 PROCEDURE — 74018 RADEX ABDOMEN 1 VIEW: CPT | Mod: GC | Performed by: RADIOLOGY

## 2024-06-05 PROCEDURE — 99215 OFFICE O/P EST HI 40 MIN: CPT | Mod: GC | Performed by: STUDENT IN AN ORGANIZED HEALTH CARE EDUCATION/TRAINING PROGRAM

## 2024-06-05 ASSESSMENT — PAIN SCALES - GENERAL: PAINLEVEL: MILD PAIN (3)

## 2024-06-05 NOTE — PATIENT INSTRUCTIONS
It was a pleasure taking care of you today.  I've included a brief summary of our discussion and care plan from today's visit below.  Please review this information with your primary care provider.  _______________________________________________________________________    My recommendations are summarized as follows:  Plan  -- Obtain KUB Xray for the stool burden of your bowel movement. This is in the setting of you having a good large bowel movement this morning. If there's still large amount of stool -> we will need more clean out of your colon including the Golytely. If there's no stool burden, it means you have a good clean out, so could consider decreasing the Magnesium oxide use.   -- Continue to work with pelvic floor therapy  -- You are not due for colonoscopy until 2026 for polyp follow-up for colon cancer screening  -- Will hold off on neuromodulator including nortriptyline (TCA)  --  If you have any questions, please don't hesitate to contact me through our GI RN Clinic Coordinator, Samantha Castro, at [phone].    Return to GI Clinic in 6 months    _______________________________________________________________________    Who do I call with any questions after my visit?  Please be in touch if there are any further questions that arise following today's visit.  There are multiple ways to contact your gastroenterology care team.    During business hours, you may reach a Gastroenterology nurse at [phone] and choose option 3.     To schedule or reschedule an appointment, please call [phone].   You can always send a secure message through [employer].  [employer] messages are answered by your nurse or doctor typically within 24 hours.  Please allow extra time on weekends and holidays.    For urgent/emergent questions after business hours, you may reach the on-call GI Fellow by contacting the [facility]  at [phone].     How will I get the results of any tests ordered?    You will  receive all of your results.  If you have signed up for [employer], any tests ordered at your visit will be available to you after your physician reviews them.  Typically this takes 1-2 weeks.  If there are urgent results that require a change in your care plan, your physician or nurse will call you to discuss the next steps.      What is [employer]?  [employer] is a secure way for you to access all of your healthcare records from the [facility].  It is a web based computer program, so you can sign on to it from any location.  It also allows you to send secure messages to your care team.  I recommend signing up for [employer] access if you have not already done so and are comfortable with using a computer.      How do I schedule labs, imaging studies, or procedures that were ordered in clinic today?   - Labs: To schedule lab appointment at the [facility], use my chart or call [phone]. If you have a [facility] lab closer to home where you are regularly seen you can give them a call.   - Procedures: If a colonoscopy, upper endoscopy, breath test, esophageal manometry, or pH impedence was ordered today, our endoscopy team will call you to schedule this. If you have not heard from our endoscopy team within a week, please call [phone] to schedule.   - Imaging Studies: If you were scheduled for a CT scan, X-ray, MRI, ultrasound, HIDA scan or other imaging study, please call [phone] to have this scheduled.   - Referral: If a referral to another specialty was ordered, expect a phone call or follow instructions above. If you have not heard from anyone regarding your referral in a week, please call our clinic to check the status.     Sincerely,    Armando Jaramillo MD  GI Fellow  [facility], Division of Gastroenterology
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
## 2024-06-05 NOTE — NURSING NOTE
"Chief Complaint   Patient presents with    Follow Up       Vitals:    06/05/24 1412   BP: 109/74   Pulse: 69   SpO2: 98%   Weight: 60.3 kg (133 lb)   Height: 1.613 m (5' 3.5\")       Body mass index is 23.19 kg/m .    [employer]    "

## 2024-06-10 ENCOUNTER — THERAPY VISIT (OUTPATIENT)
Dept: PHYSICAL THERAPY | Facility: CLINIC | Age: 74
End: 2024-06-10
Payer: MEDICARE

## 2024-06-10 DIAGNOSIS — R19.8 ABNORMAL DEFECATION: ICD-10-CM

## 2024-06-10 DIAGNOSIS — M79.18 MYALGIA OF PELVIC FLOOR: ICD-10-CM

## 2024-06-10 DIAGNOSIS — Z98.890 HISTORY OF ABDOMINOPLASTY: ICD-10-CM

## 2024-06-10 DIAGNOSIS — M62.89 HIGH-TONE PELVIC FLOOR DYSFUNCTION IN FEMALE: Primary | ICD-10-CM

## 2024-06-10 DIAGNOSIS — R39.9 LOWER URINARY TRACT SYMPTOMS (LUTS): ICD-10-CM

## 2024-06-10 DIAGNOSIS — N39.41 URGENCY INCONTINENCE: ICD-10-CM

## 2024-06-10 PROCEDURE — 97140 MANUAL THERAPY 1/> REGIONS: CPT | Mod: GP

## 2024-06-10 PROCEDURE — 97110 THERAPEUTIC EXERCISES: CPT | Mod: GP

## 2024-06-13 ENCOUNTER — TRANSFERRED RECORDS (OUTPATIENT)
Dept: HEALTH INFORMATION MANAGEMENT | Facility: CLINIC | Age: 74
End: 2024-06-13
Payer: MEDICARE

## 2024-06-17 ENCOUNTER — THERAPY VISIT (OUTPATIENT)
Dept: PHYSICAL THERAPY | Facility: CLINIC | Age: 74
End: 2024-06-17
Payer: MEDICARE

## 2024-06-17 DIAGNOSIS — M62.89 HIGH-TONE PELVIC FLOOR DYSFUNCTION IN FEMALE: Primary | ICD-10-CM

## 2024-06-17 DIAGNOSIS — N39.41 URGENCY INCONTINENCE: ICD-10-CM

## 2024-06-17 DIAGNOSIS — M79.18 MYALGIA OF PELVIC FLOOR: ICD-10-CM

## 2024-06-17 DIAGNOSIS — R39.9 LOWER URINARY TRACT SYMPTOMS (LUTS): ICD-10-CM

## 2024-06-17 DIAGNOSIS — Z98.890 HISTORY OF ABDOMINOPLASTY: ICD-10-CM

## 2024-06-17 DIAGNOSIS — R19.8 ABNORMAL DEFECATION: ICD-10-CM

## 2024-06-17 PROCEDURE — 97140 MANUAL THERAPY 1/> REGIONS: CPT | Mod: GP

## 2024-06-17 PROCEDURE — 97110 THERAPEUTIC EXERCISES: CPT | Mod: GP

## 2024-07-16 ENCOUNTER — THERAPY VISIT (OUTPATIENT)
Dept: PHYSICAL THERAPY | Facility: CLINIC | Age: 74
End: 2024-07-16
Payer: MEDICARE

## 2024-07-16 DIAGNOSIS — R19.8 ABNORMAL DEFECATION: ICD-10-CM

## 2024-07-16 DIAGNOSIS — R39.9 LOWER URINARY TRACT SYMPTOMS (LUTS): ICD-10-CM

## 2024-07-16 DIAGNOSIS — M62.89 HIGH-TONE PELVIC FLOOR DYSFUNCTION IN FEMALE: Primary | ICD-10-CM

## 2024-07-16 DIAGNOSIS — M79.18 MYALGIA OF PELVIC FLOOR: ICD-10-CM

## 2024-07-16 DIAGNOSIS — Z98.890 HISTORY OF ABDOMINOPLASTY: ICD-10-CM

## 2024-07-16 DIAGNOSIS — N39.41 URGENCY INCONTINENCE: ICD-10-CM

## 2024-07-16 PROCEDURE — 97110 THERAPEUTIC EXERCISES: CPT | Mod: GP

## 2024-07-16 PROCEDURE — 97140 MANUAL THERAPY 1/> REGIONS: CPT | Mod: GP

## 2024-07-30 ENCOUNTER — THERAPY VISIT (OUTPATIENT)
Dept: PHYSICAL THERAPY | Facility: CLINIC | Age: 74
End: 2024-07-30
Payer: MEDICARE

## 2024-07-30 DIAGNOSIS — M79.18 MYALGIA OF PELVIC FLOOR: ICD-10-CM

## 2024-07-30 DIAGNOSIS — R19.8 ABNORMAL DEFECATION: ICD-10-CM

## 2024-07-30 DIAGNOSIS — Z98.890 HISTORY OF ABDOMINOPLASTY: ICD-10-CM

## 2024-07-30 DIAGNOSIS — R39.9 LOWER URINARY TRACT SYMPTOMS (LUTS): ICD-10-CM

## 2024-07-30 DIAGNOSIS — N39.41 URGENCY INCONTINENCE: ICD-10-CM

## 2024-07-30 DIAGNOSIS — M62.89 HIGH-TONE PELVIC FLOOR DYSFUNCTION IN FEMALE: Primary | ICD-10-CM

## 2024-07-30 PROCEDURE — 97110 THERAPEUTIC EXERCISES: CPT | Mod: GP

## 2024-07-30 PROCEDURE — 97140 MANUAL THERAPY 1/> REGIONS: CPT | Mod: GP

## 2024-08-13 ENCOUNTER — THERAPY VISIT (OUTPATIENT)
Dept: PHYSICAL THERAPY | Facility: CLINIC | Age: 74
End: 2024-08-13
Payer: MEDICARE

## 2024-08-13 DIAGNOSIS — M62.89 HIGH-TONE PELVIC FLOOR DYSFUNCTION IN FEMALE: Primary | ICD-10-CM

## 2024-08-13 DIAGNOSIS — R19.8 ABNORMAL DEFECATION: ICD-10-CM

## 2024-08-13 DIAGNOSIS — R39.9 LOWER URINARY TRACT SYMPTOMS (LUTS): ICD-10-CM

## 2024-08-13 DIAGNOSIS — N39.41 URGENCY INCONTINENCE: ICD-10-CM

## 2024-08-13 DIAGNOSIS — M79.18 MYALGIA OF PELVIC FLOOR: ICD-10-CM

## 2024-08-13 DIAGNOSIS — Z98.890 HISTORY OF ABDOMINOPLASTY: ICD-10-CM

## 2024-08-13 PROCEDURE — 97110 THERAPEUTIC EXERCISES: CPT | Mod: GP

## 2024-08-13 PROCEDURE — 97140 MANUAL THERAPY 1/> REGIONS: CPT | Mod: GP

## 2024-08-14 ENCOUNTER — TELEPHONE (OUTPATIENT)
Dept: OPHTHALMOLOGY | Facility: CLINIC | Age: 74
End: 2024-08-14
Payer: MEDICARE

## 2024-08-28 ENCOUNTER — PRE VISIT (OUTPATIENT)
Dept: UROLOGY | Facility: CLINIC | Age: 74
End: 2024-08-28
Payer: MEDICARE

## 2024-08-28 NOTE — TELEPHONE ENCOUNTER
Reason for visit: follow-up      Relevant information: History of myalgia of pelvic floor, urgency incontinence, high-tone pelvic floor dysfunction, abnormal defecation, abdominoplasty, LUTS    Records/imaging/labs/orders: All records available    Pt called: No need for a call    At Rooming: Francis Alex  8/28/2024  2:08 PM
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
## 2024-09-18 ENCOUNTER — OFFICE VISIT (OUTPATIENT)
Dept: GASTROENTEROLOGY | Facility: CLINIC | Age: 74
End: 2024-09-18
Attending: STUDENT IN AN ORGANIZED HEALTH CARE EDUCATION/TRAINING PROGRAM
Payer: MEDICARE

## 2024-09-18 VITALS
HEIGHT: 64 IN | BODY MASS INDEX: 22.71 KG/M2 | OXYGEN SATURATION: 98 % | WEIGHT: 133 LBS | DIASTOLIC BLOOD PRESSURE: 68 MMHG | SYSTOLIC BLOOD PRESSURE: 101 MMHG | HEART RATE: 75 BPM

## 2024-09-18 DIAGNOSIS — R19.4 CHANGE IN BOWEL HABIT: ICD-10-CM

## 2024-09-18 PROCEDURE — 99417 PROLNG OP E/M EACH 15 MIN: CPT | Performed by: STUDENT IN AN ORGANIZED HEALTH CARE EDUCATION/TRAINING PROGRAM

## 2024-09-18 PROCEDURE — 99215 OFFICE O/P EST HI 40 MIN: CPT | Performed by: STUDENT IN AN ORGANIZED HEALTH CARE EDUCATION/TRAINING PROGRAM

## 2024-09-18 ASSESSMENT — PAIN SCALES - GENERAL: PAINLEVEL: MILD PAIN (3)

## 2024-09-18 NOTE — NURSING NOTE
"Chief Complaint   Patient presents with    Follow Up       Vitals:    09/18/24 1622   BP: 101/68   Pulse: 75   SpO2: 98%   Weight: 60.3 kg (133 lb)   Height: 1.613 m (5' 3.5\")       Body mass index is 23.19 kg/m .    Rosalba Hunter MA    "

## 2024-09-18 NOTE — PATIENT INSTRUCTIONS
It was a pleasure meeting with you today and discussing your healthcare plan. Below is a summary of what we covered:    - Try cutting back on the magnesium oxide when your stools become looser (decrease down to half of what you are taking or even hold the magnesium oxide for a few days)  - You could try some Citrucel (sugar-free) starting with 1 tsp daily in 4-8 ounces of water daily. Ok to take in the morning or at night. You can slowly increase to 2 teaspoons or even 3 teaspoons daily. Be sure to drink plenty of water.  - Okay to continue the enemas as you have been   - Follow up in 3 months       Please see below for any additional questions and scheduling guidelines.    Sign up for [employer]: [employer] patient portal serves as a secure platform for accessing your medical records from the [facility]. Additionally, [employer] facilitates easy, timely, and secure messaging with your care team. If you have not signed up, you may do so by using the provided code or calling [phone].    Coordinating your care after your visit:  There are multiple options for scheduling your follow-up care based on your provider's recommendation.    How do I schedule a follow-up clinic appointment:   After your appointment, you may receive scheduling assistance with the Clinic Coordinators by having a seat in the waiting room and a Clinic Coordinator will call you up to schedule.  Virtual visits or after you leave the clinic:  Your provider has placed a follow-up order in the [employer] portal for scheduling your return appointment. A member of the scheduling team will contact you to schedule.  [place] Scheduling: Timely scheduling through [employer] is advised to ensure appointment availability.   Call to schedule: You may schedule your follow-up appointment(s) by calling [phone], option 1.    How do I schedule my endoscopy or colonoscopy procedure:  If a procedure, such as a colonoscopy or upper endoscopy was ordered by your  provider, the scheduling team will contact you to schedule this procedure. Or you may choose to call to schedule at   [phone], option 2.  Please allow 20-30 minutes when scheduling a procedure.    How do I get my blood work done? To get your blood work done, you need to schedule a lab appointment at an [facility] Laboratory. There are multiple ways to schedule:   At the clinic: The Clinic Coordinator you meet after your visit can help you schedule a lab appointment.   [employer] scheduling: [employer] offers online lab scheduling at all [facility] laboratory locations.   Call to schedule: You can call [phone] to schedule your lab appointment.    How do I schedule my imaging study: To schedule imaging studies, such as CT scans, ultrasounds, MRIs, or X-rays, contact Imaging Services at [phone].    How do I schedule a referral to another doctor: If your provider recommended a referral to another specialist(s), the referral order was placed by your provider. You will receive a phone call to schedule this referral, or you may choose to call the number attached to the referral to self-schedule.    For Post-Visit Question(s):  For any inquiries following today's visit:  Please utilize [employer] messaging and allow 48 hours for reply or contact the Call Center during normal business hours at [phone], option 3.  For Emergent After-hours questions, contact the On-Call GI Fellow through the [facility]  at [phone].  In addition, you may contact your Nurse directly using the provided contact information.    Test Results:  Test results will be accessible via [employer] in compliance with the 21st Century Cures Act. This means that your results will be available to you at the same time as your provider. Often you may see your results before your provider does. Results are reviewed by staff within two weeks with communication follow-up. Results may be released in the patient portal  prior to your care team review.    Prescription Refill(s):  Medication prescribed by your provider will be addressed during your visit. For future refills, please coordinate with your pharmacy. If you have not had a recent clinic visit or routine labs, for your safety, your provider may not be able to refill your prescription.
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
## 2024-09-20 ENCOUNTER — THERAPY VISIT (OUTPATIENT)
Dept: PHYSICAL THERAPY | Facility: CLINIC | Age: 74
End: 2024-09-20
Payer: MEDICARE

## 2024-09-20 DIAGNOSIS — R39.9 LOWER URINARY TRACT SYMPTOMS (LUTS): ICD-10-CM

## 2024-09-20 DIAGNOSIS — M62.89 HIGH-TONE PELVIC FLOOR DYSFUNCTION IN FEMALE: Primary | ICD-10-CM

## 2024-09-20 DIAGNOSIS — R19.8 ABNORMAL DEFECATION: ICD-10-CM

## 2024-09-20 DIAGNOSIS — Z98.890 HISTORY OF ABDOMINOPLASTY: ICD-10-CM

## 2024-09-20 DIAGNOSIS — N39.41 URGENCY INCONTINENCE: ICD-10-CM

## 2024-09-20 DIAGNOSIS — M79.18 MYALGIA OF PELVIC FLOOR: ICD-10-CM

## 2024-09-20 PROCEDURE — 97530 THERAPEUTIC ACTIVITIES: CPT | Mod: GP | Performed by: PHYSICAL THERAPIST

## 2024-09-20 PROCEDURE — 97140 MANUAL THERAPY 1/> REGIONS: CPT | Mod: GP | Performed by: PHYSICAL THERAPIST

## 2024-09-20 PROCEDURE — 97110 THERAPEUTIC EXERCISES: CPT | Mod: GP | Performed by: PHYSICAL THERAPIST

## 2024-10-03 ENCOUNTER — THERAPY VISIT (OUTPATIENT)
Dept: PHYSICAL THERAPY | Facility: CLINIC | Age: 74
End: 2024-10-03
Payer: MEDICARE

## 2024-10-03 DIAGNOSIS — Z98.890 HISTORY OF ABDOMINOPLASTY: ICD-10-CM

## 2024-10-03 DIAGNOSIS — M62.89 HIGH-TONE PELVIC FLOOR DYSFUNCTION IN FEMALE: Primary | ICD-10-CM

## 2024-10-03 DIAGNOSIS — M79.18 MYALGIA OF PELVIC FLOOR: ICD-10-CM

## 2024-10-03 DIAGNOSIS — R19.8 ABNORMAL DEFECATION: ICD-10-CM

## 2024-10-03 DIAGNOSIS — N39.41 URGENCY INCONTINENCE: ICD-10-CM

## 2024-10-03 DIAGNOSIS — R39.9 LOWER URINARY TRACT SYMPTOMS (LUTS): ICD-10-CM

## 2024-10-03 PROCEDURE — 97140 MANUAL THERAPY 1/> REGIONS: CPT | Mod: GP

## 2024-10-03 PROCEDURE — 97530 THERAPEUTIC ACTIVITIES: CPT | Mod: GP

## 2024-10-03 PROCEDURE — 97110 THERAPEUTIC EXERCISES: CPT | Mod: GP

## 2024-10-07 ENCOUNTER — OFFICE VISIT (OUTPATIENT)
Dept: DERMATOLOGY | Facility: CLINIC | Age: 74
End: 2024-10-07
Payer: MEDICARE

## 2024-10-07 DIAGNOSIS — L81.4 SOLAR LENTIGO: ICD-10-CM

## 2024-10-07 DIAGNOSIS — L82.1 SEBORRHEIC KERATOSES: ICD-10-CM

## 2024-10-07 DIAGNOSIS — Z86.19 HISTORY OF TINEA PEDIS: ICD-10-CM

## 2024-10-07 DIAGNOSIS — L72.0 MILIUM CYST: ICD-10-CM

## 2024-10-07 DIAGNOSIS — L29.9 SCALP PRURITUS: ICD-10-CM

## 2024-10-07 DIAGNOSIS — D22.9 MULTIPLE BENIGN NEVI: ICD-10-CM

## 2024-10-07 DIAGNOSIS — D49.2 NEOPLASM OF UNSPECIFIED BEHAVIOR OF BONE, SOFT TISSUE, AND SKIN: ICD-10-CM

## 2024-10-07 DIAGNOSIS — D18.01 CHERRY ANGIOMA: ICD-10-CM

## 2024-10-07 DIAGNOSIS — Z12.83 SKIN CANCER SCREENING: Primary | ICD-10-CM

## 2024-10-07 PROCEDURE — 88305 TISSUE EXAM BY PATHOLOGIST: CPT | Mod: 26 | Performed by: PATHOLOGY

## 2024-10-07 PROCEDURE — 99214 OFFICE O/P EST MOD 30 MIN: CPT | Mod: 25 | Performed by: PHYSICIAN ASSISTANT

## 2024-10-07 PROCEDURE — 88341 IMHCHEM/IMCYTCHM EA ADD ANTB: CPT | Mod: TC | Performed by: PHYSICIAN ASSISTANT

## 2024-10-07 PROCEDURE — 88342 IMHCHEM/IMCYTCHM 1ST ANTB: CPT | Mod: 26 | Performed by: PATHOLOGY

## 2024-10-07 PROCEDURE — 88341 IMHCHEM/IMCYTCHM EA ADD ANTB: CPT | Mod: 26 | Performed by: PATHOLOGY

## 2024-10-07 PROCEDURE — 11102 TANGNTL BX SKIN SINGLE LES: CPT | Performed by: PHYSICIAN ASSISTANT

## 2024-10-07 ASSESSMENT — PAIN SCALES - GENERAL: PAINLEVEL: NO PAIN (0)

## 2024-10-07 NOTE — PROGRESS NOTES
[facility] Dermatology Note  Encounter Date: Oct 7, 2024  Office Visit     Dermatology Problem List:  #FBSE 10/07/2024  0. NUB x1, L medial upper arm, s/p shave bx 10/07/2024  1. Seborrheic dermatitis, scalp  2. Tinea pedis  - Current rx: vinegar soaks, ketoconazole 2% cream   3. Scalp pruritus  - s/p punch biopsy 10/11/21 resulting as sparse perivascular and perifollicular lymphohistiocytic infiltrate with rare eosinophils  - continue fluocinolone 0.01% BID  4. Hx of eczema  ____________________________________________    Assessment & Plan:     # Neoplasm of unspecified behavior of the skin (D49.2) on the L upper medial arm. The differential diagnosis includes dysplastic nevus .   - shave biopsy performed today, see procedure note below    # History of Tinea pedis with mild onychomycosis  - Continue otc remedies.   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  - Okay to rinse the scalp with water and apple cider vinegar.   - Continue antihistamines as needed.  - Consider keeping a diary to record what is and is not working.     # Skin cancer screening with multiple benign nevi, solar lentigines  - ABCDEs: Counseled ABCDEs of melanoma: Asymmetry, Border (irregularity), Color (not uniform, changes in color), Diameter (greater than 6 mm which is about the size of a pencil eraser), and Evolving (any changes in preexisting moles).  - Sun protection: Counseled SPF30+ sunscreen, UPF clothing, sun avoidance, tanning bed avoidance.    # Cherry angiomas and seborrheic keratoses,  Benign, reassurance given.       Procedures Performed:   - Shave biopsy: After discussion  "of benefits and risks including but not limited to bleeding, infection, scar, incomplete removal, recurrence, and non-diagnostic biopsy, written consent and photographs were obtained. The area was cleaned with isopropyl alcohol. < 1mL of 1% lidocaine with epinephrine was injected to obtain adequate anesthesia. A shave biopsy was performed. Hemostasis was achieved with aluminium chloride. Vaseline and a sterile dressing were applied. The patient tolerated the procedure and no complications were noted. The patient was provided with verbal and written post care instructions.        Follow-up: 1 year(s) in-person, or earlier for new or changing lesions    Staff and Scribe:   Scribe Disclosure:   By signing my name below, I, Bindu Oral, attest that this documentation has been prepared under the direction and in the presence of Dede Ghosh PA-C.  - Electronically Signed: Bindu Mixon 10/07/24       Provider Disclosure:  I agree with above History, Review of Systems, Physical exam and Plan.  I have reviewed the content of the documentation and have edited it as needed. I have personally performed the services documented here and the documentation accurately represents those services and the decisions I have made.      Electronically signed by:  All risks, benefits and alternatives were discussed with patient.  Patient is in agreement and understands the assessment and plan.  All questions were answered.  Sun Screen Education was given.   Return to Clinic annually or sooner as needed.   Dede Ghosh PA-C      ____________________________________________    CC: Derm Problem (FBSE - Hives creating irritation and itchiness around scalp and is spreading to the face; does not use soap, uses oils and vinegar to help with symptoms. Small bumps on upper lip. Hx of Athlete's foot and \"bubbling\" on top of R great toe. Had a focused area of irritation and itchiness behind neck for a month during the " summer.)    HPI:  Ms. Jennifer Steiner is a(n) 74 year old female who [place] today [place] a [place] [facility] [place]     Patient reports her scalp is doing well. She had some hives appearing but they are starting to resolve on the back of her head. She notes increased itching. She notes white skin flaking off when she itches, and new eruptions showing elsewhere. Patient reports she develops blisters on her R big toe that will itch but have not spread to other toes or parts of her foot. She notes chronic hx of athlete's foot, and notes the treatment does not help the blistering. She uses tea tree oil and lemon oil to treat, with occasional thyme and oregano. She also gets acupuncture and note it is helpful with her pain, asthma, and allergies.    Patient is otherwise feeling well, without additional skin concerns.     Labs Reviewed:  N/A    Physical exam:  Vitals: LMP  (LMP Unknown)   GEN: This is a well developed, well-nourished female in no acute distress, in a pleasant mood.    SKIN: Full skin, which includes the head/face, both arms, chest, back, abdomen,both legs, genitalia and/or groin buttocks, digits and/or nails, was examined.  - no significant scale or erythema noted to scalp  - L upper medial arm 6 mm brown macule with beaded borders   - R dorsal great toe resolving pink crusted papule   - several nail plates thickened with yellow subungual debris  - There are dome shaped bright red papules on the head/neck, trunk, extremities.   - Multiple regular brown pigmented macules and papules are identified on the head/neck, trunk, extremities.   - Scattered brown macules on sun exposed areas.  - There are waxy stuck on tan to brown papules on the head/neck, trunk, extremities.  - No other lesions of concern on areas examined.                     Medications:  Current Outpatient Medications   Medication Sig Dispense Refill    Cholecalciferol (VITAMIN D) 2000 UNIT tablet Take 1 tablet by mouth every morning       Digestive Enzymes CAPS daily Use as directed      DiphenhydrAMINE HCl (BENADRYL PO) Take by mouth daily as needed      EPINEPHrine (EPIPEN) 0.3 MG/0.3ML injection 2-pack Inject 0.3 mLs (0.3 mg) into the muscle once as needed for anaphylaxis 0.3 mL 1    estradiol (VAGIFEM) 10 MCG TABS vaginal tablet Place 10 mcg vaginally twice a week      Hypromellose (ARTIFICIAL TEARS OP) Apply 1 drop to eye daily as needed      ketoconazole (NIZORAL) 2 % external cream Apply topically daily as needed for itching To feet and in between the toes 60 g 3    lactulose (CEPHULAC) 10 GM packet Take 1/3 packet daily, mix in water and drink or sprinkle on food 10 packet 1    lactulose (CHRONULAC) 10 GM/15ML solution Take 15 mLs (10 g) by mouth 2 times daily 237 mL 3    MAGNESIUM OXIDE PO Take 6-8 tablets by mouth every evening      neomycin-polymyxin-dexAMETHasone (MAXITROL) 3.5-88612-1.1 ophthalmic ointment Place 0.1429 Applications (0.5 g) into both eyes at bedtime 0.5 inch strip each eye at night as needed for irritation 3.5 g 5    neomycin-polymyxin-dexAMETHasone (MAXITROL) 3.5-73280-4.1 ophthalmic ointment Place 0.1429 Applications (0.5 g) into both eyes At Bedtime 3.5 g 0    neomycin-polymyxin-dexamethasone (MAXITROL) 3.5-10037-8.1 SUSP ophthalmic susp Place 1-2 drops into both eyes At Bedtime 5 mL 3    peppermint oil liquid 1 mL daily as needed for other Taking gel tabs not oil, not in EMR.      Probiotic Product (ALOE 51909 & PROBIOTICS) CAPS Take by mouth every morning      progesterone (PROMETRIUM) 200 MG capsule Take 1 capsule (200 mg) by mouth every evening      Testosterone Propionate (FIRST-TESTOSTERONE MC) 2 % CREA Place onto the skin three times a week      thyroid (ARMOUR) 60 MG tablet Take 60 mg by mouth every morning        No current facility-administered medications for this visit.      Past Medical History:   Patient Active Problem List   Diagnosis    Transient cerebral ischemia    Carotid artery dissection (H)     Irritable bowel syndrome with constipation    Screen for colon cancer    Visit for screening mammogram    Encounter for routine gynecological examination    Hypothyroidism    CARDIOVASCULAR SCREENING; LDL GOAL LESS THAN 130    Rash and other nonspecific skin eruption    High cholesterol    Vaginal atrophy    Blepharitis    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    High-tone pelvic floor dysfunction in female    Myalgia of pelvic floor    Other constipation    Abnormal defecation    Chronic abdominal pain    Backache    Chronic fatigue syndrome    Food intolerance    Generalized pain    Hemorrhoids    Major depressive disorder, recurrent episode (H)    Symptomatic menopausal or female climacteric states    History of hematuria    Mixed type age-related cataract, both eyes    Lower urinary tract symptoms (LUTS)    Spinal stenosis of lumbar region with neurogenic claudication    Lumbar spondylosis    Radicular pain of lumbosacral region    Meibomian gland dysfunction (MGD), bilateral, both upper and lower lids    History of abdominoplasty    Urgency incontinence     Past Medical History:   Diagnosis Date    Anemia     Asthma     since childhood    Blepharitis     Constipation, chronic     Chronic enema use    CVA (cerebral infarction) 2009    carotid intimal repair, left    Hyperlipidemia age 58    identified age 58    Hypothyroidism     Irritable bowel disease     Mixed type age-related cataract, both eyes     Neck injuries     Pelvic floor dysfunction 2010    Postmenopausal hormone replacement therapy     From Dr. Link    Scoliosis        CC No referring provider defined for this encounter. on close of this encounter.

## 2024-10-07 NOTE — LETTER
10/7/2024       RE: Jennifer Steiner  [address]  [address]  [facility] [address]     Dear Colleague,    Thank you for referring your patient, Jennifer Steiner, to the [facility] [place] at [facility]. Please see a copy of my visit note below.    [facility] Dermatology Note  Encounter Date: Oct 7, 2024  Office Visit     Dermatology Problem List:  #FBSE 10/07/2024  0. NUB x1, L medial upper arm, s/p shave bx 10/07/2024  1. Seborrheic dermatitis, scalp  2. Tinea pedis  - Current rx: vinegar soaks, ketoconazole 2% cream   3. Scalp pruritus  - s/p punch biopsy 10/11/21 resulting as sparse perivascular and perifollicular lymphohistiocytic infiltrate with rare eosinophils  - continue fluocinolone 0.01% BID  4. Hx of eczema  ____________________________________________    Assessment & Plan:     # Neoplasm of unspecified behavior of the skin (D49.2) on the L upper medial arm. The differential diagnosis includes dysplastic nevus .   - shave biopsy performed today, see procedure note below    # History of Tinea pedis with mild onychomycosis  - Continue otc remedies.   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  - Okay to rinse the scalp with water and apple cider vinegar.   - Continue antihistamines as needed.  - Consider keeping a diary to record what is and is not working.     # Skin cancer screening with multiple benign nevi, solar lentigines  - ABCDEs: Counseled ABCDEs of melanoma: Asymmetry, Border (irregularity), Color (not uniform, changes in color), Diameter (greater than 6  mm which is about the size of a pencil eraser), and Evolving (any changes in preexisting moles).  - Sun protection: Counseled SPF30+ sunscreen, UPF clothing, sun avoidance, tanning bed avoidance.    # Cherry angiomas and seborrheic keratoses,  Benign, reassurance given.       Procedures Performed:   - Shave biopsy: After discussion of benefits and risks including but not limited to bleeding, infection, scar, incomplete removal, recurrence, and non-diagnostic biopsy, written consent and photographs were obtained. The area was cleaned with isopropyl alcohol. < 1mL of 1% lidocaine with epinephrine was injected to obtain adequate anesthesia. A shave biopsy was performed. Hemostasis was achieved with aluminium chloride. Vaseline and a sterile dressing were applied. The patient tolerated the procedure and no complications were noted. The patient was provided with verbal and written post care instructions.        Follow-up: 1 year(s) in-person, or earlier for new or changing lesions    Staff and Scribe:   Scribe Disclosure:   By signing my name below, I, Bindu Mixon, attest that this documentation has been prepared under the direction and in the presence of Dede Ghosh PA-C.  - Electronically Signed: Bindu Mixon 10/07/24       Provider Disclosure:  I agree with above History, Review of Systems, Physical exam and Plan.  I have reviewed the content of the documentation and have edited it as needed. I have personally performed the services documented here and the documentation accurately represents those services and the decisions I have made.      Electronically signed by:  All risks, benefits and alternatives were discussed with patient.  Patient is in agreement and understands the assessment and plan.  All questions were answered.  Sun Screen Education was given.   Return to Clinic annually or sooner as needed.   Dede Ghosh PA-C      ____________________________________________    CC: Derm Problem (FBSE  "- Hives creating irritation and itchiness around scalp and is spreading to the face; does not use soap, uses oils and vinegar to help with symptoms. Small bumps on upper lip. Hx of Athlete's foot and \"bubbling\" on top of R great toe. Had a focused area of irritation and itchiness behind neck for a month during the summer.)    HPI:  Ms. Jennifer Steiner is a(n) 74 year old female who presents today as a return patient for a [facility].     Patient reports her scalp is doing well. She had some hives appearing but they are starting to resolve on the back of her head. She notes increased itching. She notes white skin flaking off when she itches, and new eruptions showing elsewhere. Patient reports she develops blisters on her R big toe that will itch but have not spread to other toes or parts of her foot. She notes chronic hx of athlete's foot, and notes the treatment does not help the blistering. She uses tea tree oil and lemon oil to treat, with occasional thyme and oregano. She also gets acupuncture and note it is helpful with her pain, asthma, and allergies.    Patient is otherwise feeling well, without additional skin concerns.     Labs Reviewed:  N/A    Physical exam:  Vitals: LMP  (LMP Unknown)   GEN: This is a well developed, well-nourished female in no acute distress, in a pleasant mood.    SKIN: Full skin, which includes the head/face, both arms, chest, back, abdomen,both legs, genitalia and/or groin buttocks, digits and/or nails, was examined.  - no significant scale or erythema noted to scalp  - L upper medial arm 6 mm brown macule with beaded borders   - R dorsal great toe resolving pink crusted papule   - several nail plates thickened with yellow subungual debris  - There are dome shaped bright red papules on the head/neck, trunk, extremities.   - Multiple regular brown pigmented macules and papules are identified on the head/neck, trunk, extremities.   - Scattered brown macules on sun exposed areas.  - There are " waxy stuck on tan to brown papules on the head/neck, trunk, extremities.  - No other lesions of concern on areas examined.                     Medications:  Current Outpatient Medications   Medication Sig Dispense Refill     Cholecalciferol (VITAMIN D) 2000 UNIT tablet Take 1 tablet by mouth every morning       Digestive Enzymes CAPS daily Use as directed       DiphenhydrAMINE HCl (BENADRYL PO) Take by mouth daily as needed       EPINEPHrine (EPIPEN) 0.3 MG/0.3ML injection 2-pack Inject 0.3 mLs (0.3 mg) into the muscle once as needed for anaphylaxis 0.3 mL 1     estradiol (VAGIFEM) 10 MCG TABS vaginal tablet Place 10 mcg vaginally twice a week       Hypromellose (ARTIFICIAL TEARS OP) Apply 1 drop to eye daily as needed       ketoconazole (NIZORAL) 2 % external cream Apply topically daily as needed for itching To feet and in between the toes 60 g 3     lactulose (CEPHULAC) 10 GM packet Take 1/3 packet daily, mix in water and drink or sprinkle on food 10 packet 1     lactulose (CHRONULAC) 10 GM/15ML solution Take 15 mLs (10 g) by mouth 2 times daily 237 mL 3     MAGNESIUM OXIDE PO Take 6-8 tablets by mouth every evening       neomycin-polymyxin-dexAMETHasone (MAXITROL) 3.5-16402-3.1 ophthalmic ointment Place 0.1429 Applications (0.5 g) into both eyes at bedtime 0.5 inch strip each eye at night as needed for irritation 3.5 g 5     neomycin-polymyxin-dexAMETHasone (MAXITROL) 3.5-21406-4.1 ophthalmic ointment Place 0.1429 Applications (0.5 g) into both eyes At Bedtime 3.5 g 0     neomycin-polymyxin-dexamethasone (MAXITROL) 3.5-16918-5.1 SUSP ophthalmic susp Place 1-2 drops into both eyes At Bedtime 5 mL 3     peppermint oil liquid 1 mL daily as needed for other Taking gel tabs not oil, not in EMR.       Probiotic Product (ALOE 68747 & PROBIOTICS) CAPS Take by mouth every morning       progesterone (PROMETRIUM) 200 MG capsule Take 1 capsule (200 mg) by mouth every evening       Testosterone Propionate (FIRST-TESTOSTERONE  MC) 2 % CREA Place onto the skin three times a week       thyroid (ARMOUR) 60 MG tablet Take 60 mg by mouth every morning        No current facility-administered medications for this visit.      Past Medical History:   Patient Active Problem List   Diagnosis     Transient cerebral ischemia     Carotid artery dissection (H)     Irritable bowel syndrome with constipation     Screen for colon cancer     Visit for screening mammogram     Encounter for routine gynecological examination     Hypothyroidism     CARDIOVASCULAR SCREENING; LDL GOAL LESS THAN 130     Rash and other nonspecific skin eruption     High cholesterol     Vaginal atrophy     Blepharitis     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     High-tone pelvic floor dysfunction in female     Myalgia of pelvic floor     Other constipation     Abnormal defecation     Chronic abdominal pain     Backache     Chronic fatigue syndrome     Food intolerance     Generalized pain     Hemorrhoids     Major depressive disorder, recurrent episode (H)     Symptomatic menopausal or female climacteric states     History of hematuria     Mixed type age-related cataract, both eyes     Lower urinary tract symptoms (LUTS)     Spinal stenosis of lumbar region with neurogenic claudication     Lumbar spondylosis     Radicular pain of lumbosacral region     Meibomian gland dysfunction (MGD), bilateral, both upper and lower lids     History of abdominoplasty     Urgency incontinence     Past Medical History:   Diagnosis Date     Anemia      Asthma     since childhood     Blepharitis      Constipation, chronic     Chronic enema use     CVA (cerebral infarction) 2009    carotid intimal repair, left     Hyperlipidemia age 58    identified age 58     Hypothyroidism      Irritable bowel disease      Mixed type age-related cataract, both eyes      Neck injuries      Pelvic floor dysfunction 2010     Postmenopausal hormone replacement therapy     From   [place]     Scoliosis        CC No referring provider defined for this encounter. on close of this encounter.     Lidocaine-epinephrine 1-1:041626 % injection   1.6mL once for one use, starting 10/7/2024 ending 10/7/2024,  2mL disp, R-0, injection  Injected by ARYAN Mathews      Again, thank you for allowing me to participate in the care of your patient.      Sincerely,    Dede Ghosh PA-C

## 2024-10-07 NOTE — PROGRESS NOTES
Lidocaine-epinephrine 1-1:883707 % injection   1.6mL once for one use, starting 10/7/2024 ending 10/7/2024,  2mL disp, R-0, injection  Injected by ARYAN Mathews

## 2024-10-07 NOTE — NURSING NOTE
"Dermatology Rooming Note    Jennifer Steiner's goals for this visit include:   Chief Complaint   Patient presents with    Derm Problem     FBSE - Hives creating irritation and itchiness around scalp and is spreading to the face; does not use soap, uses oils and vinegar to help with symptoms. Small bumps on upper lip. Hx of Athlete's foot and \"bubbling\" on top of R great toe. Had a focused area of irritation and itchiness behind neck for a month during the summer.     Samaria Ivory, EMT    "

## 2024-10-07 NOTE — PATIENT INSTRUCTIONS
Start vinegar soaks at least once daily, for 10-15 minutes. Soak feet in 50 % vinegar and 50% water   Checking for Skin Cancer  You can help find cancer early by checking your skin each month. There are 3 main kinds of skin cancer: melanoma, basal cell carcinoma, and squamous cell carcinoma. Doing monthly skin checks is the best way to find new marks, sores, or skin changes. Follow these instructions for checking your skin.   The ABCDEs of checking moles for melanoma   Check your moles or growths for signs of melanoma using ABCDE:   Asymmetry: The sides of the mole or growth don t match.  Border: The edges are ragged, notched, or blurred.  Color: The color within the mole or growth varies. It could be black, brown, tan, white, or shades of red, gray, or blue.  Diameter: The mole or growth is larger than   inch or 6 mm (size of a pencil eraser).  Evolving: The size, shape, texture, or color of the mole or growth is changing.     ABCDE's of moles on light skin.        ABCDE's of moles on dark skin may be harder to identify.     Checking for other types of skin cancer  Basal cell carcinoma or squamous cell carcinoma cause symptoms like:     A spot or mole that looks different from all other marks on your skin  Changes in how an area feels, such as itching, tenderness, or pain  Changes in the skin's surface, such as oozing, bleeding, or scaliness  A sore that doesn't heal  New swelling, redness, or spread of color beyond the border of a mole    Who s at risk?  Anyone of any skin color can get skin cancer. But you're at greater risk if you have:   Fair skin that freckles easily and burns instead of tanning  Light-colored or red hair  Light-colored eyes  Many moles or abnormal moles on your skin  A long history of unprotected exposure to sunlight or tanning beds  A history of many blistering sunburns as a child or teen  A family history of skin cancer  Been exposed to radiation or chemicals  A weakened immune  system  Been exposed to arsenic  If you've had skin cancer in the past, you're at high risk of having it again.   How to check your skin  Do your monthly skin checkups in front of a full-length mirror. Use a room with good lighting so it's easier to see. Use a hand mirror to look at hard-to-see places like your buttocks and back. You can also have a trusted friend or family member help you with these checks. Check every part of your body, including your:   Head (ears, face, neck, and scalp)  Torso (front, back, sides, and under breasts)  Arms (tops, undersides, and armpits)  Hands (palms, backs, and fingers, including under the nails)  Lower back, buttocks, and genitals  Legs (front, back, and sides)  Feet (tops, soles, toes, including under the nails, and between toes)  Watch for new spots on your skin or a spot that's changing in color, shape, size.   If you have a lot of moles, take digital photos of them each month. Make sure to take photos both up close and from a distance. These can help you see if any moles change over time.   Know your skin  Most skin changes aren't cancer. But if you see any changes in your skin, call your healthcare provider right away. Only they can tell you if a change is a problem. If you have skin cancer, seeing your provider can be the first step to getting the treatment that could save your life.   [employer] last reviewed this educational content on 10/1/2021    6205-8075 [employer]. All rights reserved. This information is not intended as a substitute for professional medical care. Always follow your healthcare professional's instructions.     Wound Care After a Biopsy    What is a skin biopsy?  A skin biopsy allows the doctor to examine a very small piece of tissue under the microscope to determine the diagnosis and the best treatment for the skin condition. A local anesthetic (numbing medicine) is injected with a very small needle into the skin area to be tested. A  small piece of skin is taken from the area. Sometimes a suture (stitch) is used.     What are the risks of a skin biopsy?  I will experience scar, bleeding, swelling, pain, crusting and redness. I may experience incomplete removal or recurrence. Risks of this procedure are excessive bleeding, bruising, infection, nerve damage, numbness, thick (hypertrophic or keloidal) scar and non-diagnostic biopsy.    How should I care for my wound for the first 24 hours?  Keep the wound dry and covered for 24 hours  If it bleeds, hold direct pressure on the area for 15 minutes. If bleeding does not stop, call us or go to the emergency room  Avoid strenuous exercise the first 1-2 days or as your doctor instructs you    How should I care for the wound after 24 hours?  After 24 hours, remove the bandage  You may bathe or shower as normal  If you had a scalp biopsy, you can shampoo as usual and can use shower water to clean the biopsy site daily  Clean the wound once a day with gentle soap and water  Do not scrub, be gentle  Apply white petroleum/Vaseline after cleaning the wound with a cotton swab or a clean finger, and keep the site covered with a Bandaid /bandage. Bandages are not necessary with a scalp biopsy  If you are unable to cover the site with a Bandaid /bandage, re-apply ointment 2-3 times a day to keep the site moist. Moisture will help with healing  Avoid strenuous activity for first 1-2 days  Avoid lakes, rivers, pools, and oceans until the stitches are removed or the site is healed    How do I clean my wound?  Wash hands thoroughly with soap or use hand  before all wound care  Clean the wound with gentle soap and water  Apply white petroleum/Vaseline  to wound after it is clean  Replace the Bandaid /bandage to keep the wound covered for the first few days or as instructed by your doctor  If you had a scalp biopsy, warm shower water to the area on a daily basis should suffice    What should I use to clean my  wound?   Cotton-tipped applicators (Qtips )  White petroleum jelly (Vaseline ). Use a clean new container and use Q-tips to apply.  Bandaids  as needed  Gentle soap     How should I care for my wound long term?  Do not get your wound dirty  Keep up with wound care for one week or until the area is healed.  A small scab will form and fall off by itself when the area is completely healed. The area will be red and will become pink in color as it heals. Sun protection is very important for how your scar will turn out. Sunscreen with an SPF 30 or greater is recommended once the area is healed.  You should have some soreness but it should be mild and slowly go away over several days. Talk to your doctor about using tylenol for pain,    When should I call my doctor?  If you have increased:   Pain or swelling  Pus or drainage (clear or slightly yellow drainage is ok)  Temperature over 100F  Spreading redness or warmth around wound    When will I hear about my results?  The biopsy results can take 2 weeks to come back.  Your results will automatically release to [employer] before your provider has even reviewed them.  The clinic will call you with the results, send you a [employer] message, or have you schedule a follow-up clinic or phone time to discuss the results.  Contact our clinics if you do not hear from us in 2 weeks.    Who should I call with questions?  [facility]: [phone]  [facility]: [phone]  For urgent needs outside of business hours call the [facility] at [phone] and ask for the dermatology resident on call

## 2024-10-15 ENCOUNTER — TELEPHONE (OUTPATIENT)
Dept: DERMATOLOGY | Facility: CLINIC | Age: 74
End: 2024-10-15
Payer: MEDICARE

## 2024-10-15 DIAGNOSIS — D48.5 ATYPICAL INTRAEPIDERMAL MELANOCYTIC PROLIFERATION: Primary | ICD-10-CM

## 2024-10-15 NOTE — TELEPHONE ENCOUNTER
[facility] Call Center    Phone Message    May a detailed message be left on voicemail: yes     Reason for Call: Patient returning call for path results - please call back [phone] Thank you    Action Taken: Message routed to:  [facility] Derm    Travel Screening: Not Applicable     Date of Service:

## 2024-10-16 ENCOUNTER — TELEPHONE (OUTPATIENT)
Dept: DERMATOLOGY | Facility: CLINIC | Age: 74
End: 2024-10-16
Payer: MEDICARE

## 2024-10-16 NOTE — TELEPHONE ENCOUNTER
Called patient to schedule surgery with Dr. White    Date of Surgery: 12/16    Surgery type: Mohs    Consult scheduled: No    Has patient had mohs with us before? Not Applicable    Additional comments:     Pt would like to speak with someone from Dede's team about the details of what the atypical nevus is. She also asked if she should have more spots checked with Dede and what time frame she should do that in.     Please give her a call.       Cata Helms on 10/16/2024 at 10:32 AM

## 2024-10-17 NOTE — TELEPHONE ENCOUNTER
# Scalp pruritus, dermal hypersensitivity reaction vs urticarial dermatitis, not controlled.  - Patient notes how stress affects this condition as well as the fluctuating changes in her life.  - Okay to rinse the scalp with water and apple cider vinegar.   - Continue using a mild soap without scent.  - Continue antihistamines as needed.  - Consider keeping a diary to record what is and is not working.

## 2024-10-17 NOTE — TELEPHONE ENCOUNTER
Kaila Jeffers  You1 minute ago (11:43 AM)     CB  You can double book her at 9:15 on 10/28 but we would only be discussing her scalp      Patient is in the lateral left side position.   The body was positioned using the following devices: wedge.  Procedure performed on a bed/cart.Side rails up, staff at bedside. Position verified by Dr. Winters and Anesthesiologist

## 2024-10-17 NOTE — TELEPHONE ENCOUNTER
[facility] Call Center    Phone Message    May a detailed message be left on voicemail: no     Reason for Call: Other: Pt, Jennifer/ Spot on other arm: Right Arm/Check Head/ Hx of Skin Cancer: Mother/ Brother- nothing until July. [phone]     Action Taken: Message routed to:  [facility] DERM    Travel Screening: Not Applicable     Date of Service:

## 2024-10-17 NOTE — TELEPHONE ENCOUNTER
Called pt to offer appt 10/28 at 9:15am per Kaila. No answer, left VM.    Kelli GRANT, RN  Dermatology Surgery  [phone]

## 2024-10-17 NOTE — TELEPHONE ENCOUNTER
"Called pt to discuss below message. Pt reports she has another spot on her right arm that she would like looked at. Educated pt that when she is here for her excision that we can have our dermatology surgery team look at that spot. Pt also stated that she would like her head to be checked more thoroughly-she states that at her last visit it was \"quickly\" checked and she is worried that something may be missed. She states she has areas on her head that are itchy and bleed. Pt scheduled for follow up with Dede Ghosh PA-C in July, but would like to be seen sooner to discuss scalp concerns.Routing to her nurse to see if there may be a sooner opening.     Kelli GRANT RN  Dermatology Surgery  [phone]    "

## 2024-10-18 NOTE — TELEPHONE ENCOUNTER
I am happy to see her at that 11/6 date and talk about the biopsy and the need for excision. I can look over her scalp as well. I would do another full body skin check in 6 months but she had very recently did not see anything else of concern.    Thanks,  Dede Ghosh PA-C

## 2024-10-18 NOTE — TELEPHONE ENCOUNTER
Kaila Jeffers  You18 hours ago (2:26 PM)     CB  11/6 at 12 is the only other thing I have until next year

## 2024-10-22 ENCOUNTER — TELEPHONE (OUTPATIENT)
Dept: DERMATOLOGY | Facility: CLINIC | Age: 74
End: 2024-10-22
Payer: MEDICARE

## 2024-10-22 NOTE — TELEPHONE ENCOUNTER
10/22 Patient confirmed scheduled appointment:  Date: 11/20/2024  Time: 9:15 am  Visit type: Return Dermatology  Provider: Davina  Location: [facility]  Testing/imaging: n/a  Additional notes: n/a

## 2024-10-29 ENCOUNTER — TELEPHONE (OUTPATIENT)
Dept: DERMATOLOGY | Facility: CLINIC | Age: 74
End: 2024-10-29
Payer: MEDICARE

## 2024-10-29 NOTE — TELEPHONE ENCOUNTER
I spoke with sabrina and get over her biopsy results and explained what a excision is. Pt transferred to [facility] to discuss scheduling a consult due to have additional questions.    PALLAVI Coronado - Dermatology

## 2024-10-29 NOTE — TELEPHONE ENCOUNTER
[facility] Call Center    Phone Message    May a detailed message be left on voicemail: yes     Reason for Call: Other: Pt would like to discuss here skin cancer diagnosis. She would like to know what type of skin cancer it is.     Please call back at [phone]. Thank you.     Action Taken: Message routed to:  [facility] Derm    Travel Screening: Not Applicable     Date of Service:

## 2024-11-13 ENCOUNTER — THERAPY VISIT (OUTPATIENT)
Dept: PHYSICAL THERAPY | Facility: CLINIC | Age: 74
End: 2024-11-13
Payer: MEDICARE

## 2024-11-13 DIAGNOSIS — Z98.890 HISTORY OF ABDOMINOPLASTY: ICD-10-CM

## 2024-11-13 DIAGNOSIS — R39.9 LOWER URINARY TRACT SYMPTOMS (LUTS): ICD-10-CM

## 2024-11-13 DIAGNOSIS — R19.8 ABNORMAL DEFECATION: ICD-10-CM

## 2024-11-13 DIAGNOSIS — M62.89 HIGH-TONE PELVIC FLOOR DYSFUNCTION IN FEMALE: Primary | ICD-10-CM

## 2024-11-13 DIAGNOSIS — N39.41 URGENCY INCONTINENCE: ICD-10-CM

## 2024-11-13 DIAGNOSIS — M79.18 MYALGIA OF PELVIC FLOOR: ICD-10-CM

## 2024-11-13 PROCEDURE — 97140 MANUAL THERAPY 1/> REGIONS: CPT | Mod: GP

## 2024-11-13 PROCEDURE — 97530 THERAPEUTIC ACTIVITIES: CPT | Mod: GP
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
## 2024-11-18 NOTE — PROGRESS NOTES
"I have reviewed and updated the patient's Past Medical History, Social History, Family History and Medication List.    ALLERGIES  Aspirin, Bee venom, Birds [feathers], Cats, Cephalosporins, Dust mites, Fluoride, Liothyronine, Mold, No clinical screening - see comments, Procaine, Seasonal allergies, Simvastatin, Penicillins, and Sulfa antibiotics    Spine Surgery Follow Up    REFERRING PHYSICIAN: Milan Alvarez   PRIMARY CARE PHYSICIAN: Milan Alvarez           Chief Complaint:   RECHECK (Chronic bilateral low back pain w left-sided sciatica/Spinal stenosis of lumbar region w neurogenic claudication /)      History of Present Illness:  Symptom Profile Including: location of symptoms, onset, severity, exacerbating/alleviating factors, previous treatments:        Jennifer Steiner is a 74 year old female who presents today for routine  follow up.Last seen in clinic 6/13/23 for evaluation of left low back/ leg pain. Diagnosis was L4-5 degenerative spondylolisthesis and central stenosis with left neurogenic claudication symptoms AND Osteoporosis (most negative T-score -4.1; not currently on medications to treat). At that time, patient was not interested in pursuing surgery, so advised continue with comprehensive non-operative treatment with [facility] (PT, injections, medications) . Also recommended initation of osteoporosis treatment with PCP.    Today, she returns to review symptoms, go over plan made by [facility], and discuss osteoporosis.  She tells me today that [facility] pain clinic has recommended \"mild\" decompression procedure and \"Minuteman\" interspinous process device application for her condition; she is wondering what I think of these procedures.  She continues to deal with low back pain and left leg symptoms.  Her main complaint is left buttock pain that is worse when she is sitting.  She feels that she injured something in this buttock area when she had her injury.  Continues to have pain that does radiate down the " left lateral thigh and into the lateral calf; similar to previously.  No new red flag symptoms compared to last year.    We also discussed her osteoporosis management.  She reports her osteoporosis is likely result of being overmedicated for her Hashimoto's thyroiditis many years ago.  She also has multiple GI issues, I cannot take any antibiotic or pain medications due to severity of her GI problems.  She has a digestive enzyme issue as well.  She recalls being seen by endocrinology here in the past, but did not think the medications (bisphosphonate) would be good for her GI issues.  Per chart review, last time she was seen by endocrinology was in 2014.      TAMEKA Scores:  Oswestry (TAMEKA) Questionnaire        11/19/2024    12:23 PM   OSWESTRY DISABILITY INDEX   Count 9   Sum 24   Oswestry Score (%) 53.33 %             Physical Exam:    Constitutional - Patient is healthy, well-nourished and appears stated age   Respiratory - Patient is breathing normally and in no respiratory distress.   Skin - No suspicious rashes or lesions.   Psychiatric - Normal mood and affect.   Cardiovascular - Extremities warm and well perfused.   Eyes - Visual acuity is normal to the written word.   ENT - Hearing intact to the spoken word.   GI - No abdominal distention.   Musculoskeletal - Non-antalgic gait without use of assistive devices. Localizes pain distal to the left PSIS. mildly tender to bilateral PSIS.  Tender along course of left piriformis muscle.  Nontender to palpation midline and paraspinals lumbar spine.  Seated piriformis stretch test on the left reproduces usual left buttock pain.         Imaging:   I ordered and independently reviewed new radiographs at this clinic visit. The results were discussed with the patient.  Findings include:    11/19/2024 EOS full body standing AP/lateral view x-rays: No significant leg length discrepancy.  No significant hip degenerative changes.  Mild degenerative scoliosis.  Right greater than  "left asymmetric disc collapse at L4-5, stable compared to previous lumbar XR.  Stable appearance of known L4-5 grade 1 spondylolisthesis.  Thoracic hyperkyphosis, 78.5 degrees.             Assessment and Plan:   Assessment:  74 year old female with  1. L4-5 degenerative spondylolisthesis and central stenosis with left low back/ leg pain symptoms  2. Left piriformis pain  3. Osteoporosis (most negative T-score -4.1; not currently on medications to treat)      Plan:  Reviewed today's XR images with patient which again show lumbar degenerative changes, degenerative spondylolisthesis at L4-5.  We again reviewed nonoperative and operative treatment options for this.  It is reasonable to go forward with options as recommended by [facility] if she is interested.  Reviewed that I am not familiar with this \"mild\" procedure, so I cannot inform her on risks/benefits or expected outcomes.  My concern with the Minuteman device would be that with her untreated osteoporosis that it may not provide long-term relief of symptoms.  She remains hesitant of the idea of fusion surgery as previously discussed.  Okay to continue with nonoperative management, including TENS unit as recommended by [facility] pain.  We can follow-up in 6 months to see how she is doing.    On exam today, some of her usual left buttock symptoms were reproduced with seated piriformis stretch test which indicates that the piriformis muscle is at least part of her pain problem.  For this issue, I would recommend physical therapy, and if that is not successful we could order a piriformis steroid injection.  Long discussion today about importance of osteoporosis management.  She does have complicated history of GI issues, possible absorptive difficulties, reported history of overtreatment for Hashimoto's thyroiditis.  She has not been seen by our endocrinology team since 2014, and I would recommend reestablishing with them to go over the osteoporosis treatment " options available for her.    - PT for piriformis stretching exercises  - May try piriformis injection if no improvement in left buttock pain after PT  - Endocrinology referral for osteoporosis  - ordered TENs unit and cane per patient request  - follow up in 6 months for recheck      Respectfully,  Tierra Messina (irwin Catalan), DULCE  Orthopaedic Spine Surgery  Dept Orthopaedic Surgery, [facility]  [facility] [place]: [phone]    >35 minutes spent on the date of the encounter doing chart review, review of outside records, review of test results, my own interpretation of tests, documentation, patient history, physical exam & discussion of plan with patient and family.    Dictation Disclaimer: Some of this Note has been completed with voice-recognition dictation software. Although errors are generally corrected real-time, there is the potential for a rare error to be present in the completed chart.    DME (Durable Medical Equipment) Orders and Documentation  Orders Placed This Encounter   Procedures    Tens Unit/Supplies Order for DME - ONLY FOR DME    Cane Order      The patient was assessed and it was determined the patient is in need of the following listed DME Supplies/Equipment. Please complete supporting documentation below to demonstrate medical necessity.      TENS Unit/Supplies Documentation  The patient needs a TENS unit for the treatment of: Chronic Low Back pain (CLBP) TENS Unit for Chronic Low Back Pain (CLBP)   The patient is enrolled in an approved clinical study that meets all of the requirements set out in NCD  160.27:no

## 2024-11-19 ENCOUNTER — OFFICE VISIT (OUTPATIENT)
Dept: ORTHOPEDICS | Facility: CLINIC | Age: 74
End: 2024-11-19
Payer: MEDICARE

## 2024-11-19 ENCOUNTER — ANCILLARY PROCEDURE (OUTPATIENT)
Dept: GENERAL RADIOLOGY | Facility: CLINIC | Age: 74
End: 2024-11-19
Attending: PHYSICIAN ASSISTANT
Payer: MEDICARE

## 2024-11-19 DIAGNOSIS — M54.42 CHRONIC BILATERAL LOW BACK PAIN WITH LEFT-SIDED SCIATICA: ICD-10-CM

## 2024-11-19 DIAGNOSIS — M81.0 AGE-RELATED OSTEOPOROSIS WITHOUT CURRENT PATHOLOGICAL FRACTURE: ICD-10-CM

## 2024-11-19 DIAGNOSIS — G57.02 PIRIFORMIS SYNDROME OF LEFT SIDE: ICD-10-CM

## 2024-11-19 DIAGNOSIS — G89.29 CHRONIC BILATERAL LOW BACK PAIN WITH LEFT-SIDED SCIATICA: ICD-10-CM

## 2024-11-19 DIAGNOSIS — M48.062 LUMBAR STENOSIS WITH NEUROGENIC CLAUDICATION: Primary | ICD-10-CM

## 2024-11-19 DIAGNOSIS — M43.10 DEGENERATIVE SPONDYLOLISTHESIS: ICD-10-CM

## 2024-11-19 PROCEDURE — 99214 OFFICE O/P EST MOD 30 MIN: CPT | Performed by: PHYSICIAN ASSISTANT

## 2024-11-19 NOTE — LETTER
"11/19/2024      Jennifer Steiner  [address]  [address]  [place] [address]      Dear Colleague,    Thank you for referring your patient, Jennifer Steiner, to the [facility] [place]. Please see a copy of my visit note below.    I have reviewed and updated the patient's Past Medical History, Social History, Family History and Medication List.    ALLERGIES  Aspirin, Bee venom, Birds [feathers], Cats, Cephalosporins, Dust mites, Fluoride, Liothyronine, Mold, No clinical screening - see comments, Procaine, Seasonal allergies, Simvastatin, Penicillins, and Sulfa antibiotics    Spine Surgery Follow Up    REFERRING PHYSICIAN: Milan Alvarez   PRIMARY CARE PHYSICIAN: Milan Alvarez           Chief Complaint:   RECHECK (Chronic bilateral low back pain w left-sided sciatica/Spinal stenosis of lumbar region w neurogenic claudication /)      History of Present Illness:  Symptom Profile Including: location of symptoms, onset, severity, exacerbating/alleviating factors, previous treatments:        Jennifer Steiner is a 74 year old female who presents today for routine  follow up.Last seen in clinic 6/13/23 for evaluation of left low back/ leg pain. Diagnosis was L4-5 degenerative spondylolisthesis and central stenosis with left neurogenic claudication symptoms AND Osteoporosis (most negative T-score -4.1; not currently on medications to treat). At that time, patient was not interested in pursuing surgery, so advised continue with comprehensive non-operative treatment with [facility] (PT, injections, medications) . Also recommended initation of osteoporosis treatment with PCP.    Today, she returns to review symptoms, go over plan made by [facility], and discuss osteoporosis.  She tells me today that [facility] pain clinic has recommended \"mild\" decompression procedure and \"Minuteman\" interspinous process device application for her condition; she is wondering what I think of these procedures.  She " continues to deal with low back pain and left leg symptoms.  Her main complaint is left buttock pain that is worse when she is sitting.  She feels that she injured something in this buttock area when she had her injury.  Continues to have pain that does radiate down the left lateral thigh and into the lateral calf; similar to previously.  No new red flag symptoms compared to last year.    We also discussed her osteoporosis management.  She reports her osteoporosis is likely result of being overmedicated for her Hashimoto's thyroiditis many years ago.  She also has multiple GI issues, I cannot take any antibiotic or pain medications due to severity of her GI problems.  She has a digestive enzyme issue as well.  She recalls being seen by endocrinology here in the past, but did not think the medications (bisphosphonate) would be good for her GI issues.  Per chart review, last time she was seen by endocrinology was in 2014.      TAMEKA Scores:  Oswestry (TAMEKA) Questionnaire        11/19/2024    12:23 PM   OSWESTRY DISABILITY INDEX   Count 9   Sum 24   Oswestry Score (%) 53.33 %             Physical Exam:    Constitutional - Patient is healthy, well-nourished and appears stated age   Respiratory - Patient is breathing normally and in no respiratory distress.   Skin - No suspicious rashes or lesions.   Psychiatric - Normal mood and affect.   Cardiovascular - Extremities warm and well perfused.   Eyes - Visual acuity is normal to the written word.   ENT - Hearing intact to the spoken word.   GI - No abdominal distention.   Musculoskeletal - Non-antalgic gait without use of assistive devices. Localizes pain distal to the left PSIS. mildly tender to bilateral PSIS.  Tender along course of left piriformis muscle.  Nontender to palpation midline and paraspinals lumbar spine.  Seated piriformis stretch test on the left reproduces usual left buttock pain.         Imaging:   I ordered and independently reviewed new radiographs at  "this clinic visit. The results were discussed with the patient.  Findings include:    11/19/2024 EOS full body standing AP/lateral view x-rays: No significant leg length discrepancy.  No significant hip degenerative changes.  Mild degenerative scoliosis.  Right greater than left asymmetric disc collapse at L4-5, stable compared to previous lumbar XR.  Stable appearance of known L4-5 grade 1 spondylolisthesis.  Thoracic hyperkyphosis, 78.5 degrees.             Assessment and Plan:   Assessment:  74 year old female with  1. L4-5 degenerative spondylolisthesis and central stenosis with left low back/ leg pain symptoms  2. Left piriformis pain  3. Osteoporosis (most negative T-score -4.1; not currently on medications to treat)      Plan:  Reviewed today's XR images with patient which again show lumbar degenerative changes, degenerative spondylolisthesis at L4-5.  We again reviewed nonoperative and operative treatment options for this.  It is reasonable to go forward with options as recommended by [facility] if she is interested.  Reviewed that I am not familiar with this \"mild\" procedure, so I cannot inform her on risks/benefits or expected outcomes.  My concern with the Minuteman device would be that with her untreated osteoporosis that it may not provide long-term relief of symptoms.  She remains hesitant of the idea of fusion surgery as previously discussed.  Okay to continue with nonoperative management, including TENS unit as recommended by [facility].  We can follow-up in 6 months to see how she is doing.    On exam today, some of her usual left buttock symptoms were reproduced with seated piriformis stretch test which indicates that the piriformis muscle is at least part of her pain problem.  For this issue, I would recommend physical therapy, and if that is not successful we could order a piriformis steroid injection.  Long discussion today about importance of osteoporosis management.  She does have " complicated history of GI issues, possible absorptive difficulties, reported history of overtreatment for Hashimoto's thyroiditis.  She has not been seen by our endocrinology team since 2014, and I would recommend reestablishing with them to go over the osteoporosis treatment options available for her.    - PT for piriformis stretching exercises  - May try piriformis injection if no improvement in left buttock pain after PT  - Endocrinology referral for osteoporosis  - ordered TENs unit and cane per patient request  - follow up in 6 months for recheck      Respectfully,  Tierra Messina (irwin Catalan), DULCE  Orthopaedic Spine Surgery  Dept Orthopaedic Surgery, [facility]  [facility] [place]: [phone]    >35 minutes spent on the date of the encounter doing chart review, review of outside records, review of test results, my own interpretation of tests, documentation, patient history, physical exam & discussion of plan with patient and family.    Dictation Disclaimer: Some of this Note has been completed with voice-recognition dictation software. Although errors are generally corrected real-time, there is the potential for a rare error to be present in the completed chart.    DME (Durable Medical Equipment) Orders and Documentation  Orders Placed This Encounter   Procedures     Tens Unit/Supplies Order for DME - ONLY FOR DME     Cane Order      The patient was assessed and it was determined the patient is in need of the following listed DME Supplies/Equipment. Please complete supporting documentation below to demonstrate medical necessity.      TENS Unit/Supplies Documentation  The patient needs a TENS unit for the treatment of: Chronic Low Back pain (CLBP) TENS Unit for Chronic Low Back Pain (CLBP)   The patient is enrolled in an approved clinical study that meets all of the requirements set out in NCD  160.27:no           Again, thank you for allowing me to participate in the care of your patient.         Sincerely,        Tierra Messina PA-C

## 2024-11-19 NOTE — PATIENT INSTRUCTIONS
Plan:  - PT for piriformis stretching exercises  - May try piriformis injection if no improvement in left buttock pain after PT    - Endocrinology referral for osteoporosis    - ordered TENs unit and cane  - follow up in 6 months for recheck

## 2024-11-20 ENCOUNTER — OFFICE VISIT (OUTPATIENT)
Dept: DERMATOLOGY | Facility: CLINIC | Age: 74
End: 2024-11-20
Payer: MEDICARE

## 2024-11-20 DIAGNOSIS — D48.5 ATYPICAL INTRAEPIDERMAL MELANOCYTIC PROLIFERATION: Primary | ICD-10-CM

## 2024-11-20 DIAGNOSIS — L29.9 SCALP PRURITUS: ICD-10-CM

## 2024-11-20 ASSESSMENT — PAIN SCALES - GENERAL: PAINLEVEL_OUTOF10: NO PAIN (0)

## 2024-11-20 NOTE — TELEPHONE ENCOUNTER
FUTURE VISIT INFORMATION      FUTURE VISIT INFORMATION:  Date: 12.2.24  Time: 2:30  Location: [facility]  REFERRAL INFORMATION:  Referring provider:  Blake  Referring providers clinic:  Derm  Reason for visit/diagnosis  Skin, L upper medial arm, shave: Atypical junctional melanocytic proliferation      RECORDS REQUESTED FROM:       Clinic name Comments Records Status Imaging Status   Derm 10.7.24  Path # [id number] Epic Epic

## 2024-11-20 NOTE — NURSING NOTE
Dermatology Rooming Note    Jennifer Steiner's goals for this visit include:   Chief Complaint   Patient presents with    Derm Problem     Scalp recheck, spot of concern on R arm     ARYAN Mathews

## 2024-11-20 NOTE — PROGRESS NOTES
[facility] Dermatology Note  Encounter Date: Nov 20, 2024  Office Visit     Dermatology Problem List:  #FBSE 10/07/2024  0. 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 Seborrheic dermatitis, scalp  2. Tinea pedis  - Current rx: vinegar soaks, ketoconazole 2% cream   3. Scalp pruritus  - s/p punch biopsy 10/11/21 resulting as sparse perivascular and perifollicular lymphohistiocytic infiltrate with rare eosinophils  - continue fluocinolone 0.01% BID  4. Hx of eczema  ____________________________________________    Assessment & Plan:     # Atypical junctional melanocytic proliferation on the L upper medial arm.   Pending consult and and excision (planned 12/16/24)  Discussed this is not cancer but want to eliminate risk of this changing to melanoma and recommend excision.    # Scalp pruritus, dermal hypersensitivity reaction vs urticarial dermatitis, chronic, defers intervention/(bx 2021)  -No concerning features today  - Patient notes how stress affects this condition as well as the fluctuating changes in her life.  - Okay to rinse the scalp with water and apple cider vinegar.   - Continue antihistamines as needed.  - Consider keeping a diary to record what is and is not working.     # Benign appearing nevus, right upper medial arm.  Reassurance given.        Procedures Performed:   None    Follow-up: 6-8 months for a skin check in-person, or earlier for new or changing lesions    Staff:     All risks, benefits and alternatives were discussed with patient.  Patient is in agreement and understands the assessment and plan.  All questions were answered.  Sun Screen Education was given.   Return to Clinic annually or sooner as needed.   Dede Ghosh PA-C      ____________________________________________    CC: Derm Problem (Scalp recheck)    HPI:  Ms. Jennifer Steiner is a(n) 74 year old female who presents today as a  return patient for questions about her upcoming excision and recheck her scalp. She was last seen 10/7/24 when she had a full body skin exam and had a biopsy on her left upper arm revealing an atypical junctional melanocytic proliferation.    She would like reassurance of her scalp exam as her mother had a large skin cancer on her scalp. She states it is itchy intermittently and flares when she is stressed. She avoid any products on her scalp except rinsing with water and dilute apple cider vinegar. She wonder about a mole on her right arm. She does not believe it has changed.     Patient is otherwise feeling well, without additional skin concerns.     Labs Reviewed:  N/A    Physical exam:  Vitals: LMP  (LMP Unknown)   GEN: This is a well developed, well-nourished female in no acute distress, in a pleasant mood.    SKIN: Focused exam of the scalp and right upper arm.   - no significant scale or erythema noted to scalp  Light brown 3 mm macule on the right upper medial arm.  - No other lesions of concern on areas examined.                     Medications:  Current Outpatient Medications   Medication Sig Dispense Refill    Cholecalciferol (VITAMIN D) 2000 UNIT tablet Take 1 tablet by mouth every morning      Digestive Enzymes CAPS daily Use as directed      DiphenhydrAMINE HCl (BENADRYL PO) Take by mouth daily as needed      EPINEPHrine (EPIPEN) 0.3 MG/0.3ML injection 2-pack Inject 0.3 mLs (0.3 mg) into the muscle once as needed for anaphylaxis 0.3 mL 1    estradiol (VAGIFEM) 10 MCG TABS vaginal tablet Place 10 mcg vaginally twice a week      Hypromellose (ARTIFICIAL TEARS OP) Apply 1 drop to eye daily as needed      ketoconazole (NIZORAL) 2 % external cream Apply topically daily as needed for itching To feet and in between the toes 60 g 3    lactulose (CEPHULAC) 10 GM packet Take 1/3 packet daily, mix in water and drink or sprinkle on food 10 packet 1    lactulose (CHRONULAC) 10 GM/15ML solution Take 15 mLs (10 g) by  mouth 2 times daily 237 mL 3    MAGNESIUM OXIDE PO Take 6-8 tablets by mouth every evening      neomycin-polymyxin-dexAMETHasone (MAXITROL) 3.5-92027-2.1 ophthalmic ointment Place 0.1429 Applications (0.5 g) into both eyes at bedtime 0.5 inch strip each eye at night as needed for irritation 3.5 g 5    neomycin-polymyxin-dexAMETHasone (MAXITROL) 3.5-94945-3.1 ophthalmic ointment Place 0.1429 Applications (0.5 g) into both eyes At Bedtime 3.5 g 0    neomycin-polymyxin-dexamethasone (MAXITROL) 3.5-87980-3.1 SUSP ophthalmic susp Place 1-2 drops into both eyes At Bedtime 5 mL 3    peppermint oil liquid 1 mL daily as needed for other Taking gel tabs not oil, not in EMR.      Probiotic Product (ALOE 44652 & PROBIOTICS) CAPS Take by mouth every morning      progesterone (PROMETRIUM) 200 MG capsule Take 1 capsule (200 mg) by mouth every evening      Testosterone Propionate (FIRST-TESTOSTERONE MC) 2 % CREA Place onto the skin three times a week      thyroid (ARMOUR) 60 MG tablet Take 60 mg by mouth every morning        No current facility-administered medications for this visit.      Past Medical History:   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    High-tone pelvic floor dysfunction in female    Myalgia of pelvic floor    Other constipation    Abnormal defecation    Chronic abdominal pain    Backache    Chronic fatigue syndrome    Food intolerance    Generalized pain    Hemorrhoids    Major depressive disorder, recurrent episode (H)    Symptomatic menopausal or female  climacteric states    History of hematuria    Mixed type age-related cataract, both eyes    Lower urinary tract symptoms (LUTS)    Spinal stenosis of lumbar region with neurogenic claudication    Lumbar spondylosis    Radicular pain of lumbosacral region    Meibomian gland dysfunction (MGD), bilateral, both upper and lower lids    History of abdominoplasty    Urgency incontinence     Past Medical History:   Diagnosis Date    Anemia     Asthma     since childhood    Blepharitis     Constipation, chronic     Chronic enema use    CVA (cerebral infarction) 2009    carotid intimal repair, left    Hyperlipidemia age 58    identified age 58    Hypothyroidism     Irritable bowel disease     Mixed type age-related cataract, both eyes     Neck injuries     Pelvic floor dysfunction 2010    Postmenopausal hormone replacement therapy     From Dr. Link    Scoliosis        CC No referring provider defined for this encounter. on close of this encounter.

## 2024-11-20 NOTE — PATIENT INSTRUCTIONS

## 2024-11-20 NOTE — LETTER
11/20/2024       RE: Jennifer Steiner  [address]  [address]  [place] [facility] [address]     Dear Colleague,    Thank you for referring your patient, Jennifer Steiner, to the [facility] DERMATOLOGY CLINIC [place] at [facility]. Please see a copy of my visit note below.    [facility] Dermatology Note  Encounter Date: Nov 20, 2024  Office Visit     Dermatology Problem List:  #FBSE 10/07/2024  0. 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 Seborrheic dermatitis, scalp  2. Tinea pedis  - Current rx: vinegar soaks, ketoconazole 2% cream   3. Scalp pruritus  - s/p punch biopsy 10/11/21 resulting as sparse perivascular and perifollicular lymphohistiocytic infiltrate with rare eosinophils  - continue fluocinolone 0.01% BID  4. Hx of eczema  ____________________________________________    Assessment & Plan:     # Atypical junctional melanocytic proliferation on the L upper medial arm.   Pending consult and and excision (planned 12/16/24)  Discussed this is not cancer but want to eliminate risk of this changing to melanoma and recommend excision.    # Scalp pruritus, dermal hypersensitivity reaction vs urticarial dermatitis, chronic, defers intervention/(bx 2021)  -No concerning features today  - Patient notes how stress affects this condition as well as the fluctuating changes in her life.  - Okay to rinse the scalp with water and apple cider vinegar.   - Continue antihistamines as needed.  - Consider keeping a diary to record what is and is not working.     # Benign appearing nevus, right upper medial arm.  Reassurance given.        Procedures Performed:   None    Follow-up: 6-8 months for a skin check in-person, or earlier for new or changing lesions    Staff:     All risks, benefits and alternatives were discussed with patient.  Patient is in agreement and  understands the assessment and plan.  All questions were answered.  Sun Screen Education was given.   Return to Clinic annually or sooner as needed.   Dede Ghosh PA-C      ____________________________________________    CC: Derm Problem (Scalp recheck)    HPI:  Ms. Jennifer Steiner is a(n) 74 year old female who presents today as a return patient for questions about her upcoming excision and recheck her scalp. She was last seen 10/7/24 when she had a full body skin exam and had a biopsy on her left upper arm revealing an atypical junctional melanocytic proliferation.    She would like reassurance of her scalp exam as her mother had a large skin cancer on her scalp. She states it is itchy intermittently and flares when she is stressed. She avoid any products on her scalp except rinsing with water and dilute apple cider vinegar. She wonder about a mole on her right arm. She does not believe it has changed.     Patient is otherwise feeling well, without additional skin concerns.     Labs Reviewed:  N/A    Physical exam:  Vitals: LMP  (LMP Unknown)   GEN: This is a well developed, well-nourished female in no acute distress, in a pleasant mood.    SKIN: Focused exam of the scalp and right upper arm.   - no significant scale or erythema noted to scalp  Light brown 3 mm macule on the right upper medial arm.  - No other lesions of concern on areas examined.                     Medications:  Current Outpatient Medications   Medication Sig Dispense Refill     Cholecalciferol (VITAMIN D) 2000 UNIT tablet Take 1 tablet by mouth every morning       Digestive Enzymes CAPS daily Use as directed       DiphenhydrAMINE HCl (BENADRYL PO) Take by mouth daily as needed       EPINEPHrine (EPIPEN) 0.3 MG/0.3ML injection 2-pack Inject 0.3 mLs (0.3 mg) into the muscle once as needed for anaphylaxis 0.3 mL 1     estradiol (VAGIFEM) 10 MCG TABS vaginal tablet Place 10 mcg vaginally twice a week       Hypromellose (ARTIFICIAL TEARS OP)  Apply 1 drop to eye daily as needed       ketoconazole (NIZORAL) 2 % external cream Apply topically daily as needed for itching To feet and in between the toes 60 g 3     lactulose (CEPHULAC) 10 GM packet Take 1/3 packet daily, mix in water and drink or sprinkle on food 10 packet 1     lactulose (CHRONULAC) 10 GM/15ML solution Take 15 mLs (10 g) by mouth 2 times daily 237 mL 3     MAGNESIUM OXIDE PO Take 6-8 tablets by mouth every evening       neomycin-polymyxin-dexAMETHasone (MAXITROL) 3.5-93551-9.1 ophthalmic ointment Place 0.1429 Applications (0.5 g) into both eyes at bedtime 0.5 inch strip each eye at night as needed for irritation 3.5 g 5     neomycin-polymyxin-dexAMETHasone (MAXITROL) 3.5-75919-8.1 ophthalmic ointment Place 0.1429 Applications (0.5 g) into both eyes At Bedtime 3.5 g 0     neomycin-polymyxin-dexamethasone (MAXITROL) 3.5-28838-7.1 SUSP ophthalmic susp Place 1-2 drops into both eyes At Bedtime 5 mL 3     peppermint oil liquid 1 mL daily as needed for other Taking gel tabs not oil, not in EMR.       Probiotic Product (ALOE 26022 & PROBIOTICS) CAPS Take by mouth every morning       progesterone (PROMETRIUM) 200 MG capsule Take 1 capsule (200 mg) by mouth every evening       Testosterone Propionate (FIRST-TESTOSTERONE MC) 2 % CREA Place onto the skin three times a week       thyroid (ARMOUR) 60 MG tablet Take 60 mg by mouth every morning        No current facility-administered medications for this visit.      Past Medical History:   Patient Active Problem List   Diagnosis     Transient cerebral ischemia     Carotid artery dissection (H)     Irritable bowel syndrome with constipation     Screen for colon cancer     Visit for screening mammogram     Encounter for routine gynecological examination     Hypothyroidism     CARDIOVASCULAR SCREENING; LDL GOAL LESS THAN 130     Rash and other nonspecific skin eruption     High cholesterol     Vaginal atrophy     Blepharitis     Pain disorder associated with  psychological factors and medical condition     High-tone pelvic floor dysfunction in female     Myalgia of pelvic floor     Other constipation     Abnormal defecation     Chronic abdominal pain     Backache     Chronic fatigue syndrome     Food intolerance     Generalized pain     Hemorrhoids     Major depressive disorder, recurrent episode (H)     Symptomatic menopausal or female climacteric states     History of hematuria     Mixed type age-related cataract, both eyes     Lower urinary tract symptoms (LUTS)     Spinal stenosis of lumbar region with neurogenic claudication     Lumbar spondylosis     Radicular pain of lumbosacral region     Meibomian gland dysfunction (MGD), bilateral, both upper and lower lids     History of abdominoplasty     Urgency incontinence     Past Medical History:   Diagnosis Date     Anemia      Asthma     since childhood     Blepharitis      Constipation, chronic     Chronic enema use     CVA (cerebral infarction) 2009    carotid intimal repair, left     Hyperlipidemia age 58    identified age 58     Hypothyroidism      Irritable bowel disease      Mixed type age-related cataract, both eyes      Neck injuries      Pelvic floor dysfunction 2010     Postmenopausal hormone replacement therapy     From Dr. Link     Scoliosis        CC No referring provider defined for this encounter. on close of this encounter.       Again, thank you for allowing me to participate in the care of your patient.      Sincerely,    Dede Ghosh PA-C

## 2024-11-25 ENCOUNTER — THERAPY VISIT (OUTPATIENT)
Dept: PHYSICAL THERAPY | Facility: CLINIC | Age: 74
End: 2024-11-25
Payer: MEDICARE

## 2024-11-25 DIAGNOSIS — R19.8 ABNORMAL DEFECATION: ICD-10-CM

## 2024-11-25 DIAGNOSIS — Z98.890 HISTORY OF ABDOMINOPLASTY: ICD-10-CM

## 2024-11-25 DIAGNOSIS — M62.89 HIGH-TONE PELVIC FLOOR DYSFUNCTION IN FEMALE: Primary | ICD-10-CM

## 2024-11-25 DIAGNOSIS — M79.18 MYALGIA OF PELVIC FLOOR: ICD-10-CM

## 2024-11-25 DIAGNOSIS — N39.41 URGENCY INCONTINENCE: ICD-10-CM

## 2024-11-25 DIAGNOSIS — R39.9 LOWER URINARY TRACT SYMPTOMS (LUTS): ICD-10-CM

## 2024-11-25 PROCEDURE — 97140 MANUAL THERAPY 1/> REGIONS: CPT | Mod: GP

## 2024-11-25 PROCEDURE — 97530 THERAPEUTIC ACTIVITIES: CPT | Mod: GP

## 2024-12-02 ENCOUNTER — PRE VISIT (OUTPATIENT)
Dept: DERMATOLOGY | Facility: CLINIC | Age: 74
End: 2024-12-02

## 2024-12-02 ENCOUNTER — OFFICE VISIT (OUTPATIENT)
Dept: DERMATOLOGY | Facility: CLINIC | Age: 74
End: 2024-12-02
Payer: MEDICARE

## 2024-12-02 DIAGNOSIS — D48.5 ATYPICAL INTRAEPIDERMAL MELANOCYTIC PROLIFERATION: Primary | ICD-10-CM

## 2024-12-02 ASSESSMENT — PAIN SCALES - GENERAL: PAINLEVEL_OUTOF10: NO PAIN (0)

## 2024-12-02 NOTE — LETTER
12/2/2024       RE: Jennifer Steiner  [address]  [address]  [facility] [address]     Dear Colleague,    Thank you for referring your patient, Jennifer Steiner, to the [facility] DERMATOLOGIC SURGERY CLINIC [place] [facility]. Please see a copy of my visit note below.    Dermatologic Surgery Consultation Note    Dermatology Problem List:  #FBSE 10/07/2024  1. Atypical junctional melanocytic proliferation, L medial upper arm, s/p shave bx 10/07/2024, pending excision 12/16/24  2. Seborrheic dermatitis, scalp  3. Tinea pedis  - Current tx: vinegar soaks, ketoconazole 2% cream   4. Scalp pruritus  - s/p punch biopsy 10/11/21 resulting as sparse perivascular and perifollicular lymphohistiocytic infiltrate with rare eosinophils  - continue fluocinolone 0.01% BID  5. Hx of eczema    CC: consult  (Left upper arm )      Subjective: Jennifer Steiner is a 74 year old female who presents today for Mohs micrographic surgery consultation for a recent diagnosis of skin cancer.  - Skin cancer(s):  Atypical junctional melanocytic proliferation  - Location(s): Left medial upper arm  - Pt's first excision.  - Today, she had concerns about her risk for cancer.  - no other concerns today       Objective: An exam of the left arm was performed today   - Hyperpigmented macule on the upper left arm.  - See photo from bx date 10/07/24 below.      Path report:   Case: [id number]  Date collected: 10/07/24  Final Diagnosis   A(1). Skin, L upper medial arm, shave:  - Atypical junctional melanocytic proliferation       Assessment and Plan:     1. Plan for excision for skin cancer(s) above:  *Review lab result(s): Dermpath report   - We discussed the nature of the diagnosis/condition above. We discussed the treatment options, including the risks benefits and expectations of these options. We recommend excision as the most effective and most tissue sparing option for treatment,  and the patient agrees to proceed with this.  The patient is aware of the risks, benefits and expectations of this procedure. The patient will be scheduled for this procedure, if not already done so.  - We anticipate the following closure type: intermediate closure    Patient was discussed with and evaluated by attending physician Dr. Jose White and Dr. Jessee Orozco, MDSO fellow, PGY5    Staff Involved:  Staff/Scribe/Fellow    Scribe Disclosure:   I, Irma [facility], am serving as a scribe; to document services personally performed by Jose White MD, based on data collection and the provider's statements to me.     Provider Disclosure:  I agree with the above history, review of systems, physical exam and plan.  I have reviewed the content of the documentation and have edited it as needed. I have personally performed the services documented here and the documentation accurately represents those services and the decisions I have made.      Electronically signed by:  Attending Attestation  I attest that the Fellow recorded the interview and exam that I personally performed.  I have reviewed the note and edited it as necessary.    Jose White M.D.  Professor  Director of Dermatologic Surgery  Department of Dermatology  [facility]           Again, thank you for allowing me to participate in the care of your patient.      Sincerely,    Jose White MD

## 2024-12-02 NOTE — PROGRESS NOTES
Dermatologic Surgery Consultation Note    Dermatology Problem List:  #FBSE 10/07/2024  1. Atypical junctional melanocytic proliferation, L medial upper arm, s/p shave bx 10/07/2024, pending excision 12/16/24  2. Seborrheic dermatitis, scalp  3. Tinea pedis  - Current tx: vinegar soaks, ketoconazole 2% cream   4. Scalp pruritus  - s/p punch biopsy 10/11/21 resulting as sparse perivascular and perifollicular lymphohistiocytic infiltrate with rare eosinophils  - continue fluocinolone 0.01% BID  5. Hx of eczema    CC: consult  (Left upper arm )      Subjective: Jennifer Steiner is a 74 year old female who presents today for Mohs micrographic surgery consultation for a recent diagnosis of skin cancer.  - Skin cancer(s):  Atypical junctional melanocytic proliferation  - Location(s): Left medial upper arm  - Pt's first excision.  - Today, she had concerns about her risk for cancer.  - no other concerns today       Objective: An exam of the left arm was performed today   - Hyperpigmented macule on the upper left arm.  - See photo from bx date 10/07/24 below.      Path report:   Case: [id number]  Date collected: 10/07/24  Final Diagnosis   A(1). Skin, L upper medial arm, shave:  - Atypical junctional melanocytic proliferation       Assessment and Plan:     1. Plan for excision for skin cancer(s) above:  *Review lab result(s): Dermpath report   - We discussed the nature of the diagnosis/condition above. We discussed the treatment options, including the risks benefits and expectations of these options. We recommend excision as the most effective and most tissue sparing option for treatment, and the patient agrees to proceed with this.  The patient is aware of the risks, benefits and expectations of this procedure. The patient will be scheduled for this procedure, if not already done so.  - We anticipate the following closure type: intermediate closure    Patient was discussed with and evaluated by attending physician Dr. Garcia  Cindy and Dr. Jessee Orozco, MDSO fellow, PGY5    Staff Involved:  Staff/Scribe/Fellow    Scribe Disclosure:   I, Irma Amanda, am serving as a scribe; to document services personally performed by Jose White MD, based on data collection and the provider's statements to me.     Provider Disclosure:  I agree with the above history, review of systems, physical exam and plan.  I have reviewed the content of the documentation and have edited it as needed. I have personally performed the services documented here and the documentation accurately represents those services and the decisions I have made.      Electronically signed by:  Attending Attestation  I attest that the Fellow recorded the interview and exam that I personally performed.  I have reviewed the note and edited it as necessary.    Jose White M.D.  Professor  Director of Dermatologic Surgery  Department of Dermatology  [facility]

## 2024-12-12 ENCOUNTER — OFFICE VISIT (OUTPATIENT)
Dept: OPHTHALMOLOGY | Facility: CLINIC | Age: 74
End: 2024-12-12
Attending: OPHTHALMOLOGY
Payer: MEDICARE

## 2024-12-12 DIAGNOSIS — H02.88B MEIBOMIAN GLAND DYSFUNCTION (MGD), BILATERAL, BOTH UPPER AND LOWER LIDS: ICD-10-CM

## 2024-12-12 DIAGNOSIS — H01.02A SQUAMOUS BLEPHARITIS OF UPPER AND LOWER EYELIDS OF BOTH EYES: ICD-10-CM

## 2024-12-12 DIAGNOSIS — H02.88A MEIBOMIAN GLAND DYSFUNCTION (MGD), BILATERAL, BOTH UPPER AND LOWER LIDS: ICD-10-CM

## 2024-12-12 DIAGNOSIS — H01.02B SQUAMOUS BLEPHARITIS OF UPPER AND LOWER EYELIDS OF BOTH EYES: ICD-10-CM

## 2024-12-12 DIAGNOSIS — H04.123 BILATERAL DRY EYES: Primary | ICD-10-CM

## 2024-12-12 DIAGNOSIS — Z96.1 PSEUDOPHAKIA OF BOTH EYES: ICD-10-CM

## 2024-12-12 PROCEDURE — 92015 DETERMINE REFRACTIVE STATE: CPT | Mod: GY

## 2024-12-12 RX ORDER — NEOMYCIN SULFATE, POLYMYXIN B SULFATE, AND DEXAMETHASONE 3.5; 10000; 1 MG/G; [USP'U]/G; MG/G
0.5 OINTMENT OPHTHALMIC AT BEDTIME
Qty: 3.5 G | Refills: 5 | Status: SHIPPED | OUTPATIENT
Start: 2024-12-12

## 2024-12-12 ASSESSMENT — REFRACTION_WEARINGRX
OD_AXIS: 147
OS_SPHERE: -0.50
OD_AXIS: 147
OD_SPHERE: +1.75
OS_SPHERE: +2.00
OS_AXIS: 090
OD_CYLINDER: +0.75
OS_CYLINDER: +0.75
OS_AXIS: 090
OD_CYLINDER: +0.75
OS_CYLINDER: +0.75
OD_SPHERE: -0.75

## 2024-12-12 ASSESSMENT — REFRACTION_MANIFEST
OD_CYLINDER: +0.75
OS_AXIS: 110
OS_CYLINDER: +0.25
OS_ADD: +2.75
OD_ADD: +2.75
OS_SPHERE: PLANO
OD_AXIS: 005
OD_SPHERE: -1.00

## 2024-12-12 ASSESSMENT — CONF VISUAL FIELD
OD_NORMAL: 1
OS_SUPERIOR_NASAL_RESTRICTION: 0
OS_NORMAL: 1
OS_INFERIOR_TEMPORAL_RESTRICTION: 0
OD_INFERIOR_NASAL_RESTRICTION: 0
OD_SUPERIOR_NASAL_RESTRICTION: 0
OS_SUPERIOR_TEMPORAL_RESTRICTION: 0
METHOD: COUNTING FINGERS
OD_INFERIOR_TEMPORAL_RESTRICTION: 0
OS_INFERIOR_NASAL_RESTRICTION: 0
OD_SUPERIOR_TEMPORAL_RESTRICTION: 0

## 2024-12-12 ASSESSMENT — CUP TO DISC RATIO
OS_RATIO: 0.3
OD_RATIO: 0.3

## 2024-12-12 ASSESSMENT — TONOMETRY
OD_IOP_MMHG: 12
IOP_METHOD: ICARE
OS_IOP_MMHG: 13

## 2024-12-12 ASSESSMENT — VISUAL ACUITY
OD_PH_CC+: +1
METHOD: SNELLEN - LINEAR
OD_CC+: +1
OS_PH_CC: 20/20
OD_PH_CC: 20/25
CORRECTION_TYPE: GLASSES
OD_CC: 20/30
OS_PH_CC+: +2
OS_CC: 20/25

## 2024-12-12 ASSESSMENT — EXTERNAL EXAM - LEFT EYE: OS_EXAM: NORMAL

## 2024-12-12 NOTE — PROGRESS NOTES
HPI       Dry Eye(s) Both Eyes     Additional comments: 10 month follow up both eyes             Comments    Pt states vision is about the same as last visit. Pt has separate glasses for distance and near. Itching and burning in both eyes for the past month, usually worse with the cold weather. Pt using ointment and needing a refill today. No DM.    IVANA Cifuentes December 12, 2024 1:43 PM              Last edited by Tammie Mejia COMT on 12/12/2024  1:48 PM.          Review of systems for the eyes was negative other than the pertinent positives/negatives listed in the HPI.      Assessment & Plan      Jennifer Steiner is a 74 year old female with the following diagnoses:   1. Bilateral dry eyes    2. Squamous blepharitis of upper and lower eyelids of both eyes    3. Meibomian gland dysfunction (MGD), bilateral, both upper and lower lids    4. Pseudophakia of both eyes         Here for annual dilated fundus exam   s/p CE IOL, left eye 8/15/22  s/p CE IOL, right eye 9/12/22  Minimal posterior capsular opacity (PCO), not visually significant   Monitor      Intraocular pressure remains excellent  OK to continue using maxitrol gtts twice a day and ointment at bedtime as needed  Continue artificial tears and warm compresses  Return precautions reviewed     Refractive options reviewed  Refraction given     Patient disposition:   Return in about 1 year (around 12/12/2025) for DFE.           Attending Physician Attestation:  Complete documentation of historical and exam elements from today's encounter can be found in the full encounter summary report (not reduplicated in this progress note).  I personally obtained the chief complaint(s) and history of present illness.  I confirmed and edited as necessary the review of systems, past medical/surgical history, family history, social history, and examination findings as documented by others; and I examined the patient myself.  I personally reviewed the relevant  tests, images, and reports as documented above.  I formulated and edited as necessary the assessment and plan and discussed the findings and management plan with the patient and family. . - Damir Cunningham MD

## 2024-12-16 ENCOUNTER — OFFICE VISIT (OUTPATIENT)
Dept: DERMATOLOGY | Facility: CLINIC | Age: 74
End: 2024-12-16
Payer: MEDICARE

## 2024-12-16 ENCOUNTER — TELEPHONE (OUTPATIENT)
Dept: DERMATOLOGY | Facility: CLINIC | Age: 74
End: 2024-12-16

## 2024-12-16 VITALS — DIASTOLIC BLOOD PRESSURE: 72 MMHG | HEART RATE: 79 BPM | SYSTOLIC BLOOD PRESSURE: 128 MMHG

## 2024-12-16 DIAGNOSIS — D48.5 ATYPICAL INTRAEPIDERMAL MELANOCYTIC PROLIFERATION: ICD-10-CM

## 2024-12-16 PROCEDURE — 88305 TISSUE EXAM BY PATHOLOGIST: CPT | Mod: TC | Performed by: DERMATOLOGY

## 2024-12-16 PROCEDURE — 88305 TISSUE EXAM BY PATHOLOGIST: CPT | Mod: 26 | Performed by: PATHOLOGY

## 2024-12-16 NOTE — TELEPHONE ENCOUNTER
Follow up call completed following Mohs procedure with Dr. White.       Are you having pain? no  Are you taking pain medication? no  Are you applying ice?  no  Have you had any noticeable bleeding through the bandage? no   Do you have any other concerns? Pt wondering about 3 month scar eval-educated that this is for cosmetic reasons, pt declined scheduling at this time. Educated pt if she has any questions or concerns to let us know. Pt verbalized understanding.       Please call [phone] if you have any questions or concerns.    Kelli GRANT RN  Dermatology Surgery

## 2024-12-16 NOTE — PROGRESS NOTES
DERMATOLOGY EXCISION PROCEDURE NOTE    Dermatology Problem List:  #FBSE 10/07/2024  1. Atypical junctional melanocytic proliferation, L medial upper arm, s/p shave bx 10/07/2024, s/p excision 12/16/24  2. Seborrheic dermatitis, scalp  3. Tinea pedis  - Current tx: vinegar soaks, ketoconazole 2% cream   4. Scalp pruritus  - s/p punch biopsy 10/11/21 resulting as sparse perivascular and perifollicular lymphohistiocytic infiltrate with rare eosinophils  - continue fluocinolone 0.01% BID  5. Hx of eczema    NAME OF PROCEDURE: Excision intermediate layered linear closure  Staff surgeon: Jose White MD  Fellow: Jessee Orozco MD  Scrub Nurse: Melyssa dennison    PRE-OPERATIVE DIAGNOSIS:  Atypical junctional melanocytic proliferation   POST-OPERATIVE DIAGNOSIS: Same   LOCATION: Left medial upper arm  FINAL EXCISION SIZE(DEFECT SIZE): 2.1 x 1.7 cm  MARGIN: 0.5 cm  FINAL REPAIR LENGTH: 4.8 cm   ANESTHESIA: 8 ml 1% lidocaine with 1:100,000 epinephrine    INDICATIONS: This patient presented with a 1.1 x 0.7 cm atypical junctional melanocytic proliferation . Excision was indicated. We discussed the principles of treatment and most likely complications including scarring, bleeding, infection, incomplete excision, wound dehiscence, pain, nerve damage, and recurrence. Informed consent was obtained and the patient underwent the procedure as follows:    PROCEDURE: The patient was taken to the operative suite. Time-out was performed.  The treatment area was anesthetized with 1% lidocaine with epinephrine. The area was prepped with Chlorhexidine and rinsed with sterile saline and draped with sterile towels. The lesion was delineated and excised down to subcutaneous fat in a elliptical manner. Hemostasis was obtained by electrocoagulation.     REPAIR: An intermediate layered linear closure was selected as the procedure which would maximally preserve both function and cosmesis.    After the excision of the tumor, the area was carefully  undermined. Hemostasis was obtained with electrocoagulation.  Closure was oriented so that the wound was in the patient's natural skin tension lines. The subcutaneous and dermal layers were then closed with 4-0 monocryl sutures. The epidermis was then carefully approximated along the length of the wound using 4-0 monocryl running subcuticular sutures.     Estimated blood loss was less than 10 ml for all surgical sites. A sterile pressure dressing was applied and wound care instructions, with a written handout, were given. The patient was [facility] alert and ambulatory.    The patient elected for pathology results to automatically release and understands that the clinical staff will contact them as soon as possible to notify them of the results.    Dr. Jessee Orozco staffed the patient, Dr. Jsoe White was immediately available for the entire surgery and was physicially present for the key portions of the procedure.    Anatomic Pathology Results: pending    Clinical Follow-Up: As needed with derm surgery and regularly with general dermatology for annual skin exams.    Staff Involved:  Staff/Scribe/Fellow    Scribe Disclosure:   I, Irma Earlfield, am serving as a scribe; to document services personally performed by Jose White MD, based on data collection and the provider's statements to me.     Provider Disclosure:  I agree with the above history, review of systems, physical exam and plan.  I have reviewed the content of the documentation and have edited it as needed. I have personally performed the services documented here and the documentation accurately represents those services and the decisions I have made.      Electronically signed by:  Jessee Orozco MD

## 2024-12-16 NOTE — PATIENT INSTRUCTIONS
Wound care    I will experience scar, bleeding, swelling, pain, crusting and redness. I may experience incomplete removal or recurrence. Risks are bleeding, bruising, swelling, infection, nerve damage, & a large wound. A second procedure may be recommended to obtain the best cosmetic or functional result.       A three month office visit with your Surgeon is recommended for scar evaluation. Please reach out sooner if you have concerns about you surgical site/wound.    Caring for your skin after surgery    After your surgery, a pressure bandage will be placed over the area that has stitches. This is important to prevent bleeding. Please follow these instructions over the next 1 to 2 weeks. Following this regimen will help to prevent complications as your wound heals.     For the first 48 hours after your surgery:    Leave the pressure dressing on and keep it dry. If it should come loose, you may re-tape it, but do not take it off.  Relax and take it easy. Do not do any vigorous exercise or heavy lifting. This could cause the wound to bleed.  Post-Operative pain is usually mild. If you are able to take tylenol, You may take plain or extra-strength Tylenol (acetaminophen) As directed on the bottle (do not take more than 4,000mg in one day). If you are able to take ibuprofen, you can alternate the tylenol and ibuprofen.   Avoid alcohol as this may increase your tendency to bleed.   You may put an ice pack around the bandaged area for 20 minutes at a time as needed. This may help reduce swelling, bruising, and pain. Make sure the ice pack is waterproof so that the pressure bandage doesn t get wet.  If the wound is on the face try to sleep with your head elevated. Either in a recliner or propped up in bed, this will decrease swelling around the eyes.   You may see a small amount of drainage or blood on your pressure bandage. This is normal. However:  If drainage or bleeding continues or saturates the bandage, you will  need to apply firm pressure over the bandage with a piece of gauze for 15 minutes.  If bleeding continues after applying pressure for 15 minutes, apply an ice pack to the bandaged area for 15 minutes.  If bleeding still continues, call our office or go to the nearest emergency room.    Remove pressure dressing 48 hours after surgery:    Carefully remove the pressure bandage. If it seems sticky or too difficult to get off, you may need to soak it off in the shower.  After the pressure dressing is removed, you may shower and get the wound wet. However, Do Not let the forceful stream of the shower hit the wound directly.  Follow these wound care and dressing change instructions:    You have skin glue over the stitches. This will dry and flake off with time (about 2 weeks). If the stitches are still hanging around after 2 weeks, let us know, we can clip them out for you if they do not fall out with washing.   You may allow water to run over the site. Do not soak.  Do Not rub or scrub the site.  Pat dry after the shower or bath.  Avoid topical medications, lotions, creams, ointment,or oils.  Do not use tanning lamps or expose the site to sun.   Check wound appearance daily, some swelling and redness is normal after a procedure but should go away as your would is healing. If the swelling and redness or pain increases or if any other signs of infection occur listed below please send in a photo via my chart and or call us to let us know.  The clear glue film should start peeling and flaking off approximately around 2 weeks. By this time your wound should be sufficiently healed. If it still looks to be healing when the glue comes off you can clean the wound with soapy warm water daily in the shower. No need to bandage further, but you can put a bandage on the area daily for the two weeks if you would like.   If skin glue and sutures are still intact at 2 weeks after your procedure, you can start applying Vaseline daily to  "help soften up the skin glue. It will come off easier this way.        If you are able to take acetaminophen (\"Tylenol,\" etc.) and ibuprofen (\"Advil\" or \"Motrin,\" etc.), then you may STAGGER these medications by taking 400 mg of ibuprofen (usually two tabs) every 8 hours and 1,000 mg of acetaminophen (e.g., two tabs of extra-strength Tylenol) every 8 hours.    This means, for example, that you could take the followin,000 mg of Tylenol, followed 4 hours later by 400 mg Ibuprofen, followed 4 hours later with 1,000 mg of Tylenol, followed 4 hours later by 400 mg Ibuprofen, followed 4 hours later with 1,000 mg of Tylenol, and so forth.     Essentially, you can take either 1,000 mg of Tylenol or 400 mg of ibuprofen in alternating fashion EVERY FOUR HOURS.    Do NOT exceed more than 4,000 mg of Tylenol or 3,200 mg of ibuprofen per 24 hours. If you are not able to take Tylenol or ibuprofen as above due to other health issues (or a physician has told you directly that you are not allowed to take one of them, say due to pre-existing severe liver or kidney issues), then disregard the above directions.    Scientific evidence supports that this combination/schedule of pain medications is just as effective, if not more effective, than taking a narcotic pain medicine.       Follow up will be a 3 month scar evaluation either in person or via a telephone visit with you sending in a photo via [employer]. Unless you have been told to follow up sooner or if you have concerns and would like to be see sooner. Please call or send us in a [employer] message if possible and attach a photo.        What to expect:    The first couple of days your wound may be tender and may bleed slightly when doing wound care.  There may be swelling and bruising around the wound, especially if it is near the eyes. For your comfort, you may apply ice or cold compresses to the bruises after your have removed the pressure bandage.  The area around your wound may " be numb for several weeks or even months.  You may experience periodic sharp pain or mild itching around the wound as it heals.   The suture line will look dark for a while but will lighten over time.       When to call us:    You have bleeding that will not stop after applying pressure and ice.  You have pain that is not controlled with Tylenol (acetaminophen.)  You have signs or symptoms of an infection such as:  Fever over 100 degrees Fahrenheit  Redness, warmth or foul-smelling drainage from the wound  If you have any questions, or are not sure how to take care of the wound.    Phone numbers:    During business hours (M-F 8:00-4:30 p.m.)  [facility]-  [phone] Option 1 appt. Desk and ask for the Dermatology Surgery Team  [phone] [facility] .     ---------------------------------------------------------  Evenings/Weekends/Holidays  Hospital - [phone]   TTY for hearing ujpszttp-[phone]  *Ask  to page dermatologist on-call  Emergency Pkvz-[phone]  TTY for hearing impaired- [phone]

## 2024-12-16 NOTE — LETTER
12/16/2024       RE: Jennifer Steiner  [address]  [address]  [place] [address]     Dear Colleague,    Thank you for referring your patient, Jennifer Steiner, to the [facility] [place] at [facility]. Please see a copy of my visit note below.    DERMATOLOGY EXCISION PROCEDURE NOTE    Dermatology Problem List:  #FBSE 10/07/2024  1. Atypical junctional melanocytic proliferation, L medial upper arm, s/p shave bx 10/07/2024, s/p excision 12/16/24  2. Seborrheic dermatitis, scalp  3. Tinea pedis  - Current tx: vinegar soaks, ketoconazole 2% cream   4. Scalp pruritus  - s/p punch biopsy 10/11/21 resulting as sparse perivascular and perifollicular lymphohistiocytic infiltrate with rare eosinophils  - continue fluocinolone 0.01% BID  5. Hx of eczema    NAME OF PROCEDURE: Excision intermediate layered linear closure  Staff surgeon: Jose White MD  Fellow: Jessee Orozco MD  Scrub Nurse: Melyssa dennison    PRE-OPERATIVE DIAGNOSIS:  Atypical junctional melanocytic proliferation   POST-OPERATIVE DIAGNOSIS: Same   LOCATION: Left medial upper arm  FINAL EXCISION SIZE(DEFECT SIZE): 2.1 x 1.7 cm  MARGIN: 0.5 cm  FINAL REPAIR LENGTH: 4.8 cm   ANESTHESIA: 8 ml 1% lidocaine with 1:100,000 epinephrine    INDICATIONS: This patient presented with a 1.1 x 0.7 cm atypical junctional melanocytic proliferation . Excision was indicated. We discussed the principles of treatment and most likely complications including scarring, bleeding, infection, incomplete excision, wound dehiscence, pain, nerve damage, and recurrence. Informed consent was obtained and the patient underwent the procedure as follows:    PROCEDURE: The patient was taken to the operative suite. Time-out was performed.  The treatment area was anesthetized with 1% lidocaine with epinephrine. The area was prepped with Chlorhexidine and rinsed with sterile saline and draped with  sterile towels. The lesion was delineated and excised down to subcutaneous fat in a elliptical manner. Hemostasis was obtained by electrocoagulation.     REPAIR: An intermediate layered linear closure was selected as the procedure which would maximally preserve both function and cosmesis.    After the excision of the tumor, the area was carefully undermined. Hemostasis was obtained with electrocoagulation.  Closure was oriented so that the wound was in the patient's natural skin tension lines. The subcutaneous and dermal layers were then closed with 4-0 monocryl sutures. The epidermis was then carefully approximated along the length of the wound using 4-0 monocryl running subcuticular sutures.     Estimated blood loss was less than 10 ml for all surgical sites. A sterile pressure dressing was applied and wound care instructions, with a written handout, were given. The patient was discharged from the [facility] alert and ambulatory.    The patient elected for pathology results to automatically release and understands that the clinical staff will contact them as soon as possible to notify them of the results.    Dr. Jessee Orozco staffed the patient, Dr. Jose White was immediately available for the entire surgery and was physicially present for the key portions of the procedure.    Anatomic Pathology Results: pending    Clinical Follow-Up: As needed with derm surgery and regularly with general dermatology for annual skin exams.    Staff Involved:  Staff/Scribe/Fellow    Scribe Disclosure:   I, Irma Patel, am serving as a scribe; to document services personally performed by Jose White MD, based on data collection and the provider's statements to me.     Provider Disclosure:  I agree with the above history, review of systems, physical exam and plan.  I have reviewed the content of the documentation and have edited it as needed. I have personally performed the services documented here and the  documentation accurately represents those services and the decisions I have made.      Electronically signed by:  Jessee Orozco MD     Attestation signed by Jose White MD at 12/18/2024  8:56 AM:    Attending attestation:  I was present for key elements of the procedure and immediately available for all other portions of the procedure.  I have reviewed the note and edited it as necessary.    Jose White M.D.  Professor  Director of Dermatologic Surgery  Department of Dermatology  [facility]    [place] [address]      Again, thank you for allowing me to participate in the care of your patient.      Sincerely,    Jose Whtie MD     no

## 2024-12-16 NOTE — NURSING NOTE
Chief Complaint   Patient presents with    Derm Problem     L upper medial arm     Melyssa RN-BSN  Dermatology Surgery

## 2024-12-18 LAB
PATH REPORT.COMMENTS IMP SPEC: NORMAL
PATH REPORT.COMMENTS IMP SPEC: NORMAL
PATH REPORT.FINAL DX SPEC: NORMAL
PATH REPORT.GROSS SPEC: NORMAL
PATH REPORT.MICROSCOPIC SPEC OTHER STN: NORMAL
PATH REPORT.RELEVANT HX SPEC: NORMAL

## 2024-12-23 DIAGNOSIS — H01.02A SQUAMOUS BLEPHARITIS OF UPPER AND LOWER EYELIDS OF BOTH EYES: ICD-10-CM

## 2024-12-23 DIAGNOSIS — H01.02B SQUAMOUS BLEPHARITIS OF UPPER AND LOWER EYELIDS OF BOTH EYES: ICD-10-CM

## 2024-12-23 DIAGNOSIS — H04.123 BILATERAL DRY EYES: ICD-10-CM

## 2024-12-26 RX ORDER — NEOMYCIN SULFATE, POLYMYXIN B SULFATE AND DEXAMETHASONE 3.5; 10000; 1 MG/ML; [USP'U]/ML; MG/ML
1-2 SUSPENSION/ DROPS OPHTHALMIC 2 TIMES DAILY
Qty: 5 ML | Refills: 3 | Status: SHIPPED | OUTPATIENT
Start: 2024-12-26

## 2024-12-26 NOTE — TELEPHONE ENCOUNTER
Rx for anna marie/poly/dex eye drops for use as needed 2 times sent per request.    Last visit in December with recommendation to follow up in one year.    Thomas Benson RN 12:11 PM 12/26/24

## 2024-12-26 NOTE — TELEPHONE ENCOUNTER
Medication: neomycin-polymyxin-dexAMETHasone (MAXITROL) 3.5-19543-4.1 SUSP ophthalmic susp     Requested directions:   Route: Place 1-2 drops into both eyes At Bedtime - Both Eyes   Class: Local Print       Last Written Prescription Date:  5-3-23  Last Fill Quantity: 5 ml,   # refills: 3    Last Office Visit: 12-12-24  Future Office visit: 12-16-25    Attending Provider: Octavio  Last Clinic Note: OK to continue using maxitrol gtts twice a day and ointment at bedtime as needed     Routing refill request to provider for review/approval because:    Not on protocol  Requires provider review  Inconsistent directions

## 2024-12-27 PROBLEM — G57.02 PIRIFORMIS SYNDROME OF LEFT SIDE: Status: ACTIVE | Noted: 2024-12-27

## 2025-01-06 ENCOUNTER — THERAPY VISIT (OUTPATIENT)
Dept: PHYSICAL THERAPY | Facility: CLINIC | Age: 75
End: 2025-01-06
Payer: MEDICARE

## 2025-01-06 DIAGNOSIS — M48.062 SPINAL STENOSIS OF LUMBAR REGION WITH NEUROGENIC CLAUDICATION: Primary | ICD-10-CM

## 2025-01-06 DIAGNOSIS — G57.02 PIRIFORMIS SYNDROME OF LEFT SIDE: ICD-10-CM

## 2025-01-06 PROCEDURE — 97110 THERAPEUTIC EXERCISES: CPT | Mod: GP | Performed by: PHYSICAL THERAPIST

## 2025-01-06 PROCEDURE — 97530 THERAPEUTIC ACTIVITIES: CPT | Mod: GP | Performed by: PHYSICAL THERAPIST

## 2025-02-10 ENCOUNTER — TRANSFERRED RECORDS (OUTPATIENT)
Dept: HEALTH INFORMATION MANAGEMENT | Facility: CLINIC | Age: 75
End: 2025-02-10
Payer: MEDICARE

## 2025-02-13 ENCOUNTER — ALLIED HEALTH/NURSE VISIT (OUTPATIENT)
Dept: DERMATOLOGY | Facility: CLINIC | Age: 75
End: 2025-02-13
Payer: MEDICARE

## 2025-02-13 DIAGNOSIS — D48.5 ATYPICAL INTRAEPIDERMAL MELANOCYTIC PROLIFERATION: Primary | ICD-10-CM

## 2025-02-13 NOTE — NURSING NOTE
Chief Complaint   Patient presents with    Derm Problem     Wound check L arm, s/p excision on 12/16/24     Jennifer Steiner comes into clinic today at the request of Jose White MD Ordering Provider for Wound check.    Pt here for wound check to L arm. She had noticed some increasing discomfort at her incision a few weeks ago but today reports that it has gotten better. Incision appears well healed and approximated, no surrounding erythema noted today. Advised pt that there can be some discomfort and redness/swelling as the stitches are dissolving and that this process can take about 3 months. Pt verbalized understanding and will let us know if she notices any changes or increasing pain/swelling/redness, etc.     This service provided today was under the supervising provider of the day, Dr Frank, who was available if needed.    Kelli Gallardo RN    
Detail Level: Simple

## 2025-02-19 ENCOUNTER — PRE VISIT (OUTPATIENT)
Dept: UROLOGY | Facility: CLINIC | Age: 75
End: 2025-02-19
Payer: MEDICARE

## 2025-02-19 NOTE — TELEPHONE ENCOUNTER
Reason for visit: Follow-up      Relevant information: Last seen 3/21/24- Myalgia of pelvic floor, High-tone pelvic floor dysfunction in female, Abnormal defecation, History of abdominoplasty, Lower urinary tract symptoms (LUTS), Urgency incontinence    Records/imaging/labs/orders: All records available    Pt called: no need for a call    At Rooming: Have patient empty their bladder and PVR- can't see a recent PVR so either clarify with Dr. Ovalle or if time, PVR    Narda Alex  2/19/2025  4:59 PM

## 2025-02-20 ENCOUNTER — OFFICE VISIT (OUTPATIENT)
Dept: UROLOGY | Facility: CLINIC | Age: 75
End: 2025-02-20
Payer: MEDICARE

## 2025-02-20 VITALS
SYSTOLIC BLOOD PRESSURE: 125 MMHG | WEIGHT: 133 LBS | OXYGEN SATURATION: 97 % | DIASTOLIC BLOOD PRESSURE: 69 MMHG | HEART RATE: 73 BPM | BODY MASS INDEX: 22.71 KG/M2 | HEIGHT: 64 IN

## 2025-02-20 DIAGNOSIS — R39.9 LOWER URINARY TRACT SYMPTOMS (LUTS): ICD-10-CM

## 2025-02-20 DIAGNOSIS — Z98.890 HISTORY OF ABDOMINOPLASTY: ICD-10-CM

## 2025-02-20 DIAGNOSIS — K58.1 IRRITABLE BOWEL SYNDROME WITH CONSTIPATION: ICD-10-CM

## 2025-02-20 DIAGNOSIS — R19.8 ABNORMAL DEFECATION: ICD-10-CM

## 2025-02-20 DIAGNOSIS — M79.18 MYALGIA OF PELVIC FLOOR: ICD-10-CM

## 2025-02-20 DIAGNOSIS — N39.41 URGENCY INCONTINENCE: ICD-10-CM

## 2025-02-20 DIAGNOSIS — M62.89 HIGH-TONE PELVIC FLOOR DYSFUNCTION IN FEMALE: Primary | ICD-10-CM

## 2025-02-20 ASSESSMENT — PAIN SCALES - GENERAL: PAINLEVEL_OUTOF10: NO PAIN (0)

## 2025-02-20 NOTE — NURSING NOTE
"Chief Complaint   Patient presents with    Consult For       Blood pressure 125/69, pulse 73, height 1.626 m (5' 4\"), weight 60.3 kg (133 lb), SpO2 97%, not currently breastfeeding. Body mass index is 22.83 kg/m .    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    High-tone pelvic floor dysfunction in female    Myalgia of pelvic floor    Other constipation    Abnormal defecation    Chronic abdominal pain    Backache    Chronic fatigue syndrome    Food intolerance    Generalized pain    Hemorrhoids    Major depressive disorder, recurrent episode    Symptomatic menopausal or female climacteric states    History of hematuria    Mixed type age-related cataract, both eyes    Lower urinary tract symptoms (LUTS)    Spinal stenosis of lumbar region with neurogenic claudication    Lumbar spondylosis    Radicular pain of lumbosacral region    Meibomian gland dysfunction (MGD), bilateral, both upper and lower lids    History of abdominoplasty    Urgency incontinence    Piriformis syndrome of left side       Allergies   Allergen Reactions    Aspirin GI Disturbance     GI upset    Bee Venom Swelling    Birds [Feathers]     Cats     Cephalosporins     Dust Mites     Fluoride Other (See Comments) and Unknown     headaches    Liothyronine Unknown     Severe gut reactions, ears itching, throat closing    Mold     No Clinical Screening - See Comments Other (See Comments)     Some antibiotics but not Zpak-Can tolerate Azithromycin  z-pack, antibiotics in general- per patient.     " "Procaine Other (See Comments)     headache    Seasonal Allergies      Ragweed, pollen    Simvastatin Other (See Comments)     \"hot flash\", muscle weakness, dizziness    Penicillins Unknown and Rash     As a child    Sulfa Antibiotics Itching, Other (See Comments), Rash and Hives       Current Outpatient Medications   Medication Sig Dispense Refill    Cholecalciferol (VITAMIN D) 2000 UNIT tablet Take 1 tablet by mouth every morning      Digestive Enzymes CAPS daily Use as directed      DiphenhydrAMINE HCl (BENADRYL PO) Take by mouth daily as needed      EPINEPHrine (EPIPEN) 0.3 MG/0.3ML injection 2-pack Inject 0.3 mLs (0.3 mg) into the muscle once as needed for anaphylaxis 0.3 mL 1    estradiol (VAGIFEM) 10 MCG TABS vaginal tablet Place 10 mcg vaginally twice a week      Hypromellose (ARTIFICIAL TEARS OP) Apply 1 drop to eye daily as needed      ketoconazole (NIZORAL) 2 % external cream Apply topically daily as needed for itching To feet and in between the toes 60 g 3    lactulose (CEPHULAC) 10 GM packet Take 1/3 packet daily, mix in water and drink or sprinkle on food 10 packet 1    lactulose (CHRONULAC) 10 GM/15ML solution Take 15 mLs (10 g) by mouth 2 times daily 237 mL 3    MAGNESIUM OXIDE PO Take 6-8 tablets by mouth every evening      neomycin-polymyxin-dexAMETHasone (MAXITROL) 3.5-76750-5.1 ophthalmic ointment Place 0.1429 Applications (0.5 g) into both eyes at bedtime. 0.5 inch strip each eye at night as needed for irritation 3.5 g 5    neomycin-polymyxin-dexAMETHasone (MAXITROL) 3.5-45856-1.1 SUSP ophthalmic susp Place 1-2 drops into both eyes 2 times daily. As needed for eye symptoms. 5 mL 3    peppermint oil liquid 1 mL daily as needed for other Taking gel tabs not oil, not in EMR.      Probiotic Product (ALOE 52006 & PROBIOTICS) CAPS Take by mouth every morning      progesterone (PROMETRIUM) 200 MG capsule Take 1 capsule (200 mg) by mouth every evening      Testosterone Propionate (FIRST-TESTOSTERONE MC) " 2 % CREA Place onto the skin three times a week      thyroid (ARMOUR) 60 MG tablet Take 60 mg by mouth every morning          Social History     Tobacco Use    Smoking status: Never    Smokeless tobacco: Never   Substance Use Topics    Alcohol use: No    Drug use: No       Liss Alicea  2/20/2025  11:21 AM

## 2025-02-20 NOTE — LETTER
"2/20/2025       RE: Jennifer Steiner  [address]  [facility] [address]     Dear Colleague,    Thank you for referring your patient, Jennifer Steiner, to the [facility]OLOG[facility] [place] at [facility]. Please see a copy of my visit note below.    February 20, 2025    Jennifer was seen today for consult for.    Diagnoses and all orders for this visit:    High-tone pelvic floor dysfunction in female    Myalgia of pelvic floor    History of abdominoplasty    Lower urinary tract symptoms (LUTS)    Abnormal defecation    Irritable bowel syndrome with constipation    Urgency incontinence    Continue to work with PT    RTC 6 months (her request) to see GRAHAM     8 minutes were spent today on the day of the encounter in reviewing the EMR including PT note, acupuncture note, labs, direct patient care, coordination of care and documentation    Radha Ovalle MD MPH  (she/her/hers)   of Urology  [facility]    Subjective    Has been working from recovering from a back injury.  Getting PT (reviewed ANA MARIA Brar's note from 12/27/24 in Epic) and acupuncture (reviewed KIRT Kamara' note from 2/19/25 in [facility]) to help with her urinary and GI issues.  She denies any changes in health since last visit    /69   Pulse 73   Ht 1.626 m (5' 4\")   Wt 60.3 kg (133 lb)   LMP  (LMP Unknown)   SpO2 97%   BMI 22.83 kg/m    GENERAL: healthy, alert and no distress  EYES: Eyes grossly normal to inspection, conjunctivae and sclerae normal  HENT: normal cephalic/atraumatic.  External ears, nose and mouth without ulcers or lesions.  RESP: no audible wheeze, cough, or visible cyanosis.  No visible retractions or increased work of breathing.  Able to speak fully in complete sentences.  NEURO: Cranial nerves grossly intact, mentation intact and speech normal  PSYCH: mentation appears normal, affect normal/bright, judgement " and insight intact, normal speech and appearance well-groomed    Labs/imaging/pathology  HgbA1c 11/4/24 5.3    CC  Patient Care Team:  Milan Alvarez MD as PCP - General (Family Medicine)  Ladonna Reaves MD as MD (Specialist)  Samantha Figueroa APRN CNP as Nurse Practitioner (Nurse Practitioner)  Calderon La MD as MD (Cardiology)  Damir Cunningham MD as MD (Ophthalmology)  Thelma Arvizu MD as MD (Internal Medicine)  Kristan Kaba, RN as Registered Nurse  Radha Eubanks MD as MD (Urology)  Gricelda Cabezas, RN as Registered Nurse (Urology)  Pancho Santos OD as MD (Optometry)  Milan Alvarez MD as Referring Physician (Family Practice)  Kim Herrmann APRN CNP as Nurse Practitioner (Nurse Practitioner)  Milan Alvarez MD as Assigned PCP  Dede Ghosh PA-C as Physician Assistant (Dermatology)  Remy Guillaume MD as MD (Otolaryngology)  Armando Jaramillo MD as Fellow (Internal Medicine)  Radha Angeles MD as MD (Physical Medicine and Rehabilitation)  Tierra Messina PA-C as Assigned Musculoskeletal Provider  Radha Angeles MD as Assigned Neuroscience Provider  Yancy Stinson MD as MD (Plastic Surgery)  Dede Ghosh PA-C as Physician Assistant (Dermatology)  Doretha Dai PA-C as Physician Assistant (Gastroenterology)  Radha Eubanks MD as MD (Urology)  Doretha Dai PA-C as Assigned Gastroenterology Provider  Dede Ghosh PA-C as Assigned Surgical Provider  RADHA EUBANKS        Again, thank you for allowing me to participate in the care of your patient.      Sincerely,    Radha Eubanks MD

## 2025-02-20 NOTE — PROGRESS NOTES
"February 20, 2025    Jennifer was seen today for consult for.    Diagnoses and all orders for this visit:    High-tone pelvic floor dysfunction in female    Myalgia of pelvic floor    History of abdominoplasty    Lower urinary tract symptoms (LUTS)    Abnormal defecation    Irritable bowel syndrome with constipation    Urgency incontinence    Continue to work with PT    RTC 6 months (her request) to see GRAHAM     8 minutes were spent today on the day of the encounter in reviewing the EMR including PT note, acupuncture note, labs, direct patient care, coordination of care and documentation    Radha Ovalle MD MPH  (she/her/hers)   of Urology  [facility]    Subjective    Has been working from recovering from a back injury.  Getting PT (reviewed ANA MARIA Brar's note from 12/27/24 in Epic) and acupuncture (reviewed KIRT Kamara' note from 2/19/25 in [facility]) to help with her urinary and GI issues.  She denies any changes in health since last visit    /69   Pulse 73   Ht 1.626 m (5' 4\")   Wt 60.3 kg (133 lb)   LMP  (LMP Unknown)   SpO2 97%   BMI 22.83 kg/m    GENERAL: healthy, alert and no distress  EYES: Eyes grossly normal to inspection, conjunctivae and sclerae normal  HENT: normal cephalic/atraumatic.  External ears, nose and mouth without ulcers or lesions.  RESP: no audible wheeze, cough, or visible cyanosis.  No visible retractions or increased work of breathing.  Able to speak fully in complete sentences.  NEURO: Cranial nerves grossly intact, mentation intact and speech normal  PSYCH: mentation appears normal, affect normal/bright, judgement and insight intact, normal speech and appearance well-groomed    Labs/imaging/pathology  HgbA1c 11/4/24 5.3    CC  Patient Care Team:  Milan Alvarez MD as PCP - General (Family Medicine)  Ladonna Reaves MD as MD (Specialist)  Samantha Figueroa APRN CNP as Nurse Practitioner (Nurse Practitioner)  Calderon La MD " as MD (Cardiology)  Damir Cunningham MD as MD (Ophthalmology)  Thelma Arvizu MD as MD (Internal Medicine)  Kristan Kaba, RN as Registered Nurse  Joe Ovalle MD as MD (Urology)  Gricelda Cabezas, RN as Registered Nurse (Urology)  Pancho Santos OD as MD (Optometry)  Milan Alvarez MD as Referring Physician (Family Practice)  Kim Herrmann APRN CNP as Nurse Practitioner (Nurse Practitioner)  Milan Alvarez MD as Assigned PCP  Dede Ghosh PA-C as Physician Assistant (Dermatology)  Remy Guillaume MD as MD (Otolaryngology)  Armando Jaramillo MD as Fellow (Internal Medicine)  Radha Angeles MD as MD (Physical Medicine and Rehabilitation)  Tierra Messina PA-C as Assigned Musculoskeletal Provider  Radha Angeles MD as Assigned Neuroscience Provider  Yancy Stinson MD as MD (Plastic Surgery)  Dede Ghosh PA-C as Physician Assistant (Dermatology)  Doretha Dai PA-C as Physician Assistant (Gastroenterology)  Joe Ovalle MD as MD (Urology)  Doretha Dai PA-C as Assigned Gastroenterology Provider  Dede Ghosh PA-C as Assigned Surgical Provider  JOE OVALLE

## 2025-02-27 ENCOUNTER — THERAPY VISIT (OUTPATIENT)
Dept: PHYSICAL THERAPY | Facility: CLINIC | Age: 75
End: 2025-02-27
Payer: MEDICARE

## 2025-02-27 DIAGNOSIS — M62.89 HIGH-TONE PELVIC FLOOR DYSFUNCTION IN FEMALE: Primary | ICD-10-CM

## 2025-02-27 DIAGNOSIS — N39.41 URGENCY INCONTINENCE: ICD-10-CM

## 2025-02-27 DIAGNOSIS — R39.9 LOWER URINARY TRACT SYMPTOMS (LUTS): ICD-10-CM

## 2025-02-27 DIAGNOSIS — Z98.890 HISTORY OF ABDOMINOPLASTY: ICD-10-CM

## 2025-02-27 DIAGNOSIS — M79.18 MYALGIA OF PELVIC FLOOR: ICD-10-CM

## 2025-02-27 DIAGNOSIS — R19.8 ABNORMAL DEFECATION: ICD-10-CM
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
## 2025-03-31 ENCOUNTER — TELEPHONE (OUTPATIENT)
Dept: GASTROENTEROLOGY | Facility: CLINIC | Age: 75
End: 2025-03-31
Payer: MEDICARE

## 2025-03-31 NOTE — TELEPHONE ENCOUNTER
Left Voicemail (1st Attempt) for the patient to call back and schedule the following:     Appointment type: return (reschedule)   Provider: Doretha Dai   Return date: next available  Specialty phone number: [phone]  Additional appointment(s) needed:   Additional Notes:

## 2025-04-20 NOTE — PROGRESS NOTES
"April 4, 2019    Return visit    Patient returns today for follow up.   She saw Dr Huber this AM and then had her CT scan.  She denies any changes in her health since last visit.    /62   Pulse 67   Ht 1.613 m (5' 3.5\")   Wt 59 kg (130 lb)   BMI 22.67 kg/m    She is comfortable, in no distress, non-labored breathing.      CT urogram final report not in but based on my review that contributed to her gross hematuria    Cytology negative for high grade malignancy    A/P: 69 year old F with history of gross hematuria s/p negative urologic evaluation, pelvic floor dysfunction    Will let her know the final CT read    At this time she will continue her pelvic floor therapy exercises    RTC prn    15 minutes spent with patient today in discussion    Radha Ovalle MD MPH   of Urology    CC  Patient Care Team:  No Ref-Primary, Physician as PCP - General  Ladonna Reaves MD as MD (Specialist)  Samantha Figueroa APRN CNP as Nurse Practitioner (Nurse Practitioner)  Calderon La MD as MD (Cardiology)  Damir Cunningham MD as MD (Ophthalmology)  Thelma Arvizu MD as MD (Internal Medicine)  Kristan Kaba, RN as Registered Nurse  Vasquez, Radha Young MD as MD (Urology)  Gricelda Cabezas, RN as Registered Nurse (Urology)  Pancho Santos OD as MD (Optometry)  Milan Alvarez MD as Referring Physician (Family Practice)  Kim Herrmann APRN CNP as Nurse Practitioner (Nurse Practitioner)  MILAN ALVAREZ              "
Addended by: RICHARD CLINTON on: 8/25/2017 10:49 AM     Modules accepted: Orders    
No

## 2025-05-27 NOTE — PATIENT INSTRUCTIONS
Wound Care After a Biopsy    What is a skin biopsy?  A skin biopsy allows the doctor to examine a very small piece of tissue under the microscope to determine the diagnosis and the best treatment for the skin condition. A local anesthetic (numbing medicine) is injected with a very small needle into the skin area to be tested. A small piece of skin is taken from the area. Sometimes a suture (stitch) is used.     What are the risks of a skin biopsy?  I will experience scar, bleeding, swelling, pain, crusting and redness. I may experience incomplete removal or recurrence. Risks of this procedure are excessive bleeding, bruising, infection, nerve damage, numbness, thick (hypertrophic or keloidal) scar and non-diagnostic biopsy.    How should I care for my wound for the first 24 hours?  Keep the wound dry and covered for 24 hours  If it bleeds, hold direct pressure on the area for 15 minutes. If bleeding does not stop, call us or go to the emergency room  Avoid strenuous exercise the first 1-2 days or as your doctor instructs you    How should I care for the wound after 24 hours?  After 24 hours, remove the bandage  You may bathe or shower as normal  If you had a scalp biopsy, you can shampoo as usual and can use shower water to clean the biopsy site daily  Clean the wound once a day with gentle soap and water  Do not scrub, be gentle  Apply white petroleum/Vaseline after cleaning the wound with a cotton swab or a clean finger, and keep the site covered with a Bandaid /bandage. Bandages are not necessary with a scalp biopsy  If you are unable to cover the site with a Bandaid /bandage, re-apply ointment 2-3 times a day to keep the site moist. Moisture will help with healing  Avoid strenuous activity for first 1-2 days  Avoid lakes, rivers, pools, and oceans until the stitches are removed or the site is healed    How do I clean my wound?  Wash hands thoroughly with soap or use hand  before all wound care  Clean  the wound with gentle soap and water  Apply white petroleum/Vaseline  to wound after it is clean  Replace the Bandaid /bandage to keep the wound covered for the first few days or as instructed by your doctor  If you had a scalp biopsy, warm shower water to the area on a daily basis should suffice    What should I use to clean my wound?   Cotton-tipped applicators (Qtips )  White petroleum jelly (Vaseline ). Use a clean new container and use Q-tips to apply.  Bandaids  as needed  Gentle soap     How should I care for my wound long term?  Do not get your wound dirty  Keep up with wound care for one week or until the area is healed.    A small scab will form and fall off by itself when the area is completely healed. The area will be red and will become pink in color as it heals. Sun protection is very important for how your scar will turn out. Sunscreen with an SPF 30 or greater is recommended once the area is healed.  You should have some soreness but it should be mild and slowly go away over several days. Talk to your doctor about using tylenol for pain,    When should I call my doctor?  If you have increased:   Pain or swelling  Pus or drainage (clear or slightly yellow drainage is ok)  Temperature over 100F  Spreading redness or warmth around wound    When will I hear about my results?  The biopsy results can take 2 weeks to come back.  Your results will automatically release to [employer] before your provider has even reviewed them.  The clinic will call you with the results, send you a [employer] message, or have you schedule a follow-up clinic or phone time to discuss the results.  Contact our clinics if you do not hear from us in 2 weeks.    Who should I call with questions?  [facility]: [phone]  [facility]: [phone]  For urgent needs outside of business hours call the [facility] at [phone] and ask for the dermatology resident on  call       Checking for Skin Cancer  You can help find cancer early by checking your skin each month. There are 3 main kinds of skin cancer: melanoma, basal cell carcinoma, and squamous cell carcinoma. Doing monthly skin checks is the best way to find new marks, sores, or skin changes. Follow these instructions for checking your skin.   The ABCDEs of checking moles for melanoma   Check your moles or growths for signs of melanoma using ABCDE:   Asymmetry: The sides of the mole or growth don t match.  Border: The edges are ragged, notched, or blurred.  Color: The color within the mole or growth varies. It could be black, brown, tan, white, or shades of red, gray, or blue.  Diameter: The mole or growth is larger than   inch or 6 mm (size of a pencil eraser).  Evolving: The size, shape, texture, or color of the mole or growth is changing.     ABCDE's of moles on light skin.        ABCDE's of moles on dark skin may be harder to identify.     Checking for other types of skin cancer  Basal cell carcinoma or squamous cell carcinoma cause symptoms like:     A spot or mole that looks different from all other marks on your skin  Changes in how an area feels, such as itching, tenderness, or pain  Changes in the skin's surface, such as oozing, bleeding, or scaliness  A sore that doesn't heal  New swelling, redness, or spread of color beyond the border of a mole    Who s at risk?  Anyone of any skin color can get skin cancer. But you're at greater risk if you have:   Fair skin that freckles easily and burns instead of tanning  Light-colored or red hair  Light-colored eyes  Many moles or abnormal moles on your skin  A long history of unprotected exposure to sunlight or tanning beds  A history of many blistering sunburns as a child or teen  A family history of skin cancer  Been exposed to radiation or chemicals  A weakened immune system  Been exposed to arsenic  If you've had skin cancer in the past, you're at high risk of having  it again.   How to check your skin  Do your monthly skin checkups in front of a full-length mirror. Use a room with good lighting so it's easier to see. Use a hand mirror to look at hard-to-see places like your buttocks and back. You can also have a trusted friend or family member help you with these checks. Check every part of your body, including your:   Head (ears, face, neck, and scalp)  Torso (front, back, sides, and under breasts)  Arms (tops, undersides, and armpits)  Hands (palms, backs, and fingers, including under the nails)  Lower back, buttocks, and genitals  Legs (front, back, and sides)  Feet (tops, soles, toes, including under the nails, and between toes)  Watch for new spots on your skin or a spot that's changing in color, shape, size.   If you have a lot of moles, take digital photos of them each month. Make sure to take photos both up close and from a distance. These can help you see if any moles change over time.   Know your skin  Most skin changes aren't cancer. But if you see any changes in your skin, call your healthcare provider right away. Only they can tell you if a change is a problem. If you have skin cancer, seeing your provider can be the first step to getting the treatment that could save your life.   Jaclyn last reviewed this educational content on 10/1/2021    0230-9156 [employer]. All rights reserved. This information is not intended as a substitute for professional medical care. Always follow your healthcare professional's instructions.

## 2025-05-27 NOTE — PROGRESS NOTES
[facility] Dermatology Note  Encounter Date: May 28, 2025  Office Visit     Dermatology Problem List:  #FBSE 5/28/25  0. NUB, L upper cutaneous lip, s/p shave bx 5/28/25  1. Atypical junctional melanocytic proliferation, L medial upper arm, s/p shave bx 10/07/2024, s/p excision 12/16/24  2. Seborrheic dermatitis, scalp  3. Tinea pedis  - Current tx: vinegar soaks, ketoconazole 2% cream   4. Scalp pruritus  - s/p punch biopsy 10/11/21 resulting as sparse perivascular and perifollicular lymphohistiocytic infiltrate with rare eosinophils, ddx subtle dermal hypersensitivity reaction or conventional urticaria. No definite features of spongiotic dermatitis or primary alopecia.  - prior tx: fluocinolone 0.01% BID  5. Hx of eczema    ____________________________________________    Assessment & Plan:    # Neoplasm of unspecified behavior of the skin (D49.2) on the L upper cutaneous lip. The differential diagnosis includes BCC.  - Shave biopsy performed today. See procedure section.     # Pilar cyst, L occipital scalp.  - Benign reassurance given.    # Eczematous dermatitis, postauricular area present today.   - Reviewed emollients    # Scalp pruritus, dermal hypersensitivity reaction vs urticarial dermatitis, chronic, defers intervention/(bx 2021). Patient notes warmer temperatures exacerbate this. Discussed repeat biopsy today vs potential nerve conduction study of scalp. Patient prefers more naturopathic approach to treating. Discussed adding Vitamin D solution. Patient agreeable to try.  - Start Calcipotriene 0.005% solution  - Okay to continue to rinse the scalp with water and apple cider vinegar.   - Continue antihistamines as needed.    # History of Atypical junctional melanocytic proliferation, L medial upper arm. No clinical evidence of recurrence.    # Skin cancer screening with multiple benign nevi, solar lentigines    - ABCDEs: Counseled ABCDEs of melanoma: Asymmetry, Border (irregularity),  Color (not uniform, changes in color), Diameter (greater than 6 mm which is about the size of a pencil eraser), and Evolving (any changes in preexisting moles).  - Sun protection: Counseled SPF30+ sunscreen, UPF clothing, sun avoidance, tanning bed avoidance.    # Cherry angiomas and seborrheic keratoses,  Benign, reassurance given.      Procedures Performed:   - Shave biopsy procedure note, location(s): L upper cutaneous lip. After discussion of benefits and risks including but not limited to bleeding, infection, scar, incomplete removal, recurrence, and non-diagnostic biopsy, verbal consent and photographs were obtained. The area was cleaned with isopropyl alcohol. 0.5mL of 1% lidocaine with epinephrine was injected to obtain adequate anesthesia of lesion(s). Shave biopsy at site(s) performed. Hemostasis was achieved with aluminium chloride. Petrolatum ointment and a sterile dressing were applied. The patient tolerated the procedure and no complications were noted. The patient was provided with verbal and written post care instructions.       Follow-up: 6 month(s) in-person, or earlier for new or changing lesions    Staff and Scribe:     Scribe Disclosure:   I, Evelia Gaytan, am serving as a scribe to document services personally performed by Dede Ghosh PA-C based on data collection and the provider's statements to me.     Provider Disclosure:   The documentation recorded by the scribe accurately reflects the services I personally performed and the decisions made by me.    All risks, benefits and alternatives were discussed with patient.  Patient is in agreement and understands the assessment and plan.  All questions were answered.  Sun Screen Education was given.   Return to Clinic in 6 months or sooner as needed.   Dede Ghosh PA-C   [facility]    ____________________________________________    CC: Derm Problem (FBSE- concerns about scalp hives and gets worse when  temperatures are hot, feels bumpy. Spots on abdomen, back, upper lip)    HPI:  Ms. Jennifer Setiner is a(n) 75 year old female who presents today as a return patient for skin check. Last seen in dermatology 12/16/24 by Dr Jose White for excision of AJMP on the L medial upper arm.    Today, patient reports here for a skin check. Reports itchy scalp that gets worse with warmer temperatures. Scalp feels hot and bumpy.   Does not use a shampoo, instead uses a herb based soap and vinegar based rinse which is helpful for soothing the scalp. Sometimes scalp does get dry and flaky. Does have history of eczema as a child.     Patient is otherwise feeling well, without additional skin concerns.    Labs Reviewed:  N/A    Physical Exam:  Vitals: LMP  (LMP Unknown)   SKIN: Full skin, which includes the head/face, both arms, chest, back, abdomen,both legs, genitalia and/or groin buttocks, digits and/or nails, was examined.  - Mild scaling on the postauricular areas.   - No significant scale or erythema noted to the scalp.  - 1.5 cm fluctuant nodule on the L occipital scalp.   - Linear scar on the L upper posterior arm.   - 5 mm pink pearly papule on the L upper cutaneous lip  - Multiple regular brown pigmented macules and papules are identified on the trunk and extremities. .   - Scattered brown macules on sun exposed areas.  - There are dome shaped bright red papules on the trunk and extremities.   - Waxy stuck on papules and plaques on trunk and extremities.   - No other lesions of concern on areas examined.         Medications:  Current Outpatient Medications   Medication Sig Dispense Refill    Cholecalciferol (VITAMIN D) 2000 UNIT tablet Take 1 tablet by mouth every morning      Digestive Enzymes CAPS daily Use as directed      DiphenhydrAMINE HCl (BENADRYL PO) Take by mouth daily as needed      EPINEPHrine (EPIPEN) 0.3 MG/0.3ML injection 2-pack Inject 0.3 mLs (0.3 mg) into the muscle once as needed for anaphylaxis 0.3 mL 1     estradiol (VAGIFEM) 10 MCG TABS vaginal tablet Place 10 mcg vaginally twice a week      Hypromellose (ARTIFICIAL TEARS OP) Apply 1 drop to eye daily as needed      lactulose (CEPHULAC) 10 GM packet Take 1/3 packet daily, mix in water and drink or sprinkle on food 10 packet 1    lactulose (CHRONULAC) 10 GM/15ML solution Take 15 mLs (10 g) by mouth 2 times daily 237 mL 3    MAGNESIUM OXIDE PO Take 6-8 tablets by mouth every evening      neomycin-polymyxin-dexAMETHasone (MAXITROL) 3.5-99477-7.1 ophthalmic ointment Place 0.1429 Applications (0.5 g) into both eyes at bedtime. 0.5 inch strip each eye at night as needed for irritation 3.5 g 5    neomycin-polymyxin-dexAMETHasone (MAXITROL) 3.5-68685-4.1 SUSP ophthalmic susp Place 1-2 drops into both eyes 2 times daily. As needed for eye symptoms. 5 mL 3    peppermint oil liquid 1 mL daily as needed for other Taking gel tabs not oil, not in EMR.      Probiotic Product (ALOE 85119 & PROBIOTICS) CAPS Take by mouth every morning      progesterone (PROMETRIUM) 200 MG capsule Take 1 capsule (200 mg) by mouth every evening      Testosterone Propionate (FIRST-TESTOSTERONE MC) 2 % CREA Place onto the skin three times a week      thyroid (ARMOUR) 60 MG tablet Take 60 mg by mouth every morning       ketoconazole (NIZORAL) 2 % external cream Apply topically daily as needed for itching To feet and in between the toes (Patient not taking: Reported on 5/28/2025) 60 g 3     No current facility-administered medications for this visit.      Past Medical History:   Patient Active Problem List   Diagnosis    Transient cerebral ischemia    Carotid artery dissection    Irritable bowel syndrome with constipation    Screen for colon cancer    Visit for screening mammogram    Encounter for routine gynecological examination    Hypothyroidism    CARDIOVASCULAR SCREENING; LDL GOAL LESS THAN 130    Rash and other nonspecific skin eruption    High cholesterol    Vaginal atrophy    Blepharitis    Pain  disorder associated with psychological factors and medical condition    High-tone pelvic floor dysfunction in female    Myalgia of pelvic floor    Other constipation    Abnormal defecation    Chronic abdominal pain    Backache    Chronic fatigue syndrome    Food intolerance    Generalized pain    Hemorrhoids    Major depressive disorder, recurrent episode    Symptomatic menopausal or female climacteric states    History of hematuria    Mixed type age-related cataract, both eyes    Lower urinary tract symptoms (LUTS)    Spinal stenosis of lumbar region with neurogenic claudication    Lumbar spondylosis    Radicular pain of lumbosacral region    Meibomian gland dysfunction (MGD), bilateral, both upper and lower lids    History of abdominoplasty    Urgency incontinence    Piriformis syndrome of left side     Past Medical History:   Diagnosis Date    Anemia     Asthma     since childhood    Blepharitis     Constipation, chronic     Chronic enema use    CVA (cerebral infarction) 2009    carotid intimal repair, left    Hyperlipidemia age 58    identified age 58    Hypothyroidism     Irritable bowel disease     Mixed type age-related cataract, both eyes     Neck injuries     Pelvic floor dysfunction 2010    Postmenopausal hormone replacement therapy     From Dr. Link    Scoliosis         CC Referred Self, MD  No address on file on close of this encounter.

## 2025-05-28 ENCOUNTER — OFFICE VISIT (OUTPATIENT)
Dept: DERMATOLOGY | Facility: CLINIC | Age: 75
End: 2025-05-28
Attending: PHYSICIAN ASSISTANT
Payer: MEDICARE

## 2025-05-28 DIAGNOSIS — D49.2 NEOPLASM OF UNSPECIFIED BEHAVIOR OF BONE, SOFT TISSUE, AND SKIN: ICD-10-CM

## 2025-05-28 DIAGNOSIS — L29.9 SCALP PRURITUS: ICD-10-CM

## 2025-05-28 DIAGNOSIS — D18.01 CHERRY ANGIOMA: ICD-10-CM

## 2025-05-28 DIAGNOSIS — Z87.898 HISTORY OF ATYPICAL NEVUS: ICD-10-CM

## 2025-05-28 DIAGNOSIS — Z12.83 SKIN CANCER SCREENING: ICD-10-CM

## 2025-05-28 DIAGNOSIS — L81.4 SOLAR LENTIGO: Primary | ICD-10-CM

## 2025-05-28 DIAGNOSIS — L20.82: ICD-10-CM

## 2025-05-28 PROCEDURE — 88305 TISSUE EXAM BY PATHOLOGIST: CPT | Mod: 26 | Performed by: DERMATOLOGY

## 2025-05-28 PROCEDURE — 88305 TISSUE EXAM BY PATHOLOGIST: CPT | Mod: TC | Performed by: PHYSICIAN ASSISTANT

## 2025-05-28 RX ORDER — CALCIPOTRIENE 0.05 MG/ML
1 SOLUTION TOPICAL 2 TIMES DAILY
Qty: 60 ML | Refills: 11 | Status: SHIPPED | OUTPATIENT
Start: 2025-05-28

## 2025-05-28 ASSESSMENT — PAIN SCALES - GENERAL: PAINLEVEL_OUTOF10: NO PAIN (0)

## 2025-05-28 NOTE — LETTER
5/28/2025       RE: Jennifer Steiner  [address]  [facility] [address]     Dear Colleague,    Thank you for referring your patient, Jennifer Steiner, to the [facility] DERMATOLOGY [facility] [place] at [facility]. Please see a copy of my visit note below.      [facility] Dermatology Note  Encounter Date: May 28, 2025  Office Visit     Dermatology Problem List:  #FBSE 5/28/25  0. NUB, L upper cutaneous lip, s/p shave bx 5/28/25  1. Atypical junctional melanocytic proliferation, L medial upper arm, s/p shave bx 10/07/2024, s/p excision 12/16/24  2. Seborrheic dermatitis, scalp  3. Tinea pedis  - Current tx: vinegar soaks, ketoconazole 2% cream   4. Scalp pruritus  - s/p punch biopsy 10/11/21 resulting as sparse perivascular and perifollicular lymphohistiocytic infiltrate with rare eosinophils, ddx subtle dermal hypersensitivity reaction or conventional urticaria. No definite features of spongiotic dermatitis or primary alopecia.  - prior tx: fluocinolone 0.01% BID  5. Hx of eczema    ____________________________________________    Assessment & Plan:    # Neoplasm of unspecified behavior of the skin (D49.2) on the L upper cutaneous lip. The differential diagnosis includes BCC.  - Shave biopsy performed today. See procedure section.     # Pilar cyst, L occipital scalp.  - Benign reassurance given.    # Eczematous dermatitis, postauricular area present today.   - Reviewed emollients    # Scalp pruritus, dermal hypersensitivity reaction vs urticarial dermatitis, chronic, defers intervention/(bx 2021). Patient notes warmer temperatures exacerbate this. Discussed repeat biopsy today vs potential nerve conduction study of scalp. Patient prefers more naturopathic approach to treating. Discussed adding Vitamin D solution. Patient agreeable to try.  - Start Calcipotriene 0.005% solution  - Okay to continue to rinse the scalp with  water and apple cider vinegar.   - Continue antihistamines as needed.    # History of Atypical junctional melanocytic proliferation, L medial upper arm. No clinical evidence of recurrence.    # Skin cancer screening with multiple benign nevi, solar lentigines    - ABCDEs: Counseled ABCDEs of melanoma: Asymmetry, Border (irregularity), Color (not uniform, changes in color), Diameter (greater than 6 mm which is about the size of a pencil eraser), and Evolving (any changes in preexisting moles).  - Sun protection: Counseled SPF30+ sunscreen, UPF clothing, sun avoidance, tanning bed avoidance.    # Cherry angiomas and seborrheic keratoses,  Benign, reassurance given.      Procedures Performed:   - Shave biopsy procedure note, location(s): L upper cutaneous lip. After discussion of benefits and risks including but not limited to bleeding, infection, scar, incomplete removal, recurrence, and non-diagnostic biopsy, verbal consent and photographs were obtained. The area was cleaned with isopropyl alcohol. 0.5mL of 1% lidocaine with epinephrine was injected to obtain adequate anesthesia of lesion(s). Shave biopsy at site(s) performed. Hemostasis was achieved with aluminium chloride. Petrolatum ointment and a sterile dressing were applied. The patient tolerated the procedure and no complications were noted. The patient was provided with verbal and written post care instructions.       Follow-up: 6 month(s) in-person, or earlier for new or changing lesions    Staff and Scribe:     Scribe Disclosure:   Evelia CASTAÑEDA, am serving as a scribe to document services personally performed by Dede Ghosh PA-C based on data collection and the provider's statements to me.     Provider Disclosure:   The documentation recorded by the scribe accurately reflects the services I personally performed and the decisions made by me.    All risks, benefits and alternatives were discussed with patient.  Patient is in agreement and  understands the assessment and plan.  All questions were answered.  Sun Screen Education was given.   Return to Clinic in 6 months or sooner as needed.   Dede Ghosh PA-C   [facility]    ____________________________________________    CC: Derm Problem (FBSE- concerns about scalp hives and gets worse when temperatures are hot, feels bumpy. Spots on abdomen, back, upper lip)    HPI:  Ms. Jennifer Steiner is a(n) 75 year old female who presents today as a return patient for skin check. Last seen in dermatology 12/16/24 by Dr Jose White for excision of AJMP on the L medial upper arm.    Today, patient reports here for a skin check. Reports itchy scalp that gets worse with warmer temperatures. Scalp feels hot and bumpy.   Does not use a shampoo, instead uses a herb based soap and vinegar based rinse which is helpful for soothing the scalp. Sometimes scalp does get dry and flaky. Does have history of eczema as a child.     Patient is otherwise feeling well, without additional skin concerns.    Labs Reviewed:  N/A    Physical Exam:  Vitals: LMP  (LMP Unknown)   SKIN: Full skin, which includes the head/face, both arms, chest, back, abdomen,both legs, genitalia and/or groin buttocks, digits and/or nails, was examined.  - Mild scaling on the postauricular areas.   - No significant scale or erythema noted to the scalp.  - 1.5 cm fluctuant nodule on the L occipital scalp.   - Linear scar on the L upper posterior arm.   - 5 mm pink pearly papule on the L upper cutaneous lip  - Multiple regular brown pigmented macules and papules are identified on the trunk and extremities. .   - Scattered brown macules on sun exposed areas.  - There are dome shaped bright red papules on the trunk and extremities.   - Waxy stuck on papules and plaques on trunk and extremities.   - No other lesions of concern on areas examined.         Medications:  Current Outpatient Medications   Medication Sig Dispense Refill      Cholecalciferol (VITAMIN D) 2000 UNIT tablet Take 1 tablet by mouth every morning       Digestive Enzymes CAPS daily Use as directed       DiphenhydrAMINE HCl (BENADRYL PO) Take by mouth daily as needed       EPINEPHrine (EPIPEN) 0.3 MG/0.3ML injection 2-pack Inject 0.3 mLs (0.3 mg) into the muscle once as needed for anaphylaxis 0.3 mL 1     estradiol (VAGIFEM) 10 MCG TABS vaginal tablet Place 10 mcg vaginally twice a week       Hypromellose (ARTIFICIAL TEARS OP) Apply 1 drop to eye daily as needed       lactulose (CEPHULAC) 10 GM packet Take 1/3 packet daily, mix in water and drink or sprinkle on food 10 packet 1     lactulose (CHRONULAC) 10 GM/15ML solution Take 15 mLs (10 g) by mouth 2 times daily 237 mL 3     MAGNESIUM OXIDE PO Take 6-8 tablets by mouth every evening       neomycin-polymyxin-dexAMETHasone (MAXITROL) 3.5-04156-0.1 ophthalmic ointment Place 0.1429 Applications (0.5 g) into both eyes at bedtime. 0.5 inch strip each eye at night as needed for irritation 3.5 g 5     neomycin-polymyxin-dexAMETHasone (MAXITROL) 3.5-52891-0.1 SUSP ophthalmic susp Place 1-2 drops into both eyes 2 times daily. As needed for eye symptoms. 5 mL 3     peppermint oil liquid 1 mL daily as needed for other Taking gel tabs not oil, not in EMR.       Probiotic Product (ALOE 11389 & PROBIOTICS) CAPS Take by mouth every morning       progesterone (PROMETRIUM) 200 MG capsule Take 1 capsule (200 mg) by mouth every evening       Testosterone Propionate (FIRST-TESTOSTERONE MC) 2 % CREA Place onto the skin three times a week       thyroid (ARMOUR) 60 MG tablet Take 60 mg by mouth every morning        ketoconazole (NIZORAL) 2 % external cream Apply topically daily as needed for itching To feet and in between the toes (Patient not taking: Reported on 5/28/2025) 60 g 3     No current facility-administered medications for this visit.      Past Medical History:   Patient Active Problem List   Diagnosis     Transient cerebral ischemia      Carotid artery dissection     Irritable bowel syndrome with constipation     Screen for colon cancer     Visit for screening mammogram     Encounter for routine gynecological examination     Hypothyroidism     CARDIOVASCULAR SCREENING; LDL GOAL LESS THAN 130     Rash and other nonspecific skin eruption     High cholesterol     Vaginal atrophy     Blepharitis     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     High-tone pelvic floor dysfunction in female     Myalgia of pelvic floor     Other constipation     Abnormal defecation     Chronic abdominal pain     Backache     Chronic fatigue syndrome     Food intolerance     Generalized pain     Hemorrhoids     Major depressive disorder, recurrent episode     Symptomatic menopausal or female climacteric states     History of hematuria     Mixed type age-related cataract, both eyes     Lower urinary tract symptoms (LUTS)     Spinal stenosis of lumbar region with neurogenic claudication     Lumbar spondylosis     Radicular pain of lumbosacral region     Meibomian gland dysfunction (MGD), bilateral, both upper and lower lids     History of abdominoplasty     Urgency incontinence     Piriformis syndrome of left side     Past Medical History:   Diagnosis Date     Anemia      Asthma     since childhood     Blepharitis      Constipation, chronic     Chronic enema use     CVA (cerebral infarction) 2009    carotid intimal repair, left     Hyperlipidemia age 58    identified age 58     Hypothyroidism      Irritable bowel disease      Mixed type age-related cataract, both eyes      Neck injuries      Pelvic floor dysfunction 2010     Postmenopausal hormone replacement therapy     From Dr. Link     Scoliosis         CC Referred Self, MD  No address on file on close of this encounter.      Lidocaine-epinephrine 1-1:123549 % injection   0.5 mL once for one use, starting 5/28/2025 ending 5/28/2025,  2mL disp, R-0, injection  Injected by C. Gloss,  PALLAVI      Again, thank you for allowing me to participate in the care of your patient.      Sincerely,    Dede Ghosh PA-C

## 2025-05-28 NOTE — NURSING NOTE
Dermatology Rooming Note    Jennifer Steiner's goals for this visit include:   Chief Complaint   Patient presents with    Derm Problem     FBSE- concerns about scalp hives and gets worse when temperatures are hot, feels bumpy. Spots on abdomen, back, upper lip     Kaila Jeffers [place]     No difficulties

## 2025-05-28 NOTE — PROGRESS NOTES
Lidocaine-epinephrine 1-1:620764 % injection   0.5 mL once for one use, starting 5/28/2025 ending 5/28/2025,  2mL disp, R-0, injection  Injected by PALLAVI Wolfe

## 2025-05-29 LAB
PATH REPORT.COMMENTS IMP SPEC: ABNORMAL
PATH REPORT.COMMENTS IMP SPEC: ABNORMAL
PATH REPORT.COMMENTS IMP SPEC: YES
PATH REPORT.FINAL DX SPEC: ABNORMAL
PATH REPORT.GROSS SPEC: ABNORMAL
PATH REPORT.MICROSCOPIC SPEC OTHER STN: ABNORMAL
PATH REPORT.RELEVANT HX SPEC: ABNORMAL

## 2025-05-30 ENCOUNTER — RESULTS FOLLOW-UP (OUTPATIENT)
Dept: DERMATOLOGY | Facility: CLINIC | Age: 75
End: 2025-05-30

## 2025-06-18 ENCOUNTER — TELEPHONE (OUTPATIENT)
Dept: DERMATOLOGY | Facility: CLINIC | Age: 75
End: 2025-06-18

## 2025-06-18 NOTE — TELEPHONE ENCOUNTER
Follow up call completed following Mohs procedure with Dr. White.       Are you having pain? NO  Are you taking pain medication? none  Are you applying ice?  YES: off and on  Have you had any noticeable bleeding through the bandage? NO   Do you have any other concerns? NO    Pt scheduled for 2 week and 3 month.        Please call [phone] if you have any questions or concerns during business hours. For concerns after hours, call the hospital  to reach the dermatology resident on call at [phone], option [phone].     Kelli GRANT RN  Dermatology Surgery

## 2025-06-19 ENCOUNTER — TELEPHONE (OUTPATIENT)
Dept: DERMATOLOGY | Facility: CLINIC | Age: 75
End: 2025-06-19
Payer: MEDICARE

## 2025-06-19 NOTE — TELEPHONE ENCOUNTER
[facility] Call Center    Phone Message    May a detailed message be left on voicemail: yes     Reason for Call: Other: Pt would like to schedule her 3 month follow-up with Dr. Jose White. Please call her back. Thank you.      Action Taken: Message routed to:  [facility] Derm    Travel Screening: Not Applicable     Date of Service:

## 2025-06-19 NOTE — TELEPHONE ENCOUNTER
Patient couldn't schedule her 2 week nurse and 3 month scar because she didn't have her calendar . Please schedule when calling her today for follow up.     Janice ANDERSEN CMA

## 2025-06-19 NOTE — TELEPHONE ENCOUNTER
[facility] Call Center    Phone Message    May a detailed message be left on voicemail: yes     Reason for Call: Other: Pt Jennifer would like to schedule 3 month visit with Dr. Jose White for follow-up from surgery. Please call. Thank you.      Action Taken: Message routed to:  [facility] Derm Surgery    Travel Screening: Not Applicable     Date of Service:

## 2025-07-01 DIAGNOSIS — B35.3 TINEA PEDIS OF BOTH FEET: ICD-10-CM

## 2025-07-06 RX ORDER — KETOCONAZOLE 20 MG/G
CREAM TOPICAL DAILY PRN
Qty: 60 G | Refills: 0 | Status: SHIPPED | OUTPATIENT
Start: 2025-07-06

## 2025-07-06 NOTE — TELEPHONE ENCOUNTER
Last Written Prescription:  ketoconazole (NIZORAL) 2 % external cream 60 g 3 8/2/2023     ----------------------  Last Visit Date: 9/18/24  Future Visit Date: 10/22/25  ----------------------      Refill decision:   [x] Medication refilled per  Medication Refill in Ambulatory Care  policy.          Request from pharmacy:  Requested Prescriptions   Pending Prescriptions Disp Refills    ketoconazole (NIZORAL) 2 % external cream 60 g 3     Sig: Apply topically daily as needed for itching. To feet and in between the toes       Antifungal Agents Passed - 7/6/2025  9:23 AM        Passed - Medication is active on med list and the sig matches. RN to manually verify dose and sig if red X/fail.     If the protocol passes (green check), you do not need to verify med dose and sig.    A prescription matches if they are the same clinical intention.    For Example: once daily and every morning are the same.    The protocol can not identify upper and lower case letters as matching and will fail.     For Example: Take 1 tablet (50 mg) by mouth daily     TAKE 1 TABLET (50 MG) BY MOUTH DAILY    For all fails (red x), verify dose and sig.    If the refill does match what is on file, the RN can still proceed to approve the refill request.       If they do not match, route to the appropriate provider.             Passed - Recent (12 month) or future (90 days) visit with authorizing provider's specialty (provided they have been seen in the past 15 months)     The patient must have completed an in-person or virtual visit within the past 12 months or has a future visit scheduled within the next 90 days with the authorizing provider s specialty.  Urgent care and e-visits do not qualify as an office visit for this protocol.          Passed - Medication indicated for associated diagnosis     Medication is associated with one or more of the following diagnoses:     Tinea pedis  Tinea cruris  Tinea corporis  Tinea capitis  Tinea barbae  Tinea  faciei  Tinea manuum  Tinea nigra  Onychomycosis  Cutaneous candidias  Pityriasis versicolor

## 2025-07-10 ENCOUNTER — ALLIED HEALTH/NURSE VISIT (OUTPATIENT)
Dept: DERMATOLOGY | Facility: CLINIC | Age: 75
End: 2025-07-10
Payer: MEDICARE

## 2025-07-10 DIAGNOSIS — C44.91 SKIN CANCER, BASAL CELL: Primary | ICD-10-CM

## 2025-07-10 NOTE — NURSING NOTE
Jennifer Steiner comes into clinic today at the request of Dr. White Ordering Provider for wound check.    Incision appears well approximated. No s/sx of infection present today. Remaining glue/sutures removed. Patient had no other concerns at time of appt.      This service provided today was under the supervising provider of the day Dr. White, who was available if needed.    ZULEYMA Ragsdale

## 2025-07-28 ENCOUNTER — TELEPHONE (OUTPATIENT)
Dept: DERMATOLOGY | Facility: CLINIC | Age: 75
End: 2025-07-28
Payer: MEDICARE

## 2025-07-28 NOTE — TELEPHONE ENCOUNTER
Called and informed pt she can sauna at this point.    Mentioned pt can  some scar away gel or sheets to use on the scar. Pt verbalized understanding.

## 2025-07-28 NOTE — TELEPHONE ENCOUNTER
[facility]    Phone Message    May a detailed message be left on voicemail: yes     Reason for Call: Patient has questions re her mohs on 6/18 - she wants to know if it's okay to take a sauna at this time, also she wants to know about some sort of tape that prevents scarring - can she get some ? Please call back okay to leave VM with info     Action Taken: Message routed to:  [facility] Derm    Travel Screening: Not Applicable     Date of Service:

## 2025-08-20 ENCOUNTER — OFFICE VISIT (OUTPATIENT)
Dept: DERMATOLOGY | Facility: CLINIC | Age: 75
End: 2025-08-20
Payer: MEDICARE

## 2025-08-20 DIAGNOSIS — L29.9 SCALP PRURITUS: ICD-10-CM

## 2025-08-20 DIAGNOSIS — D18.01 CHERRY ANGIOMA: ICD-10-CM

## 2025-08-20 DIAGNOSIS — L20.82: ICD-10-CM

## 2025-08-20 DIAGNOSIS — Z85.828 HISTORY OF NONMELANOMA SKIN CANCER: Primary | ICD-10-CM

## 2025-08-20 DIAGNOSIS — Z12.83 SKIN CANCER SCREENING: ICD-10-CM

## 2025-08-20 DIAGNOSIS — L81.4 SOLAR LENTIGO: ICD-10-CM

## 2025-08-20 DIAGNOSIS — L82.1 SEBORRHEIC KERATOSES: ICD-10-CM

## 2025-08-20 DIAGNOSIS — D22.9 MULTIPLE BENIGN NEVI: ICD-10-CM

## 2025-08-20 RX ORDER — NIACINAMIDE 500 MG
500 TABLET ORAL 2 TIMES DAILY WITH MEALS
Qty: 180 TABLET | Refills: 3 | Status: SHIPPED | OUTPATIENT
Start: 2025-08-20

## 2025-08-20 RX ORDER — CALCIPOTRIENE 0.05 MG/ML
1 SOLUTION TOPICAL 2 TIMES DAILY
Qty: 60 ML | Refills: 11 | Status: SHIPPED | OUTPATIENT
Start: 2025-08-20

## 2025-08-20 ASSESSMENT — PAIN SCALES - GENERAL: PAINLEVEL_OUTOF10: MILD PAIN (2)

## (undated) DEVICE — EYE CANN IRR 27GA ANTERIOR CHAMBER 581280

## (undated) DEVICE — SOL WATER IRRIG 500ML BOTTLE 2F7113

## (undated) DEVICE — CATH FOLEY 14FR 5ML SILICONE LUBRI-SIL 175814

## (undated) DEVICE — GLOVE PROTEXIS MICRO 7.5  2D73PM75

## (undated) DEVICE — LINEN TOWEL PACK X5 5464

## (undated) DEVICE — SUCTION CANISTER BEMIS HI FLOW 006772-901

## (undated) DEVICE — SOL WATER IRRIG 1000ML BOTTLE 2F7114

## (undated) DEVICE — EYE SHIELD PLASTIC

## (undated) DEVICE — GLOVE PROTEXIS W/NEU-THERA 6.5  2D73TE65

## (undated) DEVICE — EYE KNIFE SLIT XSTAR VISITEC 2.6MM 45DEG 373726

## (undated) DEVICE — Device

## (undated) DEVICE — EYE PACK CUSTOM ANTERIOR 30DEG TIP CENTURION PPK6682-04

## (undated) DEVICE — GOWN REINFORCED XXLG 9071

## (undated) DEVICE — DEVICE TISSUE REMOVAL HYSTEROSCOPIC MYOSURE LITE 30-401LITE

## (undated) DEVICE — GOWN XLG DISP 9545

## (undated) DEVICE — GLOVE PROTEXIS BLUE W/NEU-THERA 6.5  2D73EB65

## (undated) DEVICE — EYE CANN IRR 25GA CYSTOTOME 581610

## (undated) DEVICE — EYE TIP IRRIGATION & ASPIRATION POLYMER CVD 0.3MM 8065751512

## (undated) DEVICE — EYE KNIFE STILETTO VISITEC 1.1MM ANG 45DEG SIDEPORT 376620

## (undated) DEVICE — SPECIMEN BAG BEMIS HI FLOW SUCTION WHITE SOCK 533810

## (undated) DEVICE — LINEN GOWN X4 5410

## (undated) DEVICE — ESU GROUND PAD UNIVERSAL W/O CORD

## (undated) DEVICE — PACK CATARACT CUSTOM ASC SEY15CPUMC

## (undated) DEVICE — JELLY LUBRICATING SURGILUBE 2OZ TUBE 0281-0205-02

## (undated) DEVICE — STRAP KNEE/BODY 31143004

## (undated) DEVICE — TUBING SUCTION MEDI-VAC 1/4"X20' N620A

## (undated) DEVICE — TUBING SYS AQUILEX BLUE INFLOW AQL-110 YLW OUTFLOW AQL-111

## (undated) DEVICE — PAD CHUX UNDERPAD 30X36" P3036C

## (undated) DEVICE — SOL NACL 0.9% IRRIG 3000ML BAG 2B7477

## (undated) DEVICE — SEAL SET MYOSURE ROD LENS SCOPE SINGLE USE 40-902

## (undated) DEVICE — DILATOR PROBE UTERINE OS CANAL FINDER 260-610

## (undated) DEVICE — SUCTION MANIFOLD DORNOCH ULTRA CART UL-CL500

## (undated) RX ORDER — CEFAZOLIN SODIUM 2 G/100ML
INJECTION, SOLUTION INTRAVENOUS
Status: DISPENSED
Start: 2019-03-25

## (undated) RX ORDER — EPHEDRINE SULFATE 50 MG/ML
INJECTION, SOLUTION INTRAMUSCULAR; INTRAVENOUS; SUBCUTANEOUS
Status: DISPENSED
Start: 2019-03-25

## (undated) RX ORDER — ACETAMINOPHEN 325 MG/1
TABLET ORAL
Status: DISPENSED
Start: 2022-09-12

## (undated) RX ORDER — ONDANSETRON 2 MG/ML
INJECTION INTRAMUSCULAR; INTRAVENOUS
Status: DISPENSED
Start: 2019-03-25

## (undated) RX ORDER — DEXAMETHASONE SODIUM PHOSPHATE 4 MG/ML
INJECTION, SOLUTION INTRA-ARTICULAR; INTRALESIONAL; INTRAMUSCULAR; INTRAVENOUS; SOFT TISSUE
Status: DISPENSED
Start: 2019-03-25

## (undated) RX ORDER — PROPOFOL 10 MG/ML
INJECTION, EMULSION INTRAVENOUS
Status: DISPENSED
Start: 2019-03-25

## (undated) RX ORDER — FENTANYL CITRATE 50 UG/ML
INJECTION, SOLUTION INTRAMUSCULAR; INTRAVENOUS
Status: DISPENSED
Start: 2022-09-12

## (undated) RX ORDER — SODIUM CHLORIDE 9 MG/ML
INJECTION, SOLUTION INTRAVENOUS
Status: DISPENSED
Start: 2019-03-25

## (undated) RX ORDER — FENTANYL CITRATE 50 UG/ML
INJECTION, SOLUTION INTRAMUSCULAR; INTRAVENOUS
Status: DISPENSED
Start: 2019-03-25

## (undated) RX ORDER — HYDROMORPHONE HYDROCHLORIDE 1 MG/ML
INJECTION, SOLUTION INTRAMUSCULAR; INTRAVENOUS; SUBCUTANEOUS
Status: DISPENSED
Start: 2019-03-25

## (undated) RX ORDER — SIMETHICONE 40MG/0.6ML
SUSPENSION, DROPS(FINAL DOSAGE FORM)(ML) ORAL
Status: DISPENSED
Start: 2022-08-15

## (undated) RX ORDER — ACETAMINOPHEN 325 MG/1
TABLET ORAL
Status: DISPENSED
Start: 2022-08-15

## (undated) RX ORDER — ACETAMINOPHEN 325 MG/10.15ML
LIQUID ORAL
Status: DISPENSED
Start: 2022-09-12

## (undated) RX ORDER — LIDOCAINE HYDROCHLORIDE AND EPINEPHRINE 10; 10 MG/ML; UG/ML
INJECTION, SOLUTION INFILTRATION; PERINEURAL
Status: DISPENSED
Start: 2025-05-28

## (undated) RX ORDER — LIDOCAINE HYDROCHLORIDE 20 MG/ML
INJECTION, SOLUTION EPIDURAL; INFILTRATION; INTRACAUDAL; PERINEURAL
Status: DISPENSED
Start: 2019-03-25